# Patient Record
Sex: FEMALE | Race: WHITE | NOT HISPANIC OR LATINO
[De-identification: names, ages, dates, MRNs, and addresses within clinical notes are randomized per-mention and may not be internally consistent; named-entity substitution may affect disease eponyms.]

---

## 2023-05-22 ENCOUNTER — APPOINTMENT (OUTPATIENT)
Dept: ORTHOPEDIC SURGERY | Facility: CLINIC | Age: 88
End: 2023-05-22
Payer: MEDICARE

## 2023-05-22 DIAGNOSIS — I10 ESSENTIAL (PRIMARY) HYPERTENSION: ICD-10-CM

## 2023-05-22 PROBLEM — Z00.00 ENCOUNTER FOR PREVENTIVE HEALTH EXAMINATION: Status: ACTIVE | Noted: 2023-05-22

## 2023-05-22 PROCEDURE — 24500 CLTX HUMRL SHFT FX W/O MNPJ: CPT | Mod: LT

## 2023-05-22 PROCEDURE — 99204 OFFICE O/P NEW MOD 45 MIN: CPT | Mod: 25

## 2023-05-22 PROCEDURE — 73080 X-RAY EXAM OF ELBOW: CPT | Mod: LT

## 2023-05-22 PROCEDURE — A4565: CPT

## 2023-05-22 PROCEDURE — 73030 X-RAY EXAM OF SHOULDER: CPT | Mod: RT

## 2023-05-22 NOTE — IMAGING
[de-identified] : left elbow exam is limited due to guarding\par hand swelling\par ttp over distal humerus\par rom not assessed\par nvid [Left] : left elbow [FreeTextEntry1] : displaced distal humerus fracture

## 2023-05-22 NOTE — HISTORY OF PRESENT ILLNESS
[de-identified] : 5/22/23:  This is Ms. CARLEY ALLEN  a 89 year old female who comes in today complaining of left elbow pain since her knees buckled on friday night while she was walking up stairs ad she may have banged her elbow on her walker.  She went to [facility] ER and was diagnosed with humerus fx.   [] : no [de-identified] : SN HANNAH

## 2023-05-22 NOTE — ASSESSMENT
[FreeTextEntry1] : The patient was advised of the diagnosis. The natural history of the pathology was explained in full to the patient in layman's terms. All questions were answered. The risks and benefits of surgical and non-surgical treatment alternatives were explained in full to the patient.\par \par A splint was applied.  The importance of ice and elevation were discussed with the patient.  The risks were also discussed such as compartment syndrome and skin breakdown.  They were instructed to never put foreign objects down the splint.  Patients should call for increasing pain, worsening swelling, numbness, extremity discoloration, or any other concerns.\par \par Discussed with the pt and her family that surgery is not a good option due to her age.\par Discussed that it is very likely the elbow will be stiff permanently.\par F/u in 2 weeks for xrays

## 2023-05-30 RX ORDER — IBUPROFEN 600 MG/1
600 TABLET, FILM COATED ORAL EVERY 6 HOURS
Qty: 40 | Refills: 3 | Status: ACTIVE | COMMUNITY
Start: 2023-05-30 | End: 1900-01-01

## 2023-07-11 ENCOUNTER — APPOINTMENT (OUTPATIENT)
Dept: ORTHOPEDIC SURGERY | Facility: CLINIC | Age: 88
End: 2023-07-11
Payer: MEDICARE

## 2023-07-11 DIAGNOSIS — S42.492A OTHER DISPLACED FRACTURE OF LOWER END OF LEFT HUMERUS, INITIAL ENCOUNTER FOR CLOSED FRACTURE: ICD-10-CM

## 2023-07-11 PROCEDURE — 99024 POSTOP FOLLOW-UP VISIT: CPT

## 2023-07-11 PROCEDURE — 73080 X-RAY EXAM OF ELBOW: CPT | Mod: LT

## 2023-07-11 NOTE — ASSESSMENT
[FreeTextEntry1] : The patient was advised of the diagnosis. The natural history of the pathology was explained in full to the patient in layman's terms. All questions were answered. 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

## 2023-07-11 NOTE — IMAGING
[Left] : left elbow [de-identified] : left elbow exam is limited due to guarding\par hand swelling\par no ttp over distal humerus\par rom not assessed\par nvid [FreeTextEntry1] : displaced distal humerus fracture with possible callus formation

## 2023-07-11 NOTE — HISTORY OF PRESENT ILLNESS
[8] : 8 [7] : 7 [Constant] : constant [Nothing helps with pain getting better] : Nothing helps with pain getting better [Retired] : Work status: retired [de-identified] : 7/11/23:  Pt has been wearing long arm splint and is improving.  Pt reports that she has stiffness but pain is tolerable.\par \par 5/22/23:  This is Ms. CARLEY ALLEN  a 89 year old female who comes in today complaining of left elbow pain since her knees buckled on friday night while she was walking up stairs ad she may have banged her elbow on her walker.  She went to [facility] ER and was diagnosed with humerus fx.  \par \par 7/11/23: 7 weeks s/p distal humerus fracture. In splint. [] : no [de-identified] : SN HANNAH

## 2023-07-11 NOTE — DISCUSSION/SUMMARY
[Home] : at home, [unfilled] , at the time of the visit. [Medical Office: ([facility])___] : at the medical office located in  [FreeTextEntry1] : Family provided reassurance after stopping Percocet due to patient confusion.\par Provided rx for Ibuprofen 600 mg q6h prn pain.\par Family encouraged to call with any questions/concerns.

## 2024-02-14 ENCOUNTER — RESULT REVIEW (OUTPATIENT)
Age: 89
End: 2024-02-14

## 2024-02-14 ENCOUNTER — INPATIENT (INPATIENT)
Facility: HOSPITAL | Age: 89
LOS: 21 days | Discharge: NURSING, REHAB OR LONG-TERM CARE | DRG: 64 | End: 2024-03-07
Attending: INTERNAL MEDICINE | Admitting: STUDENT IN AN ORGANIZED HEALTH CARE EDUCATION/TRAINING PROGRAM
Payer: MEDICARE

## 2024-02-14 VITALS
RESPIRATION RATE: 16 BRPM | HEART RATE: 71 BPM | TEMPERATURE: 98 F | SYSTOLIC BLOOD PRESSURE: 128 MMHG | OXYGEN SATURATION: 92 % | DIASTOLIC BLOOD PRESSURE: 56 MMHG

## 2024-02-14 DIAGNOSIS — I63.9 CEREBRAL INFARCTION, UNSPECIFIED: ICD-10-CM

## 2024-02-14 LAB
ALBUMIN SERPL ELPH-MCNC: 3.9 G/DL — SIGNIFICANT CHANGE UP (ref 3.3–5)
ALP SERPL-CCNC: 115 U/L — SIGNIFICANT CHANGE UP (ref 40–120)
ALT FLD-CCNC: 9 U/L — LOW (ref 10–45)
ANION GAP SERPL CALC-SCNC: 12 MMOL/L — SIGNIFICANT CHANGE UP (ref 5–17)
APPEARANCE UR: CLEAR — SIGNIFICANT CHANGE UP
APTT BLD: 26.5 SEC — SIGNIFICANT CHANGE UP (ref 24.5–35.6)
AST SERPL-CCNC: 13 U/L — SIGNIFICANT CHANGE UP (ref 10–40)
BASOPHILS # BLD AUTO: 0.03 K/UL — SIGNIFICANT CHANGE UP (ref 0–0.2)
BASOPHILS NFR BLD AUTO: 0.4 % — SIGNIFICANT CHANGE UP (ref 0–2)
BILIRUB SERPL-MCNC: 0.4 MG/DL — SIGNIFICANT CHANGE UP (ref 0.2–1.2)
BILIRUB UR-MCNC: NEGATIVE — SIGNIFICANT CHANGE UP
BUN SERPL-MCNC: 59 MG/DL — HIGH (ref 7–23)
CALCIUM SERPL-MCNC: 8.7 MG/DL — SIGNIFICANT CHANGE UP (ref 8.4–10.5)
CHLORIDE SERPL-SCNC: 104 MMOL/L — SIGNIFICANT CHANGE UP (ref 96–108)
CO2 SERPL-SCNC: 24 MMOL/L — SIGNIFICANT CHANGE UP (ref 22–31)
COLOR SPEC: YELLOW — SIGNIFICANT CHANGE UP
CREAT SERPL-MCNC: 1.59 MG/DL — HIGH (ref 0.5–1.3)
DIFF PNL FLD: NEGATIVE — SIGNIFICANT CHANGE UP
EGFR: 30 ML/MIN/1.73M2 — LOW
EOSINOPHIL # BLD AUTO: 0.04 K/UL — SIGNIFICANT CHANGE UP (ref 0–0.5)
EOSINOPHIL NFR BLD AUTO: 0.5 % — SIGNIFICANT CHANGE UP (ref 0–6)
GLUCOSE SERPL-MCNC: 145 MG/DL — HIGH (ref 70–99)
GLUCOSE UR QL: NEGATIVE MG/DL — SIGNIFICANT CHANGE UP
HCT VFR BLD CALC: 28.9 % — LOW (ref 34.5–45)
HGB BLD-MCNC: 9.3 G/DL — LOW (ref 11.5–15.5)
IMM GRANULOCYTES NFR BLD AUTO: 0.4 % — SIGNIFICANT CHANGE UP (ref 0–0.9)
INR BLD: 1.32 RATIO — HIGH (ref 0.85–1.18)
KETONES UR-MCNC: NEGATIVE MG/DL — SIGNIFICANT CHANGE UP
LEUKOCYTE ESTERASE UR-ACNC: NEGATIVE — SIGNIFICANT CHANGE UP
LYMPHOCYTES # BLD AUTO: 1.26 K/UL — SIGNIFICANT CHANGE UP (ref 1–3.3)
LYMPHOCYTES # BLD AUTO: 15.9 % — SIGNIFICANT CHANGE UP (ref 13–44)
MCHC RBC-ENTMCNC: 28 PG — SIGNIFICANT CHANGE UP (ref 27–34)
MCHC RBC-ENTMCNC: 32.2 GM/DL — SIGNIFICANT CHANGE UP (ref 32–36)
MCV RBC AUTO: 87 FL — SIGNIFICANT CHANGE UP (ref 80–100)
MONOCYTES # BLD AUTO: 0.71 K/UL — SIGNIFICANT CHANGE UP (ref 0–0.9)
MONOCYTES NFR BLD AUTO: 9 % — SIGNIFICANT CHANGE UP (ref 2–14)
NEUTROPHILS # BLD AUTO: 5.85 K/UL — SIGNIFICANT CHANGE UP (ref 1.8–7.4)
NEUTROPHILS NFR BLD AUTO: 73.8 % — SIGNIFICANT CHANGE UP (ref 43–77)
NITRITE UR-MCNC: NEGATIVE — SIGNIFICANT CHANGE UP
NRBC # BLD: 0 /100 WBCS — SIGNIFICANT CHANGE UP (ref 0–0)
PH UR: 7 — SIGNIFICANT CHANGE UP (ref 5–8)
PLATELET # BLD AUTO: 213 K/UL — SIGNIFICANT CHANGE UP (ref 150–400)
POTASSIUM SERPL-MCNC: 4.3 MMOL/L — SIGNIFICANT CHANGE UP (ref 3.5–5.3)
POTASSIUM SERPL-SCNC: 4.3 MMOL/L — SIGNIFICANT CHANGE UP (ref 3.5–5.3)
PROT SERPL-MCNC: 6.5 G/DL — SIGNIFICANT CHANGE UP (ref 6–8.3)
PROT UR-MCNC: NEGATIVE MG/DL — SIGNIFICANT CHANGE UP
PROTHROM AB SERPL-ACNC: 13.7 SEC — HIGH (ref 9.5–13)
RBC # BLD: 3.32 M/UL — LOW (ref 3.8–5.2)
RBC # FLD: 17.4 % — HIGH (ref 10.3–14.5)
SODIUM SERPL-SCNC: 140 MMOL/L — SIGNIFICANT CHANGE UP (ref 135–145)
SP GR SPEC: >1.03 — HIGH (ref 1–1.03)
TROPONIN T, HIGH SENSITIVITY RESULT: 83 NG/L — HIGH (ref 0–51)
UROBILINOGEN FLD QL: 0.2 MG/DL — SIGNIFICANT CHANGE UP (ref 0.2–1)
WBC # BLD: 7.92 K/UL — SIGNIFICANT CHANGE UP (ref 3.8–10.5)
WBC # FLD AUTO: 7.92 K/UL — SIGNIFICANT CHANGE UP (ref 3.8–10.5)

## 2024-02-14 PROCEDURE — 70498 CT ANGIOGRAPHY NECK: CPT | Mod: 26,MA

## 2024-02-14 PROCEDURE — 0042T: CPT | Mod: MA

## 2024-02-14 PROCEDURE — 93306 TTE W/DOPPLER COMPLETE: CPT | Mod: 26

## 2024-02-14 PROCEDURE — 70450 CT HEAD/BRAIN W/O DYE: CPT | Mod: 26,MA,59

## 2024-02-14 PROCEDURE — 71045 X-RAY EXAM CHEST 1 VIEW: CPT | Mod: 26

## 2024-02-14 PROCEDURE — 99291 CRITICAL CARE FIRST HOUR: CPT

## 2024-02-14 PROCEDURE — 70496 CT ANGIOGRAPHY HEAD: CPT | Mod: 26,MA

## 2024-02-14 RX ORDER — ACETAMINOPHEN 500 MG
2 TABLET ORAL
Refills: 0 | DISCHARGE

## 2024-02-14 RX ORDER — FUROSEMIDE 40 MG
1 TABLET ORAL
Refills: 0 | DISCHARGE

## 2024-02-14 RX ORDER — SODIUM CHLORIDE 9 MG/ML
1000 INJECTION INTRAMUSCULAR; INTRAVENOUS; SUBCUTANEOUS
Refills: 0 | Status: DISCONTINUED | OUTPATIENT
Start: 2024-02-14 | End: 2024-02-18

## 2024-02-14 RX ORDER — HEPARIN SODIUM 5000 [USP'U]/ML
5000 INJECTION INTRAVENOUS; SUBCUTANEOUS EVERY 12 HOURS
Refills: 0 | Status: DISCONTINUED | OUTPATIENT
Start: 2024-02-14 | End: 2024-02-16

## 2024-02-14 RX ORDER — ASPIRIN/CALCIUM CARB/MAGNESIUM 324 MG
300 TABLET ORAL DAILY
Refills: 0 | Status: DISCONTINUED | OUTPATIENT
Start: 2024-02-14 | End: 2024-02-16

## 2024-02-14 RX ORDER — SODIUM CHLORIDE 9 MG/ML
1000 INJECTION INTRAMUSCULAR; INTRAVENOUS; SUBCUTANEOUS
Refills: 0 | Status: DISCONTINUED | OUTPATIENT
Start: 2024-02-14 | End: 2024-02-14

## 2024-02-14 RX ORDER — ATORVASTATIN CALCIUM 80 MG/1
80 TABLET, FILM COATED ORAL AT BEDTIME
Refills: 0 | Status: DISCONTINUED | OUTPATIENT
Start: 2024-02-14 | End: 2024-02-16

## 2024-02-14 RX ORDER — ACETAMINOPHEN 500 MG
650 TABLET ORAL ONCE
Refills: 0 | Status: COMPLETED | OUTPATIENT
Start: 2024-02-14 | End: 2024-02-14

## 2024-02-14 RX ADMIN — Medication 650 MILLIGRAM(S): at 17:04

## 2024-02-14 RX ADMIN — HEPARIN SODIUM 5000 UNIT(S): 5000 INJECTION INTRAVENOUS; SUBCUTANEOUS at 17:06

## 2024-02-14 RX ADMIN — Medication 300 MILLIGRAM(S): at 12:48

## 2024-02-14 RX ADMIN — SODIUM CHLORIDE 30 MILLILITER(S): 9 INJECTION INTRAMUSCULAR; INTRAVENOUS; SUBCUTANEOUS at 17:03

## 2024-02-14 RX ADMIN — Medication 260 MILLIGRAM(S): at 16:37

## 2024-02-14 RX ADMIN — SODIUM CHLORIDE 50 MILLILITER(S): 9 INJECTION INTRAMUSCULAR; INTRAVENOUS; SUBCUTANEOUS at 16:37

## 2024-02-14 NOTE — ED PROVIDER NOTE - PHYSICAL EXAMINATION
Gen: looks ill  Head: NC, AT   Eyes: right eyelid droop   ENT: normal hearing, patent oropharynx without erythema/exudate, uvula midline  Neck: supple, no tenderness, Trachea midline  Pulm: Bilateral BS, normal resp effort, no wheeze/stridor/retractions  CV: RRR, no M/R/G, 2+ radial and dp pulses bl, no edema  Abd: soft, NT/ND, +BS, no hepatosplenomegaly  Mskel: extremities x4 with normal ROM and no joint effusions. no ctl spine ttp.   Skin: no rash, no bruising   Neuro: aphasic, dysarthric. right upper ext not moving. left upper ext can resist gravity. bl lower ext can resist gravity. right facial droop. Gen: looks ill and generally frail  Head: NC, AT   Eyes: right eyelid droop   ENT: normal hearing, patent oropharynx without erythema/exudate, uvula midline  Neck: supple, no tenderness, Trachea midline  Pulm: Bilateral BS, normal resp effort, no wheeze/stridor/retractions  CV: RRR, signif systolic murmur. 2+ radial pulses. cannot palpate dp pulse bl. has pacemaker  Abd: soft, NT/ND, +BS, no hepatosplenomegaly  Mskel: left 2nd toe crosses over big toe. extremities x4 with normal ROM and no joint effusions. no ctl spine ttp.   Skin: no rash, no bruising   Neuro: aphasic, dysarthric. right upper ext not moving. left upper ext can resist gravity. bl lower ext can resist gravity. right facial droop.

## 2024-02-14 NOTE — H&P ADULT - NSHPPHYSICALEXAM_GEN_ALL_CORE
Physical Examination: INCOMPLETE  General - non-toxic appearing female in no acute distress  Cardiovascular - peripheral pulses palpable, no edema  Respiratory - breathing comfortably with no increased work of breathing    Neurologic Exam:  Mental status - Awake, Alert, Oriented to person, place, and time. Speech fluent, repetition and naming intact. Follows simple and complex commands. Attention/concentration, recent and remote memory (including registration 3/3 and recall 3/3), and fund of knowledge intact    Cranial nerves - PERRLA (4mm -> 3mm b/l), VFF, EOMI, face sensation (V1-V3) intact b/l, facial strength intact without asymmetry b/l, hearing intact b/l, palate with symmetric elevation, trapezius OR sternocleidomastiod 5/5 strength b/l, tongue midline on protrusion with full lateral movement    Motor - Normal bulk and tone throughout. No pronator drift.    Strength testing            Deltoid      Biceps      Triceps     Wrist Extension    Wrist Flexion     Interossei         R            5                 5               5                     5                              5                        5                 5  L             5                 5               5                     5                              5                        5                 5              Hip Flexion    Hip Extension    Knee Flexion    Knee Extension    Dorsiflexion    Plantar Flexion  R              5                         5                       5                           5                            5                          5  L              5                         5                        5                           5                            5                          5    Sensation - Light touch/temperature OR pain/vibration intact throughout    DTR's -             Biceps      Triceps     Brachioradialis      Patellar    Ankle    Toes/plantar response  R             2+             2+                  2+                       2+            2+                 Down  L              2+             2+                 2+                        2+           2+                 Down    Coordination - Finger to Nose intact b/l. No tremors appreciated    Gait and station - Normal casual gait. Romberg (-) Physical Examination:   General - lying in bed, intermittently moaning. Head turned to right side (aide reports baseline 2/2 arthritic issues)  Cardiovascular - no overt b/l LE edema appreciated on evaluation    Neurologic Exam:  Mental status - eyes closed, opens to repeated verbal stimuli. Follows intermittent simple commands to squeeze L hand/give thumbs up in L hand. Does not respond to orientation questions.   Cranial nerves - R pupil appears ?sluggishly reactive. No BTT in R eye. Unable to open left eye. ?R facial droop but may be 2/2 positioning of patient's head to R.  Motor - Normal bulk. Increased tone in RUE. Does not maintain antigravity when asked throughout all extremities initially, however does squeeze hand on LUE, withdraws slightly to noxious stimuli in R hand.   Upon re-evaluation able to raise both legs antigravity.  Sensation - Withdraws to noxious stimuli throughout.  DTR's - deferred  Coordination -deferred  Gait and station - deferred

## 2024-02-14 NOTE — STROKE CODE NOTE - IV ALTEPLASE EXCL REL HIDDEN
Sarah called from [facility] with concerns re: pt.    She states that pt has an open area on her buttocks that they've been treating with Venalax, but it isn't helping. They would like to apply duoderm and have their wound nurse look at it. Ok to do?    Sarah also states that when pt returned from hospital last week, they had discontinued her Paroxetine and Sarah is wondering if they could restart it due to behaviors?    Sarah also states that when pt returned from hospital last week, her orders for Vicodin were only PRN when previously they had been scheduled. Sarah states that pt is in a lot of pain and pt would like to have the Vicodin scheduled again along with PRN Vicodin?    (phones are down at [facility] so Sarah was using her cell phone, number noted above)   show

## 2024-02-14 NOTE — ED PROVIDER NOTE - OBJECTIVE STATEMENT
Kori Alfonso III, MD [facility], PA-C    Lower Extremity Surgery  Discharge Instructions      Please take the time to review the following instructions before you leave the hospital and use them as guidelines during your recovery from surgery. If you have any questions you may contact my office at [phone]. In the event of an emergency please call 911 or have someone take you to the nearest emergency room. Wound Care/Dressing Changes:    [x]   You may change your dressing as needed. Beginning the 2 days after you are discharged from the hospital you can change your dressing daily. A big, bulky dressing isn't necessary as long as there isn't any drainage from the incisions. There will be a piece of mesh tape over your incision that resembles gauze. This tape should stay on and you should not attempt to remove it. Showering/Bathing:    [x]     You may shower 2 days after surgery. Your dressing may be removed for showering. Do not soak your incision underwater. Weight Bearing Status/Braces/Activity:    You are weight bearing as tolerated on the operative extremity. Ice/Elevation:    Continue ice and elevation consistently for 48 hours after surgery. Medication:    You will be given a prescription for pain medications. This should have been given at your preop appointment. Refills of pain medication are authorized during office hours only (8AM - 5PM Mon thru Fri). You can take 1000 mg of Tylenol every 8 hours. Do not exceed 3000 mg of Tylenol per day. If you have been given Norco for post operative pain, do not take the Tylenol. You should use Aspirin 81 mg twice daily for 21 days from the date of your surgery. This will help to prevent blood clots from forming in your legs. This should be started at dinner the day of your surgery. If you are taking another medication such as Xarelto this should also be started the day after the surgery.   You 89F unknown med hx pw weakness. pt last seen yesterday night at 9:30pm by aide. this morning found by aide with right facial droop 89F unknown med hx pw weakness. pt last seen yesterday night at 9:30pm by aide. this morning found by aide with right facial droop and right upper ext weakness. baseline is not presently known as no family presented to the ed. all history was given by ems 89F unknown med hx pw weakness. pt last seen yesterday night at 9:30pm by aide. this morning found by aide with right facial droop and right upper ext weakness. baseline is not presently known as no family presented to the ed. all history was given by ems    attending ethan spoke with cardiologist dr leon her attending outside , he adds that she has mitral stenosis, tavr, and clean coronaries. pacemaker for heart block.

## 2024-02-14 NOTE — H&P ADULT - ASSESSMENT
ASSESSMENT       IMPRESSION     PLAN  [] admit to stroke unit  [] ASA 81 daily -> unless patient able to tolerate PO, in which case  ASA   [] Start Atorvastatin 80mg QHS, (goal LDL<70)  [] DVT prophylaxis  [] MRI brain w/o contrast to look at the extent and distribution of the potential stroke   [] TTE with telemetry to look for a cardiac source of embolism.  [] Check HbA1C and lipid panel  [] Telemonitoring; Neurochecks and vital signs Q2H  [] Permissive HTN up to 180/120 mmHg for first 24 hours after the symptom onset followed by gradual normotension.   [] BGM goals 140-180  [] PT/OT evaluation  [] NPO until clears dysphagia screen, otherwise swallow evaluation  [] Stroke education provided    Discussed with stroke fellow.  Patient to be seen by team and attending. Note finalized upon attending attestation.  ASSESSMENT   89-year-old left-handed woman with past medical history of CHF, HTN, pacemaker placement, valve replacement presenting as a code stroke for AMS, ?R-facial droop, R sided weakness. Was found by home aide at 830 2/14 less responsive, not moving extremities as usually does. No prior known hx strokes per family.At baseline patient alert, able to recognize/comprehend familiar faces, per daughters at bedside patient sometimes has difficulty with getting words out. Not candidate for tenecteplase as area of hypodensity already appreciated on CT head imaging/age, not candidate for thrombectomy given higher MRS.  LKW: 2130 2/13/24  NIHSS 18  MRS 4 (aide at bedside reports patient requires assistance with all ADLs, including getting out of bed, showering, changing clothes, walking, and since breaking L arm last year, requires also assistance with feeding)  BP per /70, glucose per .     IMPRESSION   R-sided weakness, encephalopathy c/f L brain dysfunction found to have acute L MCA stroke, mechanism ESUS.    PLAN  [] admit to stroke unit  [] ASA 81 daily -> unless patient able to tolerate PO, in which case  ASA   [] Start Atorvastatin 80mg QHS, (goal LDL<70)  [] DVT prophylaxis  [] MRI brain w/o contrast to look at the extent and distribution of the potential stroke   [] TTE with telemetry to look for a cardiac source of embolism.  [] Check HbA1C and lipid panel  [] Telemonitoring; Neurochecks and vital signs Q2H  [] Permissive HTN up to 180/120 mmHg for first 24 hours after the symptom onset followed by gradual normotension.   [] BGM goals 140-180  [] PT/OT evaluation  [] NPO until clears dysphagia screen, otherwise swallow evaluation  [] Stroke education provided  [] hold home anti-hypertensives for now    Decision to defer tenecteplase + thrombectomy given patient overall case also discussed with stroke fellow under supervision of stroke attending, and conveyed to patient family.  Patient to be seen by team and attending in AM. Note finalized upon attending attestation.  ASSESSMENT   89-year-old left-handed woman with past medical history of CHF, HTN, pacemaker placement, valve replacement presenting as a code stroke for AMS, ?R-facial droop, R sided weakness. Was found by home aide at 830 2/14 less responsive, not moving extremities as usually does. No prior known hx strokes per family.At baseline patient alert, able to recognize/comprehend familiar faces, per daughters at bedside patient sometimes has difficulty with getting words out. Not candidate for tenecteplase as area of hypodensity already appreciated on CT head imaging/age, not candidate for thrombectomy given higher MRS.  LKW: 2130 2/13/24  NIHSS 18  MRS 4 (aide at bedside reports patient requires assistance with all ADLs, including getting out of bed, showering, changing clothes, walking, and since breaking L arm last year, requires also assistance with feeding)  BP per /70, glucose per .     IMPRESSION   R-sided weakness, encephalopathy c/f L brain dysfunction found to have acute L MCA stroke, mechanism ESUS.    PLAN  [] admit to stroke unit  [] ASA 81 daily -> unless patient able to tolerate PO, in which case  ASA   [] Start Atorvastatin 80mg QHS, (goal LDL<70)  [] DVT prophylaxis  [] MRI brain w/o contrast to look at the extent and distribution of the potential stroke   [] TTE with telemetry to look for a cardiac source of embolism.  [] Check HbA1C and lipid panel  [] Telemonitoring; Neurochecks and vital signs Q2H  [] Permissive HTN up to 180/120 mmHg for first 24 hours after the symptom onset followed by gradual normotension.   [] BGM goals 140-180  [] PT/OT evaluation  [] NPO until clears dysphagia screen, otherwise swallow evaluation  [] Stroke education provided  [] hold home anti-hypertensives for now    Decision to defer tenecteplase + thrombectomy given patient overall case also discussed with stroke fellow under supervision of stroke attending, and discussed with patient family.  Patient to be seen by team and attending in AM. Note finalized upon attending attestation.  ASSESSMENT   89-year-old left-handed woman with past medical history of CHF, HTN, pacemaker placement, valve replacement presenting as a code stroke for AMS, ?R-facial droop, R sided weakness. Was found by home aide at 830 2/14 less responsive, not moving extremities as usually does. No prior known hx strokes per family.At baseline patient alert, able to recognize/comprehend familiar faces, per daughters at bedside patient sometimes has difficulty with getting words out. Not candidate for tenecteplase as area of hypodensity already appreciated on CT head imaging/age, not candidate for thrombectomy given higher MRS.  LKW: 2130 2/13/24  NIHSS 18  MRS 4 (aide at bedside reports patient requires assistance with all ADLs, including getting out of bed, showering, changing clothes, walking, and since breaking L arm last year, requires also assistance with feeding)  BP per /70, glucose per .     IMPRESSION   R-sided weakness, encephalopathy c/f L brain dysfunction found to have acute L frontoparietal infarct, mechanism likely embolic    PLAN  [] admit to stroke unit  [] ASA 81 daily -> unless patient able to tolerate PO, in which case  ASA   [] Start Atorvastatin 80mg QHS, (goal LDL<70)  [] DVT prophylaxis  [] MRI brain w/o contrast to look at the extent and distribution of the potential stroke   [] TTE with telemetry to look for a cardiac source of embolism.  [] Check HbA1C and lipid panel  [] Telemonitoring; Neurochecks and vital signs Q2H  [] Permissive HTN up to 180/120 mmHg for first 24 hours after the symptom onset followed by gradual normotension.   [] BGM goals 140-180  [] PT/OT evaluation  [] NPO until clears dysphagia screen, otherwise swallow evaluation  [] Stroke education provided  [] hold home anti-hypertensives for now    Decision to defer tenecteplase + thrombectomy given patient overall case also discussed with stroke fellow under supervision of stroke attending, and discussed with patient family.  Patient to be seen by team and attending in AM. Note finalized upon attending attestation.  ASSESSMENT   89-year-old left-handed woman with past medical history of CHF, HTN, pacemaker placement, valve replacement presenting as a code stroke for AMS, ?R-facial droop, R sided weakness. Was found by home aide at 830 2/14 less responsive, not moving extremities as usually does. No prior known hx strokes per family.At baseline patient alert, able to recognize/comprehend familiar faces, per daughters at bedside patient sometimes has difficulty with getting words out. Not candidate for tenecteplase as area of hypodensity already appreciated on CT head imaging/age, not candidate for thrombectomy given higher MRS.  LKW: 2130 2/13/24  NIHSS 18  MRS 4 (aide at bedside reports patient requires assistance with all ADLs, including getting out of bed, showering, changing clothes, walking, and since breaking L arm last year, requires also assistance with feeding)  BP per /70, glucose per .     IMPRESSION   R-sided weakness, encephalopathy c/f L brain dysfunction found to have acute L frontoparietal infarct, mechanism likely embolic vs ICAD    PLAN  [] admit to stroke unit  [] ASA 81 daily -> unless patient able to tolerate PO, in which case  ASA   [] Start Atorvastatin 80mg QHS, (goal LDL<70)  [] DVT prophylaxis  [] MRI brain w/o contrast to look at the extent and distribution of the potential stroke   [] TTE with telemetry to look for a cardiac source of embolism.  [] Check HbA1C and lipid panel  [] Telemonitoring; Neurochecks and vital signs Q2H  [] Permissive HTN up to 180/120 mmHg for first 24 hours after the symptom onset followed by gradual normotension.   [] BGM goals 140-180  [] PT/OT evaluation  [] NPO until clears dysphagia screen, otherwise swallow evaluation  [] Stroke education provided  [] hold home anti-hypertensives for now    Decision to defer tenecteplase + thrombectomy given patient overall case also discussed with stroke fellow under supervision of stroke attending, and discussed with patient family.  Patient to be seen by team and attending in AM. Note finalized upon attending attestation.

## 2024-02-14 NOTE — H&P ADULT - NSHPADDITIONALINFOADULT_GEN_ALL_CORE
89 yo LH female w/ PMH CHF, HTN, pacemaker placement, valve replacement, p/w AMS, ?R-facial droop, R sided weakness, found by home aide at 830 2/14 less responsive, not moving extremities as usually does. LKN 2130 on 2/13/24. NIHSS 18 (unknown basline NIHSS)  MRS 4 (aide at bedside reports patient requires assistance with all ADLs, including getting out of bed, showering, changing clothes, walking, and since breaking L arm last year, requires also assistance with feeding)  BP per /70, glucose per .   A/P   #L-MCA region hypodensity on CTH  - will not consider TNK, wakeup protocol, due to well-established stroke territory, the risk of rebleed in the setting of 89yo is high   -Will not consider MT d/t no complete occlusion. and mRS4    Plan:  rectal 300mg ASA if cannot PO overnight  SBP<180  code and goal of care discussion  [facility] when exam changes  Pt was staffed with me and discussed with Dr Yovany Ruffin MD PhD  Vascular neurology fellow

## 2024-02-14 NOTE — ED ADULT NURSE NOTE - NSFALLHARMRISKINTERV_ED_ALL_ED

## 2024-02-14 NOTE — CHART NOTE - NSCHARTNOTEFT_GEN_A_CORE
*  HPI: 89-year-old left-handed woman with past medical history of CHF, HTN, pacemaker placement, valve replacement presenting as a code stroke for AMS, ?R-facial droop, R sided weakness.  Was found by home aide at 830 2/14 less responsive, not moving extremities as usually does. No prior known hx strokes per family.  At baseline patient alert, able to recognize/comprehend familiar faces, per daughters at bedside patient sometimes has difficulty with getting words out.    SH: No smoking ETOH use recreational drug use.  LKW: 2130 2/13/24  NIHSS 18  MRS 4 (aide at bedside reports patient requires assistance with all ADLs, including getting out of bed, showering, changing clothes, walking, and since breaking L arm last year, requires also assistance with feeding)  BP per /70, glucose per .     Information obtained from aide at bedside, then after CT scanner family also at bedside. (14 Feb 2024 12:33)    ROS: negative unless otherwise noted.       EVENT: Notified by RN that report from ED nurse had noted NIHSS 12, however on the admission to stroke unit assessment by stroke RN, NIHSS was 17. This assessment is consistent with the physical exam performed by the neurology resident during the initial code stroke (NIH 18) and the H&P admission. Patient does not appear in acute distress. Per family, patient has appeared the same since they first came to the emergency department. No further intervention required at this time.         PHYSICAL EXAM:   Vital Signs Last 24 Hrs  T(C): 36.7 (21 Feb 2024 08:00), Max: 36.7 (21 Feb 2024 00:00)  T(F): 98 (21 Feb 2024 08:00), Max: 98 (21 Feb 2024 00:00)  HR: 90 (21 Feb 2024 14:00) (81 - 110)  BP: 138/54 (21 Feb 2024 14:00) (107/45 - 138/61)  BP(mean): 78 (21 Feb 2024 14:00) (62 - 88)  RR: 25 (21 Feb 2024 14:00) (16 - 29)  SpO2: 96% (21 Feb 2024 14:00) (92% - 99%)      NEUROLOGICAL EXAM:  Mental status: Eyes closed, open intermittently to repetitive verbal stimuli. Does not respond to orientation questions, incomprehensible groans intermittently. Follows some simple commands.   Cranial Nerves: R facial asymmetry. Decreased BTT on R.  Motor exam: RUE no effort/ RLE drift. LUE some effort/ LLE drift.   Sensation: Withdraws to noxious stimuli throughout  Coordination/ Gait: deferred    LABS:                        8.2    5.91  )-----------( 179      ( 21 Feb 2024 06:32 )             27.1    02-21    152<H>  |  122<H>  |  24<H>  ----------------------------<  118<H>  5.1   |  19<L>  |  1.14    Ca    8.4      21 Feb 2024 06:32  Phos  1.8     02-20  Mg     2.5     02-20    TPro  6.3  /  Alb  3.7  /  TBili  0.6  /  DBili  x   /  AST  15  /  ALT  13  /  AlkPhos  97  02-20  PT/INR - ( 20 Feb 2024 14:40 )   PT: 13.5 sec;   INR: 1.30 ratio         PTT - ( 20 Feb 2024 14:40 )  PTT:24.6 sec      Impression: Global aphasia, R hemiparesis due to acute L frontoparietal infarct, mechanism likely embolic vs ICAD      Plan:  - No additional intervention, continue with stroke work up  - Nursing Interventions; Continue Telemetry Monitor, Continuous Pulse Oximeter, Neurochecks q2h, Oxygen prn      Time spent with patient:  20 minutes

## 2024-02-14 NOTE — ED PROVIDER NOTE - CLINICAL SUMMARY MEDICAL DECISION MAKING FREE TEXT BOX
right side weakness and right facial droop. likely cva. lkw is 930pm on 2/13/24, so not a good tpa candidate. awaiting remainder of hx from fam right side weakness and right facial droop. likely cva. lkw is 930pm on 2/13/24, so not a good tnk candidate. awaiting remainder of hx from fam  I read ekg as sinus rhythm with first degree av block with pvcs, lad, lbbb, qtc 516  case dw neuro resident dr del castillo. we agree no need for thrombectomy or tnk. admit to stroke unit.

## 2024-02-14 NOTE — H&P ADULT - HISTORY OF PRESENT ILLNESS
89-year-old left-handed woman with past medical history of CHF, HTN, pacemaker placement, valve replacement presenting as a code stroke for AMS, ?R-facial droop, R sided weakness.  Was found by home aide at 830 2/14 less responsive, not moving extremities as usually does. No prior known hx strokes per family.    At baseline patient alert, able to recognize/comprehend familiar faces, per daughters at bedside patient sometimes has difficulty with getting words out.  SH: No smoking ETOH use recreational drug use.  LKW: 2130 2/13/24  NIHSS 18  MRS 4 (aide at bedside reports patient requires assistance with all ADLs, including getting out of bed, showering, changing clothes, walking, and since breaking L arm last year, requires also assistance with feeding)  BP per /70, glucose per .     Information obtained from aide at bedside, then after CT scanner family also at bedside. 89-year-old left-handed woman with past medical history of CHF, HTN, pacemaker placement, valve replacement presenting as a code stroke for AMS, ?R-facial droop, R sided weakness.  Was found by home aide at 830 2/14 less responsive, not moving extremities as usually does. No prior known hx strokes per family.  At baseline patient alert, able to recognize/comprehend familiar faces, per daughters at bedside patient sometimes has difficulty with getting words out.    SH: No smoking ETOH use recreational drug use.  LKW: 2130 2/13/24  NIHSS 18  MRS 4 (aide at bedside reports patient requires assistance with all ADLs, including getting out of bed, showering, changing clothes, walking, and since breaking L arm last year, requires also assistance with feeding)  BP per /70, glucose per .     Information obtained from aide at bedside, then after CT scanner family also at bedside.

## 2024-02-14 NOTE — ED PROVIDER NOTE - CRITICAL CARE ATTENDING CONTRIBUTION TO CARE
pt with stroke. considered for tnk and thrombectomy  not a good candidate for either  plan for stroke admit

## 2024-02-14 NOTE — STROKE CODE NOTE - NSSTROKETPAEXCLABS_HEADCT
Head CT suggesting an established acute cerebral infarction as evidenced by selene hypodensity, regardless of size

## 2024-02-14 NOTE — ED ADULT NURSE NOTE - HIV OFFER
Post-Care Instructions: I reviewed with the patient in detail post-care instructions. Patient is to wear sunprotection, and avoid picking at any of the treated lesions. Pt may apply Vaseline to crusted or scabbing areas. Render Post-Care Instructions In Note?: no Duration Of Freeze Thaw-Cycle (Seconds): 3 Detail Level: Detailed Number Of Freeze-Thaw Cycles: 3 freeze-thaw cycles Consent: The patient's consent was obtained including but not limited to risks of crusting, scabbing, blistering, scarring, darker or lighter pigmentary change, recurrence, incomplete removal and infection. Opt out

## 2024-02-14 NOTE — H&P ADULT - NSHPLABSRESULTS_GEN_ALL_CORE
LABS:                        9.3    7.92  )-----------( 213      ( 14 Feb 2024 11:56 )             28.9     02-14    140  |  104  |  59<H>  ----------------------------<  145<H>  4.3   |  24  |  1.59<H>    Ca    8.7      14 Feb 2024 11:56    TPro  6.5  /  Alb  3.9  /  TBili  0.4  /  DBili  x   /  AST  13  /  ALT  9<L>  /  AlkPhos  115  02-14    PT/INR - ( 14 Feb 2024 11:56 )   PT: 13.7 sec;   INR: 1.32 ratio         PTT - ( 14 Feb 2024 11:56 )  PTT:26.5 sec LABS:                        9.3    7.92  )-----------( 213      ( 14 Feb 2024 11:56 )             28.9     02-14    140  |  104  |  59<H>  ----------------------------<  145<H>  4.3   |  24  |  1.59<H>    Ca    8.7      14 Feb 2024 11:56    TPro  6.5  /  Alb  3.9  /  TBili  0.4  /  DBili  x   /  AST  13  /  ALT  9<L>  /  AlkPhos  115  02-14    PT/INR - ( 14 Feb 2024 11:56 )   PT: 13.7 sec;   INR: 1.32 ratio         PTT - ( 14 Feb 2024 11:56 )  PTT:26.5 sec    CT brain:  Acute left frontoparietalinfarct. No CT evidence for selene hemorrhagic   transformation.    CT brain perfusion:  Significantly limited by motion.  Cerebral blood flow less than 30% = 0 mL. No core infarct predicted.  Tmax greater than 6 seconds = 46 mL and involves the bilateral mesial   cerebellar hemispheres, the right temporal lobe, bilateral frontal lobes,   and left posterior temporal. This represents brain parenchyma predicted   to be at continued ischemic risk in the presence of neurologic symptoms.   This finding is likely spurious in nature.  Mismatch volume 46 mL  Mismatch ratio infinite    CTA brain:  Intraluminal filling defect involving the left ICA terminus and extending   into the proximal right A1 segment.  Normal flow-related enhancement of the remaining left LEBRON. Normal   flow-related enhancement of the left MCA.  Right MCA enhances to a lesser degree than the left MCA.  No vascular aneurysm or AVM.    CTA neck:  Approximately 50% short segment stenosis of the origin and proximal   segment of the left ICA due to calcified plaque. Normal flow-related   enhancement of the more distal vessel.  Approximately 75-80% short segment stenosis of the proximal segment of   the right internal carotid artery due to partially calcified plaque.   Normal flow-related enhancement of the more distal vessel.    No evidence for arterial dissection. LABS:                        9.3    7.92  )-----------( 213      ( 14 Feb 2024 11:56 )             28.9     02-14    140  |  104  |  59<H>  ----------------------------<  145<H>  4.3   |  24  |  1.59<H>    Ca    8.7      14 Feb 2024 11:56    TPro  6.5  /  Alb  3.9  /  TBili  0.4  /  DBili  x   /  AST  13  /  ALT  9<L>  /  AlkPhos  115  02-14    PT/INR - ( 14 Feb 2024 11:56 )   PT: 13.7 sec;   INR: 1.32 ratio    PTT - ( 14 Feb 2024 11:56 )  PTT:26.5 sec    CT brain:  Acute left frontoparietalinfarct. No CT evidence for selene hemorrhagic   transformation.    CT brain perfusion:  Significantly limited by motion.  Cerebral blood flow less than 30% = 0 mL. No core infarct predicted.  Tmax greater than 6 seconds = 46 mL and involves the bilateral mesial   cerebellar hemispheres, the right temporal lobe, bilateral frontal lobes,   and left posterior temporal. This represents brain parenchyma predicted   to be at continued ischemic risk in the presence of neurologic symptoms.   This finding is likely spurious in nature.  Mismatch volume 46 mL  Mismatch ratio infinite    CTA brain:  Intraluminal filling defect involving the left ICA terminus and extending   into the proximal right A1 segment.  Normal flow-related enhancement of the remaining left LEBRON. Normal   flow-related enhancement of the left MCA.  Right MCA enhances to a lesser degree than the left MCA.  No vascular aneurysm or AVM.    CTA neck:  Approximately 50% short segment stenosis of the origin and proximal   segment of the left ICA due to calcified plaque. Normal flow-related   enhancement of the more distal vessel.  Approximately 75-80% short segment stenosis of the proximal segment of   the right internal carotid artery due to partially calcified plaque.   Normal flow-related enhancement of the more distal vessel.    No evidence for arterial dissection.

## 2024-02-14 NOTE — H&P ADULT - CRITICAL CARE ATTENDING COMMENT
89 Left handed female with CHF, HTN, PPM, severe arthritis R > L, wheelchair bound and minimally verbal at baseline here with R hemiparesis and ?aphasia. Out of tnk window and not candidate for wakeup stroke due to large hypodensity on CTH. CTA with no clear LVO but not thrombectomy candidate due to MRS of 4.   Initial NIHSS reported to be 18  CTH with L M2 superior division hypodensity  CTA with mild R ICA stenosis in the supraclinoid region, 50% L ICA stenosis proximally, 75-80% stenosis R ICA  CTP nondiagnostic   Has baseline R hemiparesis due to severe arthritis per daughter, LUE limited due to broken humerus that was never repaired  Left handed but still appears to be left brain dominant as she is mute and not following commands, may be related to mental status   Failed dysphagia eval, will give gentle hydration for now but cautious for volume overload given elev proBNP and CHF  PPM interrogated without events  L M2 superior division infarct - etiology TUCKER vs ESUS   MRI brain unlikely to change mgmt - will d/c  Will need to continue to address GOC with family 89 Left handed female with CHF, HTN, PPM, severe arthritis R > L, wheelchair bound and minimally verbal at baseline here with R hemiparesis and ?aphasia. Out of tnk window and not candidate for wakeup stroke due to large hypodensity on CTH. CTA with no clear LVO but not thrombectomy candidate due to MRS of 4.   Initial NIHSS reported to be 18  CTH with L M2 superior division hypodensity  CTA with mild R ICA stenosis in the supraclinoid region, 50% L ICA stenosis proximally and intraluminal filling defect starting at the L ICA, 75-80% stenosis R ICA  CTP nondiagnostic   Has baseline R hemiparesis due to severe arthritis per daughter, LUE limited due to broken humerus that was never repaired  Left handed but still appears to be left brain dominant as she is mute and not following commands, may be related to mental status   Failed dysphagia eval, will give gentle hydration for now but cautious for volume overload given elev proBNP and CHF  PPM interrogated without events  L M2 superior division infarct - etiology likely cardioembolic   Will start hep gtt goal ptt 40-60, will check CTH to eval for hemorrhagic conversion  MRI brain unlikely to change mgmt - will d/c  Will need to continue to address GOC with family, likely PEG 89 Left handed female with CHF, HTN, PPM, severe arthritis R > L, wheelchair bound and minimally verbal at baseline here with R hemiparesis and ?aphasia. Out of tnk window and not candidate for wakeup stroke due to large hypodensity on CTH. CTA with no clear LVO but not thrombectomy candidate due to MRS of 4.   Initial NIHSS reported to be 18  CTH with L M2 superior division hypodensity  CTA with mild R ICA stenosis in the supraclinoid region, 50% L ICA stenosis proximally and intraluminal filling defect starting at the L ICA, 75-80% stenosis R ICA  CTP nondiagnostic   Has baseline R hemiparesis due to severe arthritis per daughter, LUE limited due to broken humerus that was never repaired  Left handed but still appears to be left brain dominant as she is mute and not following commands, may be related to mental status   Failed dysphagia eval, will give gentle hydration for now but cautious for volume overload given elev proBNP and CHF  PPM interrogated without events  L M2 superior division infarct - etiology likely cardioembolic   Will need to continue to address GOC with family, likely PEG

## 2024-02-14 NOTE — ED ADULT NURSE NOTE - OBJECTIVE STATEMENT
Pt is 89y F with PMH HLD, CHF on furosemide, pacemaker, arthritis, valve replacement, complaining of facial droop. Code Stroke called 1120. . Pt's aide reports pt going to sleep 2130 yesterday, waking up to onset of AMS, slurred speech, and flaccid RUE this morning 0830. Reports baseline R lean preference and denies any changes in facial droop. Reports baseline ambulatory with walker and 1 person assist, this morning unable to move extremities. Pt unable to speak coherently, but follows commands. Withdraws from pain bilateral lower extremities, but unable to raise all extremities. L swollen eyelid, unable to open, baseline as per aide. Reports previous L arm fracture. Transported to CT scanner. Aide at baseline. Pt is 89y F with PMH HLD, CHF on furosemide, pacemaker, arthritis, valve replacement, complaining of facial droop. Code Stroke called 1120. . Pt's aide reports pt going to sleep 2130 yesterday, waking up to onset of AMS, slurred speech, and weaker RUE this morning 0830. Reports baseline R lean preference and denies any changes in facial symmetry. Reports baseline ambulatory with walker and 1 person assist, this morning unable to move all extremities. Pt unable to speak coherently, severe aphasia and dysarthria, almost no intelligible words. Able to follow commands with LUE. Withdraws from pain bilateral lower extremities. Aide reports previous L arm fracture. L arm some effort against gravity but drifts down to bed. Unable to raise all other extremities. L swollen eyelid, unable to open to assess pupils, baseline as per aide. Transported to CT scanner. Aide at baseline.

## 2024-02-15 DIAGNOSIS — I05.0 RHEUMATIC MITRAL STENOSIS: ICD-10-CM

## 2024-02-15 DIAGNOSIS — I63.9 CEREBRAL INFARCTION, UNSPECIFIED: ICD-10-CM

## 2024-02-15 DIAGNOSIS — Z95.0 PRESENCE OF CARDIAC PACEMAKER: ICD-10-CM

## 2024-02-15 LAB
A1C WITH ESTIMATED AVERAGE GLUCOSE RESULT: 6.3 % — HIGH (ref 4–5.6)
ANION GAP SERPL CALC-SCNC: 14 MMOL/L — SIGNIFICANT CHANGE UP (ref 5–17)
BUN SERPL-MCNC: 55 MG/DL — HIGH (ref 7–23)
CALCIUM SERPL-MCNC: 8.9 MG/DL — SIGNIFICANT CHANGE UP (ref 8.4–10.5)
CHLORIDE SERPL-SCNC: 108 MMOL/L — SIGNIFICANT CHANGE UP (ref 96–108)
CHOLEST SERPL-MCNC: 136 MG/DL — SIGNIFICANT CHANGE UP
CO2 SERPL-SCNC: 23 MMOL/L — SIGNIFICANT CHANGE UP (ref 22–31)
CREAT SERPL-MCNC: 1.61 MG/DL — HIGH (ref 0.5–1.3)
EGFR: 29 ML/MIN/1.73M2 — LOW
ESTIMATED AVERAGE GLUCOSE: 134 MG/DL — HIGH (ref 68–114)
GLUCOSE SERPL-MCNC: 124 MG/DL — HIGH (ref 70–99)
HCT VFR BLD CALC: 28 % — LOW (ref 34.5–45)
HDLC SERPL-MCNC: 45 MG/DL — LOW
HGB BLD-MCNC: 8.8 G/DL — LOW (ref 11.5–15.5)
LIPID PNL WITH DIRECT LDL SERPL: 76 MG/DL — SIGNIFICANT CHANGE UP
MCHC RBC-ENTMCNC: 27.8 PG — SIGNIFICANT CHANGE UP (ref 27–34)
MCHC RBC-ENTMCNC: 31.4 GM/DL — LOW (ref 32–36)
MCV RBC AUTO: 88.3 FL — SIGNIFICANT CHANGE UP (ref 80–100)
NON HDL CHOLESTEROL: 91 MG/DL — SIGNIFICANT CHANGE UP
NRBC # BLD: 0 /100 WBCS — SIGNIFICANT CHANGE UP (ref 0–0)
NT-PROBNP SERPL-SCNC: 8756 PG/ML — HIGH (ref 0–300)
PLATELET # BLD AUTO: 196 K/UL — SIGNIFICANT CHANGE UP (ref 150–400)
POTASSIUM SERPL-MCNC: 3.8 MMOL/L — SIGNIFICANT CHANGE UP (ref 3.5–5.3)
POTASSIUM SERPL-SCNC: 3.8 MMOL/L — SIGNIFICANT CHANGE UP (ref 3.5–5.3)
RBC # BLD: 3.17 M/UL — LOW (ref 3.8–5.2)
RBC # FLD: 17.5 % — HIGH (ref 10.3–14.5)
SODIUM SERPL-SCNC: 145 MMOL/L — SIGNIFICANT CHANGE UP (ref 135–145)
TRIGL SERPL-MCNC: 79 MG/DL — SIGNIFICANT CHANGE UP
WBC # BLD: 6.47 K/UL — SIGNIFICANT CHANGE UP (ref 3.8–10.5)
WBC # FLD AUTO: 6.47 K/UL — SIGNIFICANT CHANGE UP (ref 3.8–10.5)

## 2024-02-15 PROCEDURE — 93279 PRGRMG DEV EVAL PM/LDLS PM: CPT | Mod: 26

## 2024-02-15 RX ORDER — ACETAMINOPHEN 500 MG
650 TABLET ORAL ONCE
Refills: 0 | Status: COMPLETED | OUTPATIENT
Start: 2024-02-15 | End: 2024-02-15

## 2024-02-15 RX ORDER — ALBUTEROL 90 UG/1
2.5 AEROSOL, METERED ORAL ONCE
Refills: 0 | Status: COMPLETED | OUTPATIENT
Start: 2024-02-15 | End: 2024-02-15

## 2024-02-15 RX ORDER — ALBUTEROL 90 UG/1
2.5 AEROSOL, METERED ORAL
Refills: 0 | Status: DISCONTINUED | OUTPATIENT
Start: 2024-02-15 | End: 2024-02-15

## 2024-02-15 RX ORDER — ALBUTEROL 90 UG/1
2.5 AEROSOL, METERED ORAL
Refills: 0 | Status: DISCONTINUED | OUTPATIENT
Start: 2024-02-15 | End: 2024-02-16

## 2024-02-15 RX ADMIN — HEPARIN SODIUM 5000 UNIT(S): 5000 INJECTION INTRAVENOUS; SUBCUTANEOUS at 06:01

## 2024-02-15 RX ADMIN — Medication 650 MILLIGRAM(S): at 13:44

## 2024-02-15 RX ADMIN — Medication 260 MILLIGRAM(S): at 13:14

## 2024-02-15 RX ADMIN — ALBUTEROL 2.5 MILLIGRAM(S): 90 AEROSOL, METERED ORAL at 14:13

## 2024-02-15 RX ADMIN — HEPARIN SODIUM 5000 UNIT(S): 5000 INJECTION INTRAVENOUS; SUBCUTANEOUS at 17:23

## 2024-02-15 RX ADMIN — Medication 300 MILLIGRAM(S): at 13:14

## 2024-02-15 RX ADMIN — Medication 260 MILLIGRAM(S): at 06:02

## 2024-02-15 RX ADMIN — Medication 650 MILLIGRAM(S): at 07:25

## 2024-02-15 RX ADMIN — Medication 260 MILLIGRAM(S): at 18:04

## 2024-02-15 RX ADMIN — ALBUTEROL 2.5 MILLIGRAM(S): 90 AEROSOL, METERED ORAL at 21:07

## 2024-02-15 RX ADMIN — Medication 650 MILLIGRAM(S): at 19:00

## 2024-02-15 NOTE — CHART NOTE - NSCHARTNOTEFT_GEN_A_CORE
EVENT:   Called to patients bedside by nurse for increase in NIHSS  As per RN, patient NIHSS went from 18 to 22 for no effort antigravity in all extremity. She is still able to move all extremities and withdraw from pain. She is unable to answer neither LOC question and need sternal rub to participate. My assessment validates RNs assessment. Vital signs remain stable, home aide at bedside states this is what prompted her to call 911 for hospital admission. no sign of distress, SOB, or Pain. Discussed with fellow. no need for CTH at this time.       aspirin Suppository 300 milliGRAM(s) Rectal daily  atorvastatin 80 milliGRAM(s) Oral at bedtime  heparin   Injectable 5000 Unit(s) SubCutaneous every 12 hours  sodium chloride 0.9%. 1000 milliLiter(s) IV Continuous <Continuous>      CONSTITUTIONAL: No weight loss, weakness, fevers or chills  EYES/ENT: No visual changes;  No vertigo or throat pain   NECK: No pain or stiffness, swollen neck  RESPIRATORY: No shortness of breath, cough, wheezing, hemoptysis or orthopnea  CARDIOVASCULAR: No chest pain or palpitations, dyspnea on exertion, LE swelling  GASTROINTESTINAL: No abdominal or epigastric pain; No nausea, vomiting, or hematemesis; No diarrhea or constipation; No melena or hematochezia.  GENITOURINARY: No dysuria, frequency incontinence or hematuria.  NEUROLOGICAL: No loss of sensation,  numbness, tingling, or weakness, seizure or blackouts.   SKIN: No itching, burning, rashes, or lesions   All other review of systems is negative unless indicated above.    Family History  Surgical History    PHYSICAL EXAM:   Vital Signs Last 24 Hrs  T(C): 37.1 (15 Feb 2024 04:08), Max: 37.1 (15 Feb 2024 04:08)  T(F): 98.8 (15 Feb 2024 04:08), Max: 98.8 (15 Feb 2024 04:08)  HR: 69 (15 Feb 2024 04:08) (60 - 73)  BP: 130/77 (15 Feb 2024 04:08) (100/70 - 147/67)  BP(mean): 96 (15 Feb 2024 04:08) (76 - 97)  RR: 25 (15 Feb 2024 04:08) (15 - 26)  SpO2: 100% (15 Feb 2024 04:08) (92% - 100%)    Parameters below as of 15 Feb 2024 04:08  Patient On (Oxygen Delivery Method): nasal cannula  O2 Flow (L/min): 2      General: Alert and Oriented x 0-1. No acute Distress. Well Nourished, Well Developed  Cardiac: Regular Heart Rate and Rhythm. No Murmur. No Jugular Venous Distension. No Peripheral Edema.  Pulmonary: Clear Breath Sounds to Auscultation Bilateraly. No Accessory Muscle use.       NEUROLOGICAL EXAM:  Mental status: alert with sternal rub, oriented x 0-1, does not follows commands,   Cranial Nerves: No facial asymmetry, no nystagmus, no dysarthria,  tongue midline  Sensation: Intact to light touch   Coordination/ Gait: No dysmetria, gait not tested    LABS:                        8.8    6.47  )-----------( 196      ( 15 Feb 2024 05:25 )             28.0    02-15    145  |  108  |  55<H>  ----------------------------<  124<H>  3.8   |  23  |  1.61<H>    Ca    8.9      15 Feb 2024 05:25    TPro  6.5  /  Alb  3.9  /  TBili  0.4  /  DBili  x   /  AST  13  /  ALT  9<L>  /  AlkPhos  115  02-14  PT/INR - ( 14 Feb 2024 11:56 )   PT: 13.7 sec;   INR: 1.32 ratio         PTT - ( 14 Feb 2024 11:56 )  PTT:26.5 sec      IMAGING: Reviewed by me.   CT Head:  CT Angio Head/Neck    Impression:      Plan:  Medication Changes  IV Fluids  Labs  Consultant  Radiographic Studies  Nursing Interventions; Continue Telemetry Monitor, Continuous Pulse Oximeter, Neurochecks q2 , Oxygen prn ,  Will reassess in, 1 hour    Time spent with patient:  _15 minutes  Event discussed with: Dr rae

## 2024-02-15 NOTE — CONSULT NOTE ADULT - ASSESSMENT
89-year-old left-handed woman with past medical history of CHF, HTN, pacemaker placement, TAVR presenting as a code stroke for AMS, ?R-facial droop, R sided weakness.  Was found by home aide at 830 2/14 less responsive, not moving extremities as usually does. No prior known hx strokes per family.  At baseline patient alert, able to recognize/comprehend familiar faces, per daughters at bedside patient sometimes has difficulty with getting words out.    SH: No smoking ETOH use recreational drug use.  LKW: 2130 2/13/24  NIHSS 18  MRS 4 (aide at bedside reports patient requires assistance with all ADLs, including getting out of bed, showering, changing clothes, walking, and since breaking L arm last year, requires also assistance with feeding)  BP per /70, glucose per .     Information obtained from daughter  at bedside

## 2024-02-15 NOTE — SPEECH LANGUAGE PATHOLOGY EVALUATION - SLP REHAB POTENTIAL
Future Appointments   Date Time Provider Aries Joseph   9/27/2023 10:00 AM Karli Goode DO [id number]     LOV   4/21/2023    Last refill  unknown bilateral upper extremities/normal fair, will monitor progress closely

## 2024-02-15 NOTE — PROCEDURE NOTE - ADDITIONAL PROCEDURE DETAILS
1. Battery longevity 9 yrs 9 months  2. Lead impedance WNL  3. Sensing and pacing thresholds WNL  4. No recent arrhythmia episodes stored   5. Normal pacemaker function    #[id number]

## 2024-02-15 NOTE — SPEECH LANGUAGE PATHOLOGY EVALUATION - SLP DIAGNOSIS
Pt is a 89yoF with pmhx of CHF, valve replacement, arthritis, presenting as code stroke w/ R facial droops and weakness, found w/ acute L frontoparietal infarct. Pt p/w severe receptive-expressive aphasia (receptive slightly better than expressive). Pt generally with impaired alertness, with brief periods of opening eyes, but quickly closing them. Within periods of alertness, pt does attempt to raise fingers and squeeze hand on L hand, however unable to follow any other directive. Pt attempts to grunt/groan in response to question, but unable to distinguish yes/no and is non-verbal. Will continue to follow for speech therapy.

## 2024-02-15 NOTE — OCCUPATIONAL THERAPY INITIAL EVALUATION ADULT - PERTINENT HX OF CURRENT PROBLEM, REHAB EVAL
89-year-old left-handed woman with past medical history of CHF, HTN, pacemaker placement, valve replacement presenting as a code stroke for AMS, ?R-facial droop, R sided weakness.  Was found by home aide at 830 2/14 less responsive, not moving extremities as usually does. No prior known hx strokes per family. At baseline patient alert, able to recognize/comprehend familiar faces, per daughters at bedside patient sometimes has difficulty with getting words out.    CT brain: Acute left frontoparietal infarct. No CT evidence for selene hemorrhagic  transformation.  CT brain perfusion: Significantly limited by motion. Cerebral blood flow less than 30% = 0 mL. No core infarct predicted. Tmax greater than 6 seconds = 46 mL and involves the bilateral mesial cerebellar hemispheres, the right temporal lobe, bilateral frontal lobes, and left posterior temporal. This represents brain parenchyma predicted to be at continued ischemic risk in the presence of neurologic symptoms. This finding is likely spurious in nature. Mismatch volume 46 mL Mismatch ratio infinite  CTA brain:Intraluminal filling defect involving the left ICA terminus and extending  into the proximal right A1 segment. Normal flow-related enhancement of the remaining left LEBRON. Normal flow-related enhancement of the left MCA. Right MCA enhances to a lesser degree than the left MCA. No vascular aneurysm or AVM.  CTA neck: Approximately 50% short segment stenosis of the origin and proximal segment of the left ICA due to calcified plaque. Normal flow-related enhancement of the more distal vessel. Approximately 75-80% short segment stenosis of the proximal segment of the right internal carotid artery due to partially calcified plaque. Normal flow-related enhancement of the more distal vessel. No evidence for arterial dissection.

## 2024-02-15 NOTE — SPEECH LANGUAGE PATHOLOGY EVALUATION - H & P REVIEW
89-year-old left-handed woman with past medical history of CHF, HTN, pacemaker placement, valve replacement presenting as a code stroke for AMS, ?R-facial droop, R sided weakness. Was found by home aide at 830 2/14 less responsive, not moving extremities as usually does. No prior known hx strokes per family.At baseline patient alert, able to recognize/comprehend familiar faces, per daughters at bedside patient sometimes has difficulty with getting words out. Not candidate for tenecteplase as area of hypodensity already appreciated on CT head imaging/age, not candidate for thrombectomy given higher MRS. LKW: 2130 2/13/24. NIHSS 18. MRS 4 (aide at bedside reports patient requires assistance with all ADLs, including getting out of bed, showering, changing clothes, walking, and since breaking L arm last year, requires also assistance with feeding). BP per /70, glucose per .  IMPRESSION  R-sided weakness, encephalopathy c/f L brain dysfunction found to have acute L frontoparietal infarct, mechanism likely embolic vs ICAD. dysphagia screen failed on 2/14 due to "unable to position upright"./yes

## 2024-02-15 NOTE — OCCUPATIONAL THERAPY INITIAL EVALUATION ADULT - RANGE OF MOTION EXAMINATION, UPPER EXTREMITY
RUE PROM limited by severe OA, L wrist/digits/.shoulder PROM WFL. L elbow NT 2* old fx (HHA states pt has a lot of pain)

## 2024-02-15 NOTE — SPEECH LANGUAGE PATHOLOGY EVALUATION - COMMENTS
hx con't  2/14 NIH 17; pt lethargic  2/15 NIH went from 18 to 22    CXR 2/14 IMPRESSION: Pulmonary vascular congestive changes  brain imaging 2/14 IMPRESSION:  CT brain:Acute left frontoparietal infarct. No CT evidence for selene hemorrhagic transformation. Rightward midline shift of 4 mm  CT brain perfusion:Significantly limited by motion. Cerebral blood flow less than 30% = 0 mL. No core infarct predicted. Tmax greater than 6 seconds = 46 mL and involves the bilateral mesial cerebellar hemispheres, the right temporal lobe, bilateral frontal lobes, and left posterior temporal. This represents brain parenchyma predicted to be at continued ischemic risk in the presence of neurologic symptoms. This finding is likely spurious in nature. Mismatch volume 46 mL Mismatch ratio infinite    CTA brain: Intraluminal filling defect involving the left ICA terminus and extending into the proximal right A1 segment. Normal flow-related enhancement of the remaining left LEBRON. Normal flow-related enhancement of the left MCA. Right MCA enhances to a lesser degree than the left MCA. No vascular aneurysm or AVM.    CTA neck: Approximately 50% short segment stenosis of the origin and proximal segment of the left ICA due to calcified plaque. Normal flow-related enhancement of the more distal vessel. Approximately 75-80% short segment stenosis of the proximal segment of the right internal carotid artery due to partially calcified plaque.  Normal flow-related enhancement of the more distal vessel. No evidence for arterial dissection.    No prior SLP exams in [facility]. 1 MBS in PACS from 10/15/2015 from [facility], which revealed no penetration or aspiration with regular solids, thin liquids, puree, or barium pill. Noted degenerative changes of cervical spine, mild dysmotility of esophagus, and small hiatal hernia. Language: 1. Pt will improve expressive language skills for functional communication.  2. Pt will improve receptive language skills for functional communication.  3. Family/caregiver will demonstrate understanding/carryover of strategies to improve patient’s communication of wants/needs N/A given reduced sustained alertness N/A given nonverbal Pt attempting to grunt/groan. Non-verbal. Pt able to attempt raising fingers on L hand and squeeze L hand, however, otherwise unable to follow other directives or yes/no questions Language: 1. Pt will improve expressive language skills for functional communication.  2. Pt will improve receptive language skills for functional communication.  3. Family/caregiver will demonstrate understanding/carryover of strategies to improve patient’s communication of wants/needs    results d/w pt; JAN Wan Umu, daughter, [facility]

## 2024-02-15 NOTE — PHYSICAL THERAPY INITIAL EVALUATION ADULT - PASSIVE RANGE OF MOTION EXAMINATION, REHAB EVAL
RUE PROM limited by severe OA, L wrist/digits/.shoulder PROM WFL. L elbow NT 2* old fx (HHA states pt has a lot of pain)./bilateral lower extremity Passive ROM was WFL (within functional limits)

## 2024-02-15 NOTE — PHYSICAL THERAPY INITIAL EVALUATION ADULT - IMPAIRMENTS CONTRIBUTING IMPAIRED BED MOBILITY, REHAB EVAL
impaired balance/cognition/decreased flexibility/impaired motor control/impaired postural control/decreased strength

## 2024-02-15 NOTE — OCCUPATIONAL THERAPY INITIAL EVALUATION ADULT - LIVES WITH, PROFILE
As per HHA at bedside, pt lives in apartment with 4/5 steps to enter, tub with shower chair. Pt has 24/7 HHA, ambulates short distances with RW and assist, requires assist with all ADLs

## 2024-02-15 NOTE — SPEECH LANGUAGE PATHOLOGY EVALUATION - SLP GENERAL OBSERVATIONS
Pt received upright at bedside, on room air, Aox0 (potentially may groan with name, but unable to distinguish yes/no). Able to follow directives to squeeze clinician hand with pt L hand. Pt with sub-optimal body habitus/positioning given significant arthritis on R shoulder and neck at baseline, resulting in a sustained head posture toward R side facing downward. Pt primarily groaning/grunting in response, eyes briefly opening and then closing to verbal stimuli/questions, attempting to raise fingers on L hand when cued. Otherwise, nonverbal and did not follow alternate commands. Daughter and HHA present who report pt with broken humerus (which was never fixed) on L arm and given severe arthritis on R side, unable to move either arm. Per daughter and HHA report: at baseline, pt with unclear speech (clearer in AM, progressively slurred toward PM); eats very soft small foods with thin liquids at baseline with intermittent coughing on thin liquids. Also reporting "strangle hiatal hernia" and esophgeal issues.

## 2024-02-15 NOTE — CONSULT NOTE ADULT - SUBJECTIVE AND OBJECTIVE BOX
CHIEF COMPLAINT:    HISTORY OF PRESENT ILLNESS:  89-year-old left-handed woman with past medical history of CHF, HTN, pacemaker placement, TAVR presenting as a code stroke for AMS, ?R-facial droop, R sided weakness.  Was found by home aide at 830 2/14 less responsive, not moving extremities as usually does. No prior known hx strokes per family.  At baseline patient alert, able to recognize/comprehend familiar faces, per daughters at bedside patient sometimes has difficulty with getting words out.    SH: No smoking ETOH use recreational drug use.  LKW: 2130 2/13/24  NIHSS 18  MRS 4 (aide at bedside reports patient requires assistance with all ADLs, including getting out of bed, showering, changing clothes, walking, and since breaking L arm last year, requires also assistance with feeding)  BP per /70, glucose per .     Information obtained from daughter  at bedside    PAST MEDICAL & SURGICAL HISTORY:          MEDICATIONS:  heparin   Injectable 5000 Unit(s) SubCutaneous every 12 hours      albuterol    0.083% 2.5 milliGRAM(s) Nebulizer <User Schedule>    aspirin Suppository 300 milliGRAM(s) Rectal daily      atorvastatin 80 milliGRAM(s) Oral at bedtime    sodium chloride 0.9%. 1000 milliLiter(s) IV Continuous <Continuous>      FAMILY HISTORY:      SOCIAL HISTORY:    [ ] Non-smoker  [ ] Smoker  [ ] Alcohol    Allergies    No Known Allergies    Intolerances    	    REVIEW OF SYSTEMS:	    [ ] All others negative	  [XX ] Unable to obtain    PHYSICAL EXAM:  T(C): 37.1 (02-15-24 @ 16:00), Max: 37.1 (02-15-24 @ 04:08)  HR: 66 (02-15-24 @ 18:00) (60 - 76)  BP: 119/57 (02-15-24 @ 18:00) (104/62 - 141/60)  RR: 17 (02-15-24 @ 18:00) (16 - 26)  SpO2: 100% (02-15-24 @ 18:00) (99% - 100%)  Wt(kg): --  I&O's Summary    14 Feb 2024 07:01  -  15 Feb 2024 07:00  --------------------------------------------------------  IN: 0 mL / OUT: 150 mL / NET: -150 mL    15 Feb 2024 07:01  -  15 Feb 2024 19:26  --------------------------------------------------------  IN: 390 mL / OUT: 300 mL / NET: 90 mL        Appearance: NAD  HEENT:   Dry oral mucosa, PERRL, EOMI	  Lymphatic: No lymphadenopathy  Cardiovascular: Normal S1 S2, No JVD, No murmurs, No edema  Respiratory: Decreased bs  Psychiatry: A & O x 0 sleepy  Gastrointestinal:  Soft, Non-tender, + BS	  Skin: No rashes, No ecchymoses, No cyanosis	  Neurologic Exam:  Mental status - eyes closed, opens to repeated verbal stimuli. Follows intermittent simple commands to squeeze L hand/give thumbs up in L hand. Does not respond to orientation questions.   Cranial nerves - R pupil appears ?sluggishly reactive. No BTT in R eye. Unable to open left eye. ?R facial droop but may be 2/2 positioning of patient's head to R.  Motor - Normal bulk. Increased tone in RUE. Does not maintain antigravity when asked throughout all extremities initially, however does squeeze hand on LUE, withdraws slightly to noxious stimuli in R hand.   Upon re-evaluation able to raise both legs antigravity.  Sensation - Withdraws to noxious stimuli throughout.  DTR's - deferred  Coordination -deferred  Gait and station - deferred  Extremities: Normal range of motion, No clubbing, cyanosis or edema  Vascular: Peripheral pulses palpable 2+ bilaterally    TELEMETRY: 	  V  paced   ECG:  	V paced    Procedure Note-PPM Interrogation Note [Charted Location: [place] [address]] [Authored: 15-Feb-2024 10:17]- for Visit: [id number], Complete, Entered, Signed in Full, Available to Patient    PROCEDURE:   · Procedure Name	PPM Interrogation Note  · Procedure Date/Time	15-Feb-2024 10:18  · Procedure Performed By	Myself  · Procedural Sedation Used	No  · 	Biotronik  · Model	Eluna 8 SR-T  · Mode	VVI  · Rate	40 ppm  · Right Ventricular Lead	Yes  · R-wave amplitude (mV)	8.4  · RV lead Impedance (ohms)	741  · Threshold (V)	0.6  · Threshold at (ms)	0.4  · Battery	Good  · Underlying Rhythm	NSR  · Comments	PPM Interrogation  · Additional Procedure Details	1. Battery longevity 9 yrs 9 months  2. Lead impedance WNL  3. Sensing and pacing thresholds WNL  4. No recent arrhythmia episodes stored   5. Normal pacemaker function    #[id number]      Electronic Signatures:  Roberta Rivera (RITESH)  (Signed 15-Feb-2024 10:28)  	Authored: PROCEDURE      Last Updated: 15-Feb-2024 10:28 by Roberta Rivera (RITESH)      RADIOLOGY: < from: CT Brain Perfusion Maps Stroke (02.14.24 @ 11:56) >    ACC: [id number] EXAM:  CT ANGIO BRAIN STROKE PROTC IC   ORDERED BY:    RENETTA SUNSHINE     ACC: [id number] EXAM:  CT ANGIO NECK STROKE PROTCL IC   ORDERED BY:    RENETTA SUNSHINE     ACC: [id number] EXAM:  CT BRAIN STROKE PROTOCOL   ORDERED BY:  RENETTA SUNSHINE     ACC: [id number] EXAM:  CT BRAIN PERFUSION MAPS STROKE   ORDERED BY:    RENETTA SUNSHINE     PROCEDURE DATE:  02/14/2024          INTERPRETATION:  CT angiography of the brain and neck. CT brain perfusion.    CLINICAL INDICATION: Code stroke    TECHNIQUE: Direct axial CT scanning of the brain and neck was obtained   from the vertex to the level of the clavicular heads after the dynamic   intravenous injection of Omnipaque 300. Sagittal and coronal maximum   intensity projection reformats were provided.  Three-dimensional   reconstructions were performed by the radiologist using the [employer]   workstation.    CT perfusion was obtained, and post processed utilizing RAPID software.    Additionally, noncontrast axial CT scanning of the brain was obtained   from the skull base to the vertex. Sagittal and coronal reformats were   provided.    Total of 120 cc of Omnipaque 300 was intravenously administered for this   examination. 10 cc discarded.    COMPARISON: None available    FINDINGS:    CT BRAIN:    Acute left frontoparietal infarct. No CT evidence for selene hemorrhagic   transformation.    Rightward midline shift of 4 mm. No effacement of the basal cisterns.   Ventricles are mildly enlarged.    Mild white matter microvascular ischemic disease.    No acute intracranial hemorrhage.    Mild right maxillary sinus mucosal thickening. Remaining visualized   paranasal sinuses and mastoid air cells are clear.    CT BRAIN PERFUSION:    Significantly limited by motion.    Cerebral blood flow less than 30% = 0 mL.    Tmax greater than 6 seconds = 46 mL and involves the bilateral mesial   cerebellar hemispheres, the right temporal lobe, bilateral frontal lobes,   and left posterior temporal. This finding is likely spurious in nature.    Mismatch volume 46 mL  Mismatch ratio infinite    CTA BRAIN:    Mild stenosis of the proximal right supraclinoid ICA due to calcified   plaque. Otherwise normal flow-related enhancement of the skull   base/intracranial right ICA.    Intraluminal filling defect involving the left ICA terminus and extending   into the proximal right A1 segment. Otherwise normal flow-related   enhancement of the left skull base and supraclinoid ICA.    Normal flow-related enhancement of the remaining left LEBRON and the entire   right LEBRON.    Normal flow-related enhancement of the left middle cerebral artery.    The right middle cerebral artery enhances to a lesser degree than the   left, but there is no flow-limiting stenosis.    Normal flow-related enhancement of the bilateral intradural vertebral   arteries and basilar artery.    No vascular aneurysm or AVM.    CTA NECK:    Stenoses described in this report are on the basis of NASCET criteria.    Normal flow-related enhancement of the bilateral common carotid arteries.    Approximately 50% short segment stenosis of the origin and proximal   segment of the left ICA due to calcified plaque. Normal flow-related   enhancement of the more distal vessel.    Approximately 75-80% short segment stenosis of the proximal segment of   the right internal carotid artery due to partially calcified plaque.   Normal flow-related enhancement of the more distal vessel.    Normal flow-related enhancement of the bilateral vertebral arteries.    Nonspecific groundglass opacities at the lung apices.    Thyroid gland mildly enlarged, heterogeneous, containing multiple   calcific foci.    No evidence for arterial dissection or vascular aneurysm.    IMPRESSION:    CT brain:  Acute left frontoparietalinfarct. No CT evidence for selene hemorrhagic   transformation.    CT brain perfusion:  Significantly limited by motion.    Cerebral blood flow less than 30% = 0 mL. No core infarct predicted.    Tmax greater than 6 seconds = 46 mL and involves the bilateral mesial   cerebellar hemispheres, the right temporal lobe, bilateral frontal lobes,   and left posterior temporal. This represents brain parenchyma predicted   to be at continued ischemic risk in the presence of neurologic symptoms.   This finding is likely spurious in nature.    Mismatch volume 46 mL  Mismatch ratio infinite    CTA brain:  Intraluminal filling defect involving the left ICA terminus and extending   into the proximal right A1 segment.    Normal flow-related enhancement of the remaining left LEBRON. Normal   flow-related enhancement of the left MCA.    Right MCA enhances to a lesser degree than the left MCA.    No vascular aneurysm or AVM.    CTA neck:  Approximately 50% short segment stenosis of the origin and proximal   segment of the left ICA due to calcified plaque. Normal flow-related   enhancement of the more distal vessel.    Approximately 75-80% short segment stenosis of the proximal segment of   the right internal carotid artery due to partially calcified plaque.   Normal flow-related enhancement of the more distal vessel.    No evidence for arterial dissection.    Dr. Ontiveros discussed these findings with Dr. Marshall on 2/14/2024 11:57 AM   with read back.    --- End of Report ---            RADHA ONTIVEROS MD; Attending Radiologist  This document has been electronically signed. Feb 14 2024 12:49PM    < end of copied text >    OTHER: 	  	  LABS:	 	    CARDIAC MARKERS:      < from: TTE W or WO Ultrasound Enhancing Agent (02.14.24 @ 15:41) >    TRANSTHORACIC ECHOCARDIOGRAM REPORT  ________________________________________________________________________________                                      _______       Pt. Name:       CARLEY ALLEN Study Date:    2/14/2024  MRN:            [id number]   YOB: 1927  Accession #:    [id number]    Age:           89 years  Account#:       [id number] Gender:        F  Heart Rate:     73 bpm       Height:        59.00 in (149.86 cm)  Rhythm:         sinus rhythm Weight:        130.00 lb (58.97 kg)  Blood Pressure: 140/58 mmHg  BSA/BMI:       1.54 m² / 26.26 kg/m²  ________________________________________________________________________________________  Referring Physician:    [id number] No Pedroza  Interpreting Physician: Cody Troy MD  Primary Sonographer:    Michell Espino [facility]    CPT:               ECHO TTE WO CON COMP W DOPP - 65057.m  Indication(s):     Other cerebrovascular disease - I67.89  Procedure:         Transthoracic echocardiogram with 2-D, M-mode and complete                     spectral and color flow Doppler.  Ordering Location: [facility]  Admission Status:  Inpatient  Study Information: Image quality for this study is technically difficult.    _______________________________________________________________________________________     CONCLUSIONS:      1. Technically difficult image quality.   2. 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 No aortic regurgitation.   3. Severe mitral valve stenosis, secondary to dystrophic mitral annular calcification.   4. No prior echocardiogram is available for comparison.      < end of copied text >                              8.8    6.47  )-----------( 196      ( 15 Feb 2024 05:25 )             28.0     02-15    145  |  108  |  55<H>  ----------------------------<  124<H>  3.8   |  23  |  1.61<H>    Ca    8.9      15 Feb 2024 05:25    TPro  6.5  /  Alb  3.9  /  TBili  0.4  /  DBili  x   /  AST  13  /  ALT  9<L>  /  AlkPhos  115  02-14    proBNP:   Lipid Profile:   HgA1c:   TSH:

## 2024-02-15 NOTE — PHYSICAL THERAPY INITIAL EVALUATION ADULT - STRENGTHENING, PT EVAL
GOAL: Patient will improve bilateral UE/LE strength by 1 grade, for increased limb stability, and to improve gait in 4 weeks.

## 2024-02-15 NOTE — PHYSICAL THERAPY INITIAL EVALUATION ADULT - GAIT TRAINING, PT EVAL
GOAL: Patient will ambulate 10 feet with appropriate assistive device as needed and mod Ax1, in 4 weeks.

## 2024-02-15 NOTE — SWALLOW BEDSIDE ASSESSMENT ADULT - SWALLOW EVAL: DIAGNOSIS
Pt is a 89yoF with pmhx of CHF, valve replacement, arthritis, presenting as code stroke w/ R facial droops and weakness, found w/ acute L frontoparietal infarct. Pt p/w a mentation and secretion management that does not support oral feeding at this time. Pt voice gurgly at baseline with intermittent cough on secretions alone, and with brief periods of eye opening, but eyes quickly closing again. Will continue to follow pending improvement in sustained mentation and secretion management.

## 2024-02-15 NOTE — CONSULT NOTE ADULT - CRITICAL CARE ATTENDING COMMENT
Advanced care planning was discussed with  family.  Advanced care planning forms were reviewed and discussed as appropriate.  Differential diagnosis and plan of care discussed with family after the evaluation.   Pain assessed and judicious use of narcotics when appropriate was discussed.  Importance of Fall prevention discussed.  Counseling on Smoking and Alcohol cessation was offered when appropriate.  Counseling on Diet, exercise, and medication compliance was done.

## 2024-02-15 NOTE — PHYSICAL THERAPY INITIAL EVALUATION ADULT - GENERAL OBSERVATIONS, REHAB EVAL
Pt received semisupine in bed with +IV, +pulse ox, +BP cuff, +cardiac monitor, and +external female catheter. NAD noted.

## 2024-02-15 NOTE — SWALLOW BEDSIDE ASSESSMENT ADULT - SLP GENERAL OBSERVATIONS
Pt received upright at bedside, on room air, Aox0 (potentially may groan with name, but unable to distinguish yes/no). Able to follow directives to squeeze clinician hand with pt L hand. Pt with sub-optimal body habitus/positioning given significant arthritis on R shoulder and neck at baseline, resulting in a sustained head posture toward R side facing downward. Daughter and HHA present who report pt with broken humerus (which was never fixed) on L arm and given severe arthritis on R side, unable to move either arm. Per daughter and HHA report: at baseline, pt with unclear speech (clearer in AM, progressively slurred toward PM); eats very soft small foods with thin liquids at baseline with intermittent coughing on thin liquids. Also reporting "strangle hiatal hernia" and esophgeal issues. Pt received upright at bedside, on room air, Aox0 (potentially may groan with name, but unable to distinguish yes/no). Able to follow directives to squeeze clinician hand with pt L hand. Pt with sub-optimal body habitus/positioning given significant arthritis on R shoulder and neck at baseline, resulting in a sustained head posture toward R side facing downward. Pt primarily groaning/grunting in response, eyes briefly opening and then closing to verbal stimuli/questions, attempting to raise fingers on L hand when cued. Otherwise, nonverbal and did not follow alternate commands. Daughter and HHA present who report pt with broken humerus (which was never fixed) on L arm and given severe arthritis on R side, unable to move either arm. Per daughter and HHA report: at baseline, pt with unclear speech (clearer in AM, progressively slurred toward PM); eats very soft small foods with thin liquids at baseline with intermittent coughing on thin liquids. Also reporting "strangle hiatal hernia" and esophgeal issues.

## 2024-02-15 NOTE — PHYSICAL THERAPY INITIAL EVALUATION ADULT - PERTINENT HX OF CURRENT PROBLEM, REHAB EVAL
Pt is a 89-year-old left-handed woman with past medical history of CHF, HTN, pacemaker placement, valve replacement presenting as a code stroke for AMS, ?R-facial droop, R sided weakness.  Was found by home aide at 830 2/14 less responsive, not moving extremities as usually does. No prior known hx strokes per family. At baseline patient alert, able to recognize/comprehend familiar faces, per daughters at bedside patient sometimes has difficulty with getting words out.    CT brain 2/14/24: Acute left frontoparietal infarct. No CT evidence for selene hemorrhagic  transformation.  CT brain perfusion 2/14/24: Significantly limited by motion. Cerebral blood flow less than 30% = 0 mL. No core infarct predicted. Tmax greater than 6 seconds = 46 mL and involves the bilateral mesial cerebellar hemispheres, the right temporal lobe, bilateral frontal lobes, and left posterior temporal. This represents brain parenchyma predicted to be at continued ischemic risk in the presence of neurologic symptoms. This finding is likely spurious in nature. Mismatch volume 46 mL Mismatch ratio infinite  CTA brain 2/14/24: Intraluminal filling defect involving the left ICA terminus and extending  into the proximal right A1 segment. Normal flow-related enhancement of the remaining left LEBRON. Normal flow-related enhancement of the left MCA. Right MCA enhances to a lesser degree than the left MCA. No vascular aneurysm or AVM.  CTA neck 2/14/24: Approximately 50% short segment stenosis of the origin and proximal segment of the left ICA due to calcified plaque. Normal flow-related enhancement of the more distal vessel. Approximately 75-80% short segment stenosis of the proximal segment of the right internal carotid artery due to partially calcified plaque. Normal flow-related enhancement of the more distal vessel. No evidence for arterial dissection.

## 2024-02-16 DIAGNOSIS — J90 PLEURAL EFFUSION, NOT ELSEWHERE CLASSIFIED: ICD-10-CM

## 2024-02-16 LAB
ANION GAP SERPL CALC-SCNC: 13 MMOL/L — SIGNIFICANT CHANGE UP (ref 5–17)
APTT BLD: 27 SEC — SIGNIFICANT CHANGE UP (ref 24.5–35.6)
BUN SERPL-MCNC: 53 MG/DL — HIGH (ref 7–23)
CALCIUM SERPL-MCNC: 8.6 MG/DL — SIGNIFICANT CHANGE UP (ref 8.4–10.5)
CHLORIDE SERPL-SCNC: 113 MMOL/L — HIGH (ref 96–108)
CO2 SERPL-SCNC: 21 MMOL/L — LOW (ref 22–31)
CREAT SERPL-MCNC: 1.69 MG/DL — HIGH (ref 0.5–1.3)
EGFR: 27 ML/MIN/1.73M2 — LOW
GLUCOSE SERPL-MCNC: 128 MG/DL — HIGH (ref 70–99)
HCT VFR BLD CALC: 24.4 % — LOW (ref 34.5–45)
HCT VFR BLD CALC: 26.4 % — LOW (ref 34.5–45)
HGB BLD-MCNC: 7.8 G/DL — LOW (ref 11.5–15.5)
HGB BLD-MCNC: 8.2 G/DL — LOW (ref 11.5–15.5)
INR BLD: 1.3 RATIO — HIGH (ref 0.85–1.18)
MCHC RBC-ENTMCNC: 27.9 PG — SIGNIFICANT CHANGE UP (ref 27–34)
MCHC RBC-ENTMCNC: 28.7 PG — SIGNIFICANT CHANGE UP (ref 27–34)
MCHC RBC-ENTMCNC: 31.1 GM/DL — LOW (ref 32–36)
MCHC RBC-ENTMCNC: 32 GM/DL — SIGNIFICANT CHANGE UP (ref 32–36)
MCV RBC AUTO: 89.7 FL — SIGNIFICANT CHANGE UP (ref 80–100)
MCV RBC AUTO: 89.8 FL — SIGNIFICANT CHANGE UP (ref 80–100)
NRBC # BLD: 0 /100 WBCS — SIGNIFICANT CHANGE UP (ref 0–0)
NRBC # BLD: 0 /100 WBCS — SIGNIFICANT CHANGE UP (ref 0–0)
PLATELET # BLD AUTO: 176 K/UL — SIGNIFICANT CHANGE UP (ref 150–400)
PLATELET # BLD AUTO: 191 K/UL — SIGNIFICANT CHANGE UP (ref 150–400)
POTASSIUM SERPL-MCNC: 3.6 MMOL/L — SIGNIFICANT CHANGE UP (ref 3.5–5.3)
POTASSIUM SERPL-SCNC: 3.6 MMOL/L — SIGNIFICANT CHANGE UP (ref 3.5–5.3)
PROCALCITONIN SERPL-MCNC: 0.14 NG/ML — HIGH (ref 0.02–0.1)
PROTHROM AB SERPL-ACNC: 13.5 SEC — HIGH (ref 9.5–13)
RBC # BLD: 2.72 M/UL — LOW (ref 3.8–5.2)
RBC # BLD: 2.94 M/UL — LOW (ref 3.8–5.2)
RBC # FLD: 17.8 % — HIGH (ref 10.3–14.5)
RBC # FLD: 17.9 % — HIGH (ref 10.3–14.5)
SODIUM SERPL-SCNC: 147 MMOL/L — HIGH (ref 135–145)
WBC # BLD: 6.43 K/UL — SIGNIFICANT CHANGE UP (ref 3.8–10.5)
WBC # BLD: 7.29 K/UL — SIGNIFICANT CHANGE UP (ref 3.8–10.5)
WBC # FLD AUTO: 6.43 K/UL — SIGNIFICANT CHANGE UP (ref 3.8–10.5)
WBC # FLD AUTO: 7.29 K/UL — SIGNIFICANT CHANGE UP (ref 3.8–10.5)

## 2024-02-16 PROCEDURE — 99291 CRITICAL CARE FIRST HOUR: CPT

## 2024-02-16 PROCEDURE — 71250 CT THORAX DX C-: CPT | Mod: 26

## 2024-02-16 PROCEDURE — 70450 CT HEAD/BRAIN W/O DYE: CPT | Mod: 26

## 2024-02-16 RX ORDER — ACETAMINOPHEN 500 MG
650 TABLET ORAL ONCE
Refills: 0 | Status: COMPLETED | OUTPATIENT
Start: 2024-02-16 | End: 2024-02-16

## 2024-02-16 RX ORDER — ACETAMINOPHEN 500 MG
650 TABLET ORAL ONCE
Refills: 0 | Status: COMPLETED | OUTPATIENT
Start: 2024-02-16 | End: 2024-02-17

## 2024-02-16 RX ORDER — IPRATROPIUM/ALBUTEROL SULFATE 18-103MCG
3 AEROSOL WITH ADAPTER (GRAM) INHALATION EVERY 6 HOURS
Refills: 0 | Status: DISCONTINUED | OUTPATIENT
Start: 2024-02-16 | End: 2024-02-27

## 2024-02-16 RX ADMIN — SODIUM CHLORIDE 30 MILLILITER(S): 9 INJECTION INTRAMUSCULAR; INTRAVENOUS; SUBCUTANEOUS at 16:30

## 2024-02-16 RX ADMIN — Medication 650 MILLIGRAM(S): at 11:40

## 2024-02-16 RX ADMIN — Medication 260 MILLIGRAM(S): at 11:04

## 2024-02-16 RX ADMIN — Medication 3 MILLILITER(S): at 23:24

## 2024-02-16 RX ADMIN — Medication 300 MILLIGRAM(S): at 11:27

## 2024-02-16 RX ADMIN — HEPARIN SODIUM 5000 UNIT(S): 5000 INJECTION INTRAVENOUS; SUBCUTANEOUS at 05:17

## 2024-02-16 RX ADMIN — ALBUTEROL 2.5 MILLIGRAM(S): 90 AEROSOL, METERED ORAL at 09:39

## 2024-02-16 RX ADMIN — Medication 3 MILLILITER(S): at 17:25

## 2024-02-16 NOTE — PROGRESS NOTE ADULT - SUBJECTIVE AND OBJECTIVE BOX
Subjective: Patient seen and examined. No new events except as noted.   remains iN Stroke Unit   family at bedside   i called and updated her cardiologist Dr. Reese Siegel     REVIEW OF SYSTEMS:  Unable to obtain         MEDICATIONS:  MEDICATIONS  (STANDING):  albuterol    0.083% 2.5 milliGRAM(s) Nebulizer <User Schedule>  aspirin Suppository 300 milliGRAM(s) Rectal daily  atorvastatin 80 milliGRAM(s) Oral at bedtime  heparin   Injectable 5000 Unit(s) SubCutaneous every 12 hours  sodium chloride 0.9%. 1000 milliLiter(s) (30 mL/Hr) IV Continuous <Continuous>      PHYSICAL EXAM:  T(C): 37.6 (02-16-24 @ 09:00), Max: 37.6 (02-16-24 @ 09:00)  HR: 97 (02-16-24 @ 09:00) (64 - 97)  BP: 121/62 (02-16-24 @ 08:00) (104/62 - 138/58)  RR: 24 (02-16-24 @ 09:00) (16 - 24)  SpO2: 94% (02-16-24 @ 09:00) (94% - 100%)  Wt(kg): --  I&O's Summary    15 Feb 2024 07:01  -  16 Feb 2024 07:00  --------------------------------------------------------  IN: 720 mL / OUT: 550 mL / NET: 170 mL          Appearance: NAD  HEENT:   Dry oral mucosa, PERRL, EOMI	  Lymphatic: No lymphadenopathy  Cardiovascular: Normal S1 S2, No JVD, No murmurs, No edema  Respiratory: Decreased bs  Psychiatry: A & O x 0 sleepy  Gastrointestinal:  Soft, Non-tender, + BS	  Skin: No rashes, No ecchymoses, No cyanosis	  Neurologic Exam:  Mental status - eyes closed, opens to repeated verbal stimuli. Follows intermittent simple commands to squeeze L hand/give thumbs up in L hand. Does not respond to orientation questions.   Cranial nerves - R pupil appears ?sluggishly reactive. No BTT in R eye. Unable to open left eye. ?R facial droop but may be 2/2 positioning of patient's head to R.  Motor - Normal bulk. Increased tone in RUE. Does not maintain antigravity when asked throughout all extremities initially, however does squeeze hand on LUE, withdraws slightly to noxious stimuli in R hand.   Upon re-evaluation able to raise both legs antigravity.  Sensation - Withdraws to noxious stimuli throughout.  DTR's - deferred  Coordination -deferred  Gait and station - deferred  Extremities: Normal range of motion, No clubbing, cyanosis or edema  Vascular: Peripheral pulses palpable 2+ bilaterally        LABS:    CARDIAC MARKERS:                                7.8    6.43  )-----------( 176      ( 16 Feb 2024 05:44 )             24.4     02-16    147<H>  |  113<H>  |  53<H>  ----------------------------<  128<H>  3.6   |  21<L>  |  1.69<H>    Ca    8.6      16 Feb 2024 05:45    TPro  6.5  /  Alb  3.9  /  TBili  0.4  /  DBili  x   /  AST  13  /  ALT  9<L>  /  AlkPhos  115  02-14      TELEMETRY: 	 V paced    ECG:  	  RADIOLOGY:   DIAGNOSTIC TESTING:  [ ] Echocardiogram:  [ ]  Catheterization:  [ ] Stress Test:    OTHER:

## 2024-02-16 NOTE — CONSULT NOTE ADULT - ASSESSMENT
89F presented with a stroke    1. Stroke     2. Dysphagia       I had a prolonged conversation with the patient regarding the hospital course, differential diagnosis, results of diagnostic tests this far, and therapeutic modalities available. Plan of care discussed with the patient after the evaluation. Patient expresses a clear understanding of the plan of care. Fifty minutes spent on the total encounter, of which more than fifty percent of the encounter was spent on counseling and/or coordinating care by the attending physician. Advanced care planning forms were discussed. Code status including forceful chest compressions, defibrillation and intubation were discussed. The risks benefits and alternatives to pertinent gastrointestinal procedures and interventions were discussed in detail and all questions were answered. Duration: 15 Minutes.      Jovon Ugalde D.O.  Gastroenterology and Hepatology  [address] [place] [address], [address]  [place], [place]  Office: [phone]   89F presented with a stroke    1. Stroke   per neurology     2. Dysphagia   I had a lengthy discussion with her family regarding risks vs. benefits of PEG placement. Overall she is frail appearing and is considered high risk but would consider proceeding once medically optimized. Can tentatively plan for Tuesday 1/20. Will need cardiac clearance and PPM interrogation report.      3. Hypoxia   pending CT chest   abx started for possible aspiration       I had a prolonged conversation with the patient regarding the hospital course, differential diagnosis, results of diagnostic tests this far, and therapeutic modalities available. Plan of care discussed with the patient after the evaluation. Patient expresses a clear understanding of the plan of care. Fifty minutes spent on the total encounter, of which more than fifty percent of the encounter was spent on counseling and/or coordinating care by the attending physician. Advanced care planning forms were discussed. Code status including forceful chest compressions, defibrillation and intubation were discussed. The risks benefits and alternatives to pertinent gastrointestinal procedures and interventions were discussed in detail and all questions were answered. Duration: 15 Minutes.      Jovon Ugalde D.O.  Gastroenterology and Hepatology  [address] [place] [address], [address]  [place], [place]  Office: [phone]

## 2024-02-16 NOTE — CONSULT NOTE ADULT - SUBJECTIVE AND OBJECTIVE BOX
HOSPITAL COURSE:  89-year-old left-handed woman with past medical history of CHF, HTN, pacemaker placement, valve replacement presenting as a code stroke for AMS, ?R-facial droop, R sided weakness. Was found by home aide at 830 2/14 less responsive, not moving extremities as usually does. No prior known hx strokes per family. At baseline patient alert, able to recognize/comprehend familiar faces, per daughters at bedside patient sometimes has difficulty with getting words out.    Patient was found to have a left MCA stroke with partially occlusive LICA thrombus, etiology likely cardioembolic in the setting of AF. Not candidate for tenecteplase as area of hypodensity already appreciated on CT head imaging/age, not candidate for thrombectomy given higher MRS.      Patient has been in the stroke unit, however, patient has been experiencing decline of her mental status, described as less responsive. CTH was done which demonstrated evolving R mCA stroke with hemorrhagic transformation. NSCU was consulted for evaluation.     24 hour Events:       Allergies    No Known Allergies    Intolerances        REVIEW OF SYSTEMS: [x ] Unable to Assess due to neurologic exam   [ ] All ROS addressed below are non-contributory, except:  Neuro: [ ] Headache [ ] Back pain [ ] Numbness [ ] Weakness [ ] Ataxia [ ] Dizziness [ ] Aphasia [ ] Dysarthria [ ] Visual disturbance  Resp: [ ] Shortness of breath/dyspnea, [ ] Orthopnea [ ] Cough  CV: [ ] Chest pain [ ] Palpitation [ ] Lightheadedness [ ] Syncope  Renal: [ ] Thirst [ ] Edema  GI: [ ] Nausea [ ] Emesis [ ] Abdominal pain [ ] Constipation [ ] Diarrhea  Hem: [ ] Hematemesis [ ] bright red blood per rectum  ID: [ ] Fever [ ] Chills [ ] Dysuria  ENT: [ ] Rhinorrhea      DEVICES:   [ ] Restraints [ ] ET tube [ ] central line [ ] arterial line [ ] madrigal [ ] NGT/OGT [ ] EVD [ ] LD [ ] SAMIA/HMV [ ] Trach [ ] PEG [ ] Chest Tube     VITALS:   Vital Signs Last 24 Hrs  T(C): 37.6 (16 Feb 2024 12:00), Max: 37.6 (16 Feb 2024 09:00)  T(F): 99.7 (16 Feb 2024 12:00), Max: 99.7 (16 Feb 2024 12:00)  HR: 81 (16 Feb 2024 16:00) (65 - 105)  BP: 106/59 (16 Feb 2024 16:00) (106/52 - 135/58)  BP(mean): 74 (16 Feb 2024 16:00) (73 - 87)  RR: 18 (16 Feb 2024 16:00) (16 - 24)  SpO2: 100% (16 Feb 2024 16:00) (92% - 100%)    Parameters below as of 16 Feb 2024 16:00  Patient On (Oxygen Delivery Method): nasal cannula  O2 Flow (L/min): 2    CAPILLARY BLOOD GLUCOSE        I&O's Summary    15 Feb 2024 07:01  -  16 Feb 2024 07:00  --------------------------------------------------------  IN: 720 mL / OUT: 550 mL / NET: 170 mL    16 Feb 2024 07:01  -  16 Feb 2024 16:44  --------------------------------------------------------  IN: 335 mL / OUT: 0 mL / NET: 335 mL        Respiratory:        LABS:                        8.2    7.29  )-----------( 191      ( 16 Feb 2024 09:28 )             26.4     02-16    147<H>  |  113<H>  |  53<H>  ----------------------------<  128<H>  3.6   |  21<L>  |  1.69<H>             MEDICATION LEVELS:     IVF FLUIDS/MEDICATIONS:   MEDICATIONS  (STANDING):  albuterol/ipratropium for Nebulization 3 milliLiter(s) Nebulizer every 6 hours  sodium chloride 0.9%. 1000 milliLiter(s) (30 mL/Hr) IV Continuous <Continuous>    MEDICATIONS  (PRN):        IMAGING:      EXAMINATION:  PHYSICAL EXAM:    Constitutional: No Acute Distress, saturating 99% on 2 L NC. Emaciated, elderly woman.    Neurological: Awake, attends to the examiner, pupils 2 mm bilaterally reactive. Midline gaze. Couldn't appreciate facial asymmetry. Aphasic, globally. RUE withdraws to pain. RLE 3/5. LUE antigravity, LLE antigravity.    Pulmonary: Clear to Auscultation, No rales, No rhonchi, No wheezes     Cardiovascular: S1, S2, Regular rate and rhythm     Gastrointestinal: Soft, Non-tender, Non-distended     Extremities: No calf tenderness     ASSESSMENT & PLAN:    89 y/o female with RMCA stroke, cardioembolic likely etiology cardioembolic in setting of a fib. Not a thrombectomy candidate due to high MRS, not tPA candidate due to already visible stroke on the CTH. Now with evolution of stroke on the repeat CTH with some hemorrhagic transformation.      Recommend repeating the CTH in 6 hours from previous scan for stability. Recommend holding any antiplatelet/anti-thrombotics at the moment, in the setting of hemorrhagic transformation. Patient currently protecting her airway and saturating 99% on 2L nasal cannula. Patient is comfortable and able to protect her own secretions. Currently the patient does not need NSCU. Recommend speaking with the family regarding goals of care discussion as the patient may not be a neurosurgical candidate due to age and MRS score as well as involvement of the dominant hemisphere. If the patient's respiratory status declines and she is unable to protect her secretions and her airway, and should she require intubation, please call NSCU and will reconsider the admission.     Thank you for your consultation.     Plan discussed with the attending and the primary team.  HOSPITAL COURSE:  89-year-old left-handed woman with past medical history of CHF, HTN, pacemaker placement, valve replacement presenting as a code stroke for AMS, ?R-facial droop, R sided weakness. Was found by home aide at 830 2/14 less responsive, not moving extremities as usually does. No prior known hx strokes per family. At baseline patient alert, able to recognize/comprehend familiar faces, per daughters at bedside patient sometimes has difficulty with getting words out.    Patient was found to have a left MCA stroke with partially occlusive LICA thrombus, etiology likely cardioembolic in the setting of AF. Not candidate for tenecteplase as area of hypodensity already appreciated on CT head imaging/age, not candidate for thrombectomy given higher MRS.      Patient has been in the stroke unit, however, patient has been experiencing decline of her mental status, described as less responsive. CTH was done which demonstrated evolving R mCA stroke with hemorrhagic transformation. NSCU was consulted for evaluation.     24 hour Events:       Allergies    No Known Allergies    Intolerances        REVIEW OF SYSTEMS: [x ] Unable to Assess due to neurologic exam   [ ] All ROS addressed below are non-contributory, except:  Neuro: [ ] Headache [ ] Back pain [ ] Numbness [ ] Weakness [ ] Ataxia [ ] Dizziness [ ] Aphasia [ ] Dysarthria [ ] Visual disturbance  Resp: [ ] Shortness of breath/dyspnea, [ ] Orthopnea [ ] Cough  CV: [ ] Chest pain [ ] Palpitation [ ] Lightheadedness [ ] Syncope  Renal: [ ] Thirst [ ] Edema  GI: [ ] Nausea [ ] Emesis [ ] Abdominal pain [ ] Constipation [ ] Diarrhea  Hem: [ ] Hematemesis [ ] bright red blood per rectum  ID: [ ] Fever [ ] Chills [ ] Dysuria  ENT: [ ] Rhinorrhea      DEVICES:   [ ] Restraints [ ] ET tube [ ] central line [ ] arterial line [ ] madrigal [ ] NGT/OGT [ ] EVD [ ] LD [ ] SAMIA/HMV [ ] Trach [ ] PEG [ ] Chest Tube     VITALS:   Vital Signs Last 24 Hrs  T(C): 37.6 (16 Feb 2024 12:00), Max: 37.6 (16 Feb 2024 09:00)  T(F): 99.7 (16 Feb 2024 12:00), Max: 99.7 (16 Feb 2024 12:00)  HR: 81 (16 Feb 2024 16:00) (65 - 105)  BP: 106/59 (16 Feb 2024 16:00) (106/52 - 135/58)  BP(mean): 74 (16 Feb 2024 16:00) (73 - 87)  RR: 18 (16 Feb 2024 16:00) (16 - 24)  SpO2: 100% (16 Feb 2024 16:00) (92% - 100%)    Parameters below as of 16 Feb 2024 16:00  Patient On (Oxygen Delivery Method): nasal cannula  O2 Flow (L/min): 2    CAPILLARY BLOOD GLUCOSE        I&O's Summary    15 Feb 2024 07:01  -  16 Feb 2024 07:00  --------------------------------------------------------  IN: 720 mL / OUT: 550 mL / NET: 170 mL    16 Feb 2024 07:01  -  16 Feb 2024 16:44  --------------------------------------------------------  IN: 335 mL / OUT: 0 mL / NET: 335 mL        Respiratory:        LABS:                        8.2    7.29  )-----------( 191      ( 16 Feb 2024 09:28 )             26.4     02-16    147<H>  |  113<H>  |  53<H>  ----------------------------<  128<H>  3.6   |  21<L>  |  1.69<H>             MEDICATION LEVELS:     IVF FLUIDS/MEDICATIONS:   MEDICATIONS  (STANDING):  albuterol/ipratropium for Nebulization 3 milliLiter(s) Nebulizer every 6 hours  sodium chloride 0.9%. 1000 milliLiter(s) (30 mL/Hr) IV Continuous <Continuous>    MEDICATIONS  (PRN):        IMAGING:      EXAMINATION:  PHYSICAL EXAM:    Constitutional: No Acute Distress, saturating 99% on 2 L NC. Emaciated, elderly woman.    Neurological: Awake, attends to the examiner, pupils 2 mm bilaterally reactive. Midline gaze. Couldn't appreciate facial asymmetry. Aphasic, globally. RUE withdraws to pain. RLE 3/5. LUE antigravity, LLE antigravity.    Pulmonary: Clear to Auscultation, No rales, No rhonchi, No wheezes     Cardiovascular: S1, S2, Regular rate and rhythm     Gastrointestinal: Soft, Non-tender, Non-distended     Extremities: No calf tenderness     ASSESSMENT & PLAN:    89 y/o female with RMCA stroke, cardioembolic likely etiology cardioembolic in setting of a fib. Not a thrombectomy candidate due to high MRS, not tPA candidate due to already visible stroke on the CTH. Now with evolution of stroke on the repeat CTH with some hemorrhagic transformation.      Recommend repeating the CTH in 6 hours from previous scan for stability. Recommend holding any antiplatelet/anti-thrombotics at the moment, in the setting of hemorrhagic transformation. Patient currently protecting her airway and saturating 99% on 2L nasal cannula. Patient is comfortable and able to protect her own secretions. Currently the patient does not need NSCU. Recommend speaking with the family regarding goals of care discussion as the patient may not be a neurosurgical candidate due to age and MRS score as well as involvement of the dominant hemisphere. Recommend Na goal 145-155 for cerebral edema. If the patient's respiratory status declines and she is unable to protect her secretions and her airway, and should she require intubation, please call NSCU and will reconsider the admission.     Thank you for your consultation.     Plan discussed with the attending and the primary team.

## 2024-02-16 NOTE — CONSULT NOTE ADULT - SUBJECTIVE AND OBJECTIVE BOX
Chief Complaint:  Patient is a 89y old  Female who presents with a chief complaint of     Date of service: 02-16-24 @ 09:48    HPI:    The patient is a 89-year-old woman with past medical history of CHF, HTN, pacemaker placement, valve replacement presenting as a code stroke. She was found to have an acute L frontoparietal infarct. GI consulted for dysphagia.       Allergies:  No Known Allergies      Home Medications:    Hospital Medications:  albuterol    0.083% 2.5 milliGRAM(s) Nebulizer <User Schedule>  aspirin Suppository 300 milliGRAM(s) Rectal daily  atorvastatin 80 milliGRAM(s) Oral at bedtime  heparin   Injectable 5000 Unit(s) SubCutaneous every 12 hours  sodium chloride 0.9%. 1000 milliLiter(s) IV Continuous <Continuous>      PMHX/PSHX:      Family history:      Social History:   Denies ethanol use.  Denies illicit drug use.    ROS:     unable to obtain       PHYSICAL EXAM:     GENERAL:  Appears stated age, well-groomed, well-nourished, no distress  HEENT:  NC/AT,  conjunctivae anicteric, clear and pink,   NECK: supple, trachea midline  CHEST:  Full & symmetric excursion, no increased effort, breath sounds clear  HEART:  Regular rhythm, no JVD  ABDOMEN:  Soft, non-tender, non-distended, normoactive bowel sounds,  no masses , no hepatosplenomegaly  EXTREMITIES:  no cyanosis,clubbing or edema  SKIN:  No rash, erythema, or, ecchymoses, no jaundice  NEURO:  Alert, non-focal, no asterixis  PSYCH: Appropriate affect, oriented to place and time  RECTAL: Deferred      Vital Signs:  Vital Signs Last 24 Hrs  T(C): 37.6 (16 Feb 2024 09:00), Max: 37.6 (16 Feb 2024 09:00)  T(F): 99.6 (16 Feb 2024 09:00), Max: 99.6 (16 Feb 2024 09:00)  HR: 97 (16 Feb 2024 09:00) (64 - 97)  BP: 121/62 (16 Feb 2024 08:00) (104/62 - 138/58)  BP(mean): 79 (16 Feb 2024 08:00) (73 - 87)  RR: 24 (16 Feb 2024 09:00) (16 - 24)  SpO2: 94% (16 Feb 2024 09:00) (94% - 100%)    Parameters below as of 16 Feb 2024 08:00  Patient On (Oxygen Delivery Method): room air      Daily     Daily     LABS: Labs personally reviewed by me:                        7.8    6.43  )-----------( 176      ( 16 Feb 2024 05:44 )             24.4     02-16    147<H>  |  113<H>  |  53<H>  ----------------------------<  128<H>  3.6   |  21<L>  |  1.69<H>    Ca    8.6      16 Feb 2024 05:45    TPro  6.5  /  Alb  3.9  /  TBili  0.4  /  DBili  x   /  AST  13  /  ALT  9<L>  /  AlkPhos  115  02-14    LIVER FUNCTIONS - ( 14 Feb 2024 11:56 )  Alb: 3.9 g/dL / Pro: 6.5 g/dL / ALK PHOS: 115 U/L / ALT: 9 U/L / AST: 13 U/L / GGT: x           PT/INR - ( 14 Feb 2024 11:56 )   PT: 13.7 sec;   INR: 1.32 ratio         PTT - ( 14 Feb 2024 11:56 )  PTT:26.5 sec  Urinalysis Basic - ( 16 Feb 2024 05:45 )    Color: x / Appearance: x / SG: x / pH: x  Gluc: 128 mg/dL / Ketone: x  / Bili: x / Urobili: x   Blood: x / Protein: x / Nitrite: x   Leuk Esterase: x / RBC: x / WBC x   Sq Epi: x / Non Sq Epi: x / Bacteria: x          Imaging personally reviewed by me:           Chief Complaint:  Patient is a 89y old  Female who presents with a chief complaint of     Date of service: 02-16-24 @ 09:48    HPI:    The patient is nonverbal, history obtained from chart review and family at bedside. The patient is a 89-year-old woman with past medical history of CHF, HTN, pacemaker placement, valve replacement presenting as a code stroke. She was found to have an acute L frontoparietal infarct. GI consulted for dysphagia.       Allergies:  No Known Allergies      Home Medications:    Hospital Medications:  albuterol    0.083% 2.5 milliGRAM(s) Nebulizer <User Schedule>  aspirin Suppository 300 milliGRAM(s) Rectal daily  atorvastatin 80 milliGRAM(s) Oral at bedtime  heparin   Injectable 5000 Unit(s) SubCutaneous every 12 hours  sodium chloride 0.9%. 1000 milliLiter(s) IV Continuous <Continuous>      PMHX/PSHX:      Family history:      Social History:   Denies ethanol use.  Denies illicit drug use.    ROS:     unable to obtain       PHYSICAL EXAM:     GENERAL:  Appears stated age, well-groomed, well-nourished, no distress  HEENT:  NC/AT,  conjunctivae anicteric, clear and pink,   NECK: supple, trachea midline  CHEST:  Full & symmetric excursion, no increased effort, breath sounds clear  HEART:  Regular rhythm, no JVD  ABDOMEN:  Soft, non-tender, non-distended, normoactive bowel sounds,  no masses , no hepatosplenomegaly  EXTREMITIES:  no cyanosis,clubbing or edema  SKIN:  No rash, erythema, or, ecchymoses, no jaundice  NEURO:  Alert, non-focal, no asterixis  PSYCH: Appropriate affect, oriented to place and time  RECTAL: Deferred      Vital Signs:  Vital Signs Last 24 Hrs  T(C): 37.6 (16 Feb 2024 09:00), Max: 37.6 (16 Feb 2024 09:00)  T(F): 99.6 (16 Feb 2024 09:00), Max: 99.6 (16 Feb 2024 09:00)  HR: 97 (16 Feb 2024 09:00) (64 - 97)  BP: 121/62 (16 Feb 2024 08:00) (104/62 - 138/58)  BP(mean): 79 (16 Feb 2024 08:00) (73 - 87)  RR: 24 (16 Feb 2024 09:00) (16 - 24)  SpO2: 94% (16 Feb 2024 09:00) (94% - 100%)    Parameters below as of 16 Feb 2024 08:00  Patient On (Oxygen Delivery Method): room air      Daily     Daily     LABS: Labs personally reviewed by me:                        7.8    6.43  )-----------( 176      ( 16 Feb 2024 05:44 )             24.4     02-16    147<H>  |  113<H>  |  53<H>  ----------------------------<  128<H>  3.6   |  21<L>  |  1.69<H>    Ca    8.6      16 Feb 2024 05:45    TPro  6.5  /  Alb  3.9  /  TBili  0.4  /  DBili  x   /  AST  13  /  ALT  9<L>  /  AlkPhos  115  02-14    LIVER FUNCTIONS - ( 14 Feb 2024 11:56 )  Alb: 3.9 g/dL / Pro: 6.5 g/dL / ALK PHOS: 115 U/L / ALT: 9 U/L / AST: 13 U/L / GGT: x           PT/INR - ( 14 Feb 2024 11:56 )   PT: 13.7 sec;   INR: 1.32 ratio         PTT - ( 14 Feb 2024 11:56 )  PTT:26.5 sec  Urinalysis Basic - ( 16 Feb 2024 05:45 )    Color: x / Appearance: x / SG: x / pH: x  Gluc: 128 mg/dL / Ketone: x  / Bili: x / Urobili: x   Blood: x / Protein: x / Nitrite: x   Leuk Esterase: x / RBC: x / WBC x   Sq Epi: x / Non Sq Epi: x / Bacteria: x          Imaging personally reviewed by me:

## 2024-02-16 NOTE — PROGRESS NOTE ADULT - ASSESSMENT
89-year-old left-handed woman with past medical history of CHF, HTN, pacemaker placement, valve replacement presenting as a code stroke for AMS, ?R-facial droop, R sided weakness. Was found by home aide at 830 2/14 less responsive, not moving extremities as usually does. No prior known hx strokes per family.At baseline patient alert, able to recognize/comprehend familiar faces, per daughters at bedside patient sometimes has difficulty with getting words out. Not candidate for tenecteplase as area of hypodensity already appreciated on CT head imaging/age, not candidate for thrombectomy given higher MRS.    NEURO: Neurologically unchanged since admission. Continue close monitoring for neurologic deterioration. A1c 6.3, LDL 76 - high intensity statin when oral access obtained, titrate statin to LDL goal less than 70. CT Head, CTA Head/Neck as above. Physical therapy/OT/Speech eval - NOHEMI    ANTITHROMBOTIC THERAPY: ASA 300mg rectal    PULMONARY: protecting airway, saturating well. Continue home albuterol nebs.     CARDIOVASCULAR: TTE severe mitral stenosis , cardiac monitoring                              SBP goal:     GASTROINTESTINAL:  dysphagia screen       Diet:     RENAL: BUN/Cr stable, good urine output      Na Goal: Greater than 135     Sousa:    HEMATOLOGY: H/H stable, Platelets normal      DVT ppx: Heparin s.c [] LMWH []     ID: afebrile, no leukocytosis     OTHER:     DISPOSITION: Rehab or home depending on PT eval once stable and workup is complete      CORE MEASURES:        Admission NIHSS:      TPA: [] YES [] NO      LDL/HDL:     Depression Screen:      Statin Therapy:     Dysphagia Screen: [] PASS [] FAIL     Smoking [] YES [] NO      Afib [] YES [] NO     Stroke Education [] YES [] NO    Obtain screening lower extremity venous ultrasound in patients who meet 1 or more of the following criteria as patient is high risk for DVT/PE on admission:   [] History of DVT/PE  []Hypercoagulable states (Factor V Leiden, Cancer, OCP, etc. )  []Prolonged immobility (hemiplegia/hemiparesis/post operative or any other extended immobilization)  [] Transferred from outside facility (Rehab or Long term care)  [] Age </= to 50 89-year-old left-handed woman with past medical history of CHF, HTN, pacemaker placement, valve replacement presenting as a code stroke for AMS, ?R-facial droop, R sided weakness. Was found by home aide at 830 2/14 less responsive, not moving extremities as usually does. No prior known hx strokes per family.At baseline patient alert, able to recognize/comprehend familiar faces, per daughters at bedside patient sometimes has difficulty with getting words out. Not candidate for tenecteplase as area of hypodensity already appreciated on CT head imaging/age, not candidate for thrombectomy given higher MRS.    NEURO: Neurologically unchanged since admission. Continue close monitoring for neurologic deterioration. A1c 6.3, LDL 76 - high intensity statin when oral access obtained, titrate statin to LDL goal less than 70. CT Head, CTA Head/Neck as above. Physical therapy/OT/Speech eval - NOHEMI    ANTITHROMBOTIC THERAPY: ASA 300mg rectal    PULMONARY: protecting airway, saturating well. Continue home albuterol nebs.     CARDIOVASCULAR: TTE severe mitral stenosis, continue cardiac monitoring                              SBP goal: <180    GASTROINTESTINAL:  dysphagia screen failed. Failed bedside S/S eval yesterday. Will re-eval today.      Diet: NPO    RENAL: BUN/Cr stable, good urine output      Na Goal: Greater than 135     Sousa: No    HEMATOLOGY: H/H downtrending, will repeat. Platelets 176.      DVT ppx: Heparin s.c    ID: Afebrile, no leukocytosis     OTHER: Plan discussed with daughter at bedside    DISPOSITION: Rehab per PT eval once stable and workup is complete      CORE MEASURES:        Admission NIHSS: 18     TPA: NO      LDL/HDL: 76/45     Depression Screen: p     Statin Therapy: yes, when oral access obtained     Dysphagia Screen: FAIL     Smoking NO      Afib NO     Stroke Education YES    Obtain screening lower extremity venous ultrasound in patients who meet 1 or more of the following criteria as patient is high risk for DVT/PE on admission:   [] History of DVT/PE  [] Hypercoagulable states (Factor V Leiden, Cancer, OCP, etc. )  [] Prolonged immobility (hemiplegia/hemiparesis/post operative or any other extended immobilization)  [] Transferred from outside facility (Rehab or Long term care)  [] Age </= to 50 89yo left-handed woman with past medical history of CHF, HTN, pacemaker placement, valve replacement presenting as a code stroke for AMS, ?R-facial droop, R sided weakness. Was found by home aide at 830 2/14 less responsive, not moving extremities as usually does. No prior known hx strokes per family.At baseline patient alert, able to recognize/comprehend familiar faces, per daughters at bedside patient sometimes has difficulty with getting words out. Not candidate for tenecteplase as area of hypodensity already appreciated on CT head imaging/age, not candidate for thrombectomy given higher MRS.    NEURO: Neurologically unchanged since admission. Continue close monitoring for neurologic deterioration. A1c 6.3, LDL 76 - high intensity statin when oral access obtained, titrate statin to LDL goal less than 70. CT Head, CTA Head/Neck as above. Physical therapy/OT/Speech eval - NOHEMI    ANTITHROMBOTIC THERAPY: ASA 300mg rectal    PULMONARY: protecting airway, saturating well. Continue home albuterol nebs.     CARDIOVASCULAR: TTE severe mitral stenosis, continue cardiac monitoring                              SBP goal: <180    GASTROINTESTINAL:  dysphagia screen failed. Failed bedside S/S eval yesterday. Will re-eval today. GI consulted for possible PEG.      Diet: NPO    RENAL: BUN/Cr stable, good urine output      Na Goal: Greater than 135     Sousa: No    HEMATOLOGY: H/H downtrending, will repeat. Platelets 176.      DVT ppx: Heparin s.c    ID: Afebrile, no leukocytosis     OTHER: Plan discussed with daughter at bedside. After family discussion GI consulted for PEG placement.     DISPOSITION: Rehab per PT eval once stable and workup is complete      CORE MEASURES:        Admission NIHSS: 18     TPA: NO      LDL/HDL: 76/45     Depression Screen: p     Statin Therapy: yes, when oral access obtained     Dysphagia Screen: FAIL     Smoking NO      Afib NO     Stroke Education YES    Obtain screening lower extremity venous ultrasound in patients who meet 1 or more of the following criteria as patient is high risk for DVT/PE on admission:   [] History of DVT/PE  [] Hypercoagulable states (Factor V Leiden, Cancer, OCP, etc. )  [] Prolonged immobility (hemiplegia/hemiparesis/post operative or any other extended immobilization)  [] Transferred from outside facility (Rehab or Long term care)  [] Age </= to 50 89yo left-handed woman with past medical history of CHF, HTN, pacemaker placement, valve replacement presenting as a code stroke for AMS, ?R-facial droop, R sided weakness. Was found by home aide at 830 2/14 less responsive, not moving extremities as usually does. No prior known hx strokes per family.At baseline patient alert, able to recognize/comprehend familiar faces, per daughters at bedside patient sometimes has difficulty with getting words out. Not candidate for tenecteplase as area of hypodensity already appreciated on CT head imaging/age, not candidate for thrombectomy given higher MRS.    IMPRESSION: Global aphasia, R hemiparesis due to L MCA infarct 2/2 L ICA partially occlusive thrombus, etiology likely cardioembolic in the setting of AF    NEURO: Neurologically slightly worse since admission. Continue close monitoring for neurologic deterioration. A1c 6.3, LDL 76 - high intensity statin when oral access obtained, titrate statin to LDL goal less than 70. CT Head, CTA Head/Neck as above. Maintain SBP < 140. Physical therapy/OT/Speech eval - NOHEMI    ANTITHROMBOTIC THERAPY: On hold given hemorrhagic transformation on CTH     PULMONARY: protecting airway, saturating well. Continue home albuterol nebs. Now on supplemental O2. Close monitoring for pulmonary edema    CARDIOVASCULAR: TTE severe mitral stenosis, continue cardiac monitoring                              SBP goal: <180    GASTROINTESTINAL:  dysphagia screen failed. Failed bedside S/S eval yesterday. Will re-eval today. GI consulted for possible PEG.      Diet: NPO. Caution with IVF given mitral stenosis and concern for volume overload as seen on CT Chest     RENAL: BUN/Cr stable, good urine output      Na Goal: Greater than 135     Sousa: No    HEMATOLOGY: H/H downtrending, will repeat. Platelets 176.      DVT ppx: Heparin s.c    ID: Low grade temps with leukocytosis, started Abx for PNA    OTHER: Plan discussed with daughter at bedside. After family discussion GI consulted for PEG placement.     DISPOSITION: Rehab per PT eval once stable and workup is complete      CORE MEASURES:        Admission NIHSS: 18     TPA: NO      LDL/HDL: 76/45     Depression Screen: p     Statin Therapy: yes, when oral access obtained     Dysphagia Screen: FAIL     Smoking NO      Afib NO     Stroke Education YES    Obtain screening lower extremity venous ultrasound in patients who meet 1 or more of the following criteria as patient is high risk for DVT/PE on admission:   [] History of DVT/PE  [] Hypercoagulable states (Factor V Leiden, Cancer, OCP, etc. )  [] Prolonged immobility (hemiplegia/hemiparesis/post operative or any other extended immobilization)  [] Transferred from outside facility (Rehab or Long term care)  [] Age </= to 50

## 2024-02-16 NOTE — PROGRESS NOTE ADULT - PROBLEM SELECTOR PLAN 3
Called and spoke with mother, informed her of lab result message below, mother voice understanding, no further questions asked.    Rx sent to pharmacy as instructed by Dr. Goldberg.    Mother will return call to schedule appt. Due to work schedule change.      Interrogated   VVI mode.

## 2024-02-16 NOTE — PROGRESS NOTE ADULT - SUBJECTIVE AND OBJECTIVE BOX
THE PATIENT WAS SEEN AND EXAMINED BY ME WITH THE HOUSESTAFF AND STROKE TEAM DURING MORNING ROUNDS.     HPI:  89-year-old left-handed woman with past medical history of CHF, HTN, pacemaker placement, valve replacement presenting as a code stroke for AMS, ?R-facial droop, R sided weakness. Was found by home aide at 830 2/14 less responsive, not moving extremities as usually does. No prior known hx strokes per family. At baseline patient alert, able to recognize/comprehend familiar faces, per daughters at bedside patient sometimes has difficulty with getting words out. Information obtained from aide at bedside, then after CT scanner family also at bedside. (14 Feb 2024 12:33)    SUBJECTIVE: No events overnight.    albuterol    0.083% 2.5 milliGRAM(s) Nebulizer <User Schedule>  aspirin Suppository 300 milliGRAM(s) Rectal daily  atorvastatin 80 milliGRAM(s) Oral at bedtime  heparin   Injectable 5000 Unit(s) SubCutaneous every 12 hours  sodium chloride 0.9%. 1000 milliLiter(s) IV Continuous <Continuous>    PHYSICAL EXAM:   Vital Signs Last 24 Hrs  T(C): 36.7 (15 Feb 2024 20:00), Max: 37.1 (15 Feb 2024 12:00)  T(F): 98 (15 Feb 2024 20:00), Max: 98.7 (15 Feb 2024 12:00)  HR: 85 (16 Feb 2024 06:00) (60 - 86)  BP: 130/60 (16 Feb 2024 06:00) (104/62 - 138/58)  BP(mean): 87 (16 Feb 2024 06:00) (73 - 91)  RR: 20 (16 Feb 2024 06:00) (16 - 22)  SpO2: 99% (16 Feb 2024 06:00) (97% - 100%)    Parameters below as of 16 Feb 2024 06:00  Patient On (Oxygen Delivery Method): room air    General: No acute distress  Abdomen: Soft, nontender, nondistended   Extremities: No edema    NEUROLOGICAL EXAM:  Mental status: Opens eyes briefly to voice, does not respond to questions. Not following commands.  Cranial Nerves: No facial asymmetry, unclear BTT  Motor exam: Normal tone, some effort against gravity throughout  Sensation: Intact to light touch   Coordination/ Gait: Deferred    LABS:                        7.8    6.43  )-----------( 176      ( 16 Feb 2024 05:44 )             24.4    02-16    147<H>  |  113<H>  |  53<H>  ----------------------------<  128<H>  3.6   |  21<L>  |  1.69<H>    Ca    8.6      16 Feb 2024 05:45    TPro  6.5  /  Alb  3.9  /  TBili  0.4  /  DBili  x   /  AST  13  /  ALT  9<L>  /  AlkPhos  115  02-14  PT/INR - ( 14 Feb 2024 11:56 )   PT: 13.7 sec;   INR: 1.32 ratio      PTT - ( 14 Feb 2024 11:56 )  PTT:26.5 sec    IMAGING: Reviewed by me.     CT Head, CTA Head/Neck 2/14/24:    CT brain:  Acute left frontoparietalinfarct. No CT evidence for selene hemorrhagic   transformation.    CT brain perfusion:  Significantly limited by motion.    Cerebral blood flow less than 30% = 0 mL. No core infarct predicted.    Tmax greater than 6 seconds = 46 mL and involves the bilateral mesial   cerebellar hemispheres, the right temporal lobe, bilateral frontal lobes,   and left posterior temporal. This represents brain parenchyma predicted   to be at continued ischemic risk in the presence of neurologic symptoms.   This finding is likely spurious in nature.    Mismatch volume 46 mL  Mismatch ratio infinite    CTA brain:  Intraluminal filling defect involving the left ICA terminus and extending   into the proximal right A1 segment.    Normal flow-related enhancement of the remaining left LEBRON. Normal   flow-related enhancement of the left MCA.    Right MCA enhances to a lesser degree than the left MCA.    No vascular aneurysm or AVM.    CTA neck:  Approximately 50% short segment stenosis of the origin and proximal   segment of the left ICA due to calcified plaque. Normal flow-related   enhancement of the more distal vessel.    Approximately 75-80% short segment stenosis of the proximal segment of   the right internal carotid artery due to partially calcified plaque.   Normal flow-related enhancement of the more distal vessel. THE PATIENT WAS SEEN AND EXAMINED BY ME WITH THE HOUSESTAFF AND STROKE TEAM DURING MORNING ROUNDS.     HPI:  89-year-old left-handed woman with past medical history of CHF, HTN, pacemaker placement, valve replacement presenting as a code stroke for AMS, ?R-facial droop, R sided weakness. Was found by home aide at 830 2/14 less responsive, not moving extremities as usually does. No prior known hx strokes per family. At baseline patient alert, able to recognize/comprehend familiar faces, per daughters at bedside patient sometimes has difficulty with getting words out. Information obtained from aide at bedside, then after CT scanner family also at bedside. (14 Feb 2024 12:33)    SUBJECTIVE: No events overnight.    albuterol    0.083% 2.5 milliGRAM(s) Nebulizer <User Schedule>  aspirin Suppository 300 milliGRAM(s) Rectal daily  atorvastatin 80 milliGRAM(s) Oral at bedtime  heparin   Injectable 5000 Unit(s) SubCutaneous every 12 hours  sodium chloride 0.9%. 1000 milliLiter(s) IV Continuous <Continuous>    PHYSICAL EXAM:   Vital Signs Last 24 Hrs  T(C): 36.7 (15 Feb 2024 20:00), Max: 37.1 (15 Feb 2024 12:00)  T(F): 98 (15 Feb 2024 20:00), Max: 98.7 (15 Feb 2024 12:00)  HR: 85 (16 Feb 2024 06:00) (60 - 86)  BP: 130/60 (16 Feb 2024 06:00) (104/62 - 138/58)  BP(mean): 87 (16 Feb 2024 06:00) (73 - 91)  RR: 20 (16 Feb 2024 06:00) (16 - 22)  SpO2: 99% (16 Feb 2024 06:00) (97% - 100%)    Parameters below as of 16 Feb 2024 06:00  Patient On (Oxygen Delivery Method): room air    General: No acute distress  Abdomen: Soft, nontender, nondistended   Extremities: No edema    NEUROLOGICAL EXAM:  Mental status: Opens eyes briefly to voice, does not respond to questions. Not following commands.  Cranial Nerves: No facial asymmetry  Motor exam: Normal tone, some effort against gravity throughout all extremities  Sensation: Grimaces to painful stimuli  Coordination/ Gait: Deferred    LABS:                        7.8    6.43  )-----------( 176      ( 16 Feb 2024 05:44 )             24.4    02-16    147<H>  |  113<H>  |  53<H>  ----------------------------<  128<H>  3.6   |  21<L>  |  1.69<H>    Ca    8.6      16 Feb 2024 05:45    TPro  6.5  /  Alb  3.9  /  TBili  0.4  /  DBili  x   /  AST  13  /  ALT  9<L>  /  AlkPhos  115  02-14  PT/INR - ( 14 Feb 2024 11:56 )   PT: 13.7 sec;   INR: 1.32 ratio      PTT - ( 14 Feb 2024 11:56 )  PTT:26.5 sec    IMAGING: Reviewed by me.     CT Head, CTA Head/Neck 2/14/24:    CT brain:  Acute left frontoparietalinfarct. No CT evidence for selene hemorrhagic   transformation.    CT brain perfusion:  Significantly limited by motion.    Cerebral blood flow less than 30% = 0 mL. No core infarct predicted.    Tmax greater than 6 seconds = 46 mL and involves the bilateral mesial   cerebellar hemispheres, the right temporal lobe, bilateral frontal lobes,   and left posterior temporal. This represents brain parenchyma predicted   to be at continued ischemic risk in the presence of neurologic symptoms.   This finding is likely spurious in nature.    Mismatch volume 46 mL  Mismatch ratio infinite    CTA brain:  Intraluminal filling defect involving the left ICA terminus and extending   into the proximal right A1 segment.    Normal flow-related enhancement of the remaining left LEBRON. Normal   flow-related enhancement of the left MCA.    Right MCA enhances to a lesser degree than the left MCA.    No vascular aneurysm or AVM.    CTA neck:  Approximately 50% short segment stenosis of the origin and proximal   segment of the left ICA due to calcified plaque. Normal flow-related   enhancement of the more distal vessel.    Approximately 75-80% short segment stenosis of the proximal segment of   the right internal carotid artery due to partially calcified plaque.   Normal flow-related enhancement of the more distal vessel. THE PATIENT WAS SEEN AND EXAMINED BY ME WITH THE HOUSESTAFF AND STROKE TEAM DURING MORNING ROUNDS.     HPI:  89-year-old left-handed woman with past medical history of CHF, HTN, pacemaker placement, valve replacement presenting as a code stroke for AMS, ?R-facial droop, R sided weakness. Was found by home aide at 830 2/14 less responsive, not moving extremities as usually does. No prior known hx strokes per family. At baseline patient alert, able to recognize/comprehend familiar faces, per daughters at bedside patient sometimes has difficulty with getting words out. Information obtained from aide at bedside, then after CT scanner family also at bedside. (14 Feb 2024 12:33)    SUBJECTIVE: No events overnight. Inc work of breathing, O2 sats decreased, more lethargic     albuterol    0.083% 2.5 milliGRAM(s) Nebulizer <User Schedule>  aspirin Suppository 300 milliGRAM(s) Rectal daily  atorvastatin 80 milliGRAM(s) Oral at bedtime  heparin   Injectable 5000 Unit(s) SubCutaneous every 12 hours  sodium chloride 0.9%. 1000 milliLiter(s) IV Continuous <Continuous>    PHYSICAL EXAM:   Vital Signs Last 24 Hrs  T(C): 36.7 (15 Feb 2024 20:00), Max: 37.1 (15 Feb 2024 12:00)  T(F): 98 (15 Feb 2024 20:00), Max: 98.7 (15 Feb 2024 12:00)  HR: 85 (16 Feb 2024 06:00) (60 - 86)  BP: 130/60 (16 Feb 2024 06:00) (104/62 - 138/58)  BP(mean): 87 (16 Feb 2024 06:00) (73 - 91)  RR: 20 (16 Feb 2024 06:00) (16 - 22)  SpO2: 99% (16 Feb 2024 06:00) (97% - 100%)    Parameters below as of 16 Feb 2024 06:00  Patient On (Oxygen Delivery Method): room air    General: No acute distress  Abdomen: Soft, nontender, nondistended   Extremities: No edema    NEUROLOGICAL EXAM:  Mental status: Opens eyes briefly to voice, does not respond to questions. Not following commands.  Cranial Nerves: No facial asymmetry  Motor exam: Normal tone, some effort against gravity throughout all extremities, L > R. Arthritic changes throughout extremities   Sensation: Grimaces to painful stimuli x4, less so on the right  Coordination/ Gait: Deferred    LABS:                        7.8    6.43  )-----------( 176      ( 16 Feb 2024 05:44 )             24.4    02-16    147<H>  |  113<H>  |  53<H>  ----------------------------<  128<H>  3.6   |  21<L>  |  1.69<H>    Ca    8.6      16 Feb 2024 05:45    TPro  6.5  /  Alb  3.9  /  TBili  0.4  /  DBili  x   /  AST  13  /  ALT  9<L>  /  AlkPhos  115  02-14  PT/INR - ( 14 Feb 2024 11:56 )   PT: 13.7 sec;   INR: 1.32 ratio      PTT - ( 14 Feb 2024 11:56 )  PTT:26.5 sec    IMAGING: Reviewed by me.     CT Head, CTA Head/Neck 2/14/24:    CT brain:  Acute left frontoparietalinfarct. No CT evidence for selene hemorrhagic   transformation.    CT brain perfusion:  Significantly limited by motion.    Cerebral blood flow less than 30% = 0 mL. No core infarct predicted.    Tmax greater than 6 seconds = 46 mL and involves the bilateral mesial   cerebellar hemispheres, the right temporal lobe, bilateral frontal lobes,   and left posterior temporal. This represents brain parenchyma predicted   to be at continued ischemic risk in the presence of neurologic symptoms.   This finding is likely spurious in nature.    Mismatch volume 46 mL  Mismatch ratio infinite    CTA brain:  Intraluminal filling defect involving the left ICA terminus and extending   into the proximal right A1 segment.    Normal flow-related enhancement of the remaining left LEBRON. Normal   flow-related enhancement of the left MCA.    Right MCA enhances to a lesser degree than the left MCA.    No vascular aneurysm or AVM.    CTA neck:  Approximately 50% short segment stenosis of the origin and proximal   segment of the left ICA due to calcified plaque. Normal flow-related   enhancement of the more distal vessel.    Approximately 75-80% short segment stenosis of the proximal segment of   the right internal carotid artery due to partially calcified plaque.   Normal flow-related enhancement of the more distal vessel.

## 2024-02-16 NOTE — CONSULT NOTE ADULT - SUBJECTIVE AND OBJECTIVE BOX
PULMONARY CONSULT    HPI: 89 y/o F with PMH of CHF, HTN, PPM, valve replacement. Presents as a code stroke for AMS, ?R-facial droop, R sided weakness. At baseline patient reportedly alert, able to recognize/comprehend familiar faces, per daughters at bedside patient sometimes has difficulty with getting words out. Found to have acute left frontoparietal infarct. Called to consult for concern of aspiration PNA.     PAST MEDICAL & SURGICAL HISTORY:  CHF  HTN  PPM      Allergies  No Known Allergies        FAMILY HISTORY:    Social history:     Review of Systems:  CONSTITUTIONAL: No fever, chills, or fatigue  EYES: No eye pain, visual disturbances, or discharge  ENMT:  No difficulty hearing, tinnitus, vertigo; No sinus or throat pain  NECK: No pain or stiffness  RESPIRATORY: Per above  CARDIOVASCULAR: No chest pain, palpitations, dizziness, or leg swelling  GASTROINTESTINAL: No abdominal or epigastric pain. No nausea, vomiting, or hematemesis; No diarrhea or constipation. No melena or hematochezia.  GENITOURINARY: No dysuria, frequency, hematuria, or incontinence  NEUROLOGICAL: Per above   SKIN: No itching, burning, rashes, or lesions   MUSCULOSKELETAL: No joint pain or swelling; No muscle, back, or extremity pain  PSYCHIATRIC: No depression, anxiety, mood swings, or difficulty sleeping      Medications:  MEDICATIONS  (STANDING):  albuterol    0.083% 2.5 milliGRAM(s) Nebulizer <User Schedule>  aspirin Suppository 300 milliGRAM(s) Rectal daily  atorvastatin 80 milliGRAM(s) Oral at bedtime  heparin   Injectable 5000 Unit(s) SubCutaneous every 12 hours  sodium chloride 0.9%. 1000 milliLiter(s) (30 mL/Hr) IV Continuous <Continuous>            Vital Signs Last 24 Hrs  T(C): 37.6 (16 Feb 2024 09:00), Max: 37.6 (16 Feb 2024 09:00)  T(F): 99.6 (16 Feb 2024 09:00), Max: 99.6 (16 Feb 2024 09:00)  HR: 98 (16 Feb 2024 11:20) (65 - 105)  BP: 116/55 (16 Feb 2024 10:00) (104/62 - 130/60)  BP(mean): 78 (16 Feb 2024 10:00) (73 - 87)  RR: 20 (16 Feb 2024 11:20) (16 - 24)  SpO2: 92% (16 Feb 2024 11:20) (92% - 100%)    Parameters below as of 16 Feb 2024 10:00  Patient On (Oxygen Delivery Method): room air                02-15 @ 07:01  -  02-16 @ 07:00  --------------------------------------------------------  IN: 720 mL / OUT: 550 mL / NET: 170 mL          LABS:                        8.2    7.29  )-----------( 191      ( 16 Feb 2024 09:28 )             26.4     02-16    147<H>  |  113<H>  |  53<H>  ----------------------------<  128<H>  3.6   |  21<L>  |  1.69<H>    Ca    8.6      16 Feb 2024 05:45              Urinalysis Basic - ( 16 Feb 2024 05:45 )    Color: x / Appearance: x / SG: x / pH: x  Gluc: 128 mg/dL / Ketone: x  / Bili: x / Urobili: x   Blood: x / Protein: x / Nitrite: x   Leuk Esterase: x / RBC: x / WBC x   Sq Epi: x / Non Sq Epi: x / Bacteria: x              Physical Examination:    General: No acute distress.      HEENT: Pupils equal, reactive to light.  Symmetric.    PULM: Clear to auscultation bilaterally, no significant sputum production    CVS: S1, S2    ABD: Soft, nondistended, nontender, normoactive bowel sounds, no masses    EXT: No edema, nontender    SKIN: Warm and well perfused, no rashes noted.    NEURO: Alert, oriented, interactive, nonfocal    RADIOLOGY REVIEWED  CXR 2/14: congestion     CT chest: b/l pl effusions   f/u official report      TTE: < from: TTE W or WO Ultrasound Enhancing Agent (02.14.24 @ 15:41) >  CONCLUSIONS:      1. Technically difficult image quality.   2. 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 No aortic regurgitation.   3. Severe mitral valve stenosis, secondary to dystrophic mitral annular calcification.   4. No prior echocardiogram is available for comparison.    ________________________________________________________________________________________  FINDINGS:     Left Ventricle:  There is moderate (grade 2) left ventricular diastolic dysfunction, with elevated filling pressure. Left ventricular endocardium is not well visualized.     Right Ventricle:  The right ventricle is not well visualized. A device lead is visualized.     Left Atrium:  The left atrium is severely dilated with an indexed volume of 64.47 ml/m².     Aortic Valve:  Transcatheter aortic valve replacement with normal gradient. No aortic regurgitation.     Mitral Valve:  There is severe calcification of the mitral valve annulus. There is severe leaflet calcification. There is severe mitral valve stenosis, secondary to dystrophic mitral annular calcification. The transmitral mean gradient is 15.76 mmHg at a heart rate of 73 bpm. There is mild mitral regurgitation.     Tricuspid Valve:  There is mild tricuspid regurgitation.     Pulmonic Valve:  The pulmonic valve was not well visualized.     Pericardium:  No pericardial effusion seen.  ____________________________________________________________________  QUANTITATIVE DATA:  Left Ventricle Measurements: (Indexed to BSA)     MV E Vmax:    1.88 m/s  MV A Vmax:    2.65 m/s  MV E/A:       0.71  e' lateral:   4.90 cm/s  e' medial:    3.37 cm/s  E/e' lateral: 38.37  E/e' medial:  55.79  E/e' Average: 45.47       Right Ventricle Measurements:     TV Aaliyah. S': 12.00 cm/s       LVOT / RVOT/ Qp/Qs Data: (Indexed to BSA)  LVOT Diameter: 1.90 cm  LVOT Vmax:     1.38 m/s  LVOT VTI:      25.59 cm  LVOT SV:       72.5 ml    47.25 ml/m²  LVOT CO:       7.69 l/min 5.01 l/min/m²    Aortic Valve Measurements:  AV Vmax:                1.5 m/s  AV Peak Gradient:       8.9 mmHg  AV Mean Gradient:       3.8 mmHg  AV VTI:                 28.2 cm  AV VTI Ratio:           0.91  AoV EOA, Contin:        2.57 cm²  AoV EOA, Contin i:      1.68 cm²/m²  AoV DimensionlessIndex 0.91    Mitral Valve Measurements:     MV VTI:         81.37 cm  MV VTI (tips):  74.60 cm  MV Mean Grad:   15.22 mmHg  MV E Vmax:      1.9 m/s  MV A Vmax:      2.6 m/s  MV E/A:         0.7  MV Gradient HR: 73 bpm       Tricuspid Valve Measurements:     TR Vmax:          3.1 m/s  TR Peak Gradient: 37.5 mmHg    ________________________________________________________________________________________  Electronically signed on 2/14/2024 at 7:32:22 PM by Cody Troy MD         < end of copied text >     PULMONARY CONSULT    HPI: 89 y/o F with PMH of CHF, HTN, PPM, valve replacement. Presents as a code stroke for AMS, ?R-facial droop, R sided weakness. At baseline patient reportedly alert, able to recognize/comprehend familiar faces, per daughters at bedside patient sometimes has difficulty with getting words out. Found to have acute left frontoparietal infarct. Called to consult for concern of aspiration PNA. Reportedly with oral secretions. ROS unable to be obtained 2nd to mental status.     PAST MEDICAL & SURGICAL HISTORY:  CHF  HTN  PPM      Allergies  No Known Allergies        FAMILY HISTORY: non contributory     Social history: never a smoker     Review of Systems: ROS unable to be obtained     Medications:  MEDICATIONS  (STANDING):  albuterol    0.083% 2.5 milliGRAM(s) Nebulizer <User Schedule>  aspirin Suppository 300 milliGRAM(s) Rectal daily  atorvastatin 80 milliGRAM(s) Oral at bedtime  heparin   Injectable 5000 Unit(s) SubCutaneous every 12 hours  sodium chloride 0.9%. 1000 milliLiter(s) (30 mL/Hr) IV Continuous <Continuous>            Vital Signs Last 24 Hrs  T(C): 37.6 (16 Feb 2024 09:00), Max: 37.6 (16 Feb 2024 09:00)  T(F): 99.6 (16 Feb 2024 09:00), Max: 99.6 (16 Feb 2024 09:00)  HR: 98 (16 Feb 2024 11:20) (65 - 105)  BP: 116/55 (16 Feb 2024 10:00) (104/62 - 130/60)  BP(mean): 78 (16 Feb 2024 10:00) (73 - 87)  RR: 20 (16 Feb 2024 11:20) (16 - 24)  SpO2: 92% (16 Feb 2024 11:20) (92% - 100%)    Parameters below as of 16 Feb 2024 10:00  Patient On (Oxygen Delivery Method): room air                02-15 @ 07:01  -  02-16 @ 07:00  --------------------------------------------------------  IN: 720 mL / OUT: 550 mL / NET: 170 mL          LABS:                        8.2    7.29  )-----------( 191      ( 16 Feb 2024 09:28 )             26.4     02-16    147<H>  |  113<H>  |  53<H>  ----------------------------<  128<H>  3.6   |  21<L>  |  1.69<H>    Ca    8.6      16 Feb 2024 05:45              Urinalysis Basic - ( 16 Feb 2024 05:45 )    Color: x / Appearance: x / SG: x / pH: x  Gluc: 128 mg/dL / Ketone: x  / Bili: x / Urobili: x   Blood: x / Protein: x / Nitrite: x   Leuk Esterase: x / RBC: x / WBC x   Sq Epi: x / Non Sq Epi: x / Bacteria: x              Physical Examination:    General: No acute distress.      HEENT: Pupils equal, reactive to light.  Symmetric.    PULM: decreased BS     CVS: S1, S2    ABD: Soft, nondistended, nontender, normoactive bowel sounds, no masses    EXT: No edema, nontender    SKIN: Warm and well perfused, no rashes noted.    NEURO: lethargic, oriented x0    RADIOLOGY REVIEWED  CXR 2/14: congestion     CT chest: b/l pl effusions   f/u official report      TTE: < from: TTE W or WO Ultrasound Enhancing Agent (02.14.24 @ 15:41) >  CONCLUSIONS:      1. Technically difficult image quality.   2. 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 No aortic regurgitation.   3. Severe mitral valve stenosis, secondary to dystrophic mitral annular calcification.   4. No prior echocardiogram is available for comparison.    ________________________________________________________________________________________  FINDINGS:     Left Ventricle:  There is moderate (grade 2) left ventricular diastolic dysfunction, with elevated filling pressure. Left ventricular endocardium is not well visualized.     Right Ventricle:  The right ventricle is not well visualized. A device lead is visualized.     Left Atrium:  The left atrium is severely dilated with an indexed volume of 64.47 ml/m².     Aortic Valve:  Transcatheter aortic valve replacement with normal gradient. No aortic regurgitation.     Mitral Valve:  There is severe calcification of the mitral valve annulus. There is severe leaflet calcification. There is severe mitral valve stenosis, secondary to dystrophic mitral annular calcification. The transmitral mean gradient is 15.76 mmHg at a heart rate of 73 bpm. There is mild mitral regurgitation.     Tricuspid Valve:  There is mild tricuspid regurgitation.     Pulmonic Valve:  The pulmonic valve was not well visualized.     Pericardium:  No pericardial effusion seen.  ____________________________________________________________________  QUANTITATIVE DATA:  Left Ventricle Measurements: (Indexed to BSA)     MV E Vmax:    1.88 m/s  MV A Vmax:    2.65 m/s  MV E/A:       0.71  e' lateral:   4.90 cm/s  e' medial:    3.37 cm/s  E/e' lateral: 38.37  E/e' medial:  55.79  E/e' Average: 45.47       Right Ventricle Measurements:     TV Aaliyah. S': 12.00 cm/s       LVOT / RVOT/ Qp/Qs Data: (Indexed to BSA)  LVOT Diameter: 1.90 cm  LVOT Vmax:     1.38 m/s  LVOT VTI:      25.59 cm  LVOT SV:       72.5 ml    47.25 ml/m²  LVOT CO:       7.69 l/min 5.01 l/min/m²    Aortic Valve Measurements:  AV Vmax:                1.5 m/s  AV Peak Gradient:       8.9 mmHg  AV Mean Gradient:       3.8 mmHg  AV VTI:                 28.2 cm  AV VTI Ratio:           0.91  AoV EOA, Contin:        2.57 cm²  AoV EOA, Contin i:      1.68 cm²/m²  AoV DimensionlessIndex 0.91    Mitral Valve Measurements:     MV VTI:         81.37 cm  MV VTI (tips):  74.60 cm  MV Mean Grad:   15.22 mmHg  MV E Vmax:      1.9 m/s  MV A Vmax:      2.6 m/s  MV E/A:         0.7  MV Gradient HR: 73 bpm       Tricuspid Valve Measurements:     TR Vmax:          3.1 m/s  TR Peak Gradient: 37.5 mmHg    ________________________________________________________________________________________  Electronically signed on 2/14/2024 at 7:32:22 PM by Cody Troy MD         < end of copied text >

## 2024-02-16 NOTE — CONSULT NOTE ADULT - ASSESSMENT
89 y/o F with PMH of CHF, HTN, PPM, valve replacement. Presents as a code stroke for AMS, ?R-facial droop, R sided weakness. At baseline patient reportedly alert, able to recognize/comprehend familiar faces, per daughters at bedside patient sometimes has difficulty with getting words out. Found to have acute left frontoparietal infarct. Called to consult for concern of aspiration PNA.

## 2024-02-16 NOTE — CONSULT NOTE ADULT - NS ATTEND AMEND GEN_ALL_CORE FT
CT chest with ?early PNA vs. mucous plugging LLL, F/u official report  Check procalcitonin  Start Levaquin 500mg/d  keep sat>90%  prognosis guarded

## 2024-02-17 LAB
ANION GAP SERPL CALC-SCNC: 16 MMOL/L — SIGNIFICANT CHANGE UP (ref 5–17)
ANION GAP SERPL CALC-SCNC: 16 MMOL/L — SIGNIFICANT CHANGE UP (ref 5–17)
BUN SERPL-MCNC: 45 MG/DL — HIGH (ref 7–23)
BUN SERPL-MCNC: 47 MG/DL — HIGH (ref 7–23)
CALCIUM SERPL-MCNC: 9 MG/DL — SIGNIFICANT CHANGE UP (ref 8.4–10.5)
CALCIUM SERPL-MCNC: 9.1 MG/DL — SIGNIFICANT CHANGE UP (ref 8.4–10.5)
CHLORIDE SERPL-SCNC: 116 MMOL/L — HIGH (ref 96–108)
CHLORIDE SERPL-SCNC: 119 MMOL/L — HIGH (ref 96–108)
CO2 SERPL-SCNC: 19 MMOL/L — LOW (ref 22–31)
CO2 SERPL-SCNC: 20 MMOL/L — LOW (ref 22–31)
CREAT SERPL-MCNC: 1.73 MG/DL — HIGH (ref 0.5–1.3)
CREAT SERPL-MCNC: 1.79 MG/DL — HIGH (ref 0.5–1.3)
EGFR: 26 ML/MIN/1.73M2 — LOW
EGFR: 27 ML/MIN/1.73M2 — LOW
GLUCOSE SERPL-MCNC: 103 MG/DL — HIGH (ref 70–99)
GLUCOSE SERPL-MCNC: 106 MG/DL — HIGH (ref 70–99)
HCT VFR BLD CALC: 25.3 % — LOW (ref 34.5–45)
HCT VFR BLD CALC: 25.7 % — LOW (ref 34.5–45)
HGB BLD-MCNC: 7.8 G/DL — LOW (ref 11.5–15.5)
HGB BLD-MCNC: 7.8 G/DL — LOW (ref 11.5–15.5)
MCHC RBC-ENTMCNC: 27.6 PG — SIGNIFICANT CHANGE UP (ref 27–34)
MCHC RBC-ENTMCNC: 27.9 PG — SIGNIFICANT CHANGE UP (ref 27–34)
MCHC RBC-ENTMCNC: 30.4 GM/DL — LOW (ref 32–36)
MCHC RBC-ENTMCNC: 30.8 GM/DL — LOW (ref 32–36)
MCV RBC AUTO: 90.4 FL — SIGNIFICANT CHANGE UP (ref 80–100)
MCV RBC AUTO: 90.8 FL — SIGNIFICANT CHANGE UP (ref 80–100)
NRBC # BLD: 0 /100 WBCS — SIGNIFICANT CHANGE UP (ref 0–0)
NRBC # BLD: 0 /100 WBCS — SIGNIFICANT CHANGE UP (ref 0–0)
PLATELET # BLD AUTO: 174 K/UL — SIGNIFICANT CHANGE UP (ref 150–400)
PLATELET # BLD AUTO: 185 K/UL — SIGNIFICANT CHANGE UP (ref 150–400)
POTASSIUM SERPL-MCNC: 3.5 MMOL/L — SIGNIFICANT CHANGE UP (ref 3.5–5.3)
POTASSIUM SERPL-MCNC: 3.5 MMOL/L — SIGNIFICANT CHANGE UP (ref 3.5–5.3)
POTASSIUM SERPL-SCNC: 3.5 MMOL/L — SIGNIFICANT CHANGE UP (ref 3.5–5.3)
POTASSIUM SERPL-SCNC: 3.5 MMOL/L — SIGNIFICANT CHANGE UP (ref 3.5–5.3)
PROCALCITONIN SERPL-MCNC: 0.14 NG/ML — HIGH (ref 0.02–0.1)
RBC # BLD: 2.8 M/UL — LOW (ref 3.8–5.2)
RBC # BLD: 2.83 M/UL — LOW (ref 3.8–5.2)
RBC # FLD: 18 % — HIGH (ref 10.3–14.5)
RBC # FLD: 18.1 % — HIGH (ref 10.3–14.5)
SODIUM SERPL-SCNC: 152 MMOL/L — HIGH (ref 135–145)
SODIUM SERPL-SCNC: 154 MMOL/L — HIGH (ref 135–145)
WBC # BLD: 6.5 K/UL — SIGNIFICANT CHANGE UP (ref 3.8–10.5)
WBC # BLD: 6.56 K/UL — SIGNIFICANT CHANGE UP (ref 3.8–10.5)
WBC # FLD AUTO: 6.5 K/UL — SIGNIFICANT CHANGE UP (ref 3.8–10.5)
WBC # FLD AUTO: 6.56 K/UL — SIGNIFICANT CHANGE UP (ref 3.8–10.5)

## 2024-02-17 PROCEDURE — 99233 SBSQ HOSP IP/OBS HIGH 50: CPT

## 2024-02-17 PROCEDURE — 70450 CT HEAD/BRAIN W/O DYE: CPT | Mod: 26

## 2024-02-17 RX ORDER — ACETAMINOPHEN 500 MG
650 TABLET ORAL ONCE
Refills: 0 | Status: COMPLETED | OUTPATIENT
Start: 2024-02-17 | End: 2024-02-17

## 2024-02-17 RX ADMIN — Medication 650 MILLIGRAM(S): at 23:59

## 2024-02-17 RX ADMIN — Medication 260 MILLIGRAM(S): at 07:23

## 2024-02-17 RX ADMIN — Medication 650 MILLIGRAM(S): at 01:14

## 2024-02-17 RX ADMIN — Medication 260 MILLIGRAM(S): at 00:44

## 2024-02-17 RX ADMIN — Medication 3 MILLILITER(S): at 11:46

## 2024-02-17 RX ADMIN — Medication 3 MILLILITER(S): at 18:00

## 2024-02-17 RX ADMIN — Medication 3 MILLILITER(S): at 05:24

## 2024-02-17 RX ADMIN — Medication 260 MILLIGRAM(S): at 22:39

## 2024-02-17 RX ADMIN — Medication 650 MILLIGRAM(S): at 08:00

## 2024-02-17 NOTE — PROGRESS NOTE ADULT - SUBJECTIVE AND OBJECTIVE BOX
Subjective: Patient seen and examined. No new events except as noted.   remains in Stroke Unit       REVIEW OF SYSTEMS:    Unable to obtain      MEDICATIONS:  MEDICATIONS  (STANDING):  acetaminophen   IVPB .. 650 milliGRAM(s) IV Intermittent once  albuterol/ipratropium for Nebulization 3 milliLiter(s) Nebulizer every 6 hours  sodium chloride 0.9%. 1000 milliLiter(s) (30 mL/Hr) IV Continuous <Continuous>      PHYSICAL EXAM:  T(C): 37.1 (02-17-24 @ 14:00), Max: 37.5 (02-17-24 @ 00:00)  HR: 104 (02-17-24 @ 18:00) (83 - 106)  BP: 122/74 (02-17-24 @ 18:00) (102/46 - 129/57)  RR: 24 (02-17-24 @ 18:00) (14 - 26)  SpO2: 100% (02-17-24 @ 18:00) (92% - 100%)  Wt(kg): --  I&O's Summary    16 Feb 2024 07:01  -  17 Feb 2024 07:00  --------------------------------------------------------  IN: 515 mL / OUT: 581 mL / NET: -66 mL    17 Feb 2024 07:01  -  17 Feb 2024 19:54  --------------------------------------------------------  IN: 360 mL / OUT: 250 mL / NET: 110 mL        Appearance: NAD  HEENT:   Dry oral mucosa, PERRL, EOMI	  Lymphatic: No lymphadenopathy  Cardiovascular: Normal S1 S2, No JVD, No murmurs, No edema  Respiratory: Decreased bs  Psychiatry: A & O x 0 sleepy  Gastrointestinal:  Soft, Non-tender, + BS	  Skin: No rashes, No ecchymoses, No cyanosis	  Neurologic Exam:  Mental status - eyes closed, opens to repeated verbal stimuli. Follows intermittent simple commands to squeeze L hand/give thumbs up in L hand. Does not respond to orientation questions.   Cranial nerves - R pupil appears ?sluggishly reactive. No BTT in R eye. Unable to open left eye. ?R facial droop but may be 2/2 positioning of patient's head to R.  Motor - Normal bulk. Increased tone in RUE. Does not maintain antigravity when asked throughout all extremities initially, however does squeeze hand on LUE, withdraws slightly to noxious stimuli in R hand.   Upon re-evaluation able to raise both legs antigravity.  Sensation - Withdraws to noxious stimuli throughout.  DTR's - deferred  Coordination -deferred  Gait and station - deferred  Extremities: Normal range of motion, No clubbing, cyanosis or edema  Vascular: Peripheral pulses palpable 2+ bilaterally        LABS:    CARDIAC MARKERS:                                7.8    6.50  )-----------( 185      ( 17 Feb 2024 14:24 )             25.3     02-17    154<H>  |  119<H>  |  45<H>  ----------------------------<  103<H>  3.5   |  19<L>  |  1.73<H>    Ca    9.0      17 Feb 2024 14:24        TELEMETRY: 	 V paced   ECG:  	  RADIOLOGY:   DIAGNOSTIC TESTING:  [ ] Echocardiogram:  [ ]  Catheterization:  [ ] Stress Test:    OTHER:

## 2024-02-17 NOTE — PROGRESS NOTE ADULT - SUBJECTIVE AND OBJECTIVE BOX
pt seen and examined, no complaints, ROS - .        albuterol/ipratropium for Nebulization 3 milliLiter(s) Nebulizer every 6 hours  sodium chloride 0.9%. 1000 milliLiter(s) IV Continuous <Continuous>                            7.8    6.56  )-----------( 174      ( 17 Feb 2024 07:39 )             25.7       Hemoglobin: 7.8 g/dL (02-17 @ 07:39)  Hemoglobin: 8.2 g/dL (02-16 @ 09:28)  Hemoglobin: 7.8 g/dL (02-16 @ 05:44)  Hemoglobin: 8.8 g/dL (02-15 @ 05:25)  Hemoglobin: 9.3 g/dL (02-14 @ 11:56)      02-17    152<H>  |  116<H>  |  47<H>  ----------------------------<  106<H>  3.5   |  20<L>  |  1.79<H>    Ca    9.1      17 Feb 2024 07:36      Creatinine Trend: 1.79<--, 1.69<--, 1.61<--, 1.59<--    COAGS:           T(C): 37 (02-17-24 @ 08:00), Max: 37.6 (02-16-24 @ 12:00)  HR: 83 (02-17-24 @ 10:00) (81 - 109)  BP: 102/46 (02-17-24 @ 10:00) (102/46 - 135/58)  RR: 18 (02-17-24 @ 10:00) (14 - 26)  SpO2: 100% (02-17-24 @ 10:00) (92% - 100%)  Wt(kg): --    I&O's Summary    16 Feb 2024 07:01  -  17 Feb 2024 07:00  --------------------------------------------------------  IN: 515 mL / OUT: 581 mL / NET: -66 mL      ROS:     unable to obtain       PHYSICAL EXAM:     GENERAL:  Appears stated age, well-groomed, well-nourished, no distress  HEENT:  NC/AT,  conjunctivae anicteric, clear and pink,   NECK: supple, trachea midline  CHEST:  Full & symmetric excursion, no increased effort, breath sounds clear  HEART:  Regular rhythm, no JVD  ABDOMEN:  Soft, non-tender, non-distended, normoactive bowel sounds,  no masses , no hepatosplenomegaly  EXTREMITIES:  no cyanosis,clubbing or edema  SKIN:  No rash, erythema, or, ecchymoses, no jaundice  NEURO:  Alert, non-focal, no asterixis  PSYCH: Appropriate affect, oriented to place and time  RECTAL: Deferred

## 2024-02-17 NOTE — PROGRESS NOTE ADULT - PROBLEM SELECTOR PLAN 3
CT chest with ?early PNA vs. mucous plugging LLL  -Procalcitonin mildly elevated   -Continue Levaquin (pt with PCN allergy) for now. Will re-evaluate this afternoon   -Keep sats >90%  -Duoneb q6h  -Suction PRN. CT chest with ?early PNA vs. mucous plugging LLL  -Procalcitonin mildly elevated   -No leukocytosis, afebrile  -Suggest monitor off ABX.   -Keep sats >90%  -Duoneb q6h  -Suction PRN.

## 2024-02-17 NOTE — PROGRESS NOTE ADULT - SUBJECTIVE AND OBJECTIVE BOX
Follow-up Pulm Progress Note    Unable to participate in ROS, breathing nonlabored  O2 sats 100% on 2LNC, placed on room air     Medications:  MEDICATIONS  (STANDING):  albuterol/ipratropium for Nebulization 3 milliLiter(s) Nebulizer every 6 hours  sodium chloride 0.9%. 1000 milliLiter(s) (30 mL/Hr) IV Continuous <Continuous>    Vital Signs Last 24 Hrs  T(C): 37 (17 Feb 2024 08:00), Max: 37.6 (16 Feb 2024 12:00)  T(F): 98.6 (17 Feb 2024 08:00), Max: 99.7 (16 Feb 2024 12:00)  HR: 83 (17 Feb 2024 10:00) (81 - 109)  BP: 102/46 (17 Feb 2024 10:00) (102/46 - 135/58)  BP(mean): 66 (17 Feb 2024 10:00) (65 - 83)  RR: 18 (17 Feb 2024 10:00) (14 - 26)  SpO2: 100% (17 Feb 2024 10:00) (92% - 100%)    Parameters below as of 17 Feb 2024 10:00  Patient On (Oxygen Delivery Method): nasal cannula  O2 Flow (L/min): 1            02-16 @ 07:01  -  02-17 @ 07:00  --------------------------------------------------------  IN: 515 mL / OUT: 581 mL / NET: -66 mL          LABS:                        7.8    6.56  )-----------( 174      ( 17 Feb 2024 07:39 )             25.7     02-17    152<H>  |  116<H>  |  47<H>  ----------------------------<  106<H>  3.5   |  20<L>  |  1.79<H>    Ca    9.1      17 Feb 2024 07:36      PT/INR - ( 16 Feb 2024 14:28 )   PT: 13.5 sec;   INR: 1.30 ratio         PTT - ( 16 Feb 2024 14:28 )  PTT:27.0 sec  Urinalysis Basic - ( 17 Feb 2024 07:36 )    Color: x / Appearance: x / SG: x / pH: x  Gluc: 106 mg/dL / Ketone: x  / Bili: x / Urobili: x   Blood: x / Protein: x / Nitrite: x   Leuk Esterase: x / RBC: x / WBC x   Sq Epi: x / Non Sq Epi: x / Bacteria: x      Procalcitonin, Serum: 0.14 ng/mL (02-17-24 @ 07:36)  Procalcitonin, Serum: 0.14 ng/mL (02-16-24 @ 14:28)    Physical Examination:  PULM: Decreased BS   CVS: S1, S2 heard    RADIOLOGY REVIEWED    CT chest:  < from: CT Chest No Cont (02.16.24 @ 12:30) >    FINDINGS:    AIRWAYS AND LUNGS: Tracheal bronchial secretions.  Patchy bilateral lung   opacities superimposed on mosaic attenuation.    MEDIASTINUM AND PLEURA: There are no enlarged mediastinal, hilar or   axillary lymph nodes. The visualized portion of the thyroid gland is   heterogeneous. There are small bilateral pleural effusions.  There is no   pneumothorax.    HEART AND VESSELS: There is mild cardiomegaly.  There are atherosclerotic   calcifications of the aorta and coronary arteries. There is a small   pericardial effusion.  TAVR. Left-sided pacemaker    UPPER ABDOMEN: Images of the upper abdomen demonstrate cholecystectomy.   Residual contrast within the kidneys..    BONES AND SOFT TISSUES: There are mild degenerative changes of the spine.    The soft tissues are unremarkable.      IMPRESSION:  CHF and/or pneumonia with small bilateral pleural effusions.    --- End of Report ---    < end of copied text >    < from: TTE W or WO Ultrasound Enhancing Agent (02.14.24 @ 15:41) >  _______________________________________________________________________________________     CONCLUSIONS:      1. Technically difficult image quality.   2. 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 No aortic regurgitation.   3. Severe mitral valve stenosis, secondary to dystrophic mitral annular calcification.   4. No prior echocardiogram is available for comparison.    ________________________________________________________________________________________  FINDINGS:     Left Ventricle:  There is moderate (grade 2) left ventricular diastolic dysfunction, with elevated filling pressure. Left ventricular endocardium is not well visualized.     Right Ventricle:  The right ventricle is not well visualized. A device lead is visualized.     Left Atrium:  The left atrium is severely dilated with an indexed volume of 64.47 ml/m².     Aortic Valve:  Transcatheter aortic valve replacement with normal gradient. No aortic regurgitation.     Mitral Valve:  There is severe calcification of the mitral valve annulus. There is severe leaflet calcification. There is severe mitral valve stenosis, secondary to dystrophic mitral annular calcification. The transmitral mean gradient is 15.76 mmHg at a heart rate of 73 bpm. There is mild mitral regurgitation.     Tricuspid Valve:  There is mild tricuspid regurgitation.     Pulmonic Valve:  The pulmonic valve was not well visualized.     Pericardium:  No pericardial effusion seen.  ____________________________________________________________________  QUANTITATIVE DATA:  Left Ventricle Measurements: (Indexed to BSA)     MV E Vmax:    1.88 m/s  MV A Vmax:    2.65 m/s  MV E/A:       0.71  e' lateral:   4.90 cm/s  e' medial:    3.37 cm/s  E/e' lateral: 38.37  E/e' medial:  55.79  E/e' Average: 45.47       Right Ventricle Measurements:     TV Aaliyah. S': 12.00 cm/s       LVOT / RVOT/ Qp/Qs Data: (Indexed to BSA)  LVOT Diameter: 1.90 cm  LVOT Vmax:     1.38 m/s  LVOT VTI:      25.59 cm  LVOT SV:       72.5 ml    47.25 ml/m²  LVOT CO:       7.69 l/min 5.01 l/min/m²    Aortic Valve Measurements:  AV Vmax:                1.5 m/s  AV Peak Gradient:       8.9 mmHg  AV Mean Gradient:       3.8 mmHg  AV VTI:                 28.2 cm  AV VTI Ratio:           0.91  AoV EOA, Contin:        2.57 cm²  AoV EOA, Contin i:      1.68 cm²/m²  AoV DimensionlessIndex 0.91    Mitral Valve Measurements:     MV VTI:         81.37 cm  MV VTI (tips):  74.60 cm  MV Mean Grad:   15.22 mmHg  MV E Vmax:      1.9 m/s  MV A Vmax:      2.6 m/s  MV E/A:         0.7  MV Gradient HR: 73 bpm       Tricuspid Valve Measurements:     TR Vmax:          3.1 m/s  TR Peak Gradient: 37.5 mmHg    ________________________________________________________________________________________  Electronically signed on 2/14/2024 at 7:32:22 PM by Cody Troy MD       < end of copied text >

## 2024-02-17 NOTE — PROGRESS NOTE ADULT - ASSESSMENT
89yo left-handed woman with past medical history of CHF, HTN, pacemaker placement, valve replacement presenting as a code stroke for AMS, ?R-facial droop, R sided weakness. Was found by home aide at 830 2/14 less responsive, not moving extremities as usually does. No prior known hx strokes per family.At baseline patient alert, able to recognize/comprehend familiar faces, per daughters at bedside patient sometimes has difficulty with getting words out. Not candidate for tenecteplase as area of hypodensity already appreciated on CT head imaging/age, not candidate for thrombectomy given higher MRS.    IMPRESSION: Global aphasia, R hemiparesis due to L MCA infarct 2/2 L ICA partially occlusive thrombus, etiology likely cardioembolic in the setting of AF    NEURO: Neurologically slightly worse since admission. Continue close monitoring for neurologic deterioration. A1c 6.3, LDL 76 - high intensity statin when oral access obtained, titrate statin to LDL goal less than 70. CT Head, CTA Head/Neck as above. Maintain SBP < 140. Physical therapy/OT/Speech eval - NOHEMI    ANTITHROMBOTIC THERAPY: On hold given hemorrhagic transformation on CTH     PULMONARY: protecting airway, saturating well. Continue home albuterol nebs. Now on supplemental O2. Close monitoring for pulmonary edema    CARDIOVASCULAR: TTE severe mitral stenosis, continue cardiac monitoring                              SBP goal: <180    GASTROINTESTINAL:  dysphagia screen failed. Failed bedside S/S eval yesterday. Will re-eval today. GI consulted for possible PEG.      Diet: NPO. Caution with IVF given mitral stenosis and concern for volume overload as seen on CT Chest     RENAL: BUN/Cr stable, good urine output      Na Goal: Greater than 135     Sousa: No    HEMATOLOGY: H/H downtrending, will repeat. Platelets 176.      DVT ppx: Heparin s.c    ID: Low grade temps with leukocytosis, started Abx for PNA    OTHER: Plan discussed with daughter at bedside. After family discussion GI consulted for PEG placement.     DISPOSITION: Rehab per PT eval once stable and workup is complete      CORE MEASURES:        Admission NIHSS: 18     TPA: NO      LDL/HDL: 76/45     Depression Screen: p     Statin Therapy: yes, when oral access obtained     Dysphagia Screen: FAIL     Smoking NO      Afib NO     Stroke Education YES    Obtain screening lower extremity venous ultrasound in patients who meet 1 or more of the following criteria as patient is high risk for DVT/PE on admission:   [] History of DVT/PE  [] Hypercoagulable states (Factor V Leiden, Cancer, OCP, etc. )  [] Prolonged immobility (hemiplegia/hemiparesis/post operative or any other extended immobilization)  [] Transferred from outside facility (Rehab or Long term care)  [] Age </= to 50     89 YO left-handed woman with past medical history of CHF, HTN, pacemaker placement, valve replacement presenting as a code stroke for AMS, ?R-facial droop, R sided weakness. Was found by home aide at 830 2/14 less responsive, not moving extremities as usually does. No prior known hx strokes per family.At baseline patient alert, able to recognize/comprehend familiar faces, per daughters at bedside patient sometimes has difficulty with getting words out. Not candidate for tenecteplase as area of hypodensity already appreciated on CT head imaging/age, not candidate for thrombectomy given higher MRS.    IMPRESSION: Global aphasia, R hemiparesis due to L MCA infarct 2/2 L ICA partially occlusive thrombus, etiology likely cardioembolic in the setting of AF    NEURO: Neurologically slightly improved since admission. Continue close monitoring for neurologic deterioration. A1c 6.3, LDL 76 - high intensity statin when oral access obtained, titrate statin to LDL goal less than 70. CT Head, CTA Head/Neck as above. Maintain SBP < 140. Physical therapy/OT/Speech eval - NOHEMI    ANTITHROMBOTIC THERAPY: On hold given hemorrhagic transformation on CTH     PULMONARY: protecting airway, saturating well. Continue home albuterol nebs. Now on supplemental O2. Close monitoring for pulmonary edema    CARDIOVASCULAR: TTE severe mitral stenosis, continue cardiac monitoring                              SBP goal: <180    GASTROINTESTINAL:  dysphagia screen failed. Failed bedside S/S eval yesterday. Will re-eval today. GI following for possible PEG placement on 2/20     Diet: NPO. Caution with IVF given mitral stenosis and concern for volume overload as seen on CT Chest     RENAL: BUN/Cr stable, good urine output      Na Goal: Greater than 135     Sousa: No    HEMATOLOGY: H/H downtrending, will repeat. Platelets 176.      DVT ppx: on HOLD, will discuss with GI     ID: Low grade temps with leukocytosis, started Abx for PNA, Levaquin     OTHER: Plan discussed with daughter at bedside. Family wants FULL code. Palliative care consulted for goals of care discussion and support.     DISPOSITION: Rehab per PT eval once stable and workup is complete    CORE MEASURES:        Admission NIHSS: 18     TPA: NO      LDL/HDL: 76/45     Depression Screen: p     Statin Therapy: yes, when oral access obtained     Dysphagia Screen: FAIL     Smoking NO      Afib NO     Stroke Education YES    Obtain screening lower extremity venous ultrasound in patients who meet 1 or more of the following criteria as patient is high risk for DVT/PE on admission:   [] History of DVT/PE  [] Hypercoagulable states (Factor V Leiden, Cancer, OCP, etc. )  [] Prolonged immobility (hemiplegia/hemiparesis/post operative or any other extended immobilization)  [] Transferred from outside facility (Rehab or Long term care)  [] Age </= to 50

## 2024-02-17 NOTE — PROGRESS NOTE ADULT - ASSESSMENT
89 y/o F with PMH of CHF, HTN, PPM, valve replacement. Presents as a code stroke for AMS, ?R-facial droop, R sided weakness. At baseline patient reportedly alert, able to recognize/comprehend familiar faces, per daughters at bedside patient sometimes has difficulty with getting words out. Found to have acute left frontoparietal infarct. Called to consult for concern of aspiration PNA.

## 2024-02-17 NOTE — PROGRESS NOTE ADULT - ASSESSMENT
89F presented with a stroke    1. Stroke   per neurology     2. Dysphagia   Dr Ugalde has a lengthy discussion with her family regarding risks vs. benefits of PEG placement. Overall she is frail appearing and is considered high risk but would consider proceeding once medically optimized. Can tentatively plan for Tuesday 1/20. Will need cardiac clearance and PPM interrogation report.      3. Hypoxia      abx started for possible aspiration

## 2024-02-18 LAB
ANION GAP SERPL CALC-SCNC: 15 MMOL/L — SIGNIFICANT CHANGE UP (ref 5–17)
ANION GAP SERPL CALC-SCNC: 17 MMOL/L — SIGNIFICANT CHANGE UP (ref 5–17)
BUN SERPL-MCNC: 41 MG/DL — HIGH (ref 7–23)
BUN SERPL-MCNC: 43 MG/DL — HIGH (ref 7–23)
CALCIUM SERPL-MCNC: 9 MG/DL — SIGNIFICANT CHANGE UP (ref 8.4–10.5)
CALCIUM SERPL-MCNC: 9.1 MG/DL — SIGNIFICANT CHANGE UP (ref 8.4–10.5)
CHLORIDE SERPL-SCNC: 122 MMOL/L — HIGH (ref 96–108)
CHLORIDE SERPL-SCNC: 124 MMOL/L — HIGH (ref 96–108)
CO2 SERPL-SCNC: 18 MMOL/L — LOW (ref 22–31)
CO2 SERPL-SCNC: 19 MMOL/L — LOW (ref 22–31)
CREAT SERPL-MCNC: 1.55 MG/DL — HIGH (ref 0.5–1.3)
CREAT SERPL-MCNC: 1.72 MG/DL — HIGH (ref 0.5–1.3)
EGFR: 27 ML/MIN/1.73M2 — LOW
EGFR: 30 ML/MIN/1.73M2 — LOW
GLUCOSE SERPL-MCNC: 103 MG/DL — HIGH (ref 70–99)
GLUCOSE SERPL-MCNC: 110 MG/DL — HIGH (ref 70–99)
HCT VFR BLD CALC: 27.3 % — LOW (ref 34.5–45)
HCT VFR BLD CALC: 27.8 % — LOW (ref 34.5–45)
HGB BLD-MCNC: 8.4 G/DL — LOW (ref 11.5–15.5)
HGB BLD-MCNC: 8.5 G/DL — LOW (ref 11.5–15.5)
MCHC RBC-ENTMCNC: 28.2 PG — SIGNIFICANT CHANGE UP (ref 27–34)
MCHC RBC-ENTMCNC: 28.8 PG — SIGNIFICANT CHANGE UP (ref 27–34)
MCHC RBC-ENTMCNC: 30.6 GM/DL — LOW (ref 32–36)
MCHC RBC-ENTMCNC: 30.8 GM/DL — LOW (ref 32–36)
MCV RBC AUTO: 91.6 FL — SIGNIFICANT CHANGE UP (ref 80–100)
MCV RBC AUTO: 94.2 FL — SIGNIFICANT CHANGE UP (ref 80–100)
NRBC # BLD: 0 /100 WBCS — SIGNIFICANT CHANGE UP (ref 0–0)
NRBC # BLD: 1 /100 WBCS — HIGH (ref 0–0)
PLATELET # BLD AUTO: 175 K/UL — SIGNIFICANT CHANGE UP (ref 150–400)
PLATELET # BLD AUTO: 178 K/UL — SIGNIFICANT CHANGE UP (ref 150–400)
POTASSIUM SERPL-MCNC: 3.5 MMOL/L — SIGNIFICANT CHANGE UP (ref 3.5–5.3)
POTASSIUM SERPL-MCNC: 5.3 MMOL/L — SIGNIFICANT CHANGE UP (ref 3.5–5.3)
POTASSIUM SERPL-SCNC: 3.5 MMOL/L — SIGNIFICANT CHANGE UP (ref 3.5–5.3)
POTASSIUM SERPL-SCNC: 5.3 MMOL/L — SIGNIFICANT CHANGE UP (ref 3.5–5.3)
RBC # BLD: 2.95 M/UL — LOW (ref 3.8–5.2)
RBC # BLD: 2.98 M/UL — LOW (ref 3.8–5.2)
RBC # FLD: 18.2 % — HIGH (ref 10.3–14.5)
RBC # FLD: 18.6 % — HIGH (ref 10.3–14.5)
SODIUM SERPL-SCNC: 157 MMOL/L — HIGH (ref 135–145)
SODIUM SERPL-SCNC: 158 MMOL/L — HIGH (ref 135–145)
WBC # BLD: 6.99 K/UL — SIGNIFICANT CHANGE UP (ref 3.8–10.5)
WBC # BLD: 7.73 K/UL — SIGNIFICANT CHANGE UP (ref 3.8–10.5)
WBC # FLD AUTO: 6.99 K/UL — SIGNIFICANT CHANGE UP (ref 3.8–10.5)
WBC # FLD AUTO: 7.73 K/UL — SIGNIFICANT CHANGE UP (ref 3.8–10.5)

## 2024-02-18 RX ORDER — ENOXAPARIN SODIUM 100 MG/ML
40 INJECTION SUBCUTANEOUS EVERY 24 HOURS
Refills: 0 | Status: DISCONTINUED | OUTPATIENT
Start: 2024-02-18 | End: 2024-02-18

## 2024-02-18 RX ORDER — ENOXAPARIN SODIUM 100 MG/ML
30 INJECTION SUBCUTANEOUS EVERY 24 HOURS
Refills: 0 | Status: DISCONTINUED | OUTPATIENT
Start: 2024-02-18 | End: 2024-02-26

## 2024-02-18 RX ORDER — SODIUM CHLORIDE 9 MG/ML
1000 INJECTION, SOLUTION INTRAVENOUS
Refills: 0 | Status: DISCONTINUED | OUTPATIENT
Start: 2024-02-18 | End: 2024-02-19

## 2024-02-18 RX ORDER — SODIUM CHLORIDE 9 MG/ML
1000 INJECTION, SOLUTION INTRAVENOUS
Refills: 0 | Status: DISCONTINUED | OUTPATIENT
Start: 2024-02-18 | End: 2024-02-18

## 2024-02-18 RX ORDER — ACETAMINOPHEN 500 MG
650 TABLET ORAL ONCE
Refills: 0 | Status: COMPLETED | OUTPATIENT
Start: 2024-02-18 | End: 2024-02-18

## 2024-02-18 RX ADMIN — Medication 3 MILLILITER(S): at 05:31

## 2024-02-18 RX ADMIN — SODIUM CHLORIDE 30 MILLILITER(S): 9 INJECTION, SOLUTION INTRAVENOUS at 11:35

## 2024-02-18 RX ADMIN — Medication 3 MILLILITER(S): at 11:35

## 2024-02-18 RX ADMIN — Medication 3 MILLILITER(S): at 17:28

## 2024-02-18 RX ADMIN — Medication 3 MILLILITER(S): at 00:00

## 2024-02-18 RX ADMIN — ENOXAPARIN SODIUM 30 MILLIGRAM(S): 100 INJECTION SUBCUTANEOUS at 17:28

## 2024-02-18 NOTE — PROGRESS NOTE ADULT - ASSESSMENT
89 YO left-handed woman with past medical history of CHF, HTN, pacemaker placement, valve replacement presenting as a code stroke for AMS, ?R-facial droop, R sided weakness. Was found by home aide at 830 2/14 less responsive, not moving extremities as usually does. No prior known hx strokes per family.At baseline patient alert, able to recognize/comprehend familiar faces, per daughters at bedside patient sometimes has difficulty with getting words out. Not candidate for tenecteplase as area of hypodensity already appreciated on CT head imaging/age, not candidate for thrombectomy given higher MRS.    IMPRESSION: Global aphasia, R hemiparesis due to L MCA infarct 2/2 L ICA partially occlusive thrombus, etiology likely cardioembolic in the setting of AF    NEURO: Neurologically slightly improved since admission. Continue close monitoring for neurologic deterioration. A1c 6.3, LDL 76 - high intensity statin when oral access obtained, titrate statin to LDL goal less than 70. CT Head, CTA Head/Neck as above. Maintain SBP < 140. Physical therapy/OT/Speech eval - NOHEMI    ANTITHROMBOTIC THERAPY: On hold given hemorrhagic transformation on CTH     PULMONARY: protecting airway, saturating well. Continue home albuterol nebs. Now on supplemental O2. Close monitoring for pulmonary edema    CARDIOVASCULAR: TTE severe mitral stenosis, continue cardiac monitoring                              SBP goal: <180    GASTROINTESTINAL: Dysphagia screen failed. Failed bedside S/S eval yesterday. Will re-eval today. GI following for possible PEG placement on 2/20. Repeat Swallow evaluation ordered.      Diet: NPO. Caution with IVF given mitral stenosis and concern for volume overload as seen on CT Chest     RENAL: BUN/Cr stable, good urine output     Na Goal: Greater than 135. Na: 158, on IVF, continue to trend      Sousa: No    HEMATOLOGY: H/H downtrending, will repeat. Platelets 176.      DVT ppx: Lovenox 40 subq     ID: Low grade temps with leukocytosis, started Abx for PNA, Levaquin     OTHER: Plan discussed with daughter at bedside. Family wants FULL code. Palliative care consulted for goals of care discussion and support.     DISPOSITION: Rehab per PT eval once stable and workup is complete    CORE MEASURES:        Admission NIHSS: 18     TPA: NO      LDL/HDL: 76/45     Depression Screen: p     Statin Therapy: yes, when oral access obtained     Dysphagia Screen: FAIL     Smoking NO      Afib NO     Stroke Education YES    Obtain screening lower extremity venous ultrasound in patients who meet 1 or more of the following criteria as patient is high risk for DVT/PE on admission:   [] History of DVT/PE  [] Hypercoagulable states (Factor V Leiden, Cancer, OCP, etc. )  [] Prolonged immobility (hemiplegia/hemiparesis/post operative or any other extended immobilization)  [] Transferred from outside facility (Rehab or Long term care)  [] Age </= to 50

## 2024-02-18 NOTE — PROGRESS NOTE ADULT - SUBJECTIVE AND OBJECTIVE BOX
THE PATIENT WAS SEEN AND EXAMINED BY ME WITH THE HOUSESTAFF AND STROKE TEAM DURING MORNING ROUNDS.     HPI:  89-year-old left-handed woman with past medical history of CHF, HTN, pacemaker placement, valve replacement presenting as a code stroke for AMS, ?R-facial droop, R sided weakness. Was found by home aide at 830 2/14 less responsive, not moving extremities as usually does. No prior known hx strokes per family. At baseline patient alert, able to recognize/comprehend familiar faces, per daughters at bedside patient sometimes has difficulty with getting words out. Information obtained from aide at bedside, then after CT scanner family also at bedside.    SUBJECTIVE: No events overnight.  No new neurologic complaints.      albuterol/ipratropium for Nebulization 3 milliLiter(s) Nebulizer every 6 hours  dextrose 5% + sodium chloride 0.9%. 1000 milliLiter(s) IV Continuous <Continuous>  enoxaparin Injectable 40 milliGRAM(s) SubCutaneous every 24 hours    PHYSICAL EXAM:   Vital Signs Last 24 Hrs  T(C): 36.8 (18 Feb 2024 08:00), Max: 37.1 (17 Feb 2024 14:00)  T(F): 98.3 (18 Feb 2024 08:00), Max: 98.8 (18 Feb 2024 02:00)  HR: 91 (18 Feb 2024 10:00) (89 - 109)  BP: 122/53 (18 Feb 2024 10:00) (112/49 - 133/63)  BP(mean): 77 (18 Feb 2024 10:00) (71 - 91)  RR: 17 (18 Feb 2024 10:00) (15 - 24)  SpO2: 100% (18 Feb 2024 10:00) (91% - 100%)    Parameters below as of 18 Feb 2024 10:00  Patient On (Oxygen Delivery Method): nasal cannula  O2 Flow (L/min): 1    General: No acute distress  Abdomen: Soft, nontender, nondistended   Extremities: No edema    NEUROLOGICAL EXAM:  Mental status: Opens eyes briefly to voice, does not respond to questions. Inconsistently following one step commands. Generates a few unintelligible sounds  Cranial Nerves: No facial asymmetry. Decreased blink to threat on Right.   Motor exam: Normal tone, some effort against gravity throughout all extremities, L > R. Arthritic changes throughout extremities. RLE: some effort against gravity  Sensation: Grimaces to painful stimuli x4, less so on the right  Coordination/ Gait: Deferred      LABS:                        8.4    6.99  )-----------( 178      ( 18 Feb 2024 04:27 )             27.3    02-18    158<H>  |  122<H>  |  43<H>  ----------------------------<  103<H>  3.5   |  19<L>  |  1.72<H>    Ca    9.0      18 Feb 2024 04:27    PT/INR - ( 16 Feb 2024 14:28 )   PT: 13.5 sec;   INR: 1.30 ratio      PTT - ( 16 Feb 2024 14:28 )  PTT:27.0 sec      IMAGING: Reviewed by me.     2/17/24 CT Head:   Acute left MCA territory infarct, as on the prior, with associated   hemorrhagic transformation centered in the ipsilateral basal ganglia, not   significantly changed. Associated 8-9 mm rightward midline shift is not   significantly changed. Slightly more pronounced gyriform hyperdensity may   reflect spared cortex versus cortical petechial hemorrhage. Additional   findings described in detail above.    2/16/24 CT Head:   IMPRESSION: Redemonstration of an evolving left-sided MCA distribution   acute/subacute infarct with a new small amount of hemorrhagic   transformation within the infarct bed in comparison with 2/14/2024.    Surrounding mass effect and shift of the midline structures from left to   right appears unchanged when compared to the most recent prior CT exam.    CT Head, CTA Head/Neck 2/14/24:    CT brain:  Acute left frontoparietalinfarct. No CT evidence for selene hemorrhagic   transformation.    CT brain perfusion:  Significantly limited by motion.    Cerebral blood flow less than 30% = 0 mL. No core infarct predicted.    Tmax greater than 6 seconds = 46 mL and involves the bilateral mesial   cerebellar hemispheres, the right temporal lobe, bilateral frontal lobes,   and left posterior temporal. This represents brain parenchyma predicted   to be at continued ischemic risk in the presence of neurologic symptoms.   This finding is likely spurious in nature.    Mismatch volume 46 mL  Mismatch ratio infinite    CTA brain:  Intraluminal filling defect involving the left ICA terminus and extending   into the proximal right A1 segment.    Normal flow-related enhancement of the remaining left LEBRON. Normal   flow-related enhancement of the left MCA.    Right MCA enhances to a lesser degree than the left MCA.    No vascular aneurysm or AVM.    CTA neck:  Approximately 50% short segment stenosis of the origin and proximal   segment of the left ICA due to calcified plaque. Normal flow-related   enhancement of the more distal vessel.    Approximately 75-80% short segment stenosis of the proximal segment of   the right internal carotid artery due to partially calcified plaque.   Normal flow-related enhancement of the more distal vessel.    2/14/24 TTE:    1. Technically difficult image quality.   2. 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 No aortic regurgitation.   3. Severe mitral valve stenosis, secondary to dystrophic mitral annular calcification.   4. No prior echocardiogram is available for comparison.

## 2024-02-18 NOTE — PROGRESS NOTE ADULT - ASSESSMENT
89F presented with a stroke    1. Stroke   per neurology     2. Dysphagia   possible PEG tuesday    3. Hypoxia      abx started for possible aspiration     4. Hypernatremia

## 2024-02-18 NOTE — PROGRESS NOTE ADULT - SUBJECTIVE AND OBJECTIVE BOX
Subjective: Patient seen and examined. No new events except as noted.   remains in Stroke unit       REVIEW OF SYSTEMS:    Unable to obtain       MEDICATIONS:  MEDICATIONS  (STANDING):  albuterol/ipratropium for Nebulization 3 milliLiter(s) Nebulizer every 6 hours  sodium chloride 0.9%. 1000 milliLiter(s) (30 mL/Hr) IV Continuous <Continuous>      PHYSICAL EXAM:  T(C): 36.7 (02-18-24 @ 04:00), Max: 37.1 (02-17-24 @ 14:00)  HR: 104 (02-18-24 @ 08:00) (89 - 109)  BP: 118/58 (02-18-24 @ 08:00) (112/49 - 133/63)  RR: 20 (02-18-24 @ 08:00) (15 - 24)  SpO2: 99% (02-18-24 @ 08:00) (93% - 100%)  Wt(kg): --  I&O's Summary    17 Feb 2024 07:01  -  18 Feb 2024 07:00  --------------------------------------------------------  IN: 720 mL / OUT: 500 mL / NET: 220 mL          Appearance: NAD  HEENT:   Dry oral mucosa, PERRL, EOMI	  Lymphatic: No lymphadenopathy  Cardiovascular: Normal S1 S2, No JVD, No murmurs, No edema  Respiratory: Decreased bs  Psychiatry: A & O x 0 sleepy  Gastrointestinal:  Soft, Non-tender, + BS	  Skin: No rashes, No ecchymoses, No cyanosis	  Neurologic Exam:  Mental status - eyes closed, opens to repeated verbal stimuli. Follows intermittent simple commands to squeeze L hand/give thumbs up in L hand. Does not respond to orientation questions.   Cranial nerves - R pupil appears ?sluggishly reactive. No BTT in R eye. Unable to open left eye. ?R facial droop but may be 2/2 positioning of patient's head to R.  Motor - Normal bulk. Increased tone in RUE. Does not maintain antigravity when asked throughout all extremities initially, however does squeeze hand on LUE, withdraws slightly to noxious stimuli in R hand.   Upon re-evaluation able to raise both legs antigravity.  Sensation - Withdraws to noxious stimuli throughout.  DTR's - deferred  Coordination -deferred  Gait and station - deferred  Extremities: Normal range of motion, No clubbing, cyanosis or edema  Vascular: Peripheral pulses palpable 2+ bilaterally            LABS:    CARDIAC MARKERS:                                8.4    6.99  )-----------( 178      ( 18 Feb 2024 04:27 )             27.3     02-18    158<H>  |  122<H>  |  43<H>  ----------------------------<  103<H>  3.5   |  19<L>  |  1.72<H>    Ca    9.0      18 Feb 2024 04:27      proBNP:   Lipid Profile:   HgA1c:   TSH:             TELEMETRY: 	  V paced  ECG:  	  RADIOLOGY:   DIAGNOSTIC TESTING:  [ ] Echocardiogram:  [ ]  Catheterization:  [ ] Stress Test:    OTHER:

## 2024-02-19 DIAGNOSIS — R13.10 DYSPHAGIA, UNSPECIFIED: ICD-10-CM

## 2024-02-19 LAB
ANION GAP SERPL CALC-SCNC: 16 MMOL/L — SIGNIFICANT CHANGE UP (ref 5–17)
ANION GAP SERPL CALC-SCNC: 16 MMOL/L — SIGNIFICANT CHANGE UP (ref 5–17)
BUN SERPL-MCNC: 36 MG/DL — HIGH (ref 7–23)
BUN SERPL-MCNC: 39 MG/DL — HIGH (ref 7–23)
CALCIUM SERPL-MCNC: 9.1 MG/DL — SIGNIFICANT CHANGE UP (ref 8.4–10.5)
CALCIUM SERPL-MCNC: 9.1 MG/DL — SIGNIFICANT CHANGE UP (ref 8.4–10.5)
CHLORIDE SERPL-SCNC: 126 MMOL/L — HIGH (ref 96–108)
CHLORIDE SERPL-SCNC: 127 MMOL/L — HIGH (ref 96–108)
CO2 SERPL-SCNC: 17 MMOL/L — LOW (ref 22–31)
CO2 SERPL-SCNC: 18 MMOL/L — LOW (ref 22–31)
CREAT SERPL-MCNC: 1.46 MG/DL — HIGH (ref 0.5–1.3)
CREAT SERPL-MCNC: 1.51 MG/DL — HIGH (ref 0.5–1.3)
EGFR: 31 ML/MIN/1.73M2 — LOW
EGFR: 33 ML/MIN/1.73M2 — LOW
GLUCOSE SERPL-MCNC: 117 MG/DL — HIGH (ref 70–99)
GLUCOSE SERPL-MCNC: 121 MG/DL — HIGH (ref 70–99)
HCT VFR BLD CALC: 26.5 % — LOW (ref 34.5–45)
HCT VFR BLD CALC: 28 % — LOW (ref 34.5–45)
HGB BLD-MCNC: 8.1 G/DL — LOW (ref 11.5–15.5)
HGB BLD-MCNC: 8.5 G/DL — LOW (ref 11.5–15.5)
MCHC RBC-ENTMCNC: 28.2 PG — SIGNIFICANT CHANGE UP (ref 27–34)
MCHC RBC-ENTMCNC: 28.2 PG — SIGNIFICANT CHANGE UP (ref 27–34)
MCHC RBC-ENTMCNC: 30.4 GM/DL — LOW (ref 32–36)
MCHC RBC-ENTMCNC: 30.6 GM/DL — LOW (ref 32–36)
MCV RBC AUTO: 92.3 FL — SIGNIFICANT CHANGE UP (ref 80–100)
MCV RBC AUTO: 93 FL — SIGNIFICANT CHANGE UP (ref 80–100)
NRBC # BLD: 0 /100 WBCS — SIGNIFICANT CHANGE UP (ref 0–0)
NRBC # BLD: 0 /100 WBCS — SIGNIFICANT CHANGE UP (ref 0–0)
PLATELET # BLD AUTO: 183 K/UL — SIGNIFICANT CHANGE UP (ref 150–400)
PLATELET # BLD AUTO: 198 K/UL — SIGNIFICANT CHANGE UP (ref 150–400)
POTASSIUM SERPL-MCNC: 3.3 MMOL/L — LOW (ref 3.5–5.3)
POTASSIUM SERPL-MCNC: 3.4 MMOL/L — LOW (ref 3.5–5.3)
POTASSIUM SERPL-SCNC: 3.3 MMOL/L — LOW (ref 3.5–5.3)
POTASSIUM SERPL-SCNC: 3.4 MMOL/L — LOW (ref 3.5–5.3)
RBC # BLD: 2.87 M/UL — LOW (ref 3.8–5.2)
RBC # BLD: 3.01 M/UL — LOW (ref 3.8–5.2)
RBC # FLD: 18.6 % — HIGH (ref 10.3–14.5)
RBC # FLD: 18.6 % — HIGH (ref 10.3–14.5)
SODIUM SERPL-SCNC: 160 MMOL/L — CRITICAL HIGH (ref 135–145)
SODIUM SERPL-SCNC: 160 MMOL/L — CRITICAL HIGH (ref 135–145)
WBC # BLD: 6.86 K/UL — SIGNIFICANT CHANGE UP (ref 3.8–10.5)
WBC # BLD: 6.94 K/UL — SIGNIFICANT CHANGE UP (ref 3.8–10.5)
WBC # FLD AUTO: 6.86 K/UL — SIGNIFICANT CHANGE UP (ref 3.8–10.5)
WBC # FLD AUTO: 6.94 K/UL — SIGNIFICANT CHANGE UP (ref 3.8–10.5)

## 2024-02-19 RX ORDER — AMLODIPINE BESYLATE 2.5 MG/1
5 TABLET ORAL DAILY
Refills: 0 | Status: DISCONTINUED | OUTPATIENT
Start: 2024-02-19 | End: 2024-02-20

## 2024-02-19 RX ORDER — POTASSIUM CHLORIDE 20 MEQ
10 PACKET (EA) ORAL
Refills: 0 | Status: COMPLETED | OUTPATIENT
Start: 2024-02-19 | End: 2024-02-19

## 2024-02-19 RX ORDER — SODIUM CHLORIDE 9 MG/ML
1000 INJECTION, SOLUTION INTRAVENOUS
Refills: 0 | Status: DISCONTINUED | OUTPATIENT
Start: 2024-02-19 | End: 2024-02-23

## 2024-02-19 RX ORDER — SODIUM CHLORIDE 9 MG/ML
1000 INJECTION, SOLUTION INTRAVENOUS
Refills: 0 | Status: DISCONTINUED | OUTPATIENT
Start: 2024-02-19 | End: 2024-02-19

## 2024-02-19 RX ADMIN — Medication 3 MILLILITER(S): at 00:41

## 2024-02-19 RX ADMIN — ENOXAPARIN SODIUM 30 MILLIGRAM(S): 100 INJECTION SUBCUTANEOUS at 17:10

## 2024-02-19 RX ADMIN — Medication 3 MILLILITER(S): at 17:10

## 2024-02-19 RX ADMIN — Medication 100 MILLIEQUIVALENT(S): at 20:43

## 2024-02-19 RX ADMIN — Medication 3 MILLILITER(S): at 12:01

## 2024-02-19 RX ADMIN — Medication 3 MILLILITER(S): at 06:26

## 2024-02-19 RX ADMIN — Medication 100 MILLIEQUIVALENT(S): at 23:15

## 2024-02-19 RX ADMIN — SODIUM CHLORIDE 75 MILLILITER(S): 9 INJECTION, SOLUTION INTRAVENOUS at 17:11

## 2024-02-19 RX ADMIN — SODIUM CHLORIDE 30 MILLILITER(S): 9 INJECTION, SOLUTION INTRAVENOUS at 07:49

## 2024-02-19 RX ADMIN — Medication 100 MILLIEQUIVALENT(S): at 16:35

## 2024-02-19 NOTE — PROGRESS NOTE ADULT - ASSESSMENT
89-year-old left-handed woman with past medical history of CHF, HTN, pacemaker placement, valve replacement presenting as a code stroke for AMS, ?R-facial droop, R sided weakness. Was found by home aide at 830 2/14 less responsive, not moving extremities as usually does. No prior known hx strokes per family. At baseline patient alert, able to recognize/comprehend familiar faces, per daughters at bedside patient sometimes has difficulty with getting words out. Not candidate for tenecteplase as area of hypodensity already appreciated on CT head imaging/age, not candidate for thrombectomy given higher MRS.    IMPRESSION: Global aphasia, R hemiparesis due to L MCA infarct 2/2 L ICA partially occlusive thrombus, etiology likely cardioembolic in the setting of AF    NEURO: Neurologically slightly improved since admission. Continue close monitoring for neurologic deterioration. A1c 6.3, LDL 76 - high intensity statin when oral access obtained, titrate statin to LDL goal less than 70. CT Head, CTA Head/Neck as above. Maintain SBP < 140. Physical therapy/OT/Speech eval - NOHEMI    ANTITHROMBOTIC THERAPY: On hold given hemorrhagic transformation on CTH     PULMONARY: protecting airway, saturating well. Continue home albuterol nebs. Now on supplemental O2. CT chest w/ small b/l pleural effusions and congestion. CT chest w/ mucous plugging LLL. Pulmonology following. Procalcitonin not significantly elevated. Continue monitoring off antibiotics.    CARDIOVASCULAR: TTE severe mitral stenosis, s/p previous TAVR. Cardiac pacemaker interrogated. Continue cardiac monitoring. Monitor for PAF.     SBP goal: <180    GASTROINTESTINAL: Dysphagia screen failed. Failed bedside S/S eval. GI following for possible PEG placement on 2/20. Per GI, PEG on hold until hypernatremia is corrected / patient is medically optimized. Repeat Swallow evaluation 2/19 - patient not a candidate for oral feeding at this time.       Diet: NPO. Caution with IVF given mitral stenosis and concern for volume overload as seen on CT Chest     RENAL: BUN/Cr 39/1.51, good urine output     Na Goal: Greater than 135. Na: 160, on IVF, continue to trend      Sousa: No    HEMATOLOGY: H/H fluctuating, hemoglobin 8.1, will c/w trending. Platelets 198.      DVT ppx: Lovenox 30 subq     ID: Afebrile without leukocytosis, monitoring off antibiotics.     OTHER: Plan discussed with daughter at bedside. Family wants FULL code. Palliative care consulted for goals of care discussion and support.     DISPOSITION: Rehab per PT eval once stable and workup is complete    CORE MEASURES:        Admission NIHSS: 18     TPA: NO      LDL/HDL: 76/45     Depression Screen: p     Statin Therapy: yes, when oral access obtained     Dysphagia Screen: FAIL     Smoking NO      Afib NO     Stroke Education YES    Obtain screening lower extremity venous ultrasound in patients who meet 1 or more of the following criteria as patient is high risk for DVT/PE on admission:   [] History of DVT/PE  [] Hypercoagulable states (Factor V Leiden, Cancer, OCP, etc. )  [] Prolonged immobility (hemiplegia/hemiparesis/post operative or any other extended immobilization)  [] Transferred from outside facility (Rehab or Long term care)  [] Age </= to 50 89-year-old left-handed woman with past medical history of CHF, HTN, pacemaker placement, valve replacement presenting as a code stroke for AMS, ?R-facial droop, R sided weakness. Was found by home aide at 830 2/14 less responsive, not moving extremities as usually does. No prior known hx strokes per family. At baseline patient alert, able to recognize/comprehend familiar faces, per daughters at bedside patient sometimes has difficulty with getting words out. Not candidate for tenecteplase as area of hypodensity already appreciated on CT head imaging/age, not candidate for thrombectomy given higher MRS.    IMPRESSION: Global aphasia, R hemiparesis due to L MCA infarct 2/2 L ICA partially occlusive thrombus, etiology likely cardioembolic in the setting of AF    NEURO: Neurologically slightly improved since admission. Continue close monitoring for neurologic deterioration. A1c 6.3, LDL 76 - high intensity statin when oral access obtained, titrate statin to LDL goal less than 70. CT Head, CTA Head/Neck as above. Maintain SBP < 140. Physical therapy/OT/Speech eval - NOHEMI    ANTITHROMBOTIC THERAPY: On hold given hemorrhagic transformation on CTH     PULMONARY: Protecting airway, saturating well. Continue home albuterol nebs. Now on supplemental O2. CT chest w/ small b/l pleural effusions and congestion. CT chest w/ mucous plugging LLL. Pulmonology following. Procalcitonin not significantly elevated. Continue monitoring off antibiotics.    CARDIOVASCULAR: TTE severe mitral stenosis, s/p previous TAVR. Cardiac pacemaker interrogated. Continue cardiac monitoring. Monitor for PAF. BP in the 170s, Norvasc 5mg daily added as per cardio.      SBP goal: <180    GASTROINTESTINAL: Dysphagia screen failed. Failed bedside S/S eval. GI following for possible PEG placement on 2/20. Per GI, PEG on hold until hypernatremia is corrected / patient is medically optimized. Repeat Swallow evaluation 2/19 - patient not a candidate for oral feeding at this time. Repeat Swallow evaluation on 2/19, patient did not pass. as per GI: PEG on hold until hypernatremia is corrected / pt is medically optimized.      Diet: NPO. Caution with IVF given mitral stenosis and concern for volume overload as seen on CT Chest     RENAL: BUN/Cr, good urine output     Na Goal: Greater than 135. Na: 160, on D5W, continue to trend      Sousa: No    HEMATOLOGY: H/H fluctuating, hemoglobin 8.5, will c/w trending. Platelets 183      DVT ppx: Lovenox 30 subq     ID: Afebrile without leukocytosis, monitoring off antibiotics.     OTHER: Plan discussed with daughter at bedside. Family wants FULL code. Palliative care consulted for goals of care discussion and support.     DISPOSITION: Rehab per PT eval once stable and workup is complete    CORE MEASURES:        Admission NIHSS: 18     TPA: NO      LDL/HDL: 76/45     Depression Screen: p     Statin Therapy: yes, when oral access obtained     Dysphagia Screen: FAIL     Smoking NO      Afib NO     Stroke Education YES    Obtain screening lower extremity venous ultrasound in patients who meet 1 or more of the following criteria as patient is high risk for DVT/PE on admission:   [] History of DVT/PE  [] Hypercoagulable states (Factor V Leiden, Cancer, OCP, etc. )  [] Prolonged immobility (hemiplegia/hemiparesis/post operative or any other extended immobilization)  [] Transferred from outside facility (Rehab or Long term care)  [] Age </= to 50 89-year-old left-handed woman with past medical history of CHF, HTN, pacemaker placement, valve replacement presenting as a code stroke for AMS, ?R-facial droop, R sided weakness. Was found by home aide at 830 2/14 less responsive, not moving extremities as usually does. No prior known hx strokes per family. At baseline patient alert, able to recognize/comprehend familiar faces, per daughters at bedside patient sometimes has difficulty with getting words out. Not candidate for tenecteplase as area of hypodensity already appreciated on CT head imaging/age, not candidate for thrombectomy given higher MRS.    IMPRESSION: Global aphasia, R hemiparesis due to L MCA infarct 2/2 L ICA partially occlusive thrombus, etiology likely cardioembolic in the setting of AF    NEURO: Neurologically slightly improved since admission. Continue close monitoring for neurologic deterioration. A1c 6.3, LDL 76 - high intensity statin when oral access obtained, titrate statin to LDL goal less than 70. CT Head, CTA Head/Neck as above. Maintain SBP < 140. Physical therapy/OT/Speech eval - NOHEMI    ANTITHROMBOTIC THERAPY: On hold given hemorrhagic transformation on CTH and PEG pending    PULMONARY: Protecting airway, saturating well. Continue home albuterol nebs. Now on supplemental O2. CT chest w/ small b/l pleural effusions and congestion. CT chest w/ mucous plugging LLL. Pulmonology following. Procalcitonin not significantly elevated. Continue monitoring off antibiotics.    CARDIOVASCULAR: TTE severe mitral stenosis, s/p previous TAVR. Cardiac pacemaker interrogated. Continue cardiac monitoring. Monitor for PAF. BP in the 170s, Norvasc 5mg daily added as per cardio.      SBP goal: <180    GASTROINTESTINAL: Dysphagia screen failed. Failed bedside S/S eval. GI following for possible PEG placement on 2/20. Per GI, PEG on hold until hypernatremia is corrected / patient is medically optimized. Repeat Swallow evaluation 2/19 - patient not a candidate for oral feeding at this time. Repeat Swallow evaluation on 2/19, patient did not pass. as per GI: PEG on hold until hypernatremia is corrected / pt is medically optimized.      Diet: NPO. Caution with IVF given mitral stenosis and concern for volume overload as seen on CT Chest     RENAL: BUN/Cr, good urine output     Na Goal: Greater than 135. Na: 160, on D5W, continue to trend      Sousa: No    HEMATOLOGY: H/H fluctuating, hemoglobin 8.5, will c/w trending. Platelets 183      DVT ppx: Lovenox 30 subq     ID: Afebrile without leukocytosis, monitoring off antibiotics.     OTHER: Plan discussed with daughter at bedside. Family wants FULL code. Palliative care consulted for goals of care discussion and support.     DISPOSITION: Rehab per PT eval once stable and workup is complete    CORE MEASURES:        Admission NIHSS: 18     TPA: NO      LDL/HDL: 76/45     Depression Screen: p     Statin Therapy: yes, when oral access obtained     Dysphagia Screen: FAIL     Smoking NO      Afib NO     Stroke Education YES    Obtain screening lower extremity venous ultrasound in patients who meet 1 or more of the following criteria as patient is high risk for DVT/PE on admission:   [] History of DVT/PE  [] Hypercoagulable states (Factor V Leiden, Cancer, OCP, etc. )  [] Prolonged immobility (hemiplegia/hemiparesis/post operative or any other extended immobilization)  [] Transferred from outside facility (Rehab or Long term care)  [] Age </= to 50

## 2024-02-19 NOTE — PROGRESS NOTE ADULT - ASSESSMENT
89F presented with a stroke    1. Stroke w. midline shift  per neurology     2. Dysphagia   PEG on hold until hypernatremia is corrected / pt is medically optimized   PPM interrogated  would need cardiac clearance      3. Hypoxia   CT chest appreciated; bilateral effusions     4. Hypernatremia      89F presented with a stroke    1. Stroke w. midline shift  per neurology     2. Dysphagia   PEG on hold until hypernatremia is corrected / pt is medically optimized. she is also following some commands today, consider repeat swallow eval     3. Hypoxia   CT chest appreciated; bilateral effusions     4. Hypernatremia

## 2024-02-19 NOTE — SWALLOW BEDSIDE ASSESSMENT ADULT - SLP GENERAL OBSERVATIONS
Pt found in bed, family and HHA present, pt on room air, minimal verbal responses unless cued, following ~30% of simple commands. Left gaze preference

## 2024-02-19 NOTE — PROGRESS NOTE ADULT - SUBJECTIVE AND OBJECTIVE BOX
THE PATIENT WAS SEEN AND EXAMINED BY STROKE TEAM DURING MORNING ROUNDS.     HPI:  89-year-old left-handed woman with past medical history of CHF, HTN, pacemaker placement, valve replacement presenting as a code stroke for AMS, ?R-facial droop, R sided weakness.  Was found by home aide at 830 2/14 less responsive, not moving extremities as usually does. No prior known hx strokes per family.  At baseline patient alert, able to recognize/comprehend familiar faces, per daughters at bedside patient sometimes has difficulty with getting words out.    SH: No smoking ETOH use recreational drug use.  LKW: 2130 2/13/24  NIHSS 18  MRS 4 (aide at bedside reports patient requires assistance with all ADLs, including getting out of bed, showering, changing clothes, walking, and since breaking L arm last year, requires also assistance with feeding)  BP per /70, glucose per .     Information obtained from aide at bedside, then after CT scanner family also at bedside. (14 Feb 2024 12:33)      SUBJECTIVE: No events overnight.  No new neurologic complaints.  ROS reported negative unless otherwise noted.    acetaminophen   IVPB .. 650 milliGRAM(s) IV Intermittent once  albuterol/ipratropium for Nebulization 3 milliLiter(s) Nebulizer every 6 hours  enoxaparin Injectable 30 milliGRAM(s) SubCutaneous every 24 hours  lactated ringers. 1000 milliLiter(s) IV Continuous <Continuous>      PHYSICAL EXAM:   Vital Signs Last 24 Hrs  T(C): 36.8 (19 Feb 2024 12:00), Max: 36.8 (18 Feb 2024 18:09)  T(F): 98.3 (19 Feb 2024 12:00), Max: 98.3 (18 Feb 2024 18:09)  HR: 94 (19 Feb 2024 14:00) (93 - 108)  BP: 167/63 (19 Feb 2024 14:00) (104/49 - 172/59)  BP(mean): 90 (19 Feb 2024 14:00) (69 - 96)  RR: 21 (19 Feb 2024 14:00) (19 - 26)  SpO2: 96% (19 Feb 2024 14:00) (92% - 100%)    Parameters below as of 19 Feb 2024 14:00  Patient On (Oxygen Delivery Method): room air      Physical Examination:    General: No acute distress  Abdomen: Soft, nontender, nondistended   Extremities: No edema    NEUROLOGICAL EXAM:  Mental status: Opens eyes briefly to voice, does not respond to questions. Inconsistently following one step commands. Generates a few unintelligible sounds  Cranial Nerves: No facial asymmetry. Decreased blink to threat on Right.   Motor exam: Normal tone, some effort against gravity throughout all extremities, L > R. Arthritic changes throughout extremities. RLE: some effort against gravity  Sensation: Grimaces to painful stimuli x4, less so on the right  Coordination/ Gait: Deferred      LABS:                        8.1    6.94  )-----------( 198      ( 19 Feb 2024 06:18 )             26.5    02-19    160<HH>  |  126<H>  |  39<H>  ----------------------------<  121<H>  3.4<L>   |  18<L>  |  1.51<H>    Ca    9.1      19 Feb 2024 06:18      IMAGING: Reviewed by me.     2/17/24 CT Head:   Acute left MCA territory infarct, as on the prior, with associated   hemorrhagic transformation centered in the ipsilateral basal ganglia, not   significantly changed. Associated 8-9 mm rightward midline shift is not   significantly changed. Slightly more pronounced gyriform hyperdensity may   reflect spared cortex versus cortical petechial hemorrhage. Additional   findings described in detail above.    2/16/24 CT Head:   IMPRESSION: Redemonstration of an evolving left-sided MCA distribution   acute/subacute infarct with a new small amount of hemorrhagic   transformation within the infarct bed in comparison with 2/14/2024.    Surrounding mass effect and shift of the midline structures from left to   right appears unchanged when compared to the most recent prior CT exam.    CT Head, CTA Head/Neck 2/14/24:    CT brain:  Acute left frontoparietalinfarct. No CT evidence for selene hemorrhagic   transformation.    CT brain perfusion:  Significantly limited by motion.    Cerebral blood flow less than 30% = 0 mL. No core infarct predicted.    Tmax greater than 6 seconds = 46 mL and involves the bilateral mesial   cerebellar hemispheres, the right temporal lobe, bilateral frontal lobes,   and left posterior temporal. This represents brain parenchyma predicted   to be at continued ischemic risk in the presence of neurologic symptoms.   This finding is likely spurious in nature.    Mismatch volume 46 mL  Mismatch ratio infinite    CTA brain:  Intraluminal filling defect involving the left ICA terminus and extending   into the proximal right A1 segment.    Normal flow-related enhancement of the remaining left LEBRON. Normal   flow-related enhancement of the left MCA.    Right MCA enhances to a lesser degree than the left MCA.    No vascular aneurysm or AVM.    CTA neck:  Approximately 50% short segment stenosis of the origin and proximal   segment of the left ICA due to calcified plaque. Normal flow-related   enhancement of the more distal vessel.    Approximately 75-80% short segment stenosis of the proximal segment of   the right internal carotid artery due to partially calcified plaque.   Normal flow-related enhancement of the more distal vessel.    2/14/24 TTE:    1. Technically difficult image quality.   2. 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 No aortic regurgitation.   3. Severe mitral valve stenosis, secondary to dystrophic mitral annular calcification.   4. No prior echocardiogram is available for comparison.     THE PATIENT WAS SEEN AND EXAMINED BY STROKE TEAM DURING MORNING ROUNDS.     HPI:  89-year-old left-handed woman with past medical history of CHF, HTN, pacemaker placement, valve replacement presenting as a code stroke for AMS, ?R-facial droop, R sided weakness. Was found by home aide at 830 2/14 less responsive, not moving extremities as usually does. No prior known hx strokes per family. At baseline patient alert, able to recognize/comprehend familiar faces, per daughters at bedside patient sometimes has difficulty with getting words out. Information obtained from aide at bedside, then after CT scanner family also at bedside.    SUBJECTIVE: No events overnight.  No new neurologic complaints.  ROS reported negative unless otherwise noted.    acetaminophen   IVPB .. 650 milliGRAM(s) IV Intermittent once  albuterol/ipratropium for Nebulization 3 milliLiter(s) Nebulizer every 6 hours  enoxaparin Injectable 30 milliGRAM(s) SubCutaneous every 24 hours  lactated ringers. 1000 milliLiter(s) IV Continuous <Continuous>      PHYSICAL EXAM:   Vital Signs Last 24 Hrs  T(C): 36.8 (19 Feb 2024 12:00), Max: 36.8 (18 Feb 2024 18:09)  T(F): 98.3 (19 Feb 2024 12:00), Max: 98.3 (18 Feb 2024 18:09)  HR: 94 (19 Feb 2024 14:00) (93 - 108)  BP: 167/63 (19 Feb 2024 14:00) (104/49 - 172/59)  BP(mean): 90 (19 Feb 2024 14:00) (69 - 96)  RR: 21 (19 Feb 2024 14:00) (19 - 26)  SpO2: 96% (19 Feb 2024 14:00) (92% - 100%)    Parameters below as of 19 Feb 2024 14:00  Patient On (Oxygen Delivery Method): room air    Physical Examination:    General: No acute distress  Abdomen: Soft, nontender, nondistended   Extremities: No edema    NEUROLOGICAL EXAM:  Mental status: Opens eyes briefly to voice, does not respond to questions. Inconsistently following one step commands. Generates a few unintelligible sounds  Cranial Nerves: No facial asymmetry. Decreased blink to threat on Right.   Motor exam: Normal tone, some effort against gravity throughout all extremities, L > R. Arthritic changes throughout extremities. RLE: some effort against gravity  Sensation: Grimaces to painful stimuli x4, less so on the right  Coordination/ Gait: Deferred      LABS:                        8.1    6.94  )-----------( 198      ( 19 Feb 2024 06:18 )             26.5    02-19    160<HH>  |  126<H>  |  39<H>  ----------------------------<  121<H>  3.4<L>   |  18<L>  |  1.51<H>    Ca    9.1      19 Feb 2024 06:18      IMAGING: Reviewed by me.     2/17/24 CT Head:   Acute left MCA territory infarct, as on the prior, with associated   hemorrhagic transformation centered in the ipsilateral basal ganglia, not   significantly changed. Associated 8-9 mm rightward midline shift is not   significantly changed. Slightly more pronounced gyriform hyperdensity may   reflect spared cortex versus cortical petechial hemorrhage. Additional   findings described in detail above.    2/16/24 CT Head:   IMPRESSION: Redemonstration of an evolving left-sided MCA distribution   acute/subacute infarct with a new small amount of hemorrhagic   transformation within the infarct bed in comparison with 2/14/2024.    Surrounding mass effect and shift of the midline structures from left to   right appears unchanged when compared to the most recent prior CT exam.    CT Head, CTA Head/Neck 2/14/24:    CT brain:  Acute left frontoparietalinfarct. No CT evidence for selene hemorrhagic   transformation.    CT brain perfusion:  Significantly limited by motion.    Cerebral blood flow less than 30% = 0 mL. No core infarct predicted.    Tmax greater than 6 seconds = 46 mL and involves the bilateral mesial   cerebellar hemispheres, the right temporal lobe, bilateral frontal lobes,   and left posterior temporal. This represents brain parenchyma predicted   to be at continued ischemic risk in the presence of neurologic symptoms.   This finding is likely spurious in nature.    Mismatch volume 46 mL  Mismatch ratio infinite    CTA brain:  Intraluminal filling defect involving the left ICA terminus and extending   into the proximal right A1 segment.    Normal flow-related enhancement of the remaining left LEBRON. Normal   flow-related enhancement of the left MCA.    Right MCA enhances to a lesser degree than the left MCA.    No vascular aneurysm or AVM.    CTA neck:  Approximately 50% short segment stenosis of the origin and proximal   segment of the left ICA due to calcified plaque. Normal flow-related   enhancement of the more distal vessel.    Approximately 75-80% short segment stenosis of the proximal segment of   the right internal carotid artery due to partially calcified plaque.   Normal flow-related enhancement of the more distal vessel.    2/14/24 TTE:    1. Technically difficult image quality.   2. 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 No aortic regurgitation.   3. Severe mitral valve stenosis, secondary to dystrophic mitral annular calcification.   4. No prior echocardiogram is available for comparison.

## 2024-02-19 NOTE — PROGRESS NOTE ADULT - SUBJECTIVE AND OBJECTIVE BOX
Follow-up Pulm Progress Note    pt reportedly more responsive  currently lethargic  sats 99% on 1LNC  minimal/no secretions         Medications:  MEDICATIONS  (STANDING):  acetaminophen   IVPB .. 650 milliGRAM(s) IV Intermittent once  albuterol/ipratropium for Nebulization 3 milliLiter(s) Nebulizer every 6 hours  enoxaparin Injectable 30 milliGRAM(s) SubCutaneous every 24 hours  lactated ringers. 1000 milliLiter(s) (30 mL/Hr) IV Continuous <Continuous>        Vital Signs Last 24 Hrs  T(C): 36.8 (19 Feb 2024 08:00), Max: 37.1 (18 Feb 2024 12:00)  T(F): 98.2 (19 Feb 2024 08:00), Max: 98.7 (18 Feb 2024 12:00)  HR: 93 (19 Feb 2024 10:00) (93 - 109)  BP: 158/60 (19 Feb 2024 10:00) (104/49 - 158/60)  BP(mean): 86 (19 Feb 2024 10:00) (69 - 89)  RR: 20 (19 Feb 2024 10:00) (10 - 26)  SpO2: 99% (19 Feb 2024 10:00) (92% - 100%)    Parameters below as of 19 Feb 2024 10:00  Patient On (Oxygen Delivery Method): nasal cannula  O2 Flow (L/min): 1 02-18 @ 07:01  -  02-19 @ 07:00  --------------------------------------------------------  IN: 720 mL / OUT: 420 mL / NET: 300 mL          LABS:                        8.1    6.94  )-----------( 198      ( 19 Feb 2024 06:18 )             26.5     02-19    160<HH>  |  126<H>  |  39<H>  ----------------------------<  121<H>  3.4<L>   |  18<L>  |  1.51<H>    Ca    9.1      19 Feb 2024 06:18            Urinalysis Basic - ( 19 Feb 2024 06:18 )    Color: x / Appearance: x / SG: x / pH: x  Gluc: 121 mg/dL / Ketone: x  / Bili: x / Urobili: x   Blood: x / Protein: x / Nitrite: x   Leuk Esterase: x / RBC: x / WBC x   Sq Epi: x / Non Sq Epi: x / Bacteria: x      Procalcitonin, Serum: 0.14 ng/mL (02-17-24 @ 07:36)  Procalcitonin, Serum: 0.14 ng/mL (02-16-24 @ 14:28)              Physical Examination:  PULM: Decreased BS   CVS: S1, S2 heard    RADIOLOGY REVIEWED    CT chest:  < from: CT Chest No Cont (02.16.24 @ 12:30) >    FINDINGS:    AIRWAYS AND LUNGS: Tracheal bronchial secretions.  Patchy bilateral lung   opacities superimposed on mosaic attenuation.    MEDIASTINUM AND PLEURA: There are no enlarged mediastinal, hilar or   axillary lymph nodes. The visualized portion of the thyroid gland is   heterogeneous. There are small bilateral pleural effusions.  There is no   pneumothorax.    HEART AND VESSELS: There is mild cardiomegaly.  There are atherosclerotic   calcifications of the aorta and coronary arteries. There is a small   pericardial effusion.  TAVR. Left-sided pacemaker    UPPER ABDOMEN: Images of the upper abdomen demonstrate cholecystectomy.   Residual contrast within the kidneys..    BONES AND SOFT TISSUES: There are mild degenerative changes of the spine.    The soft tissues are unremarkable.      IMPRESSION:  CHF and/or pneumonia with small bilateral pleural effusions.    --- End of Report ---    < end of copied text >    < from: TTE W or WO Ultrasound Enhancing Agent (02.14.24 @ 15:41) >  _______________________________________________________________________________________     CONCLUSIONS:      1. Technically difficult image quality.   2. 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 No aortic regurgitation.   3. Severe mitral valve stenosis, secondary to dystrophic mitral annular calcification.   4. No prior echocardiogram is available for comparison.    ________________________________________________________________________________________  FINDINGS:     Left Ventricle:  There is moderate (grade 2) left ventricular diastolic dysfunction, with elevated filling pressure. Left ventricular endocardium is not well visualized.     Right Ventricle:  The right ventricle is not well visualized. A device lead is visualized.     Left Atrium:  The left atrium is severely dilated with an indexed volume of 64.47 ml/m².     Aortic Valve:  Transcatheter aortic valve replacement with normal gradient. No aortic regurgitation.     Mitral Valve:  There is severe calcification of the mitral valve annulus. There is severe leaflet calcification. There is severe mitral valve stenosis, secondary to dystrophic mitral annular calcification. The transmitral mean gradient is 15.76 mmHg at a heart rate of 73 bpm. There is mild mitral regurgitation.     Tricuspid Valve:  There is mild tricuspid regurgitation.     Pulmonic Valve:  The pulmonic valve was not well visualized.     Pericardium:  No pericardial effusion seen.  ____________________________________________________________________  QUANTITATIVE DATA:  Left Ventricle Measurements: (Indexed to BSA)     MV E Vmax:    1.88 m/s  MV A Vmax:    2.65 m/s  MV E/A:       0.71  e' lateral:   4.90 cm/s  e' medial:    3.37 cm/s  E/e' lateral: 38.37  E/e' medial:  55.79  E/e' Average: 45.47       Right Ventricle Measurements:     TV Aaliyah. S': 12.00 cm/s       LVOT / RVOT/ Qp/Qs Data: (Indexed to BSA)  LVOT Diameter: 1.90 cm  LVOT Vmax:     1.38 m/s  LVOT VTI:      25.59 cm  LVOT SV:       72.5 ml    47.25 ml/m²  LVOT CO:       7.69 l/min 5.01 l/min/m²    Aortic Valve Measurements:  AV Vmax:                1.5 m/s  AV Peak Gradient:       8.9 mmHg  AV Mean Gradient:       3.8 mmHg  AV VTI:                 28.2 cm  AV VTI Ratio:           0.91  AoV EOA, Contin:        2.57 cm²  AoV EOA, Contin i:      1.68 cm²/m²  AoV DimensionlessIndex 0.91    Mitral Valve Measurements:     MV VTI:         81.37 cm  MV VTI (tips):  74.60 cm  MV Mean Grad:   15.22 mmHg  MV E Vmax:      1.9 m/s  MV A Vmax:      2.6 m/s  MV E/A:         0.7  MV Gradient HR: 73 bpm       Tricuspid Valve Measurements:     TR Vmax:          3.1 m/s  TR Peak Gradient: 37.5 mmHg    ________________________________________________________________________________________  Electronically signed on 2/14/2024 at 7:32:22 PM by Cody Troy MD       < end of copied text >

## 2024-02-19 NOTE — SWALLOW BEDSIDE ASSESSMENT ADULT - SWALLOW EVAL: DIAGNOSIS
89 yo female s/p L MCA stroke. Pt now seen for reassessment of swallow as mentation reportedly improved. Pt presents with evidence of an oropharyngeal dysphagia marked by significantly impaired oral management skills and an absent trigger of the pharyngeal swallow. Pt is not a candidate for oral feeding at this time.

## 2024-02-19 NOTE — PROGRESS NOTE ADULT - SUBJECTIVE AND OBJECTIVE BOX
Subjective: Patient seen and examined. No new events except as noted.   remains in Stroke Unit   Daughter at bedside   more awake and alert     REVIEW OF SYSTEMS:    CONSTITUTIONAL: + weakness, fevers or chills  EYES/ENT: No visual changes;  No vertigo or throat pain   NECK: No pain or stiffness  RESPIRATORY: No cough, wheezing, hemoptysis; No shortness of breath  CARDIOVASCULAR: No chest pain or palpitations  GASTROINTESTINAL: No abdominal or epigastric pain. No nausea, vomiting, or hematemesis; No diarrhea or constipation. No melena or hematochezia.  GENITOURINARY: No dysuria, frequency or hematuria  NEUROLOGICAL: No numbness or weakness  SKIN: No itching, burning, rashes, or lesions   All other review of systems is negative unless indicated above.    MEDICATIONS:  MEDICATIONS  (STANDING):  acetaminophen   IVPB .. 650 milliGRAM(s) IV Intermittent once  albuterol/ipratropium for Nebulization 3 milliLiter(s) Nebulizer every 6 hours  enoxaparin Injectable 30 milliGRAM(s) SubCutaneous every 24 hours  lactated ringers. 1000 milliLiter(s) (30 mL/Hr) IV Continuous <Continuous>      PHYSICAL EXAM:  T(C): 36.8 (02-19-24 @ 08:00), Max: 37.1 (02-18-24 @ 12:00)  HR: 100 (02-19-24 @ 08:00) (93 - 109)  BP: 140/62 (02-19-24 @ 08:00) (104/49 - 140/62)  RR: 21 (02-19-24 @ 08:00) (10 - 26)  SpO2: 97% (02-19-24 @ 08:00) (92% - 100%)  Wt(kg): --  I&O's Summary    18 Feb 2024 07:01  -  19 Feb 2024 07:00  --------------------------------------------------------  IN: 720 mL / OUT: 420 mL / NET: 300 mL          Appearance: NAD  HEENT:   Dry oral mucosa, PERRL, EOMI	  Lymphatic: No lymphadenopathy  Cardiovascular: Normal S1 S2, No JVD, No murmurs, No edema  Respiratory: Decreased bs  Psychiatry: A & O x 0 sleepy  Gastrointestinal:  Soft, Non-tender, + BS	  Skin: No rashes, No ecchymoses, No cyanosis	  Neurologic Exam:  Mental status - eyes closed, opens to repeated verbal stimuli. Follows intermittent simple commands to squeeze L hand/give thumbs up in L hand. Does not respond to orientation questions.   Cranial nerves - R pupil appears ?sluggishly reactive. No BTT in R eye. Unable to open left eye. ?R facial droop but may be 2/2 positioning of patient's head to R.  Motor - Normal bulk. Increased tone in RUE. Does not maintain antigravity when asked throughout all extremities initially, however does squeeze hand on LUE, withdraws slightly to noxious stimuli in R hand.   Upon re-evaluation able to raise both legs antigravity.  Sensation - Withdraws to noxious stimuli throughout.  DTR's - deferred  Coordination -deferred  Gait and station - deferred  Extremities: Normal range of motion, No clubbing, cyanosis or edema  Vascular: Peripheral pulses palpable 2+ bilaterally        LABS:    CARDIAC MARKERS:                                8.1    6.94  )-----------( 198      ( 19 Feb 2024 06:18 )             26.5     02-19    160<HH>  |  126<H>  |  39<H>  ----------------------------<  121<H>  3.4<L>   |  18<L>  |  1.51<H>    Ca    9.1      19 Feb 2024 06:18            TELEMETRY: 	  SR ST   ECG:  	  RADIOLOGY:   DIAGNOSTIC TESTING:  [ ] Echocardiogram:  [ ]  Catheterization:  [ ] Stress Test:    OTHER:

## 2024-02-19 NOTE — PROGRESS NOTE ADULT - SUBJECTIVE AND OBJECTIVE BOX
Chief Complaint:  Patient is a 89y old  Female who presents with a chief complaint of     Date of service 02-19-24 @ 09:54      Interval Events:       Hospital Medications:  acetaminophen   IVPB .. 650 milliGRAM(s) IV Intermittent once  albuterol/ipratropium for Nebulization 3 milliLiter(s) Nebulizer every 6 hours  enoxaparin Injectable 30 milliGRAM(s) SubCutaneous every 24 hours  lactated ringers. 1000 milliLiter(s) IV Continuous <Continuous>        Review of Systems:  General:  No wt loss, fevers, chills, night sweats, fatigue,   Eyes:  Good vision, no reported pain  ENT:  No sore throat, pain, runny nose, dysphagia  CV:  No pain, palpitations, hypo/hypertension  Resp:  No dyspnea, cough, tachypnea, wheezing  GI:  See HPI  :  No pain, bleeding, incontinence, nocturia  Muscle:  No pain, weakness  Neuro:  No weakness, tingling, memory problems  Psych:  No fatigue, insomnia, mood problems, depression  Endocrine:  No polyuria, polydipsia, cold/heat intolerance  Heme:  No petechiae, ecchymosis, easy bruisability  Integumentary:  No rash, edema    PHYSICAL EXAM:   Vital Signs:  Vital Signs Last 24 Hrs  T(C): 36.8 (19 Feb 2024 08:00), Max: 37.1 (18 Feb 2024 12:00)  T(F): 98.2 (19 Feb 2024 08:00), Max: 98.7 (18 Feb 2024 12:00)  HR: 100 (19 Feb 2024 08:00) (91 - 109)  BP: 140/62 (19 Feb 2024 08:00) (104/49 - 140/62)  BP(mean): 89 (19 Feb 2024 08:00) (69 - 89)  RR: 21 (19 Feb 2024 08:00) (10 - 26)  SpO2: 97% (19 Feb 2024 08:00) (92% - 100%)    Parameters below as of 19 Feb 2024 08:00  Patient On (Oxygen Delivery Method): nasal cannula  O2 Flow (L/min): 1    Daily     Daily       PHYSICAL EXAM:     GENERAL:  Appears stated age, well-groomed, well-nourished, no distress  HEENT:  NC/AT,  conjunctivae anicteric, clear and pink,   NECK: supple, trachea midline  CHEST:  Full & symmetric excursion, no increased effort, breath sounds clear  HEART:  Regular rhythm, no JVD  ABDOMEN:  Soft, non-tender, non-distended, normoactive bowel sounds,  no masses , no hepatosplenomegaly  EXTREMITIES:  no cyanosis,clubbing or edema  SKIN:  No rash, erythema, or, ecchymoses, no jaundice  NEURO:  Alert, non-focal, no asterixis  PSYCH: Appropriate affect, oriented to place and time  RECTAL: Deferred      LABS Personally reviewed by me:                        8.1    6.94  )-----------( 198      ( 19 Feb 2024 06:18 )             26.5     Mean Cell Volume: 92.3 fl (02-19-24 @ 06:18)    02-19    160<HH>  |  126<H>  |  39<H>  ----------------------------<  121<H>  3.4<L>   |  18<L>  |  1.51<H>    Ca    9.1      19 Feb 2024 06:18          Urinalysis Basic - ( 19 Feb 2024 06:18 )    Color: x / Appearance: x / SG: x / pH: x  Gluc: 121 mg/dL / Ketone: x  / Bili: x / Urobili: x   Blood: x / Protein: x / Nitrite: x   Leuk Esterase: x / RBC: x / WBC x   Sq Epi: x / Non Sq Epi: x / Bacteria: x                              8.1    6.94  )-----------( 198      ( 19 Feb 2024 06:18 )             26.5                         8.5    7.73  )-----------( 175      ( 18 Feb 2024 18:04 )             27.8                         8.4    6.99  )-----------( 178      ( 18 Feb 2024 04:27 )             27.3                         7.8    6.50  )-----------( 185      ( 17 Feb 2024 14:24 )             25.3                         7.8    6.56  )-----------( 174      ( 17 Feb 2024 07:39 )             25.7       Imaging personally reviewed by me:             Chief Complaint:  Patient is a 89y old  Female who presents with a chief complaint of     Date of service 02-19-24 @ 09:54      Interval Events:   more awake     Hospital Medications:  acetaminophen   IVPB .. 650 milliGRAM(s) IV Intermittent once  albuterol/ipratropium for Nebulization 3 milliLiter(s) Nebulizer every 6 hours  enoxaparin Injectable 30 milliGRAM(s) SubCutaneous every 24 hours  lactated ringers. 1000 milliLiter(s) IV Continuous <Continuous>        Review of Systems:  General:  No wt loss, fevers, chills, night sweats, fatigue,   Eyes:  Good vision, no reported pain  ENT:  No sore throat, pain, runny nose, dysphagia  CV:  No pain, palpitations, hypo/hypertension  Resp:  No dyspnea, cough, tachypnea, wheezing  GI:  See HPI  :  No pain, bleeding, incontinence, nocturia  Muscle:  No pain, weakness  Neuro:  No weakness, tingling, memory problems  Psych:  No fatigue, insomnia, mood problems, depression  Endocrine:  No polyuria, polydipsia, cold/heat intolerance  Heme:  No petechiae, ecchymosis, easy bruisability  Integumentary:  No rash, edema    PHYSICAL EXAM:   Vital Signs:  Vital Signs Last 24 Hrs  T(C): 36.8 (19 Feb 2024 08:00), Max: 37.1 (18 Feb 2024 12:00)  T(F): 98.2 (19 Feb 2024 08:00), Max: 98.7 (18 Feb 2024 12:00)  HR: 100 (19 Feb 2024 08:00) (91 - 109)  BP: 140/62 (19 Feb 2024 08:00) (104/49 - 140/62)  BP(mean): 89 (19 Feb 2024 08:00) (69 - 89)  RR: 21 (19 Feb 2024 08:00) (10 - 26)  SpO2: 97% (19 Feb 2024 08:00) (92% - 100%)    Parameters below as of 19 Feb 2024 08:00  Patient On (Oxygen Delivery Method): nasal cannula  O2 Flow (L/min): 1    Daily     Daily       PHYSICAL EXAM:     GENERAL:  Appears stated age, well-groomed, well-nourished, no distress  HEENT:  NC/AT,  conjunctivae anicteric, clear and pink,   NECK: supple, trachea midline  CHEST:  Full & symmetric excursion, no increased effort, breath sounds clear  HEART:  Regular rhythm, no JVD  ABDOMEN:  Soft, non-tender, non-distended, normoactive bowel sounds,  no masses , no hepatosplenomegaly  EXTREMITIES:  no cyanosis,clubbing or edema  SKIN:  No rash, erythema, or, ecchymoses, no jaundice  NEURO:  Alert, non-focal, no asterixis  PSYCH: Appropriate affect, oriented to place and time  RECTAL: Deferred      LABS Personally reviewed by me:                        8.1    6.94  )-----------( 198      ( 19 Feb 2024 06:18 )             26.5     Mean Cell Volume: 92.3 fl (02-19-24 @ 06:18)    02-19    160<HH>  |  126<H>  |  39<H>  ----------------------------<  121<H>  3.4<L>   |  18<L>  |  1.51<H>    Ca    9.1      19 Feb 2024 06:18          Urinalysis Basic - ( 19 Feb 2024 06:18 )    Color: x / Appearance: x / SG: x / pH: x  Gluc: 121 mg/dL / Ketone: x  / Bili: x / Urobili: x   Blood: x / Protein: x / Nitrite: x   Leuk Esterase: x / RBC: x / WBC x   Sq Epi: x / Non Sq Epi: x / Bacteria: x                              8.1    6.94  )-----------( 198      ( 19 Feb 2024 06:18 )             26.5                         8.5    7.73  )-----------( 175      ( 18 Feb 2024 18:04 )             27.8                         8.4    6.99  )-----------( 178      ( 18 Feb 2024 04:27 )             27.3                         7.8    6.50  )-----------( 185      ( 17 Feb 2024 14:24 )             25.3                         7.8    6.56  )-----------( 174      ( 17 Feb 2024 07:39 )             25.7       Imaging personally reviewed by me:

## 2024-02-20 LAB
ALBUMIN SERPL ELPH-MCNC: 3.7 G/DL — SIGNIFICANT CHANGE UP (ref 3.3–5)
ALP SERPL-CCNC: 97 U/L — SIGNIFICANT CHANGE UP (ref 40–120)
ALT FLD-CCNC: 13 U/L — SIGNIFICANT CHANGE UP (ref 10–45)
ANION GAP SERPL CALC-SCNC: 11 MMOL/L — SIGNIFICANT CHANGE UP (ref 5–17)
ANION GAP SERPL CALC-SCNC: 11 MMOL/L — SIGNIFICANT CHANGE UP (ref 5–17)
APPEARANCE UR: CLEAR — SIGNIFICANT CHANGE UP
APTT BLD: 24.6 SEC — SIGNIFICANT CHANGE UP (ref 24.5–35.6)
AST SERPL-CCNC: 15 U/L — SIGNIFICANT CHANGE UP (ref 10–40)
BACTERIA # UR AUTO: NEGATIVE /HPF — SIGNIFICANT CHANGE UP
BILIRUB SERPL-MCNC: 0.6 MG/DL — SIGNIFICANT CHANGE UP (ref 0.2–1.2)
BILIRUB UR-MCNC: NEGATIVE — SIGNIFICANT CHANGE UP
BUN SERPL-MCNC: 29 MG/DL — HIGH (ref 7–23)
BUN SERPL-MCNC: 34 MG/DL — HIGH (ref 7–23)
CALCIUM SERPL-MCNC: 8.5 MG/DL — SIGNIFICANT CHANGE UP (ref 8.4–10.5)
CALCIUM SERPL-MCNC: 8.8 MG/DL — SIGNIFICANT CHANGE UP (ref 8.4–10.5)
CAST: 5 /LPF — HIGH (ref 0–4)
CHLORIDE SERPL-SCNC: 122 MMOL/L — HIGH (ref 96–108)
CHLORIDE SERPL-SCNC: 127 MMOL/L — HIGH (ref 96–108)
CO2 SERPL-SCNC: 19 MMOL/L — LOW (ref 22–31)
CO2 SERPL-SCNC: 21 MMOL/L — LOW (ref 22–31)
COLOR SPEC: YELLOW — SIGNIFICANT CHANGE UP
CREAT SERPL-MCNC: 1.21 MG/DL — SIGNIFICANT CHANGE UP (ref 0.5–1.3)
CREAT SERPL-MCNC: 1.32 MG/DL — HIGH (ref 0.5–1.3)
DIFF PNL FLD: NEGATIVE — SIGNIFICANT CHANGE UP
EGFR: 37 ML/MIN/1.73M2 — LOW
EGFR: 41 ML/MIN/1.73M2 — LOW
GLUCOSE SERPL-MCNC: 142 MG/DL — HIGH (ref 70–99)
GLUCOSE SERPL-MCNC: 147 MG/DL — HIGH (ref 70–99)
GLUCOSE UR QL: NEGATIVE MG/DL — SIGNIFICANT CHANGE UP
HCT VFR BLD CALC: 26.7 % — LOW (ref 34.5–45)
HGB BLD-MCNC: 8 G/DL — LOW (ref 11.5–15.5)
INR BLD: 1.3 RATIO — HIGH (ref 0.85–1.18)
KETONES UR-MCNC: ABNORMAL MG/DL
LEUKOCYTE ESTERASE UR-ACNC: NEGATIVE — SIGNIFICANT CHANGE UP
MAGNESIUM SERPL-MCNC: 2.5 MG/DL — SIGNIFICANT CHANGE UP (ref 1.6–2.6)
MCHC RBC-ENTMCNC: 28.9 PG — SIGNIFICANT CHANGE UP (ref 27–34)
MCHC RBC-ENTMCNC: 30 GM/DL — LOW (ref 32–36)
MCV RBC AUTO: 96.4 FL — SIGNIFICANT CHANGE UP (ref 80–100)
NITRITE UR-MCNC: NEGATIVE — SIGNIFICANT CHANGE UP
NRBC # BLD: 0 /100 WBCS — SIGNIFICANT CHANGE UP (ref 0–0)
PH UR: 5.5 — SIGNIFICANT CHANGE UP (ref 5–8)
PHOSPHATE SERPL-MCNC: 1.8 MG/DL — LOW (ref 2.5–4.5)
PLATELET # BLD AUTO: 168 K/UL — SIGNIFICANT CHANGE UP (ref 150–400)
POTASSIUM SERPL-MCNC: 3.5 MMOL/L — SIGNIFICANT CHANGE UP (ref 3.5–5.3)
POTASSIUM SERPL-MCNC: 3.5 MMOL/L — SIGNIFICANT CHANGE UP (ref 3.5–5.3)
POTASSIUM SERPL-SCNC: 3.5 MMOL/L — SIGNIFICANT CHANGE UP (ref 3.5–5.3)
POTASSIUM SERPL-SCNC: 3.5 MMOL/L — SIGNIFICANT CHANGE UP (ref 3.5–5.3)
PROT SERPL-MCNC: 6.3 G/DL — SIGNIFICANT CHANGE UP (ref 6–8.3)
PROT UR-MCNC: SIGNIFICANT CHANGE UP MG/DL
PROTHROM AB SERPL-ACNC: 13.5 SEC — HIGH (ref 9.5–13)
RBC # BLD: 2.77 M/UL — LOW (ref 3.8–5.2)
RBC # FLD: 18.8 % — HIGH (ref 10.3–14.5)
RBC CASTS # UR COMP ASSIST: 1 /HPF — SIGNIFICANT CHANGE UP (ref 0–4)
SODIUM SERPL-SCNC: 154 MMOL/L — HIGH (ref 135–145)
SODIUM SERPL-SCNC: 157 MMOL/L — HIGH (ref 135–145)
SP GR SPEC: 1.02 — SIGNIFICANT CHANGE UP (ref 1–1.03)
SQUAMOUS # UR AUTO: 2 /HPF — SIGNIFICANT CHANGE UP (ref 0–5)
UROBILINOGEN FLD QL: 0.2 MG/DL — SIGNIFICANT CHANGE UP (ref 0.2–1)
WBC # BLD: 5.38 K/UL — SIGNIFICANT CHANGE UP (ref 3.8–10.5)
WBC # FLD AUTO: 5.38 K/UL — SIGNIFICANT CHANGE UP (ref 3.8–10.5)
WBC UR QL: 0 /HPF — SIGNIFICANT CHANGE UP (ref 0–5)

## 2024-02-20 PROCEDURE — 99223 1ST HOSP IP/OBS HIGH 75: CPT

## 2024-02-20 PROCEDURE — 99291 CRITICAL CARE FIRST HOUR: CPT | Mod: FS

## 2024-02-20 RX ORDER — POTASSIUM PHOSPHATE, MONOBASIC POTASSIUM PHOSPHATE, DIBASIC 236; 224 MG/ML; MG/ML
30 INJECTION, SOLUTION INTRAVENOUS ONCE
Refills: 0 | Status: COMPLETED | OUTPATIENT
Start: 2024-02-20 | End: 2024-02-20

## 2024-02-20 RX ORDER — POTASSIUM CHLORIDE 20 MEQ
10 PACKET (EA) ORAL
Refills: 0 | Status: COMPLETED | OUTPATIENT
Start: 2024-02-20 | End: 2024-02-21

## 2024-02-20 RX ADMIN — Medication 3 MILLILITER(S): at 23:00

## 2024-02-20 RX ADMIN — SODIUM CHLORIDE 75 MILLILITER(S): 9 INJECTION, SOLUTION INTRAVENOUS at 11:58

## 2024-02-20 RX ADMIN — Medication 100 MILLIEQUIVALENT(S): at 22:59

## 2024-02-20 RX ADMIN — Medication 3 MILLILITER(S): at 17:43

## 2024-02-20 RX ADMIN — Medication 3 MILLILITER(S): at 00:07

## 2024-02-20 RX ADMIN — POTASSIUM PHOSPHATE, MONOBASIC POTASSIUM PHOSPHATE, DIBASIC 83.33 MILLIMOLE(S): 236; 224 INJECTION, SOLUTION INTRAVENOUS at 16:50

## 2024-02-20 RX ADMIN — Medication 3 MILLILITER(S): at 06:08

## 2024-02-20 RX ADMIN — SODIUM CHLORIDE 50 MILLILITER(S): 9 INJECTION, SOLUTION INTRAVENOUS at 22:59

## 2024-02-20 RX ADMIN — ENOXAPARIN SODIUM 30 MILLIGRAM(S): 100 INJECTION SUBCUTANEOUS at 17:43

## 2024-02-20 RX ADMIN — Medication 3 MILLILITER(S): at 13:32

## 2024-02-20 RX ADMIN — Medication 100 MILLIEQUIVALENT(S): at 16:44

## 2024-02-20 NOTE — PROGRESS NOTE ADULT - SUBJECTIVE AND OBJECTIVE BOX
THE PATIENT WAS SEEN AND EXAMINED BY STROKE TEAM DURING MORNING ROUNDS.     HPI:  89-year-old left-handed woman with past medical history of CHF, HTN, pacemaker placement, valve replacement presenting as a code stroke for AMS, ?R-facial droop, R sided weakness. Was found by home aide at 830 2/14 less responsive, not moving extremities as usually does. No prior known hx strokes per family. At baseline patient alert, able to recognize/comprehend familiar faces, per daughters at bedside patient sometimes has difficulty with getting words out. Information obtained from aide at bedside, then after CT scanner family also at bedside.    SUBJECTIVE: No events overnight.  No new neurologic complaints.  ROS reported negative unless otherwise noted.    acetaminophen   IVPB .. 650 milliGRAM(s) IV Intermittent once  albuterol/ipratropium for Nebulization 3 milliLiter(s) Nebulizer every 6 hours  enoxaparin Injectable 30 milliGRAM(s) SubCutaneous every 24 hours  lactated ringers. 1000 milliLiter(s) IV Continuous <Continuous>      Vital Signs Last 24 Hrs  T(C): 36.8 (19 Feb 2024 20:00), Max: 36.8 (19 Feb 2024 08:00)  T(F): 98.2 (19 Feb 2024 20:00), Max: 98.3 (19 Feb 2024 12:00)  HR: 84 (20 Feb 2024 06:00) (84 - 102)  BP: 121/57 (20 Feb 2024 06:00) (108/45 - 172/59)  BP(mean): 82 (20 Feb 2024 06:00) (65 - 96)  RR: 22 (20 Feb 2024 06:00) (20 - 26)  SpO2: 95% (20 Feb 2024 06:00) (94% - 100%)    Parameters below as of 20 Feb 2024 06:00  Patient On (Oxygen Delivery Method): room air        Physical Examination:    General: No acute distress  Abdomen: Soft, nontender, nondistended   Extremities: No edema    NEUROLOGICAL EXAM:  Mental status: Opens eyes briefly to voice, does not respond to questions. Inconsistently following one step commands. Generates a few unintelligible sounds (today reexam pendng)  Cranial Nerves: No facial asymmetry. Decreased blink to threat on Right.   Motor exam: Normal tone, some effort against gravity throughout all extremities, L > R. Arthritic changes throughout extremities. RLE: some effort against gravity  Sensation: Grimaces to painful stimuli x4, less so on the right  Coordination/ Gait: Deferred      02-20    157<H>  |  127<H>  |  34<H>  ----------------------------<  142<H>  3.5   |  19<L>  |  1.32<H>    Ca    8.5      20 Feb 2024 03:53                          8.0    5.38  )-----------( 168      ( 20 Feb 2024 03:53 )             26.7       IMAGING: Reviewed by me.     2/17/24 CT Head:   Acute left MCA territory infarct, as on the prior, with associated   hemorrhagic transformation centered in the ipsilateral basal ganglia, not   significantly changed. Associated 8-9 mm rightward midline shift is not   significantly changed. Slightly more pronounced gyriform hyperdensity may   reflect spared cortex versus cortical petechial hemorrhage. Additional   findings described in detail above.    2/16/24 CT Head:   IMPRESSION: Redemonstration of an evolving left-sided MCA distribution   acute/subacute infarct with a new small amount of hemorrhagic   transformation within the infarct bed in comparison with 2/14/2024.    Surrounding mass effect and shift of the midline structures from left to   right appears unchanged when compared to the most recent prior CT exam.    CT Head, CTA Head/Neck 2/14/24:    CT brain:  Acute left frontoparietalinfarct. No CT evidence for selene hemorrhagic   transformation.    CT brain perfusion:  Significantly limited by motion.    Cerebral blood flow less than 30% = 0 mL. No core infarct predicted.    Tmax greater than 6 seconds = 46 mL and involves the bilateral mesial   cerebellar hemispheres, the right temporal lobe, bilateral frontal lobes,   and left posterior temporal. This represents brain parenchyma predicted   to be at continued ischemic risk in the presence of neurologic symptoms.   This finding is likely spurious in nature.    Mismatch volume 46 mL  Mismatch ratio infinite    CTA brain:  Intraluminal filling defect involving the left ICA terminus and extending   into the proximal right A1 segment.    Normal flow-related enhancement of the remaining left LEBRON. Normal   flow-related enhancement of the left MCA.    Right MCA enhances to a lesser degree than the left MCA.    No vascular aneurysm or AVM.    CTA neck:  Approximately 50% short segment stenosis of the origin and proximal   segment of the left ICA due to calcified plaque. Normal flow-related   enhancement of the more distal vessel.    Approximately 75-80% short segment stenosis of the proximal segment of   the right internal carotid artery due to partially calcified plaque.   Normal flow-related enhancement of the more distal vessel.    2/14/24 TTE:    1. Technically difficult image quality.   2. 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 No aortic regurgitation.   3. Severe mitral valve stenosis, secondary to dystrophic mitral annular calcification.   4. No prior echocardiogram is available for comparison.     THE PATIENT WAS SEEN AND EXAMINED BY STROKE TEAM DURING MORNING ROUNDS.     HPI:  89-year-old left-handed woman with past medical history of CHF, HTN, pacemaker placement, valve replacement presenting as a code stroke for AMS, ?R-facial droop, R sided weakness. Was found by home aide at 830 2/14 less responsive, not moving extremities as usually does. No prior known hx strokes per family. At baseline patient alert, able to recognize/comprehend familiar faces, per daughters at bedside patient sometimes has difficulty with getting words out. Information obtained from aide at bedside, then after CT scanner family also at bedside.    SUBJECTIVE: Na uptrending to 160, fluids switched to free water, and potassium supplemented.  No new neurologic complaints.  ROS unable to be assessed    acetaminophen   IVPB .. 650 milliGRAM(s) IV Intermittent once  albuterol/ipratropium for Nebulization 3 milliLiter(s) Nebulizer every 6 hours  enoxaparin Injectable 30 milliGRAM(s) SubCutaneous every 24 hours  lactated ringers. 1000 milliLiter(s) IV Continuous <Continuous>      Vital Signs Last 24 Hrs  T(C): 36.8 (19 Feb 2024 20:00), Max: 36.8 (19 Feb 2024 08:00)  T(F): 98.2 (19 Feb 2024 20:00), Max: 98.3 (19 Feb 2024 12:00)  HR: 84 (20 Feb 2024 06:00) (84 - 102)  BP: 121/57 (20 Feb 2024 06:00) (108/45 - 172/59)  BP(mean): 82 (20 Feb 2024 06:00) (65 - 96)  RR: 22 (20 Feb 2024 06:00) (20 - 26)  SpO2: 95% (20 Feb 2024 06:00) (94% - 100%)    Parameters below as of 20 Feb 2024 06:00  Patient On (Oxygen Delivery Method): room air        Physical Examination:    General: No acute distress  Abdomen: Soft, nondistended   Extremities: No edema    NEUROLOGICAL EXAM:  Mental status: Opens eyes briefly to voice, does not respond to questions. Inconsistently following one step commands. Generates a few unintelligible sounds (today reexam pendng)  Cranial Nerves: No facial asymmetry. Decreased blink to threat on Right.   Motor exam: Normal tone, some effort against gravity throughout all extremities, L > R. Arthritic changes throughout extremities. RLE: some effort against gravity  Sensation: Grimaces to painful stimuli x4, less so on the right  Coordination/ Gait: Deferred      02-20    157<H>  |  127<H>  |  34<H>  ----------------------------<  142<H>  3.5   |  19<L>  |  1.32<H>    Ca    8.5      20 Feb 2024 03:53                          8.0    5.38  )-----------( 168      ( 20 Feb 2024 03:53 )             26.7       IMAGING: Reviewed by me.     2/17/24 CT Head:   Acute left MCA territory infarct, as on the prior, with associated   hemorrhagic transformation centered in the ipsilateral basal ganglia, not   significantly changed. Associated 8-9 mm rightward midline shift is not   significantly changed. Slightly more pronounced gyriform hyperdensity may   reflect spared cortex versus cortical petechial hemorrhage. Additional   findings described in detail above.    2/16/24 CT Head:   IMPRESSION: Redemonstration of an evolving left-sided MCA distribution   acute/subacute infarct with a new small amount of hemorrhagic   transformation within the infarct bed in comparison with 2/14/2024.    Surrounding mass effect and shift of the midline structures from left to   right appears unchanged when compared to the most recent prior CT exam.    CT Head, CTA Head/Neck 2/14/24:    CT brain:  Acute left frontoparietalinfarct. No CT evidence for selene hemorrhagic   transformation.    CT brain perfusion:  Significantly limited by motion.    Cerebral blood flow less than 30% = 0 mL. No core infarct predicted.    Tmax greater than 6 seconds = 46 mL and involves the bilateral mesial   cerebellar hemispheres, the right temporal lobe, bilateral frontal lobes,   and left posterior temporal. This represents brain parenchyma predicted   to be at continued ischemic risk in the presence of neurologic symptoms.   This finding is likely spurious in nature.    Mismatch volume 46 mL  Mismatch ratio infinite    CTA brain:  Intraluminal filling defect involving the left ICA terminus and extending   into the proximal right A1 segment.    Normal flow-related enhancement of the remaining left LEBRON. Normal   flow-related enhancement of the left MCA.    Right MCA enhances to a lesser degree than the left MCA.    No vascular aneurysm or AVM.    CTA neck:  Approximately 50% short segment stenosis of the origin and proximal   segment of the left ICA due to calcified plaque. Normal flow-related   enhancement of the more distal vessel.    Approximately 75-80% short segment stenosis of the proximal segment of   the right internal carotid artery due to partially calcified plaque.   Normal flow-related enhancement of the more distal vessel.    2/14/24 TTE:    1. Technically difficult image quality.   2. 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 No aortic regurgitation.   3. Severe mitral valve stenosis, secondary to dystrophic mitral annular calcification.   4. No prior echocardiogram is available for comparison.     THE PATIENT WAS SEEN AND EXAMINED BY STROKE TEAM DURING MORNING ROUNDS.     HPI:  89-year-old left-handed woman with past medical history of CHF, HTN, pacemaker placement, valve replacement presenting as a code stroke for AMS, ?R-facial droop, R sided weakness. Was found by home aide at 830 2/14 less responsive, not moving extremities as usually does. No prior known hx strokes per family. At baseline patient alert, able to recognize/comprehend familiar faces, per daughters at bedside patient sometimes has difficulty with getting words out. Information obtained from aide at bedside, then after CT scanner family also at bedside.    SUBJECTIVE: Na uptrending to 160, fluids switched to free water, and potassium supplemented.  No new neurologic complaints.  ROS unable to be assessed    acetaminophen   IVPB .. 650 milliGRAM(s) IV Intermittent once  albuterol/ipratropium for Nebulization 3 milliLiter(s) Nebulizer every 6 hours  enoxaparin Injectable 30 milliGRAM(s) SubCutaneous every 24 hours  lactated ringers. 1000 milliLiter(s) IV Continuous <Continuous>      Vital Signs Last 24 Hrs  T(C): 36.8 (19 Feb 2024 20:00), Max: 36.8 (19 Feb 2024 08:00)  T(F): 98.2 (19 Feb 2024 20:00), Max: 98.3 (19 Feb 2024 12:00)  HR: 84 (20 Feb 2024 06:00) (84 - 102)  BP: 121/57 (20 Feb 2024 06:00) (108/45 - 172/59)  BP(mean): 82 (20 Feb 2024 06:00) (65 - 96)  RR: 22 (20 Feb 2024 06:00) (20 - 26)  SpO2: 95% (20 Feb 2024 06:00) (94% - 100%)    Parameters below as of 20 Feb 2024 06:00  Patient On (Oxygen Delivery Method): room air        Physical Examination:    General: No acute distress  Abdomen: Soft, nondistended   Extremities: No edema    NEUROLOGICAL EXAM:  Mental status: Opens eyes briefly to voice, does not respond to questions. No FC  Cranial Nerves: No facial asymmetry. Decreased blink to threat on Right.   Motor exam: Normal tone, some effort spontaneous against gravity throughout all extremities, L > R. Arthritic changes throughout extremities. RLE: some effort against gravity  Sensation: Grimaces to painful stimuli x4, less so on the right  Coordination/ Gait: Deferred      02-20    157<H>  |  127<H>  |  34<H>  ----------------------------<  142<H>  3.5   |  19<L>  |  1.32<H>    Ca    8.5      20 Feb 2024 03:53                          8.0    5.38  )-----------( 168      ( 20 Feb 2024 03:53 )             26.7       IMAGING: Reviewed by me.     2/17/24 CT Head:   Acute left MCA territory infarct, as on the prior, with associated   hemorrhagic transformation centered in the ipsilateral basal ganglia, not   significantly changed. Associated 8-9 mm rightward midline shift is not   significantly changed. Slightly more pronounced gyriform hyperdensity may   reflect spared cortex versus cortical petechial hemorrhage. Additional   findings described in detail above.    2/16/24 CT Head:   IMPRESSION: Redemonstration of an evolving left-sided MCA distribution   acute/subacute infarct with a new small amount of hemorrhagic   transformation within the infarct bed in comparison with 2/14/2024.    Surrounding mass effect and shift of the midline structures from left to   right appears unchanged when compared to the most recent prior CT exam.    CT Head, CTA Head/Neck 2/14/24:    CT brain:  Acute left frontoparietalinfarct. No CT evidence for selene hemorrhagic   transformation.    CT brain perfusion:  Significantly limited by motion.    Cerebral blood flow less than 30% = 0 mL. No core infarct predicted.    Tmax greater than 6 seconds = 46 mL and involves the bilateral mesial   cerebellar hemispheres, the right temporal lobe, bilateral frontal lobes,   and left posterior temporal. This represents brain parenchyma predicted   to be at continued ischemic risk in the presence of neurologic symptoms.   This finding is likely spurious in nature.    Mismatch volume 46 mL  Mismatch ratio infinite    CTA brain:  Intraluminal filling defect involving the left ICA terminus and extending   into the proximal right A1 segment.    Normal flow-related enhancement of the remaining left LEBRON. Normal   flow-related enhancement of the left MCA.    Right MCA enhances to a lesser degree than the left MCA.    No vascular aneurysm or AVM.    CTA neck:  Approximately 50% short segment stenosis of the origin and proximal   segment of the left ICA due to calcified plaque. Normal flow-related   enhancement of the more distal vessel.    Approximately 75-80% short segment stenosis of the proximal segment of   the right internal carotid artery due to partially calcified plaque.   Normal flow-related enhancement of the more distal vessel.    2/14/24 TTE:    1. Technically difficult image quality.   2. 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 No aortic regurgitation.   3. Severe mitral valve stenosis, secondary to dystrophic mitral annular calcification.   4. No prior echocardiogram is available for comparison.

## 2024-02-20 NOTE — PROGRESS NOTE ADULT - SUBJECTIVE AND OBJECTIVE BOX
Subjective: Patient seen and examined. No new events except as noted.   remains in Stroke Unit    Hypernatremia     REVIEW OF SYSTEMS:    CONSTITUTIONAL:+ weakness, fevers or chills  EYES/ENT: No visual changes;  No vertigo or throat pain   NECK: No pain or stiffness  RESPIRATORY: No cough, wheezing, hemoptysis; No shortness of breath  CARDIOVASCULAR: No chest pain or palpitations  GASTROINTESTINAL: No abdominal or epigastric pain. No nausea, vomiting, or hematemesis; No diarrhea or constipation. No melena or hematochezia.  GENITOURINARY: No dysuria, frequency or hematuria  NEUROLOGICAL: No numbness or weakness  SKIN: No itching, burning, rashes, or lesions   All other review of systems is negative unless indicated above.    MEDICATIONS:  MEDICATIONS  (STANDING):  acetaminophen   IVPB .. 650 milliGRAM(s) IV Intermittent once  albuterol/ipratropium for Nebulization 3 milliLiter(s) Nebulizer every 6 hours  amLODIPine   Tablet 5 milliGRAM(s) Oral daily  dextrose 5%. 1000 milliLiter(s) (75 mL/Hr) IV Continuous <Continuous>  enoxaparin Injectable 30 milliGRAM(s) SubCutaneous every 24 hours      PHYSICAL EXAM:  T(C): 37.1 (02-20-24 @ 08:00), Max: 37.1 (02-20-24 @ 08:00)  HR: 95 (02-20-24 @ 08:00) (84 - 102)  BP: 121/50 (02-20-24 @ 08:00) (108/45 - 172/59)  RR: 22 (02-20-24 @ 08:00) (20 - 26)  SpO2: 97% (02-20-24 @ 08:00) (94% - 100%)  Wt(kg): --  I&O's Summary    19 Feb 2024 07:01  -  20 Feb 2024 07:00  --------------------------------------------------------  IN: 1320 mL / OUT: 550 mL / NET: 770 mL          Appearance: NAD  HEENT:   Dry oral mucosa, PERRL, EOMI	  Lymphatic: No lymphadenopathy  Cardiovascular: Normal S1 S2, No JVD, No murmurs, No edema  Respiratory: Decreased bs  Psychiatry: A & O x 0 sleepy  Gastrointestinal:  Soft, Non-tender, + BS	  Skin: No rashes, No ecchymoses, No cyanosis	  Neurologic Exam:  Mental status - eyes closed, opens to repeated verbal stimuli. Follows intermittent simple commands to squeeze L hand/give thumbs up in L hand. Does not respond to orientation questions.   Cranial nerves - R pupil appears ?sluggishly reactive. No BTT in R eye. Unable to open left eye. ?R facial droop but may be 2/2 positioning of patient's head to R.  Motor - Normal bulk. Increased tone in RUE. Does not maintain antigravity when asked throughout all extremities initially, however does squeeze hand on LUE, withdraws slightly to noxious stimuli in R hand.   Upon re-evaluation able to raise both legs antigravity.  Sensation - Withdraws to noxious stimuli throughout.  DTR's - deferred  Coordination -deferred  Gait and station - deferred  Extremities: Normal range of motion, No clubbing, cyanosis or edema  Vascular: Peripheral pulses palpable 2+ bilaterally          LABS:    CARDIAC MARKERS:                                8.0    5.38  )-----------( 168      ( 20 Feb 2024 03:53 )             26.7     02-20    157<H>  |  127<H>  |  34<H>  ----------------------------<  142<H>  3.5   |  19<L>  |  1.32<H>    Ca    8.5      20 Feb 2024 03:53            TELEMETRY: 	  SR   ECG:  	  RADIOLOGY:   DIAGNOSTIC TESTING:  [ ] Echocardiogram:  [ ]  Catheterization:  [ ] Stress Test:    OTHER:

## 2024-02-20 NOTE — CONSULT NOTE ADULT - CONVERSATION DETAILS
Met with daughters Florencia and Maria Luisa who are very vested in the belief that their mother is a fighter with a resilient spirit who is willing to accept limited mobility as a way of life.  As far as nutrition, they feel that eating is important and their marc in [employer] is guiding their decision making.  They do seek guidance from their Rabbi.  At this point they are leaning towards oral feeds as tolerated despite the speech and swallow conclusion and will let us know re: peg tube placement.  MOLST was discussed - they desire all medical interventions and resuscitation in the event of cardiac arrest.  Time spent in face to face discussion 50 minutes.

## 2024-02-20 NOTE — PROGRESS NOTE ADULT - NS ATTEND AMEND GEN_ALL_CORE FT
I saw and examined the patient, reviewed diagnostic studies, and reviewed images personally. I agree with PA’s history, exam, orders placed, and plan of care. Thirty minutes of critical care time was spent in caring for this patient who has concern of sudden and clinically significant deterioration requiring a high level of physician preparedness, management of brain supporting interventions, and frequent physician assessments.  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 Now failed swallow w/ dysphagia, plan is for NG. Family debating between peg vs no peg - palliative consult. Hold off on AC until peg decision, then reevaluate. Hypernatremia, Monitor Na. Repeat K+ for hypokalemia. Stable exam otherwise. Discussed case with two daughters at bedside at length. All questions answered, they express understanding of current situation and management plan.

## 2024-02-20 NOTE — CONSULT NOTE ADULT - TIME BILLING
minutes spent on total encounter. The necessity of the time spent during the encounter on this date of service was due to:     Total time spent including the following  [x ] Physical chart review and documentation   An extensive review of the physical chart, electronic health record, and documentation was conducted to obtain collateral information including but not limited to:   - Current inpatient records (ED, H&P, primary team, and consultants [IR])   - Inpatient values/results (CBC, CMP, CT)   - Prior inpatient records (prior GaP notes when he was admitted to [facility])   - Current or proposed treatment plans   - Pharmacotherapy review   [x ]discussion with the interdisciplinary palliative care team -RN, , clinicians, trainees,   [x ]discussion with the patient/family/decision maker + 50 minutes in addition to regular e and m service  [ x]Physical Exam and/or review of systems   [x ]Formulation of assessment and plan   [ ]Evaluating for response to treatment and side effects of opioids or benzodiazepines.  [ x]Review of care coordination documentation.

## 2024-02-20 NOTE — PROGRESS NOTE ADULT - ASSESSMENT
89F presented with a stroke    1. Stroke w. midline shift  per family, pt was awake and following commands all day yesterday     2. Dysphagia   needs S&S follow up   If she remains NPO, family will decide on pleasure feeds vs. PEG     3. Hypernatremia        89F presented with a stroke    1. Stroke w. midline shift  per family, pt was awake and following commands all day yesterday     2. Dysphagia   discussed with S&S, plan for reevaluation tomorrow. If remains NPO, will plan for PEG on thursday 2/20  attempt to place NGT unsuccessful due to deviated septum     3. Hypernatremia

## 2024-02-20 NOTE — PROGRESS NOTE ADULT - ASSESSMENT
89-year-old left-handed woman with past medical history of CHF, HTN, pacemaker placement, valve replacement presenting as a code stroke for AMS, ?R-facial droop, R sided weakness. Was found by home aide at 830 2/14 less responsive, not moving extremities as usually does. No prior known hx strokes per family. At baseline patient alert, able to recognize/comprehend familiar faces, per daughters at bedside patient sometimes has difficulty with getting words out. Not candidate for tenecteplase as area of hypodensity already appreciated on CT head imaging/age, not candidate for thrombectomy given higher MRS.    IMPRESSION: Global aphasia, R hemiparesis due to L MCA infarct 2/2 L ICA partially occlusive thrombus, etiology likely cardioembolic in the setting of AF    NEURO: Neurologically slightly improved since admission. Continue close monitoring for neurologic deterioration. A1c 6.3, LDL 76 - high intensity statin when oral access obtained, titrate statin to LDL goal less than 70. CT Head, CTA Head/Neck as above. Maintain SBP < 140. Physical therapy/OT/Speech eval - NOHEMI    ANTITHROMBOTIC THERAPY: On hold given hemorrhagic transformation on CTH and PEG pending    PULMONARY: Protecting airway, saturating well. Continue home albuterol nebs. Now on supplemental O2. CT chest w/ small b/l pleural effusions and congestion. CT chest w/ mucous plugging LLL. Pulmonology following. Procalcitonin not significantly elevated. Continue monitoring off antibiotics.    CARDIOVASCULAR: TTE severe mitral stenosis, s/p previous TAVR. Cardiac pacemaker interrogated. Continue cardiac monitoring. Monitor for PAF. BP in the 170s, Norvasc 5mg daily added as per cardio.      SBP goal: <180    GASTROINTESTINAL: Dysphagia screen failed. Failed rpt bedside S/S eval. GI following for possible PEG placement on 2/20. Per GI, PEG on hold until hypernatremia is corrected / patient is medically optimized. Repeat Swallow evaluation 2/19 - patient not a candidate for oral feeding at this time. Repeat Swallow evaluation on 2/19, patient did not pass. as per GI: PEG on hold until hypernatremia is corrected / pt is medically optimized.      Diet: NPO. Caution with IVF given mitral stenosis and concern for volume overload as seen on CT Chest     RENAL: BUN/Cr, good urine output     Na Goal: Greater than 135. Na: 157, on D5W @ 75cc/hr, continue to trend      Sousa: No    HEMATOLOGY: H/H fluctuating, hemoglobin 8.0, will c/w trending. Platelets 183      DVT ppx: Lovenox 30 subq     ID: Afebrile without leukocytosis, monitoring off antibiotics.     OTHER: Plan discussed with daughter at bedside. Family wants FULL code. Palliative care consulted for goals of care discussion and support.     DISPOSITION: Rehab per PT eval once stable and workup is complete    CORE MEASURES:        Admission NIHSS: 18     TPA: NO      LDL/HDL: 76/45     Depression Screen: unable to assess     Statin Therapy: yes, when oral access obtained     Dysphagia Screen: FAIL     Smoking NO      Afib NO     Stroke Education YES    Obtain screening lower extremity venous ultrasound in patients who meet 1 or more of the following criteria as patient is high risk for DVT/PE on admission:   [] History of DVT/PE  [] Hypercoagulable states (Factor V Leiden, Cancer, OCP, etc. )  [x] Prolonged immobility (hemiplegia/hemiparesis/post operative or any other extended immobilization)  [] Transferred from outside facility (Rehab or Long term care)  [] Age </= to 50    -on lovenox 30 dvt ppx, Lower [facility] duplex pending 89-year-old left-handed woman with past medical history of CHF, HTN, pacemaker placement, valve replacement presenting as a code stroke for AMS, ?R-facial droop, R sided weakness. Was found by home aide at 830 2/14 less responsive, not moving extremities as usually does. No prior known hx strokes per family. At baseline patient alert, able to recognize/comprehend familiar faces, per daughters at bedside patient sometimes has difficulty with getting words out. Not candidate for tenecteplase as area of hypodensity already appreciated on CT head imaging/age, not candidate for thrombectomy given higher MRS.    IMPRESSION: Global aphasia, R hemiparesis due to L MCA infarct 2/2 L ICA partially occlusive thrombus, etiology likely cardioembolic in the setting of AF    NEURO: Neurologically slightly improved since admission. Continue close monitoring for neurologic deterioration. A1c 6.3, LDL 76 - high intensity statin when oral access obtained, titrate statin to LDL goal less than 70. CT Head, CTA Head/Neck as above. Maintain SBP < 140. Physical therapy/OT/Speech eval - NOHEMI    ANTITHROMBOTIC THERAPY: On hold given hemorrhagic transformation on CTH and PEG pending    PULMONARY: Protecting airway, saturating well. Continue home albuterol nebs. Now on supplemental O2. CT chest w/ small b/l pleural effusions and congestion. CT chest w/ mucous plugging LLL. Pulmonology following. Procalcitonin not significantly elevated. Continue monitoring off antibiotics.    CARDIOVASCULAR: TTE severe mitral stenosis, s/p previous TAVR. Cardiac pacemaker interrogated. Continue cardiac monitoring. Monitor for PAF. BP in the 170s, Norvasc 5mg daily added as per cardio.      SBP goal: <180    GASTROINTESTINAL: Dysphagia screen failed. Failed rpt bedside S/S eval. GI following for possible PEG placement on 2/20. Per GI, PEG on hold until hypernatremia is corrected / patient is medically optimized. Repeat Swallow evaluation 2/19 - patient not a candidate for oral feeding at this time. Repeat Swallow evaluation on 2/19, patient did not pass. as per GI: PEG on hold until hypernatremia is corrected / pt is medically optimized.      Diet: NPO. Caution with IVF given mitral stenosis and concern for volume overload as seen on CT Chest     RENAL: mild Oliguria     Na Goal: Greater than 135. Na: 157, on D5W @ 75cc/hr, continue to trend      Sousa: No  Resolving JOLLY w stage 3 CKD? in setting of contrast load, Cr improving 1.32    HEMATOLOGY: H/H fluctuating, hemoglobin 8.0, will c/w trending. Platelets 183      DVT ppx: Lovenox 30 subq     ID: Afebrile without leukocytosis, monitoring off antibiotics.     OTHER: Plan discussed with daughter at bedside. Family wants FULL code. Palliative care consulted for goals of care discussion and support.     DISPOSITION: Rehab per PT eval once stable and workup is complete    CORE MEASURES:        Admission NIHSS: 18     TPA: NO      LDL/HDL: 76/45     Depression Screen: unable to assess     Statin Therapy: yes, when oral access obtained     Dysphagia Screen: FAIL     Smoking NO      Afib NO     Stroke Education YES    Obtain screening lower extremity venous ultrasound in patients who meet 1 or more of the following criteria as patient is high risk for DVT/PE on admission:   [] History of DVT/PE  [] Hypercoagulable states (Factor V Leiden, Cancer, OCP, etc. )  [x] Prolonged immobility (hemiplegia/hemiparesis/post operative or any other extended immobilization)  [] Transferred from outside facility (Rehab or Long term care)  [] Age </= to 50    -on lovenox 30 dvt ppx, Lower [facility] duplex pending

## 2024-02-20 NOTE — PROGRESS NOTE ADULT - SUBJECTIVE AND OBJECTIVE BOX
Chief Complaint:  Patient is a 89y old  Female who presents with a chief complaint of     Date of service 02-20-24 @ 10:29      Interval Events:   sleepy    Hospital Medications:  acetaminophen   IVPB .. 650 milliGRAM(s) IV Intermittent once  albuterol/ipratropium for Nebulization 3 milliLiter(s) Nebulizer every 6 hours  amLODIPine   Tablet 5 milliGRAM(s) Oral daily  dextrose 5%. 1000 milliLiter(s) IV Continuous <Continuous>  enoxaparin Injectable 30 milliGRAM(s) SubCutaneous every 24 hours        Review of Systems:  unable to obtain     PHYSICAL EXAM:   Vital Signs:  Vital Signs Last 24 Hrs  T(C): 37.1 (20 Feb 2024 08:00), Max: 37.1 (20 Feb 2024 08:00)  T(F): 98.7 (20 Feb 2024 08:00), Max: 98.7 (20 Feb 2024 08:00)  HR: 95 (20 Feb 2024 08:00) (84 - 102)  BP: 121/50 (20 Feb 2024 08:00) (108/45 - 172/59)  BP(mean): 70 (20 Feb 2024 08:00) (65 - 96)  RR: 22 (20 Feb 2024 08:00) (20 - 26)  SpO2: 97% (20 Feb 2024 08:00) (94% - 100%)    Parameters below as of 20 Feb 2024 08:00  Patient On (Oxygen Delivery Method): room air      Daily     Daily       PHYSICAL EXAM:     GENERAL:  Appears stated age, well-groomed, well-nourished, no distress  HEENT:  NC/AT,  conjunctivae anicteric, clear and pink,   NECK: supple, trachea midline  CHEST:  Full & symmetric excursion, no increased effort, breath sounds clear  HEART:  Regular rhythm, no JVD  ABDOMEN:  Soft, non-tender, non-distended, normoactive bowel sounds,  no masses , no hepatosplenomegaly  EXTREMITIES:  no cyanosis,clubbing or edema  SKIN:  No rash, erythema, or, ecchymoses, no jaundice  NEURO:  Alert, non-focal, no asterixis  PSYCH: Appropriate affect, oriented to place and time  RECTAL: Deferred      LABS Personally reviewed by me:                        8.0    5.38  )-----------( 168      ( 20 Feb 2024 03:53 )             26.7     Mean Cell Volume: 96.4 fl (02-20-24 @ 03:53)    02-20    157<H>  |  127<H>  |  34<H>  ----------------------------<  142<H>  3.5   |  19<L>  |  1.32<H>    Ca    8.5      20 Feb 2024 03:53          Urinalysis Basic - ( 20 Feb 2024 03:53 )    Color: x / Appearance: x / SG: x / pH: x  Gluc: 142 mg/dL / Ketone: x  / Bili: x / Urobili: x   Blood: x / Protein: x / Nitrite: x   Leuk Esterase: x / RBC: x / WBC x   Sq Epi: x / Non Sq Epi: x / Bacteria: x                              8.0    5.38  )-----------( 168      ( 20 Feb 2024 03:53 )             26.7                         8.5    6.86  )-----------( 183      ( 19 Feb 2024 15:41 )             28.0                         8.1    6.94  )-----------( 198      ( 19 Feb 2024 06:18 )             26.5                         8.5    7.73  )-----------( 175      ( 18 Feb 2024 18:04 )             27.8                         8.4    6.99  )-----------( 178      ( 18 Feb 2024 04:27 )             27.3       Imaging personally reviewed by me:

## 2024-02-20 NOTE — CONSULT NOTE ADULT - PROBLEM SELECTOR RECOMMENDATION 9
Not candidate for tenecteplase as area of hypodensity already appreciated on CT head imaging/age, not candidate for thrombectomy given higher MRS.  LKW: 2130 2/13/24  NIHSS 18  MRS 4 (aide at bedside reports patient requires assistance with all ADLs, including getting out of bed, showering, changing clothes, walking, and since breaking L arm last year, requires also assistance with feeding)  Awaiting MRI   Monitor on Tele rule out PAF , but di suspect this to be the etiology given advanced age and severe MS  Aspiration precautions
CT chest with small b/l pl effusions and congestion  -Suggest diuresis as able, keep O>I as tolerated
acute on chronic, family will follow rabinical guidance and place peg tube with pleasure feeds as tolerated.

## 2024-02-20 NOTE — CONSULT NOTE ADULT - SUBJECTIVE AND OBJECTIVE BOX
89-year-old left-handed woman with past medical history of CHF, HTN, pacemaker placement, valve replacement presenting as a code stroke for AMS, ?R-facial droop, R sided weakness.  Was found by home aide at 830 2/14 less responsive, not moving extremities as usually does. No prior known hx strokes per family.  At baseline patient alert, able to recognize/comprehend familiar faces, per daughters at bedside patient sometimes has difficulty with getting words out.    SH: No smoking ETOH use recreational drug use.  LKW: 2130 2/13/24  NIHSS 18  MRS 4 (aide at bedside reports patient requires assistance with all ADLs, including getting out of bed, showering, changing clothes, walking, and since breaking L arm last year, requires also assistance with feeding)  BP per /70, glucose per .     Information obtained from aide at bedside, then after CT scanner family also at bedside. (14 Feb 2024 12:33)    02-20-24 @ 11:33  PAST MEDICAL & SURGICAL HISTORY:      Review of Systems:   UTO    Allergies    No Known Allergies    Intolerances        Social History: pta pt required assistance with ADL. has 24 hr HHA    FAMILY HISTORY:      MEDICATIONS  (STANDING):  acetaminophen   IVPB .. 650 milliGRAM(s) IV Intermittent once  albuterol/ipratropium for Nebulization 3 milliLiter(s) Nebulizer every 6 hours  amLODIPine   Tablet 5 milliGRAM(s) Oral daily  dextrose 5%. 1000 milliLiter(s) (75 mL/Hr) IV Continuous <Continuous>  enoxaparin Injectable 30 milliGRAM(s) SubCutaneous every 24 hours    MEDICATIONS  (PRN):      Vital Signs Last 24 Hrs  T(C): 37.1 (20 Feb 2024 08:00), Max: 37.1 (20 Feb 2024 08:00)  T(F): 98.7 (20 Feb 2024 08:00), Max: 98.7 (20 Feb 2024 08:00)  HR: 80 (20 Feb 2024 10:00) (80 - 102)  BP: 105/53 (20 Feb 2024 10:00) (105/53 - 172/59)  BP(mean): 76 (20 Feb 2024 10:00) (65 - 96)  RR: 17 (20 Feb 2024 10:00) (17 - 26)  SpO2: 97% (20 Feb 2024 10:00) (94% - 100%)    Parameters below as of 20 Feb 2024 10:00  Patient On (Oxygen Delivery Method): room air      CAPILLARY BLOOD GLUCOSE        I&O's Summary    19 Feb 2024 07:01  -  20 Feb 2024 07:00  --------------------------------------------------------  IN: 1320 mL / OUT: 550 mL / NET: 770 mL        PHYSICAL EXAM:  GENERAL: NAD, well-developed  HEAD:  Atraumatic, Normocephalic  EYES: EOMI, PERRLA, conjunctiva and sclera clear  NECK: Supple, No JVD  CHEST/LUNG: Clear to auscultation bilaterally; No wheeze  HEART: Regular rate and rhythm; No murmurs, rubs, or gallops  ABDOMEN: Soft, Nontender, Nondistended; Bowel sounds present  EXTREMITIES:  2+ Peripheral Pulses, No clubbing, cyanosis, or edema  NEUROLOGICAL EXAM:  Mental status: Opens eyes briefly to voice, does not respond to questions. No FC  Cranial Nerves: No facial asymmetry. Decreased blink to threat on Right.   Motor exam: Normal tone, some effort spontaneous against gravity throughout all extremities, L > R. Arthritic changes throughout extremities. RLE: some effort against gravity  Sensation: Grimaces to painful stimuli x4, less so on the right  Coordination/ Gait: Deferred      LABS:                        8.0    5.38  )-----------( 168      ( 20 Feb 2024 03:53 )             26.7     02-20    157<H>  |  127<H>  |  34<H>  ----------------------------<  142<H>  3.5   |  19<L>  |  1.32<H>    Ca    8.5      20 Feb 2024 03:53            Urinalysis Basic - ( 20 Feb 2024 03:53 )    Color: x / Appearance: x / SG: x / pH: x  Gluc: 142 mg/dL / Ketone: x  / Bili: x / Urobili: x   Blood: x / Protein: x / Nitrite: x   Leuk Esterase: x / RBC: x / WBC x   Sq Epi: x / Non Sq Epi: x / Bacteria: x    < from: CT Head No Cont (02.17.24 @ 09:14) >  FINDINGS:    VENTRICLES AND SULCI: Sulcal effacement left cerebral hemisphere. Mild   effacement of the left lateral ventricle with ventricular configuration   not significantly changed from prior head CT.  INTRA-AXIAL: Redemonstrated acute left MCA territory infarct. Again seen   is acute hemorrhage centered in the left basal ganglia which measures up   to 2.3 cm in maximal diameter, not significantly changed when accounting   for differences in measurement and technique. Again seen is 8-9 mm of   rightward midline shift which is not significantly changed from prior.   Slightly more pronounced gyriform hyperdensity in the region of the   aforementioned infarct. Moderate chronic white matter microvascular   changes.  EXTRA-AXIAL: No mass or fluid collection. Basal cisterns are normal in   appearance.    VISUALIZED SINUSES:  Mild mucosal thickening of the right maxillary   sinus. Small left sphenoid sinus air-fluid level.  TYMPANOMASTOID CAVITIES:  No middle ear effusion. Minimal partial   opacification right posterior-inferior mastoid air cells.  VISUALIZED ORBITS: Bilateral lens replacement.  CALVARIUM: No aggressive lesion.    MISCELLANEOUS: None.    IMPRESSION:    Acute left MCA territory infarct, as on the prior, with associated   hemorrhagic transformation centered in the ipsilateral basal ganglia, not   significantly changed. Associated 8-9 mm rightward midline shift is not   significantly changed. Slightly more pronounced gyriform hyperdensity may   reflect spared cortex versus cortical petechialhemorrhage. Additional   findings described in detail above.    < end of copied text >  < from: TTE W or WO Ultrasound Enhancing Agent (02.14.24 @ 15:41) >  CONCLUSIONS:      1. Technically difficult image quality.   2. 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 No aortic regurgitation.   3. Severe mitral valve stenosis, secondary to dystrophic mitral annular calcification.   4. No prior echocardiogram is available for comparison.    ________________________________________________________________________________________  FINDINGS:     Left Ventricle:  There is moderate (grade 2) left ventricular diastolic dysfunction, with elevated filling pressure. Left ventricular endocardium is not well visualized.     Right Ventricle:  The right ventricle is not well visualized. A device lead is visualized.     Left Atrium:  The left atrium is severely dilated with an indexed volume of 64.47 ml/m².     Aortic Valve:  Transcatheter aortic valve replacement with normal gradient. No aortic regurgitation.     Mitral Valve:  There is severe calcification of the mitral valve annulus. There is severe leaflet calcification. There is severe mitral valve stenosis, secondary to dystrophic mitral annular calcification. The transmitral mean gradient is 15.76 mmHg at a heart rate of 73 bpm. There is mild mitral regurgitation.     Tricuspid Valve:  There is mild tricuspid regurgitation.     Pulmonic Valve:  The pulmonic valve was not well visualized.     Pericardium:  No pericardial effusion seen.    < end of copied text >      RADIOLOGY & ADDITIONAL TESTS:    Imaging Personally Reviewed:    Consultant(s) Notes Reviewed:      Care Discussed with Consultants/Other Providers:

## 2024-02-20 NOTE — CONSULT NOTE ADULT - SUBJECTIVE AND OBJECTIVE BOX
HPI:  89-year-old left-handed woman with past medical history of CHF, HTN, pacemaker placement, valve replacement presenting as a code stroke for AMS, ?R-facial droop, R sided weakness.  Was found by home aide at 830 2/14 less responsive, not moving extremities as usually does. No prior known hx strokes per family.  At baseline patient alert, able to recognize/comprehend familiar faces, per daughters at bedside patient sometimes has difficulty with getting words out.    SH: No smoking ETOH use recreational drug use.  LKW: 2130 2/13/24  NIHSS 18  MRS 4 (aide at bedside reports patient requires assistance with all ADLs, including getting out of bed, showering, changing clothes, walking, and since breaking L arm last year, requires also assistance with feeding)  BP per /70, glucose per .     Information obtained from aide at bedside, then after CT scanner family also at bedside. (14 Feb 2024 12:33)    PERTINENT PM/SXH:       FAMILY HISTORY:    Family Hx substance abuse [ ]yes [ ]no  ITEMS NOT CHECKED ARE NOT PRESENT    SOCIAL HISTORY:   Significant other/partner[ ]  Children[ ]  Congregational/Spirituality:  Substance hx:  [ ]   Tobacco hx:  [ ]   Alcohol hx: [ ]   Home Opioid hx:  [ ] I-Stop Reference No:  Living Situation: [ ]Home  [ ]Long term care  [ ]Rehab [ ]Other    ADVANCE DIRECTIVES:    DNR/MOLST  [ ]  Living Will  [ ]   DECISION MAKER(s):  [ ] Health Care Proxy(s)  [ ] Surrogate(s)  [ ] Guardian           Name(s): Phone Number(s):    BASELINE (I)ADL(s) (prior to admission):  [place]: [ ]Total  [ ] Moderate [ ]Dependent    Allergies    No Known Allergies    Intolerances    MEDICATIONS  (STANDING):  acetaminophen   IVPB .. 650 milliGRAM(s) IV Intermittent once  albuterol/ipratropium for Nebulization 3 milliLiter(s) Nebulizer every 6 hours  amLODIPine   Tablet 5 milliGRAM(s) Oral daily  dextrose 5%. 1000 milliLiter(s) (75 mL/Hr) IV Continuous <Continuous>  enoxaparin Injectable 30 milliGRAM(s) SubCutaneous every 24 hours    MEDICATIONS  (PRN):    PRESENT SYMPTOMS: [ ]Unable to self-report see CPOT, PAINADs, RDOS  Source if other than patient:  [ ]Family   [ ]Team     Pain: [ ]yes [ ]no  QOL impact -   Location -                    Aggravating factors -  Quality -  Radiation -  Timing-  Severity (0-10 scale):  Minimal acceptable level (0-10 scale):       Dyspnea:                           [ ]Mild [ ]Moderate [ ]Severe  Anxiety:                             [ ]Mild [ ]Moderate [ ]Severe  Fatigue:                             [ ]Mild [ ]Moderate [ ]Severe  Nausea:                             [ ]Mild [ ]Moderate [ ]Severe  Loss of appetite:              [ ]Mild [ ]Moderate [ ]Severe  Constipation:                    [ ]Mild [ ]Moderate [ ]Severe    PCSSQ [Palliative Care Spiritual Screening Question]   Severity (0-10):  Score of 4 or > indicate consideration of Chaplaincy referral.  Chaplaincy Referral: [ ] yes [ ] refused [ ] following    Caregiver [place]? : [ ] yes [ ] no [ ] deferred:  Social work referral [ ] Patient & Family Centered Care Referral [ ]     Anticipatory Grief Present?: [ ] yes [ ] no  [ ] deferred: Social work referral [ ]  Patient & Family Centered Care Referral [ ]       Other Symptoms:  [ ]All other review of systems negative     PHYSICAL EXAM:  Vital Signs Last 24 Hrs  T(C): 37.1 (20 Feb 2024 08:00), Max: 37.1 (20 Feb 2024 08:00)  T(F): 98.7 (20 Feb 2024 08:00), Max: 98.7 (20 Feb 2024 08:00)  HR: 95 (20 Feb 2024 08:00) (84 - 102)  BP: 121/50 (20 Feb 2024 08:00) (108/45 - 172/59)  BP(mean): 70 (20 Feb 2024 08:00) (65 - 96)  RR: 22 (20 Feb 2024 08:00) (20 - 26)  SpO2: 97% (20 Feb 2024 08:00) (94% - 100%)    Parameters below as of 20 Feb 2024 08:00  Patient On (Oxygen Delivery Method): room air     I&O's Summary    19 Feb 2024 07:01  -  20 Feb 2024 07:00  --------------------------------------------------------  IN: 1320 mL / OUT: 550 mL / NET: 770 mL      GENERAL: [ ]Cachexia    [ ]Alert  [ ]Oriented x   [ ]Lethargic  [ ]Unarousable  [ ]Verbal  [ ]Non-Verbal  Behavioral:   [ ] Anxiety  [ ] Delirium [ ] Agitation [ ] Other  HEENT:  [ ]Normal   [ ]Dry mouth   [ ]ET Tube/Trach  [ ]Oral lesions  PULMONARY:   [ ]Clear [ ]Tachypnea  [ ]Audible excessive secretions   [ ]Rhonchi        [ ]Right [ ]Left [ ]Bilateral  [ ]Crackles        [ ]Right [ ]Left [ ]Bilateral  [ ]Wheezing     [ ]Right [ ]Left [ ]Bilateral  [ ]Diminished breath sounds [ ]right [ ]left [ ]bilateral  CARDIOVASCULAR:    [ ]Regular [ ]Irregular [ ]Tachy  [ ]Mihcael [ ]Murmur [ ]Other  GASTROINTESTINAL:  [ ]Soft  [ ]Distended   [ ]+BS  [ ]Non tender [ ]Tender  [ ]Other [ ]PEG [ ]OGT/ NGT  Last BM:  GENITOURINARY:  [ ]Normal [ ] Incontinent   [ ]Oliguria/Anuria   [ ]Sousa  MUSCULOSKELETAL:   [ ]Normal   [ ]Weakness  [ ]Bed/Wheelchair bound [ ]Edema  NEUROLOGIC:   [ ]No focal deficits  [ ]Cognitive impairment  [ ]Dysphagia [ ]Dysarthria [ ]Paresis [ ]Other   SKIN:   [ ]Normal  [ ]Rash  [ ]Other  [ ]Pressure ulcer(s)       Present on admission [ ]y [ ]n    CRITICAL CARE:  [ ] Shock Present  [ ]Septic [ ]Cardiogenic [ ]Neurologic [ ]Hypovolemic  [ ]  Vasopressors [ ]  Inotropes   [ ]Respiratory failure present [ ]Mechanical ventilation [ ]Non-invasive ventilatory support [ ]High flow    [ ]Acute  [ ]Chronic [ ]Hypoxic  [ ]Hypercarbic [ ]Other  [ ]Other organ failure     LABS:                        8.0    5.38  )-----------( 168      ( 20 Feb 2024 03:53 )             26.7   02-20    157<H>  |  127<H>  |  34<H>  ----------------------------<  142<H>  3.5   |  19<L>  |  1.32<H>    Ca    8.5      20 Feb 2024 03:53        Urinalysis Basic - ( 20 Feb 2024 03:53 )    Color: x / Appearance: x / SG: x / pH: x  Gluc: 142 mg/dL / Ketone: x  / Bili: x / Urobili: x   Blood: x / Protein: x / Nitrite: x   Leuk Esterase: x / RBC: x / WBC x   Sq Epi: x / Non Sq Epi: x / Bacteria: x      RADIOLOGY & ADDITIONAL STUDIES:  < from: CT Brain Stroke Protocol (02.14.24 @ 11:56) >  CT brain:  Acute left frontoparietalinfarct. No CT evidence for selene hemorrhagic   transformation.    CT brain perfusion:  Significantly limited by motion.    Cerebral blood flow less than 30% = 0 mL. No core infarct predicted.    Tmax greater than 6 seconds = 46 mL and involves the bilateral mesial   cerebellar hemispheres, the right temporal lobe, bilateral frontal lobes,   and left posterior temporal. This represents brain parenchyma predicted   to be at continued ischemic risk in the presence of neurologic symptoms.   This finding is likely spurious in nature.    Mismatch volume 46 mL  Mismatch ratio infinite    CTA brain:  Intraluminal filling defect involving the left ICA terminus and extending   into the proximal right A1 segment.    Normal flow-related enhancement of the remaining left LEBRON. Normal   flow-related enhancement of the left MCA.    Right MCA enhances to a lesser degree than the left MCA.    No vascular aneurysm or AVM.    CTA neck:  Approximately 50% short segment stenosis of the origin and proximal   segment of the left ICA due to calcified plaque. Normal flow-related   enhancement of the more distal vessel.    Approximately 75-80% short segment stenosis of the proximal segment of   the right internal carotid artery due to partially calcified plaque.   Normal flow-related enhancement of the more distal vessel.    No evidence for arterial dissection.    Dr. Ontiveros discussed these findings with Dr. Marshall on 2/14/2024 11:57 AM   with read back.    --- End of Report ---            RADHA ONTIVEROS MD; Attending Radiologist  This document has been electronically signed. Feb 14 2024 12:49PM    < end of copied text >  < from: CT Head No Cont (02.17.24 @ 09:14) >  IMPRESSION:    Acute left MCA territory infarct, as on the prior, with associated   hemorrhagic transformation centered in the ipsilateral basal ganglia, not   significantly changed. Associated 8-9 mm rightward midline shift is not   significantly changed. Slightly more pronounced gyriform hyperdensity may   reflect spared cortex versus cortical petechialhemorrhage. Additional   findings described in detail above.        --- End of Report ---           MONET ALDANA MD; Resident Radiologist  This document has been electronically signed.  DUKE PAYTON M.D., ATTENDING RADIOLOGIST  This document has been electronically signed. Feb 17 2024 11:20AM    < end of copied text >  < from: CT Chest No Cont (02.16.24 @ 12:30) >    IMPRESSION:  CHF and/or pneumonia with small bilateral pleural effusions.    --- End of Report ---            KYRA CHAVARRIA MD; Attending Radiologist  This document has been electronically signed.Feb 16 2024  1:32PM    < end of copied text >  < from: Xray Chest 1 View- PORTABLE-Urgent (02.14.24 @ 12:27) >  IMPRESSION:  Pulmonary vascular congestive changes    --- End of Report ---          PATRICK ESQUEDA DO; Resident Radiologist  This document has been electronically signed.  JULEE HERNDON MD; Attending Radiologist  This document has been electronically signed. Feb 14 2024 12:35PM    < end of copied text >  < from: TTE W or WO Ultrasound Enhancing Agent (02.14.24 @ 15:41) >  CONCLUSIONS:      1. Technically difficult image quality.   2. 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 No aortic regurgitation.   3. Severe mitral valve stenosis, secondary to dystrophic mitral annular calcification.   4. No prior echocardiogram is available for comparison.    < end of copied text >  < from: TTE W or WO Ultrasound Enhancing Agent (02.14.24 @ 15:41) >  ________________________________________________________________________________________  Electronically signed on 2/14/2024 at 7:32:22 PM by Cody Troy MD         *** Final ***    < end of copied text >      PROTEIN CALORIE MALNUTRITION PRESENT: [ ]mild [ ]moderate [ ]severe [ ]underweight [ ]morbid obesity  https://www.andeal.org/vault/2440/web/files/ONC/Table_Clinical%20Characteristics%20to%20Document%20Malnutrition-White%20JV%20et%20al%202012.pdf      Weight (kg): 43.091 (02-14-24 @ 16:30)    [ ]PPSV2 < or = to 30% [ ]significant weight loss  [ ]poor nutritional intake  [ ]anasarca[ ]Artificial Nutrition      Other REFERRALS:  [ ]Hospice  [ ]Child Life  [ ]Social Work  [ ]Case management [ ]Holistic Therapy                                 Care Coordination Assessment 201    COGNITIVE/LEARNING 201  Mental Status    Answers: Unable to assess    ADMISSION HISTORY 201  Admitted From    Answers: Home    Functional Status Prior to Admission    Answers: Assistive equipment,  Answers: Assistive person,  Answers: Completely dependent    Services Present on Admission    Answers: DME; specify Rolling Walker and Wheelchair    FINANCIAL 201  Does patient have financial concerns for discharge?    Answers: None    LIVING ARRANGEMENTS/SUPPORT 201  Housing Environment    Answers: Apartment,  Answers: Stairs, number of steps 4-5 [address]    Living Arrangements    Answers: Lives alone,  Answers: Other    Sources of support/caregivers    Answers: Children,  Answers: Hired Care    CAREGIVER CONTACT 201  Does the patient wish to identify a Caregiver?    Answers: Unable to assess    EMERGENCY CONTACTS OUTSIDE HOME 201  Emergency Contact    Answers: Emergency Contact Name Florencia Bustillo,  Answers: Emergency Contact Phone # [phone],  Answers: Emergency Contact Relationship daughter    DISCHARGE PLANNING 201  Potential Discharge Plan and Services    Answers: Anticipated Needs Unclear at Present    SCREENING 201  Social Work Screen and Referral    Answers: Adjustment to Illness/Difficulty Coping    SUMMARY 201  Initial Clinical Summary    Notes: Social work reviewed chart of patient on Stroke Unit for psychosocial  assessment and support. As per H&P, patient is a 89-year-old left-handed woman  with past medical history of CHF, HTN, pacemaker placement, valve replacement  presenting as a code stroke for AMS, ?R-facial droop, R sided weakness.     Patient unable to participate in assessment secondary to acuity of illness.  LMSW met with patients daughter, Florencia Bustillo(p:[phone]), at bedside to  introduce self and explain role in patients care. Daughter verbalized  understanding. LMSW provided contact card and assured availability. Daughter  confirmed patients demographics.      Patient resides alone in an apartment ([address]) in [place], [place]. As per  daughter, patient with 24/7 private hire aides. Daughter reports her and her  siblings (patients children) will remain as patients emergency contacts: Maria Luisa Rosariop:[phone] and  Alee Jorge (p:[phone]). Children to act as  patients surrogate decision maker. Prior to admission, patient required  assistance with all ADLs and utilized a RW for short distances. Patient has a  wheelchair at home.        As per PT, patient recommended for NOHEMI. As per daughter, she would like for  patient to return home with Home PT. Patient with Medicare insurance. Spiritual  support remains available. Social work will continue to collaborate with  interdisciplinary team to assess needs.       Electronically signed by:  Deborah Curtis  Electronically signed on:  2024-02-15  14:54        Palliative Performance Scale:  http://npcrc.org/files/news/palliative_performance_scale_ppsv2.pdf  (Ctrl +  left click to view)  Respiratory Distress Observation Tool:  https://homecareinformation.net/handouts/hen/Respiratory_Distress_Observation_Scale.pdf (Ctrl +  left click to view)  PAINAD Score:  http://geriatrictoolkit.[place].[facility]/cog/painad.pdf (Ctrl +  left click to view)   HPI:  89-year-old left-handed woman with past medical history of CHF, HTN, pacemaker placement, valve replacement presenting as a code stroke for AMS, ?R-facial droop, R sided weakness.  Was found by home aide at 830 2/14 less responsive, not moving extremities as usually does. No prior known hx strokes per family.  At baseline patient alert, able to recognize/comprehend familiar faces, per daughters at bedside patient sometimes has difficulty with getting words out.      Patient with acute LMCA stroke with hemorrhagic conversion.  We are called to address goals of care.    SH: No smoking ETOH use recreational drug use.  LKW: 2130 2/13/24  NIHSS 18  MRS 4 (aide at bedside reports patient requires assistance with all ADLs, including getting out of bed, showering, changing clothes, walking, and since breaking L arm last year, requires also assistance with feeding)  BP per /70, glucose per .     Information obtained from aide at bedside, then after CT scanner family also at bedside. (14 Feb 2024 12:33)    PERTINENT PM/SXH:  as above HPI      FAMILY HISTORY: No family hx of pacemaker implant    Family Hx substance abuse [ ]yes [x ]no  ITEMS NOT CHECKED ARE NOT PRESENT    SOCIAL HISTORY:   Significant other/partner[ ]  Children[x ]  Protestant/Spirituality: Rastafari  Substance hx:  [ ]   Tobacco hx:  [ ]   Alcohol hx: [ ]   Home Opioid hx:  [ ] I-Stop Reference No:  Living Situation: [x ]Home with 24 hr HHA [ ]Long term care  [ ]Rehab [ ]Other    ADVANCE DIRECTIVES:    DNR/MOLST  [ ]  Living Will  [ ]   DECISION MAKER(s):  [ ] Health Care Proxy(s)  [ ] Surrogate(s)  [ ] Guardian           Name(s):  All adult children (three)  Phone Number(s):  Maria Luisa [phone]  Florencia     BASELINE (I)ADL(s) (prior to admission):  [place]: [ ]Total  [ ] Moderate x[ ]Dependent    Allergies    No Known Allergies    Intolerances    MEDICATIONS  (STANDING):  acetaminophen   IVPB .. 650 milliGRAM(s) IV Intermittent once  albuterol/ipratropium for Nebulization 3 milliLiter(s) Nebulizer every 6 hours  amLODIPine   Tablet 5 milliGRAM(s) Oral daily  dextrose 5%. 1000 milliLiter(s) (75 mL/Hr) IV Continuous <Continuous>  enoxaparin Injectable 30 milliGRAM(s) SubCutaneous every 24 hours    MEDICATIONS  (PRN):    PRESENT SYMPTOMS: [x ]Unable to self-report see CPOT, PAINADs, RDOS  Source if other than patient:  [ ]Family   [ ]Team     Pain: [ ]yes [ ]no  QOL impact -   Location -                    Aggravating factors -  Quality -  Radiation -  Timing-  Severity (0-10 scale):  Minimal acceptable level (0-10 scale):       Dyspnea:                           [ ]Mild [ ]Moderate [ ]Severe  Anxiety:                             [ ]Mild [ ]Moderate [ ]Severe  Fatigue:                             [ ]Mild [ ]Moderate [ ]Severe  Nausea:                             [ ]Mild [ ]Moderate [ ]Severe  Loss of appetite:              [ ]Mild [ ]Moderate [ ]Severe  Constipation:                    [ ]Mild [ ]Moderate [ ]Severe    PCSSQ [Palliative Care Spiritual Screening Question]   Severity (0-10):  Score of 4 or > indicate consideration of Chaplaincy referral.  Chaplaincy Referral: [ ] yes [ ] refused [ ] following [x ] deferred    Caregiver [place]? : [ ] yes [x ] no [ ] deferred:  Social work referral [ ] Patient & Family Centered Care Referral [ ]     Anticipatory Grief Present?: [x ] yes [ ] no  [ ] deferred: Social work referral [ ]  Patient & Family Centered Care Referral [ ]       Other Symptoms:  [ ]All other review of systems negative The patient is unable to self-report.     PHYSICAL EXAM:  Vital Signs Last 24 Hrs  T(C): 37.1 (20 Feb 2024 08:00), Max: 37.1 (20 Feb 2024 08:00)  T(F): 98.7 (20 Feb 2024 08:00), Max: 98.7 (20 Feb 2024 08:00)  HR: 95 (20 Feb 2024 08:00) (84 - 102)  BP: 121/50 (20 Feb 2024 08:00) (108/45 - 172/59)  BP(mean): 70 (20 Feb 2024 08:00) (65 - 96)  RR: 22 (20 Feb 2024 08:00) (20 - 26)  SpO2: 97% (20 Feb 2024 08:00) (94% - 100%)    Parameters below as of 20 Feb 2024 08:00  Patient On (Oxygen Delivery Method): room air     I&O's Summary    19 Feb 2024 07:01  -  20 Feb 2024 07:00  --------------------------------------------------------  IN: 1320 mL / OUT: 550 mL / NET: 770 mL      GENERAL: [ ]Cachexia    [x ]Alert  [ ]Oriented x   [ ]Lethargic  [ ]Unarousable  [ ]Verbal  [x ]Non-Verbal  Behavioral:   [ ] Anxiety  [ ] Delirium [ ] Agitation [ ] Other  HEENT:  [ x]Normal   [ ]Dry mouth   [ ]ET Tube/Trach  [ ]Oral lesions  PULMONARY:   [x ]Clear [ ]Tachypnea  [ ]Audible excessive secretions   [ ]Rhonchi        [ ]Right [ ]Left [ ]Bilateral  [ ]Crackles        [ ]Right [ ]Left [ ]Bilateral  [ ]Wheezing     [ ]Right [ ]Left [ ]Bilateral  [ ]Diminished breath sounds [ ]right [ ]left [ ]bilateral  CARDIOVASCULAR:    [x ]Regular [ ]Irregular [ ]Tachy  [ ]Michael [ ]Murmur [ ]Other  GASTROINTESTINAL:  [x ]Soft  [ ]Distended   [x ]+BS  [x ]Non tender [ ]Tender  [ ]Other [ ]PEG [ ]OGT/ NGT  Last BM:  GENITOURINARY:  [ ]Normal [x ] Incontinent   [ ]Oliguria/Anuria   [ ]Sousa  MUSCULOSKELETAL:   [ ]Normal   [ ]Weakness  [x ]Bed/Wheelchair bound [ ]Edema  NEUROLOGIC:   [ ]No focal deficits  [x ]Cognitive impairment  [x ]Dysphagia [ ]Dysarthria [ ]Paresis [ ]Other   SKIN: scattered ecchimosi  [x ]Normal  [ ]Rash  [ ]Other  [ ]Pressure ulcer(s)       Present on admission [ ]y [ ]n    CRITICAL CARE:  [ ] Shock Present  [ ]Septic [ ]Cardiogenic [ ]Neurologic [ ]Hypovolemic  [ ]  Vasopressors [ ]  Inotropes   [ ]Respiratory failure present [ ]Mechanical ventilation [ ]Non-invasive ventilatory support [ ]High flow    [ ]Acute  [ ]Chronic [ ]Hypoxic  [ ]Hypercarbic [ ]Other  [x ]Other organ failure  brain    LABS:                        8.0    5.38  )-----------( 168      ( 20 Feb 2024 03:53 )             26.7   02-20    157<H>  |  127<H>  |  34<H>  ----------------------------<  142<H>  3.5   |  19<L>  |  1.32<H>    Ca    8.5      20 Feb 2024 03:53        Urinalysis Basic - ( 20 Feb 2024 03:53 )    Color: x / Appearance: x / SG: x / pH: x  Gluc: 142 mg/dL / Ketone: x  / Bili: x / Urobili: x   Blood: x / Protein: x / Nitrite: x   Leuk Esterase: x / RBC: x / WBC x   Sq Epi: x / Non Sq Epi: x / Bacteria: x      RADIOLOGY & ADDITIONAL STUDIES:  < from: CT Brain Stroke Protocol (02.14.24 @ 11:56) >  CT brain:  Acute left frontoparietalinfarct. No CT evidence for selene hemorrhagic   transformation.    CT brain perfusion:  Significantly limited by motion.    Cerebral blood flow less than 30% = 0 mL. No core infarct predicted.    Tmax greater than 6 seconds = 46 mL and involves the bilateral mesial   cerebellar hemispheres, the right temporal lobe, bilateral frontal lobes,   and left posterior temporal. This represents brain parenchyma predicted   to be at continued ischemic risk in the presence of neurologic symptoms.   This finding is likely spurious in nature.    Mismatch volume 46 mL  Mismatch ratio infinite    CTA brain:  Intraluminal filling defect involving the left ICA terminus and extending   into the proximal right A1 segment.    Normal flow-related enhancement of the remaining left LEBRON. Normal   flow-related enhancement of the left MCA.    Right MCA enhances to a lesser degree than the left MCA.    No vascular aneurysm or AVM.    CTA neck:  Approximately 50% short segment stenosis of the origin and proximal   segment of the left ICA due to calcified plaque. Normal flow-related   enhancement of the more distal vessel.    Approximately 75-80% short segment stenosis of the proximal segment of   the right internal carotid artery due to partially calcified plaque.   Normal flow-related enhancement of the more distal vessel.    No evidence for arterial dissection.    Dr. Ontiveros discussed these findings with Dr. Marshall on 2/14/2024 11:57 AM   with read back.    --- End of Report ---            RADHA ONTIVEROS MD; Attending Radiologist  This document has been electronically signed. Feb 14 2024 12:49PM    < end of copied text >  < from: CT Head No Cont (02.17.24 @ 09:14) >  IMPRESSION:    Acute left MCA territory infarct, as on the prior, with associated   hemorrhagic transformation centered in the ipsilateral basal ganglia, not   significantly changed. Associated 8-9 mm rightward midline shift is not   significantly changed. Slightly more pronounced gyriform hyperdensity may   reflect spared cortex versus cortical petechialhemorrhage. Additional   findings described in detail above.        --- End of Report ---           MONET ALDANA MD; Resident Radiologist  This document has been electronically signed.  DUKE PAYTON M.D., ATTENDING RADIOLOGIST  This document has been electronically signed. Feb 17 2024 11:20AM    < end of copied text >  < from: CT Chest No Cont (02.16.24 @ 12:30) >    IMPRESSION:  CHF and/or pneumonia with small bilateral pleural effusions.    --- End of Report ---            KYRA CHAVARRIA MD; Attending Radiologist  This document has been electronically signed.Feb 16 2024  1:32PM    < end of copied text >  < from: Xray Chest 1 View- PORTABLE-Urgent (02.14.24 @ 12:27) >  IMPRESSION:  Pulmonary vascular congestive changes    --- End of Report ---          PATRICK ESQUEDA DO; Resident Radiologist  This document has been electronically signed.  JULEE HERNDON MD; Attending Radiologist  This document has been electronically signed. Feb 14 2024 12:35PM    < end of copied text >  < from: TTE W or WO Ultrasound Enhancing Agent (02.14.24 @ 15:41) >  CONCLUSIONS:      1. Technically difficult image quality.   2. 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 No aortic regurgitation.   3. Severe mitral valve stenosis, secondary to dystrophic mitral annular calcification.   4. No prior echocardiogram is available for comparison.    < end of copied text >  < from: TTE W or WO Ultrasound Enhancing Agent (02.14.24 @ 15:41) >  ________________________________________________________________________________________  Electronically signed on 2/14/2024 at 7:32:22 PM by Cody Troy MD         *** Final ***    < end of copied text >      PROTEIN CALORIE MALNUTRITION PRESENT: [ ]mild [ ]moderate [ ]severe [ ]underweight [ ]morbid obesity  https://www.andeal.org/vault/2440/web/files/ONC/Table_Clinical%20Characteristics%20to%20Document%20Malnutrition-White%20JV%20et%20al%202012.pdf      Weight (kg): 43.091 (02-14-24 @ 16:30)    [ ]PPSV2 < or = to 30% [ ]significant weight loss  [ ]poor nutritional intake  [ ]anasarca[ ]Artificial Nutrition      Other REFERRALS:  [ ]Hospice  [ ]Child Life  [ ]Social Work  [ ]Case management [ ]Holistic Therapy                                 Care Coordination Assessment 201    COGNITIVE/LEARNING 201  Mental Status    Answers: Unable to assess    ADMISSION HISTORY 201  Admitted From    Answers: Home    Functional Status Prior to Admission    Answers: Assistive equipment,  Answers: Assistive person,  Answers: Completely dependent    Services Present on Admission    Answers: DME; specify Rolling Walker and Wheelchair    FINANCIAL 201  Does patient have financial concerns for discharge?    Answers: None    LIVING ARRANGEMENTS/SUPPORT 201  Housing Environment    Answers: Apartment,  Answers: Stairs, number of steps 4-5 JEANINE    Living Arrangements    Answers: Lives alone,  Answers: Other    Sources of support/caregivers    Answers: Children,  Answers: Hired Care    CAREGIVER CONTACT 201  Does the patient wish to identify a Caregiver?    Answers: Unable to assess    EMERGENCY CONTACTS OUTSIDE HOME 201  Emergency Contact    Answers: Emergency Contact Name Florencia Bustillo,  Answers: Emergency Contact Phone # [phone],  Answers: Emergency Contact Relationship daughter    DISCHARGE PLANNING 201  Potential Discharge Plan and Services    Answers: Anticipated Needs Unclear at Present    SCREENING 201  Social Work Screen and Referral    Answers: Adjustment to Illness/Difficulty Coping    SUMMARY 201  Initial Clinical Summary    Notes: Social work reviewed chart of patient on Stroke Unit for psychosocial  assessment and support. As per H&P, patient is a 89-year-old left-handed woman  with past medical history of CHF, HTN, pacemaker placement, valve replacement  presenting as a code stroke for AMS, ?R-facial droop, R sided weakness.     Patient unable to participate in assessment secondary to acuity of illness.  LMSW met with patients daughter, Florencia Bustillo(p:[phone]), at bedside to  introduce self and explain role in patients care. Daughter verbalized  understanding. LMSW provided contact card and assured availability. Daughter  confirmed patients demographics.      Patient resides alone in an apartment ([address]) in [place], [place]. As per  daughter, patient with 24/7 private hire aides. Daughter reports her and her  siblings (patients children) will remain as patients emergency contacts: Maria Luisa Bray (p:[phone] and  Alee Patel (p:[phone]). Children to act as  patients surrogate decision maker. Prior to admission, patient required  assistance with all ADLs and utilized a RW for short distances. Patient has a  wheelchair at home.        As per PT, patient recommended for NOHEMI. As per daughter, she would like for  patient to return home with Home PT. Patient with Medicare insurance. Spiritual  support remains available. Social work will continue to collaborate with  interdisciplinary team to assess needs.       Electronically signed by:  Deborah Curtis  Electronically signed on:  2024-02-15  14:54        Palliative Performance Scale:  http://npcrc.org/files/news/palliative_performance_scale_ppsv2.pdf  (Ctrl +  left click to view)  Respiratory Distress Observation Tool:  https://homecareinformation.net/handouts/hen/Respiratory_Distress_Observation_Scale.pdf (Ctrl +  left click to view)  PAINAD Score:  http://geriatrictoolkit.[place].[facility]/cog/painad.pdf (Ctrl +  left click to view)   HPI:  89-year-old left-handed woman with past medical history of CHF, HTN, pacemaker placement, valve replacement presenting as a code stroke for AMS, ?R-facial droop, R sided weakness.  Was found by home aide at 830 2/14 less responsive, not moving extremities as usually does. No prior known hx strokes per family.  At baseline patient alert, able to recognize/comprehend familiar faces, per daughters at bedside patient sometimes has difficulty with getting words out.      Patient with acute LMCA stroke with hemorrhagic conversion.  We are called to address goals of care.    SH: No smoking ETOH use recreational drug use.  LKW: 2130 2/13/24  NIHSS 18  MRS 4 (aide at bedside reports patient requires assistance with all ADLs, including getting out of bed, showering, changing clothes, walking, and since breaking L arm last year, requires also assistance with feeding)  BP per /70, glucose per .     Information obtained from aide at bedside, then after CT scanner family also at bedside. (14 Feb 2024 12:33)    PERTINENT PM/SXH:  as above HPI      FAMILY HISTORY: No family hx of pacemaker implant    Family Hx substance abuse [ ]yes [x ]no  ITEMS NOT CHECKED ARE NOT PRESENT    SOCIAL HISTORY:   Significant other/partner[ ]  Children[x ]  Faith/Spirituality: Confucianist  Substance hx:  [ ]   Tobacco hx:  [ ]   Alcohol hx: [ ]   Home Opioid hx:  [ ] I-Stop Reference No:  Living Situation: [x ]Home with 24 hr HHA [ ]Long term care  [ ]Rehab [ ]Other    ADVANCE DIRECTIVES:    DNR/MOLST  [ ]  Living Will  [ ]   DECISION MAKER(s):  [ ] Health Care Proxy(s)  [ ] Surrogate(s)  [ ] Guardian           Name(s):  All adult children (three)  Phone Number(s):  Maria Luisa [phone]  Florencia     BASELINE (I)ADL(s) (prior to admission):  [place]: [ ]Total  [ ] Moderate x[ ]Dependent    Allergies    No Known Allergies    Intolerances    MEDICATIONS  (STANDING):  acetaminophen   IVPB .. 650 milliGRAM(s) IV Intermittent once  albuterol/ipratropium for Nebulization 3 milliLiter(s) Nebulizer every 6 hours  amLODIPine   Tablet 5 milliGRAM(s) Oral daily  dextrose 5%. 1000 milliLiter(s) (75 mL/Hr) IV Continuous <Continuous>  enoxaparin Injectable 30 milliGRAM(s) SubCutaneous every 24 hours    MEDICATIONS  (PRN):    PRESENT SYMPTOMS: [x ]Unable to self-report see CPOT, PAINADs, RDOS  Source if other than patient:  [ ]Family   [ ]Team     Pain: [ ]yes [ ]no  QOL impact -   Location -                    Aggravating factors -  Quality -  Radiation -  Timing-  Severity (0-10 scale):  Minimal acceptable level (0-10 scale):       Dyspnea:                           [ ]Mild [ ]Moderate [ ]Severe  Anxiety:                             [ ]Mild [ ]Moderate [ ]Severe  Fatigue:                             [ ]Mild [ ]Moderate [ ]Severe  Nausea:                             [ ]Mild [ ]Moderate [ ]Severe  Loss of appetite:              [ ]Mild [ ]Moderate [ ]Severe  Constipation:                    [ ]Mild [ ]Moderate [ ]Severe    PCSSQ [Palliative Care Spiritual Screening Question]   Severity (0-10):  Score of 4 or > indicate consideration of Chaplaincy referral.  Chaplaincy Referral: [ ] yes [ ] refused [ ] following [x ] deferred    Caregiver [place]? : [ ] yes [x ] no [ ] deferred:  Social work referral [ ] Patient & Family Centered Care Referral [ ]     Anticipatory Grief Present?: [x ] yes [ ] no  [ ] deferred: Social work referral [ ]  Patient & Family Centered Care Referral [ ]       Other Symptoms:  [ ]All other review of systems negative The patient is unable to self-report.     PHYSICAL EXAM:  Vital Signs Last 24 Hrs  T(C): 37.1 (20 Feb 2024 08:00), Max: 37.1 (20 Feb 2024 08:00)  T(F): 98.7 (20 Feb 2024 08:00), Max: 98.7 (20 Feb 2024 08:00)  HR: 95 (20 Feb 2024 08:00) (84 - 102)  BP: 121/50 (20 Feb 2024 08:00) (108/45 - 172/59)  BP(mean): 70 (20 Feb 2024 08:00) (65 - 96)  RR: 22 (20 Feb 2024 08:00) (20 - 26)  SpO2: 97% (20 Feb 2024 08:00) (94% - 100%)    Parameters below as of 20 Feb 2024 08:00  Patient On (Oxygen Delivery Method): room air     I&O's Summary    19 Feb 2024 07:01  -  20 Feb 2024 07:00  --------------------------------------------------------  IN: 1320 mL / OUT: 550 mL / NET: 770 mL      GENERAL: [ ]Cachexia    [x ]Alert  [ ]Oriented x   [ ]Lethargic  [ ]Unarousable  [ ]Verbal  [x ]Non-Verbal  Behavioral:   [ ] Anxiety  [ ] Delirium [ ] Agitation [ ] Other  HEENT:  [ x]Normal   [ ]Dry mouth   [ ]ET Tube/Trach  [ ]Oral lesions  PULMONARY:   [x ]Clear [ ]Tachypnea  [ ]Audible excessive secretions   [ ]Rhonchi        [ ]Right [ ]Left [ ]Bilateral  [ ]Crackles        [ ]Right [ ]Left [ ]Bilateral  [ ]Wheezing     [ ]Right [ ]Left [ ]Bilateral  [ ]Diminished breath sounds [ ]right [ ]left [ ]bilateral  CARDIOVASCULAR:    [x ]Regular [ ]Irregular [ ]Tachy  [ ]Michael [ ]Murmur [ ]Other  GASTROINTESTINAL:  [x ]Soft  [ ]Distended   [x ]+BS  [x ]Non tender [ ]Tender  [ ]Other [ ]PEG [ ]OGT/ NGT  Last BM: fecal incontinence  GENITOURINARY:  [ ]Normal [x ] Incontinent   [ ]Oliguria/Anuria   [ ]Sousa  MUSCULOSKELETAL:   [ ]Normal   [ ]Weakness  [x ]Bed/Wheelchair bound [ ]Edema  NEUROLOGIC:   [ ]No focal deficits  [x ]Cognitive impairment  [x ]Dysphagia [ ]Dysarthria [ ]Paresis [ ]Other   SKIN: scattered ecchymosis  [x ]Normal  [ ]Rash  [ ]Other  [ ]Pressure ulcer(s)       Present on admission [ ]y [ ]n    CRITICAL CARE:  [ ] Shock Present  [ ]Septic [ ]Cardiogenic [ ]Neurologic [ ]Hypovolemic  [ ]  Vasopressors [ ]  Inotropes   [ ]Respiratory failure present [ ]Mechanical ventilation [ ]Non-invasive ventilatory support [ ]High flow    [ ]Acute  [ ]Chronic [ ]Hypoxic  [ ]Hypercarbic [ ]Other  [x ]Other organ failure  brain    LABS:                        8.0    5.38  )-----------( 168      ( 20 Feb 2024 03:53 )             26.7   02-20    157<H>  |  127<H>  |  34<H>  ----------------------------<  142<H>  3.5   |  19<L>  |  1.32<H>    Ca    8.5      20 Feb 2024 03:53        Urinalysis Basic - ( 20 Feb 2024 03:53 )    Color: x / Appearance: x / SG: x / pH: x  Gluc: 142 mg/dL / Ketone: x  / Bili: x / Urobili: x   Blood: x / Protein: x / Nitrite: x   Leuk Esterase: x / RBC: x / WBC x   Sq Epi: x / Non Sq Epi: x / Bacteria: x      RADIOLOGY & ADDITIONAL STUDIES:  < from: CT Brain Stroke Protocol (02.14.24 @ 11:56) >  CT brain:  Acute left frontoparietalinfarct. No CT evidence for selene hemorrhagic   transformation.    CT brain perfusion:  Significantly limited by motion.    Cerebral blood flow less than 30% = 0 mL. No core infarct predicted.    Tmax greater than 6 seconds = 46 mL and involves the bilateral mesial   cerebellar hemispheres, the right temporal lobe, bilateral frontal lobes,   and left posterior temporal. This represents brain parenchyma predicted   to be at continued ischemic risk in the presence of neurologic symptoms.   This finding is likely spurious in nature.    Mismatch volume 46 mL  Mismatch ratio infinite    CTA brain:  Intraluminal filling defect involving the left ICA terminus and extending   into the proximal right A1 segment.    Normal flow-related enhancement of the remaining left LEBRON. Normal   flow-related enhancement of the left MCA.    Right MCA enhances to a lesser degree than the left MCA.    No vascular aneurysm or AVM.    CTA neck:  Approximately 50% short segment stenosis of the origin and proximal   segment of the left ICA due to calcified plaque. Normal flow-related   enhancement of the more distal vessel.    Approximately 75-80% short segment stenosis of the proximal segment of   the right internal carotid artery due to partially calcified plaque.   Normal flow-related enhancement of the more distal vessel.    No evidence for arterial dissection.    Dr. Ontiveros discussed these findings with Dr. Marshall on 2/14/2024 11:57 AM   with read back.    --- End of Report ---            RADHA ONTIVEROS MD; Attending Radiologist  This document has been electronically signed. Feb 14 2024 12:49PM    < end of copied text >  < from: CT Head No Cont (02.17.24 @ 09:14) >  IMPRESSION:    Acute left MCA territory infarct, as on the prior, with associated   hemorrhagic transformation centered in the ipsilateral basal ganglia, not   significantly changed. Associated 8-9 mm rightward midline shift is not   significantly changed. Slightly more pronounced gyriform hyperdensity may   reflect spared cortex versus cortical petechialhemorrhage. Additional   findings described in detail above.        --- End of Report ---           MONET ALDANA MD; Resident Radiologist  This document has been electronically signed.  DUKE PAYTON M.D., ATTENDING RADIOLOGIST  This document has been electronically signed. Feb 17 2024 11:20AM    < end of copied text >  < from: CT Chest No Cont (02.16.24 @ 12:30) >    IMPRESSION:  CHF and/or pneumonia with small bilateral pleural effusions.    --- End of Report ---            KYRA CHAVARRIA MD; Attending Radiologist  This document has been electronically signed.Feb 16 2024  1:32PM    < end of copied text >  < from: Xray Chest 1 View- PORTABLE-Urgent (02.14.24 @ 12:27) >  IMPRESSION:  Pulmonary vascular congestive changes    --- End of Report ---          PATRICK ESQUEDA DO; Resident Radiologist  This document has been electronically signed.  JULEE HERNDON MD; Attending Radiologist  This document has been electronically signed. Feb 14 2024 12:35PM    < end of copied text >  < from: TTE W or WO Ultrasound Enhancing Agent (02.14.24 @ 15:41) >  CONCLUSIONS:      1. Technically difficult image quality.   2. 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 No aortic regurgitation.   3. Severe mitral valve stenosis, secondary to dystrophic mitral annular calcification.   4. No prior echocardiogram is available for comparison.    < end of copied text >  < from: TTE W or WO Ultrasound Enhancing Agent (02.14.24 @ 15:41) >  ________________________________________________________________________________________  Electronically signed on 2/14/2024 at 7:32:22 PM by Cody Troy MD         *** Final ***    < end of copied text >      PROTEIN CALORIE MALNUTRITION PRESENT: [ ]mild [ ]moderate [ ]severe [ ]underweight [ ]morbid obesity  https://www.andeal.org/vault/2440/web/files/ONC/Table_Clinical%20Characteristics%20to%20Document%20Malnutrition-White%20JV%20et%20al%202012.pdf      Weight (kg): 43.091 (02-14-24 @ 16:30)    [ ]PPSV2 < or = to 30% [ ]significant weight loss  [ ]poor nutritional intake  [ ]anasarca[ ]Artificial Nutrition      Other REFERRALS:  [ ]Hospice  [ ]Child Life  [ ]Social Work  [ ]Case management [ ]Holistic Therapy                                 Care Coordination Assessment 201    COGNITIVE/LEARNING 201  Mental Status    Answers: Unable to assess    ADMISSION HISTORY 201  Admitted From    Answers: Home    Functional Status Prior to Admission    Answers: Assistive equipment,  Answers: Assistive person,  Answers: Completely dependent    Services Present on Admission    Answers: DME; specify Rolling Walker and Wheelchair    FINANCIAL 201  Does patient have financial concerns for discharge?    Answers: None    LIVING ARRANGEMENTS/SUPPORT 201  Housing Environment    Answers: Apartment,  Answers: Stairs, number of steps 4-5 JEANINE    Living Arrangements    Answers: Lives alone,  Answers: Other    Sources of support/caregivers    Answers: Children,  Answers: Hired Care    CAREGIVER CONTACT 201  Does the patient wish to identify a Caregiver?    Answers: Unable to assess    EMERGENCY CONTACTS OUTSIDE HOME 201  Emergency Contact    Answers: Emergency Contact Name Florencia Bustillo,  Answers: Emergency Contact Phone # [phone],  Answers: Emergency Contact Relationship daughter    DISCHARGE PLANNING 201  Potential Discharge Plan and Services    Answers: Anticipated Needs Unclear at Present    SCREENING 201  Social Work Screen and Referral    Answers: Adjustment to Illness/Difficulty Coping    SUMMARY 201  Initial Clinical Summary    Notes: Social work reviewed chart of patient on Stroke Unit for psychosocial  assessment and support. As per H&P, patient is a 89-year-old left-handed woman  with past medical history of CHF, HTN, pacemaker placement, valve replacement  presenting as a code stroke for AMS, ?R-facial droop, R sided weakness.     Patient unable to participate in assessment secondary to acuity of illness.  LMSW met with patients daughter, Florencia Bustillo(p:[phone]), at bedside to  introduce self and explain role in patients care. Daughter verbalized  understanding. LMSW provided contact card and assured availability. Daughter  confirmed patients demographics.      Patient resides alone in an apartment ([address]) in [place], [place]. As per  daughter, patient with 24/7 private hire aides. Daughter reports her and her  siblings (patients children) will remain as patients emergency contacts: Maria Luisa Bray (p:[phone] and  Alee Patel (p:[phone]). Children to act as  patients surrogate decision maker. Prior to admission, patient required  assistance with all ADLs and utilized a RW for short distances. Patient has a  wheelchair at home.        As per PT, patient recommended for NOHEMI. As per daughter, she would like for  patient to return home with Home PT. Patient with Medicare insurance. Spiritual  support remains available. Social work will continue to collaborate with  interdisciplinary team to assess needs.       Electronically signed by:  Deborah Curtis  Electronically signed on:  2024-02-15  14:54        Palliative Performance Scale:  http://npcrc.org/files/news/palliative_performance_scale_ppsv2.pdf  (Ctrl +  left click to view)  Respiratory Distress Observation Tool:  https://homecareinformation.net/handouts/hen/Respiratory_Distress_Observation_Scale.pdf (Ctrl +  left click to view)  PAINAD Score:  http://geriatrictoolkit.[place].[facility]/cog/painad.pdf (Ctrl +  left click to view)

## 2024-02-20 NOTE — CONSULT NOTE ADULT - ASSESSMENT
89-year-old left-handed woman with past medical history of CHF, HTN, pacemaker placement, TAVR presenting as a code stroke for AMS, ?R-facial droop, R sided weakness.  Was found by home aide at 830 2/14 less responsive, not moving extremities as usually does. No prior known hx strokes per family.  At baseline patient alert, able to recognize/comprehend familiar faces, per daughters at bedside patient sometimes has difficulty with getting words out.  Information obtained from daughter  at bedside    CVA (cerebrovascular accident).   ·  Plan: Not candidate for tenecteplase as area of hypodensity already appreciated on CT head imaging/age, not candidate for thrombectomy given higher MRS.  -  (aide at bedside reports patient requires assistance with all ADLs, including getting out of bed, showering, changing clothes, walking, and since breaking L arm last year, requires also assistance with feeding)  - Monitor on Tele rule out PAF , but I suspect this to be the etiology given advanced age and severe MS  - Aspiration precautions.    hypernatremia  - c/w d5w  -monitor sodium    JOLLY  - unknown baseline  - monitor cre  - avoid nephrotoxins    h/o CHF  - monitoe vlume status  - strict Is and Os    dysphagia  - NGT  - swallow eval  - peg vs pleasure feeds     Mitral valve stenosis.   - Likely history of RHD   - s/p previous TAVR   - now severe MS  - Monitor for PAF.    Cardiac pacemaker.   - Interrogated   - VVI mode.    dispo  - PT/OT  - palliative care  _ full code

## 2024-02-20 NOTE — CONSULT NOTE ADULT - PROBLEM SELECTOR RECOMMENDATION 5
Family decisions guided by their marc.  They are optimistic that she will make some improvement.  All medical interventions to be pursued at this time.

## 2024-02-20 NOTE — PROGRESS NOTE ADULT - SUBJECTIVE AND OBJECTIVE BOX
Follow-up Pulm Progress Note    No new respiratory events overnight.  Denies SOB/CP.   O2 sats 97% RA     Medications:  MEDICATIONS  (STANDING):  acetaminophen   IVPB .. 650 milliGRAM(s) IV Intermittent once  albuterol/ipratropium for Nebulization 3 milliLiter(s) Nebulizer every 6 hours  amLODIPine   Tablet 5 milliGRAM(s) Oral daily  dextrose 5%. 1000 milliLiter(s) (75 mL/Hr) IV Continuous <Continuous>  enoxaparin Injectable 30 milliGRAM(s) SubCutaneous every 24 hours      Vital Signs Last 24 Hrs  T(C): 37.1 (20 Feb 2024 08:00), Max: 37.1 (20 Feb 2024 08:00)  T(F): 98.7 (20 Feb 2024 08:00), Max: 98.7 (20 Feb 2024 08:00)  HR: 87 (20 Feb 2024 12:00) (80 - 101)  BP: 101/49 (20 Feb 2024 12:00) (101/49 - 172/59)  BP(mean): 71 (20 Feb 2024 12:00) (65 - 90)  RR: 23 (20 Feb 2024 12:00) (17 - 24)  SpO2: 97% (20 Feb 2024 12:00) (94% - 97%)    Parameters below as of 20 Feb 2024 12:00  Patient On (Oxygen Delivery Method): room air              02-19 @ 07:01  -  02-20 @ 07:00  --------------------------------------------------------  IN: 1320 mL / OUT: 550 mL / NET: 770 mL          LABS:                        8.0    5.38  )-----------( 168      ( 20 Feb 2024 03:53 )             26.7     02-20    157<H>  |  127<H>  |  34<H>  ----------------------------<  142<H>  3.5   |  19<L>  |  1.32<H>    Ca    8.5      20 Feb 2024 03:53      Urinalysis Basic - ( 20 Feb 2024 03:53 )    Color: x / Appearance: x / SG: x / pH: x  Gluc: 142 mg/dL / Ketone: x  / Bili: x / Urobili: x   Blood: x / Protein: x / Nitrite: x   Leuk Esterase: x / RBC: x / WBC x   Sq Epi: x / Non Sq Epi: x / Bacteria: x      Physical Examination:  PULM: Decreased BS   CVS: S1, S2 heard    RADIOLOGY REVIEWED    CT chest:  < from: CT Chest No Cont (02.16.24 @ 12:30) >    FINDINGS:    AIRWAYS AND LUNGS: Tracheal bronchial secretions.  Patchy bilateral lung   opacities superimposed on mosaic attenuation.    MEDIASTINUM AND PLEURA: There are no enlarged mediastinal, hilar or   axillary lymph nodes. The visualized portion of the thyroid gland is   heterogeneous. There are small bilateral pleural effusions.  There is no   pneumothorax.    HEART AND VESSELS: There is mild cardiomegaly.  There are atherosclerotic   calcifications of the aorta and coronary arteries. There is a small   pericardial effusion.  TAVR. Left-sided pacemaker    UPPER ABDOMEN: Images of the upper abdomen demonstrate cholecystectomy.   Residual contrast within the kidneys..    BONES AND SOFT TISSUES: There are mild degenerative changes of the spine.    The soft tissues are unremarkable.      IMPRESSION:  CHF and/or pneumonia with small bilateral pleural effusions.    --- End of Report ---    < end of copied text >    < from: TTE W or WO Ultrasound Enhancing Agent (02.14.24 @ 15:41) >  _______________________________________________________________________________________     CONCLUSIONS:      1. Technically difficult image quality.   2. 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 No aortic regurgitation.   3. Severe mitral valve stenosis, secondary to dystrophic mitral annular calcification.   4. No prior echocardiogram is available for comparison.    ________________________________________________________________________________________  FINDINGS:     Left Ventricle:  There is moderate (grade 2) left ventricular diastolic dysfunction, with elevated filling pressure. Left ventricular endocardium is not well visualized.     Right Ventricle:  The right ventricle is not well visualized. A device lead is visualized.     Left Atrium:  The left atrium is severely dilated with an indexed volume of 64.47 ml/m².     Aortic Valve:  Transcatheter aortic valve replacement with normal gradient. No aortic regurgitation.     Mitral Valve:  There is severe calcification of the mitral valve annulus. There is severe leaflet calcification. There is severe mitral valve stenosis, secondary to dystrophic mitral annular calcification. The transmitral mean gradient is 15.76 mmHg at a heart rate of 73 bpm. There is mild mitral regurgitation.     Tricuspid Valve:  There is mild tricuspid regurgitation.     Pulmonic Valve:  The pulmonic valve was not well visualized.     Pericardium:  No pericardial effusion seen.  ____________________________________________________________________  QUANTITATIVE DATA:  Left Ventricle Measurements: (Indexed to BSA)     MV E Vmax:    1.88 m/s  MV A Vmax:    2.65 m/s  MV E/A:       0.71  e' lateral:   4.90 cm/s  e' medial:    3.37 cm/s  E/e' lateral: 38.37  E/e' medial:  55.79  E/e' Average: 45.47       Right Ventricle Measurements:     TV Aaliyah. S': 12.00 cm/s       LVOT / RVOT/ Qp/Qs Data: (Indexed to BSA)  LVOT Diameter: 1.90 cm  LVOT Vmax:     1.38 m/s  LVOT VTI:      25.59 cm  LVOT SV:       72.5 ml    47.25 ml/m²  LVOT CO:       7.69 l/min 5.01 l/min/m²    Aortic Valve Measurements:  AV Vmax:                1.5 m/s  AV Peak Gradient:       8.9 mmHg  AV Mean Gradient:       3.8 mmHg  AV VTI:                 28.2 cm  AV VTI Ratio:           0.91  AoV EOA, Contin:        2.57 cm²  AoV EOA, Contin i:      1.68 cm²/m²  AoV DimensionlessIndex 0.91    Mitral Valve Measurements:     MV VTI:         81.37 cm  MV VTI (tips):  74.60 cm  MV Mean Grad:   15.22 mmHg  MV E Vmax:      1.9 m/s  MV A Vmax:      2.6 m/s  MV E/A:         0.7  MV Gradient HR: 73 bpm       Tricuspid Valve Measurements:     TR Vmax:          3.1 m/s  TR Peak Gradient: 37.5 mmHg    ________________________________________________________________________________________  Electronically signed on 2/14/2024 at 7:32:22 PM by Cody Troy MD       < end of copied text >

## 2024-02-20 NOTE — CONSULT NOTE ADULT - PROBLEM SELECTOR RECOMMENDATION 3
CT chest with ?early PNA vs. mucous plugging LLL  -Check procalcitonin  -Start Levaquin (pt with PCN allergy)  -F/u official report  -Will continue/discontinue abx pending clinical status in next 24 hrs  -Keep sats >90%  -Duoneb q6h  -Suction PRN
Interrogated   VVI mode
Patient requires total support for all ADL's.  PPSV2 30   %.

## 2024-02-20 NOTE — CONSULT NOTE ADULT - ASSESSMENT
89-year-old left-handed woman with past medical history of CHF, HTN, pacemaker placement, valve replacement presenting as a code stroke for AMS, ?R-facial droop, R sided weakness.  Was found by home aide at 830 2/14 less responsive, not moving extremities as usually does. No prior known hx strokes per family.  At baseline patient alert, able to recognize/comprehend familiar faces, per daughters at bedside patient sometimes has difficulty with getting words out.      Patient with acute LMCA stroke with hemorrhagic conversion.  We are called to address goals of care.

## 2024-02-20 NOTE — CONSULT NOTE ADULT - NSCONSULTADDITIONALINFOA_GEN_ALL_CORE
Full consult to follow    MD Donte Thacker FACP  Available on Teams during regular business hours  Pager after hours [phone]  Division of Geriatrics and Palliative Medicine Consult completed  DOS 2 20 2024    Ary Love MD MSEavila FACP  Available on Teams during regular business hours  Pager after hours [phone]  Division of Geriatrics and Palliative Medicine

## 2024-02-21 DIAGNOSIS — I63.512 CEREBRAL INFARCTION DUE TO UNSPECIFIED OCCLUSION OR STENOSIS OF LEFT MIDDLE CEREBRAL ARTERY: ICD-10-CM

## 2024-02-21 DIAGNOSIS — Z51.5 ENCOUNTER FOR PALLIATIVE CARE: ICD-10-CM

## 2024-02-21 DIAGNOSIS — E87.0 HYPEROSMOLALITY AND HYPERNATREMIA: ICD-10-CM

## 2024-02-21 DIAGNOSIS — R53.2 FUNCTIONAL QUADRIPLEGIA: ICD-10-CM

## 2024-02-21 DIAGNOSIS — Z71.89 OTHER SPECIFIED COUNSELING: ICD-10-CM

## 2024-02-21 LAB
ANION GAP SERPL CALC-SCNC: 11 MMOL/L — SIGNIFICANT CHANGE UP (ref 5–17)
BUN SERPL-MCNC: 24 MG/DL — HIGH (ref 7–23)
CALCIUM SERPL-MCNC: 8.4 MG/DL — SIGNIFICANT CHANGE UP (ref 8.4–10.5)
CHLORIDE SERPL-SCNC: 122 MMOL/L — HIGH (ref 96–108)
CO2 SERPL-SCNC: 19 MMOL/L — LOW (ref 22–31)
CREAT SERPL-MCNC: 1.14 MG/DL — SIGNIFICANT CHANGE UP (ref 0.5–1.3)
EGFR: 44 ML/MIN/1.73M2 — LOW
GLUCOSE SERPL-MCNC: 118 MG/DL — HIGH (ref 70–99)
HCT VFR BLD CALC: 27.1 % — LOW (ref 34.5–45)
HGB BLD-MCNC: 8.2 G/DL — LOW (ref 11.5–15.5)
MCHC RBC-ENTMCNC: 28.2 PG — SIGNIFICANT CHANGE UP (ref 27–34)
MCHC RBC-ENTMCNC: 30.3 GM/DL — LOW (ref 32–36)
MCV RBC AUTO: 93.1 FL — SIGNIFICANT CHANGE UP (ref 80–100)
NRBC # BLD: 0 /100 WBCS — SIGNIFICANT CHANGE UP (ref 0–0)
PLATELET # BLD AUTO: 179 K/UL — SIGNIFICANT CHANGE UP (ref 150–400)
POTASSIUM SERPL-MCNC: 5.1 MMOL/L — SIGNIFICANT CHANGE UP (ref 3.5–5.3)
POTASSIUM SERPL-SCNC: 5.1 MMOL/L — SIGNIFICANT CHANGE UP (ref 3.5–5.3)
RBC # BLD: 2.91 M/UL — LOW (ref 3.8–5.2)
RBC # FLD: 19.3 % — HIGH (ref 10.3–14.5)
SODIUM SERPL-SCNC: 152 MMOL/L — HIGH (ref 135–145)
WBC # BLD: 5.91 K/UL — SIGNIFICANT CHANGE UP (ref 3.8–10.5)
WBC # FLD AUTO: 5.91 K/UL — SIGNIFICANT CHANGE UP (ref 3.8–10.5)

## 2024-02-21 PROCEDURE — 99233 SBSQ HOSP IP/OBS HIGH 50: CPT | Mod: FS

## 2024-02-21 RX ORDER — ONDANSETRON 8 MG/1
4 TABLET, FILM COATED ORAL ONCE
Refills: 0 | Status: COMPLETED | OUTPATIENT
Start: 2024-02-21 | End: 2024-02-21

## 2024-02-21 RX ORDER — PANTOPRAZOLE SODIUM 20 MG/1
40 TABLET, DELAYED RELEASE ORAL ONCE
Refills: 0 | Status: COMPLETED | OUTPATIENT
Start: 2024-02-21 | End: 2024-02-21

## 2024-02-21 RX ADMIN — ONDANSETRON 4 MILLIGRAM(S): 8 TABLET, FILM COATED ORAL at 10:17

## 2024-02-21 RX ADMIN — Medication 3 MILLILITER(S): at 22:58

## 2024-02-21 RX ADMIN — ENOXAPARIN SODIUM 30 MILLIGRAM(S): 100 INJECTION SUBCUTANEOUS at 17:48

## 2024-02-21 RX ADMIN — PANTOPRAZOLE SODIUM 40 MILLIGRAM(S): 20 TABLET, DELAYED RELEASE ORAL at 17:50

## 2024-02-21 RX ADMIN — SODIUM CHLORIDE 50 MILLILITER(S): 9 INJECTION, SOLUTION INTRAVENOUS at 12:18

## 2024-02-21 RX ADMIN — Medication 3 MILLILITER(S): at 05:30

## 2024-02-21 RX ADMIN — Medication 3 MILLILITER(S): at 17:47

## 2024-02-21 RX ADMIN — Medication 100 MILLIEQUIVALENT(S): at 02:50

## 2024-02-21 RX ADMIN — Medication 3 MILLILITER(S): at 12:18

## 2024-02-21 NOTE — DIETITIAN INITIAL EVALUATION ADULT - PERTINENT MEDS FT
MEDICATIONS  (STANDING):  albuterol/ipratropium for Nebulization 3 milliLiter(s) Nebulizer every 6 hours  dextrose 5%. 1000 milliLiter(s) (50 mL/Hr) IV Continuous <Continuous>  enoxaparin Injectable 30 milliGRAM(s) SubCutaneous every 24 hours  pantoprazole  Injectable 40 milliGRAM(s) IV Push once    MEDICATIONS  (PRN):

## 2024-02-21 NOTE — DIETITIAN INITIAL EVALUATION ADULT - REASON FOR ADMISSION
Cerebral infarction    Per chart, pt is a 89-year-old left-handed woman with past medical history of CHF, HTN, pacemaker placement, valve replacement presenting as a code stroke for AMS, ?R-facial droop, R sided weakness. Was found by home aide at 830 2/14 less responsive, not moving extremities as usually does. No prior known hx strokes per family. At baseline patient alert, able to recognize/comprehend familiar faces, per daughters at bedside patient sometimes has difficulty with getting words out. Not candidate for tenecteplase as area of hypodensity already appreciated on CT head imaging/age, not candidate for thrombectomy given higher MRS.

## 2024-02-21 NOTE — PROGRESS NOTE ADULT - ASSESSMENT
89-year-old left-handed woman with past medical history of CHF, HTN, pacemaker placement, TAVR presenting as a code stroke for AMS, ?R-facial droop, R sided weakness.  Was found by home aide at 830 2/14 less responsive, not moving extremities as usually does. No prior known hx strokes per family.  At baseline patient alert, able to recognize/comprehend familiar faces, per daughters at bedside patient sometimes has difficulty with getting words out.  Information obtained from daughter  at bedside    CVA (cerebrovascular accident).   ·  Plan: Not candidate for tenecteplase as area of hypodensity already appreciated on CT head imaging/age, not candidate for thrombectomy given higher MRS.  -  (aide at bedside reports patient requires assistance with all ADLs, including getting out of bed, showering, changing clothes, walking, and since breaking L arm last year, requires also assistance with feeding)  - Monitor on Tele rule out PAF , but I suspect this to be the etiology given advanced age and severe MS  - Aspiration precautions.    hypernatremia  - c/w d5w  -monitor sodium    JOLLY  - unknown baseline  - monitor cre  - avoid nephrotoxins    h/o CHF  - monitoe vlume status  - strict Is and Os    dysphagia  - swallow eval  - risks and benefits of peg vs pleasure feeds was discussed with 2 daughters at length     Mitral valve stenosis.   - Likely history of RHD   - s/p previous TAVR   - now severe MS  - Monitor for PAF.    Cardiac pacemaker.   - Interrogated   - VVI mode.    dispo  - PT/OT  - palliative care  - full code

## 2024-02-21 NOTE — DIETITIAN INITIAL EVALUATION ADULT - NSFNSGIIOFT_GEN_A_CORE
No nausea, vomiting, diarrhea noted at this time. Chronic constipation noted per aide. Last BM 2/15 per chart. Pt not currently ordered for bowel regimen.

## 2024-02-21 NOTE — CHART NOTE - NSCHARTNOTEFT_GEN_A_CORE
Met with family members at bedside.  At present, they are hopeful for recovery and will likely pursue peg with pleasure feeds as tolerated.  Patient to remain full code.      Patient to be discharged from the [facility] team consult service today.  Please call [phone] if we can be of further assistance.

## 2024-02-21 NOTE — PROGRESS NOTE ADULT - SUBJECTIVE AND OBJECTIVE BOX
THE PATIENT WAS SEEN AND EXAMINED BY STROKE TEAM DURING MORNING ROUNDS.     HPI:  89-year-old left-handed woman with past medical history of CHF, HTN, pacemaker placement, valve replacement presenting as a code stroke for AMS, ?R-facial droop, R sided weakness. Was found by home aide at 830 2/14 less responsive, not moving extremities as usually does. No prior known hx strokes per family. At baseline patient alert, able to recognize/comprehend familiar faces, per daughters at bedside patient sometimes has difficulty with getting words out. Information obtained from aide at bedside, then after CT scanner family also at bedside.    SUBJECTIVE: No events overnight.  No new neurologic complaints.      albuterol/ipratropium for Nebulization 3 milliLiter(s) Nebulizer every 6 hours  dextrose 5%. 1000 milliLiter(s) IV Continuous <Continuous>  enoxaparin Injectable 30 milliGRAM(s) SubCutaneous every 24 hours    PHYSICAL EXAM:   Vital Signs Last 24 Hrs  T(C): 36.7 (21 Feb 2024 00:00), Max: 37.1 (20 Feb 2024 08:00)  T(F): 98 (21 Feb 2024 00:00), Max: 98.7 (20 Feb 2024 08:00)  HR: 92 (21 Feb 2024 06:00) (80 - 105)  BP: 119/51 (21 Feb 2024 06:00) (101/49 - 136/56)  BP(mean): 74 (21 Feb 2024 06:00) (62 - 92)  RR: 25 (21 Feb 2024 06:00) (17 - 26)  SpO2: 96% (21 Feb 2024 06:00) (94% - 99%)    Parameters below as of 21 Feb 2024 00:00  Patient On (Oxygen Delivery Method): room air    Physical Examination:    General: No acute distress  Abdomen: Soft, nondistended   Extremities: No edema    NEUROLOGICAL EXAM:  Mental status: Opens eyes briefly to voice, does not respond to questions. No FC  Cranial Nerves: No facial asymmetry. Decreased blink to threat on Right.   Motor exam: Normal tone, some effort spontaneous against gravity throughout all extremities, L > R. Arthritic changes throughout extremities. RLE: some effort against gravity  Sensation: Grimaces to painful stimuli x4, less so on the right  Coordination/ Gait: Deferred    LABS:                        8.2    5.91  )-----------( 179      ( 21 Feb 2024 06:32 )             27.1    02-20    154<H>  |  122<H>  |  29<H>  ----------------------------<  147<H>  3.5   |  21<L>  |  1.21    Ca    8.8      20 Feb 2024 14:40  Phos  1.8     02-20  Mg     2.5     02-20    TPro  6.3  /  Alb  3.7  /  TBili  0.6  /  DBili  x   /  AST  15  /  ALT  13  /  AlkPhos  97  02-20  PT/INR - ( 20 Feb 2024 14:40 )   PT: 13.5 sec;   INR: 1.30 ratio      PTT - ( 20 Feb 2024 14:40 )  PTT:24.6 sec    IMAGING: Reviewed by me.     2/17/24 CT Head:   Acute left MCA territory infarct, as on the prior, with associated   hemorrhagic transformation centered in the ipsilateral basal ganglia, not   significantly changed. Associated 8-9 mm rightward midline shift is not   significantly changed. Slightly more pronounced gyriform hyperdensity may   reflect spared cortex versus cortical petechial hemorrhage. Additional   findings described in detail above.    2/16/24 CT Head:   IMPRESSION: Redemonstration of an evolving left-sided MCA distribution   acute/subacute infarct with a new small amount of hemorrhagic   transformation within the infarct bed in comparison with 2/14/2024.    Surrounding mass effect and shift of the midline structures from left to   right appears unchanged when compared to the most recent prior CT exam.    CT Head, CTA Head/Neck 2/14/24:    CT brain:  Acute left frontoparietalinfarct. No CT evidence for selene hemorrhagic   transformation.    CT brain perfusion:  Significantly limited by motion.    Cerebral blood flow less than 30% = 0 mL. No core infarct predicted.    Tmax greater than 6 seconds = 46 mL and involves the bilateral mesial   cerebellar hemispheres, the right temporal lobe, bilateral frontal lobes,   and left posterior temporal. This represents brain parenchyma predicted   to be at continued ischemic risk in the presence of neurologic symptoms.   This finding is likely spurious in nature.    Mismatch volume 46 mL  Mismatch ratio infinite    CTA brain:  Intraluminal filling defect involving the left ICA terminus and extending   into the proximal right A1 segment.    Normal flow-related enhancement of the remaining left LEBRON. Normal   flow-related enhancement of the left MCA.    Right MCA enhances to a lesser degree than the left MCA.    No vascular aneurysm or AVM.    CTA neck:  Approximately 50% short segment stenosis of the origin and proximal   segment of the left ICA due to calcified plaque. Normal flow-related   enhancement of the more distal vessel.    Approximately 75-80% short segment stenosis of the proximal segment of   the right internal carotid artery due to partially calcified plaque.   Normal flow-related enhancement of the more distal vessel.    2/14/24 TTE:    1. Technically difficult image quality.   2. 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 No aortic regurgitation.   3. Severe mitral valve stenosis, secondary to dystrophic mitral annular calcification.   4. No prior echocardiogram is available for comparison. THE PATIENT WAS SEEN AND EXAMINED BY STROKE TEAM DURING MORNING ROUNDS.     HPI:  89-year-old left-handed woman with past medical history of CHF, HTN, pacemaker placement, valve replacement presenting as a code stroke for AMS, ?R-facial droop, R sided weakness. Was found by home aide at 830 2/14 less responsive, not moving extremities as usually does. No prior known hx strokes per family. At baseline patient alert, able to recognize/comprehend familiar faces, per daughters at bedside patient sometimes has difficulty with getting words out. Information obtained from aide at bedside, then after CT scanner family also at bedside.    SUBJECTIVE: No events overnight.    albuterol/ipratropium for Nebulization 3 milliLiter(s) Nebulizer every 6 hours  dextrose 5%. 1000 milliLiter(s) IV Continuous <Continuous>  enoxaparin Injectable 30 milliGRAM(s) SubCutaneous every 24 hours    PHYSICAL EXAM:   Vital Signs Last 24 Hrs  T(C): 36.7 (21 Feb 2024 00:00), Max: 37.1 (20 Feb 2024 08:00)  T(F): 98 (21 Feb 2024 00:00), Max: 98.7 (20 Feb 2024 08:00)  HR: 92 (21 Feb 2024 06:00) (80 - 105)  BP: 119/51 (21 Feb 2024 06:00) (101/49 - 136/56)  BP(mean): 74 (21 Feb 2024 06:00) (62 - 92)  RR: 25 (21 Feb 2024 06:00) (17 - 26)  SpO2: 96% (21 Feb 2024 06:00) (94% - 99%)    Parameters below as of 21 Feb 2024 00:00  Patient On (Oxygen Delivery Method): room air    Physical Examination:    General: No acute distress  Abdomen: Soft, nondistended   Extremities: No edema    NEUROLOGICAL EXAM:  Mental status: Opens eyes briefly to voice, does not respond to questions.   Cranial Nerves: No facial asymmetry. Decreased blink to threat on Right.   Motor exam: Normal tone, some effort spontaneous against gravity throughout all extremities, L > R. Arthritic changes throughout extremities.  Sensation: Grimaces to painful stimuli x 4, less so on the right  Coordination/ Gait: Deferred    LABS:                        8.2    5.91  )-----------( 179      ( 21 Feb 2024 06:32 )             27.1    02-20    154<H>  |  122<H>  |  29<H>  ----------------------------<  147<H>  3.5   |  21<L>  |  1.21    Ca    8.8      20 Feb 2024 14:40  Phos  1.8     02-20  Mg     2.5     02-20    TPro  6.3  /  Alb  3.7  /  TBili  0.6  /  DBili  x   /  AST  15  /  ALT  13  /  AlkPhos  97  02-20  PT/INR - ( 20 Feb 2024 14:40 )   PT: 13.5 sec;   INR: 1.30 ratio      PTT - ( 20 Feb 2024 14:40 )  PTT:24.6 sec    IMAGING: Reviewed by me.     2/17/24 CT Head:   Acute left MCA territory infarct, as on the prior, with associated   hemorrhagic transformation centered in the ipsilateral basal ganglia, not   significantly changed. Associated 8-9 mm rightward midline shift is not   significantly changed. Slightly more pronounced gyriform hyperdensity may   reflect spared cortex versus cortical petechial hemorrhage. Additional   findings described in detail above.    2/16/24 CT Head:   IMPRESSION: Redemonstration of an evolving left-sided MCA distribution   acute/subacute infarct with a new small amount of hemorrhagic   transformation within the infarct bed in comparison with 2/14/2024.    Surrounding mass effect and shift of the midline structures from left to   right appears unchanged when compared to the most recent prior CT exam.    CT Head, CTA Head/Neck 2/14/24:    CT brain:  Acute left frontoparietalinfarct. No CT evidence for selene hemorrhagic   transformation.    CT brain perfusion:  Significantly limited by motion.    Cerebral blood flow less than 30% = 0 mL. No core infarct predicted.    Tmax greater than 6 seconds = 46 mL and involves the bilateral mesial   cerebellar hemispheres, the right temporal lobe, bilateral frontal lobes,   and left posterior temporal. This represents brain parenchyma predicted   to be at continued ischemic risk in the presence of neurologic symptoms.   This finding is likely spurious in nature.    Mismatch volume 46 mL  Mismatch ratio infinite    CTA brain:  Intraluminal filling defect involving the left ICA terminus and extending   into the proximal right A1 segment.    Normal flow-related enhancement of the remaining left LEBRON. Normal   flow-related enhancement of the left MCA.    Right MCA enhances to a lesser degree than the left MCA.    No vascular aneurysm or AVM.    CTA neck:  Approximately 50% short segment stenosis of the origin and proximal   segment of the left ICA due to calcified plaque. Normal flow-related   enhancement of the more distal vessel.    Approximately 75-80% short segment stenosis of the proximal segment of   the right internal carotid artery due to partially calcified plaque.   Normal flow-related enhancement of the more distal vessel.    2/14/24 TTE:    1. Technically difficult image quality.   2. 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 No aortic regurgitation.   3. Severe mitral valve stenosis, secondary to dystrophic mitral annular calcification.   4. No prior echocardiogram is available for comparison.

## 2024-02-21 NOTE — PROGRESS NOTE ADULT - ASSESSMENT
89-year-old left-handed woman with past medical history of CHF, HTN, pacemaker placement, valve replacement presenting as a code stroke for AMS, ?R-facial droop, R sided weakness. Was found by home aide at 830 2/14 less responsive, not moving extremities as usually does. No prior known hx strokes per family. At baseline patient alert, able to recognize/comprehend familiar faces, per daughters at bedside patient sometimes has difficulty with getting words out. Not candidate for tenecteplase as area of hypodensity already appreciated on CT head imaging/age, not candidate for thrombectomy given higher MRS.    IMPRESSION: Global aphasia, R hemiparesis due to L MCA infarct 2/2 L ICA partially occlusive thrombus, etiology likely cardioembolic in the setting of AF    NEURO: Neurologically slightly improved since admission. Continue close monitoring for neurologic deterioration. A1c 6.3, LDL 76 - high intensity statin when oral access obtained, titrate statin to LDL goal less than 70. CT Head, CTA Head/Neck as above. Maintain SBP < 140. Physical therapy/OT/Speech eval - NOHEMI    ANTITHROMBOTIC THERAPY: On hold given hemorrhagic transformation on CTH and PEG pending    PULMONARY: Protecting airway, saturating well. Continue home albuterol nebs. Now on supplemental O2. CT chest w/ small b/l pleural effusions and congestion. CT chest w/ mucous plugging LLL. Pulmonology following. Procalcitonin not significantly elevated. Continue monitoring off antibiotics.    CARDIOVASCULAR: TTE severe mitral stenosis, s/p previous TAVR. Cardiac pacemaker interrogated. Continue cardiac monitoring. Monitor for PAF. BP in the 170s, Norvasc 5mg daily added as per cardio.      SBP goal: <180    GASTROINTESTINAL: Dysphagia screen failed. Failed rpt bedside S/S eval. GI following for possible PEG placement on 2/20. Per GI, PEG on hold until hypernatremia is corrected/patient is medically optimized. Repeat swallow evaluation 2/19 - patient not a candidate for oral feeding at this time. Repeat Swallow evaluation on 2/19, patient did not pass. As per GI: PEG on hold until hypernatremia is corrected/pt is medically optimized.      Diet: NPO. Caution with IVF given mitral stenosis and concern for volume overload as seen on CT Chest     RENAL: Resolving JOLLY, likely in setting of contrast load vs. prerenal, Cr improving 1.14     Na Goal: Greater than 135. Na: 157, on D5W @ 75cc/hr, continue to trend      Sousa: No    HEMATOLOGY: H/H fluctuating, hemoglobin 8.2, will c/w trending. Platelets 179     DVT ppx: Lovenox 30 subq     ID: Afebrile without leukocytosis, monitoring off antibiotics.     OTHER: Plan discussed with daughter at bedside. Family wants FULL code. Palliative care consulted for goals of care discussion and support.     DISPOSITION: Rehab per PT eval once stable and workup is complete    CORE MEASURES:        Admission NIHSS: 18     TPA: NO      LDL/HDL: 76/45     Depression Screen: unable to assess     Statin Therapy: yes, when oral access obtained     Dysphagia Screen: FAIL     Smoking NO      Afib NO     Stroke Education YES    Obtain screening lower extremity venous ultrasound in patients who meet 1 or more of the following criteria as patient is high risk for DVT/PE on admission:   [] History of DVT/PE  [] Hypercoagulable states (Factor V Leiden, Cancer, OCP, etc. )  [x] Prolonged immobility (hemiplegia/hemiparesis/post operative or any other extended immobilization)  [] Transferred from outside facility (Rehab or Long term care)  [] Age </= to 50 89-year-old left-handed woman with past medical history of CHF, HTN, pacemaker placement, valve replacement presenting as a code stroke for AMS, ?R-facial droop, R sided weakness. Was found by home aide at 830 2/14 less responsive, not moving extremities as usually does. No prior known hx strokes per family. At baseline patient alert, able to recognize/comprehend familiar faces, per daughters at bedside patient sometimes has difficulty with getting words out. Not candidate for tenecteplase as area of hypodensity already appreciated on CT head imaging/age, not candidate for thrombectomy given higher MRS.    IMPRESSION: Global aphasia, R hemiparesis due to L MCA infarct 2/2 L ICA partially occlusive thrombus.    NEURO: Neurologically slightly improved since admission. Continue close monitoring for neurologic deterioration. A1c 6.3, LDL 76 - high intensity statin when oral access obtained, titrate statin to LDL goal less than 70. CT Head, CTA Head/Neck as above. Maintain SBP < 140. Physical therapy/OT/Speech eval - NOHEMI.    ANTITHROMBOTIC THERAPY: On hold given hemorrhagic transformation on CTH and PEG pending    PULMONARY: Protecting airway, saturating well. Continue home albuterol nebs. Now on supplemental O2. CT chest w/ small b/l pleural effusions and congestion. CT chest w/ mucous plugging LLL. Pulmonology following. Procalcitonin not significantly elevated. Continue monitoring off antibiotics.    CARDIOVASCULAR: TTE severe mitral stenosis, s/p previous TAVR. Cardiac pacemaker interrogated. Continue cardiac monitoring. Monitor for PAF. BP in the 170s, Norvasc 5mg daily added as per cardio.     SBP goal: <180    GASTROINTESTINAL: Dysphagia screen failed. Failed rpt bedside S/S eval. GI following for possible PEG placement, deferred on 2/20 due to hypernatremia. Hypernatremia now improving. Plan for PEG on 2/22. Repeat swallow evaluation 2/19, 2/21 - patient not a candidate for oral feeding at this time.         Diet: NPO. Caution with IVF given mitral stenosis and concern for volume overload as seen on CT Chest     RENAL: Resolving JOLLY, likely in setting of contrast load vs. prerenal, Cr improving 1.14. Na: 152, on D5W @ 50cc/hr, continue to trend      Na Goal: Greater than 135.      Sousa: No    HEMATOLOGY: H/H fluctuating, hemoglobin 8.2, will c/w trending. Platelets 179     DVT ppx: Lovenox 30 subq     ID: Afebrile without leukocytosis, monitoring off antibiotics.     OTHER: Plan discussed with daughter at bedside. Family wants FULL code. Palliative care consulted for goals of care discussion and support.     DISPOSITION: Rehab per PT eval once stable and workup is complete    CORE MEASURES:        Admission NIHSS: 18     TPA: NO      LDL/HDL: 76/45     Depression Screen: unable to assess     Statin Therapy: yes, when oral access obtained     Dysphagia Screen: FAIL     Smoking NO      Afib NO     Stroke Education YES    Obtain screening lower extremity venous ultrasound in patients who meet 1 or more of the following criteria as patient is high risk for DVT/PE on admission:   [] History of DVT/PE  [] Hypercoagulable states (Factor V Leiden, Cancer, OCP, etc. )  [x] Prolonged immobility (hemiplegia/hemiparesis/post operative or any other extended immobilization)  [] Transferred from outside facility (Rehab or Long term care)  [] Age </= to 50

## 2024-02-21 NOTE — DIETITIAN INITIAL EVALUATION ADULT - OTHER INFO
Reports weight has been declining over the past year or so. Daughter states pt used to be overweight at one point. Last year was ~130 lbs.  Dosing wt: 95 lbs (02-14)  Wt history per chart: 141 lbs (11/10/20). Based on family report, would suggest 27% wt loss x 1 year - clinically significant. RD to continue to monitor weight trends as able/available.     Height per family: 5'0"    Additional nutrition-related concerns:  - Hypernatremia ongoing, improved to 152 mmol/L today  - resolving JOLLY; K+ WNL, Phos low 2/20  - hx CHF

## 2024-02-21 NOTE — PROGRESS NOTE ADULT - SUBJECTIVE AND OBJECTIVE BOX
DATE OF SERVICE: 02-21-24 @ 14:42    Patient is a 89y old  Female who presents with a chief complaint of CVA (20 Feb 2024 11:32)      SUBJECTIVE / OVERNIGHT EVENTS:  NAd . opens eyes. nonverbal    MEDICATIONS  (STANDING):  albuterol/ipratropium for Nebulization 3 milliLiter(s) Nebulizer every 6 hours  dextrose 5%. 1000 milliLiter(s) (50 mL/Hr) IV Continuous <Continuous>  enoxaparin Injectable 30 milliGRAM(s) SubCutaneous every 24 hours  pantoprazole  Injectable 40 milliGRAM(s) IV Push once    MEDICATIONS  (PRN):      Vital Signs Last 24 Hrs  T(C): 36.7 (21 Feb 2024 08:00), Max: 37.1 (20 Feb 2024 16:00)  T(F): 98 (21 Feb 2024 08:00), Max: 98.7 (20 Feb 2024 16:00)  HR: 90 (21 Feb 2024 14:00) (81 - 110)  BP: 138/54 (21 Feb 2024 14:00) (107/45 - 138/61)  BP(mean): 78 (21 Feb 2024 14:00) (62 - 92)  RR: 25 (21 Feb 2024 14:00) (16 - 29)  SpO2: 96% (21 Feb 2024 14:00) (92% - 99%)    Parameters below as of 21 Feb 2024 14:00  Patient On (Oxygen Delivery Method): room air      CAPILLARY BLOOD GLUCOSE        I&O's Summary    20 Feb 2024 07:01  -  21 Feb 2024 07:00  --------------------------------------------------------  IN: 1450 mL / OUT: 860 mL / NET: 590 mL    21 Feb 2024 07:01  -  21 Feb 2024 14:42  --------------------------------------------------------  IN: 350 mL / OUT: 0 mL / NET: 350 mL        PHYSICAL EXAM:  GENERAL: NAD, well-developed  HEAD:  Atraumatic, Normocephalic  EYES: EOMI, PERRLA, conjunctiva and sclera clear  NECK: Supple, No JVD  CHEST/LUNG: Clear to auscultation bilaterally; No wheeze  HEART: Regular rate and rhythm; No murmurs, rubs, or gallops  ABDOMEN: Soft, Nontender, Nondistended; Bowel sounds present  EXTREMITIES:  2+ Peripheral Pulses, No clubbing, cyanosis, or edema  NEUROLOGICAL EXAM:  Mental status: Opens eyes briefly to voice, does not respond to questions. No FC  Cranial Nerves: No facial asymmetry. Decreased blink to threat on Right.   Motor exam: Normal tone, some effort spontaneous against gravity throughout all extremities, L > R. Arthritic changes throughout extremities. RLE: some effort against gravity  Sensation: Grimaces to painful stimuli x4, less so on the right  Coordination/ Gait: Deferred    LABS:                        8.2    5.91  )-----------( 179      ( 21 Feb 2024 06:32 )             27.1     02-21    152<H>  |  122<H>  |  24<H>  ----------------------------<  118<H>  5.1   |  19<L>  |  1.14    Ca    8.4      21 Feb 2024 06:32  Phos  1.8     02-20  Mg     2.5     02-20    TPro  6.3  /  Alb  3.7  /  TBili  0.6  /  DBili  x   /  AST  15  /  ALT  13  /  AlkPhos  97  02-20    PT/INR - ( 20 Feb 2024 14:40 )   PT: 13.5 sec;   INR: 1.30 ratio         PTT - ( 20 Feb 2024 14:40 )  PTT:24.6 sec      Urinalysis Basic - ( 21 Feb 2024 06:32 )    Color: x / Appearance: x / SG: x / pH: x  Gluc: 118 mg/dL / Ketone: x  / Bili: x / Urobili: x   Blood: x / Protein: x / Nitrite: x   Leuk Esterase: x / RBC: x / WBC x   Sq Epi: x / Non Sq Epi: x / Bacteria: x        RADIOLOGY & ADDITIONAL TESTS:    Imaging Personally Reviewed:    Consultant(s) Notes Reviewed:      Care Discussed with Consultants/Other Providers:

## 2024-02-21 NOTE — PROGRESS NOTE ADULT - SUBJECTIVE AND OBJECTIVE BOX
Follow-up Pulm Progress Note    No new respiratory events overnight.  Unable to participate in ROS, breathing nonlabored  o2 sats 96% RA     Medications:  MEDICATIONS  (STANDING):  albuterol/ipratropium for Nebulization 3 milliLiter(s) Nebulizer every 6 hours  dextrose 5%. 1000 milliLiter(s) (50 mL/Hr) IV Continuous <Continuous>  enoxaparin Injectable 30 milliGRAM(s) SubCutaneous every 24 hours  pantoprazole  Injectable 40 milliGRAM(s) IV Push once      Vital Signs Last 24 Hrs  T(C): 36.7 (21 Feb 2024 08:00), Max: 36.7 (21 Feb 2024 00:00)  T(F): 98 (21 Feb 2024 08:00), Max: 98 (21 Feb 2024 00:00)  HR: 90 (21 Feb 2024 14:00) (81 - 110)  BP: 138/54 (21 Feb 2024 14:00) (107/45 - 138/61)  BP(mean): 78 (21 Feb 2024 14:00) (62 - 88)  RR: 25 (21 Feb 2024 14:00) (16 - 29)  SpO2: 96% (21 Feb 2024 14:00) (92% - 99%)    Parameters below as of 21 Feb 2024 14:00  Patient On (Oxygen Delivery Method): room air              02-20 @ 07:01  -  02-21 @ 07:00  --------------------------------------------------------  IN: 1450 mL / OUT: 860 mL / NET: 590 mL          LABS:                        8.2    5.91  )-----------( 179      ( 21 Feb 2024 06:32 )             27.1     02-21    152<H>  |  122<H>  |  24<H>  ----------------------------<  118<H>  5.1   |  19<L>  |  1.14    Ca    8.4      21 Feb 2024 06:32  Phos  1.8     02-20  Mg     2.5     02-20    TPro  6.3  /  Alb  3.7  /  TBili  0.6  /  DBili  x   /  AST  15  /  ALT  13  /  AlkPhos  97  02-20          CAPILLARY BLOOD GLUCOSE        PT/INR - ( 20 Feb 2024 14:40 )   PT: 13.5 sec;   INR: 1.30 ratio         PTT - ( 20 Feb 2024 14:40 )  PTT:24.6 sec  Urinalysis Basic - ( 21 Feb 2024 06:32 )    Color: x / Appearance: x / SG: x / pH: x  Gluc: 118 mg/dL / Ketone: x  / Bili: x / Urobili: x   Blood: x / Protein: x / Nitrite: x   Leuk Esterase: x / RBC: x / WBC x   Sq Epi: x / Non Sq Epi: x / Bacteria: x    Physical Examination:  PULM: Decreased BS   CVS: S1, S2 heard    RADIOLOGY REVIEWED    CT chest:  < from: CT Chest No Cont (02.16.24 @ 12:30) >    FINDINGS:    AIRWAYS AND LUNGS: Tracheal bronchial secretions.  Patchy bilateral lung   opacities superimposed on mosaic attenuation.    MEDIASTINUM AND PLEURA: There are no enlarged mediastinal, hilar or   axillary lymph nodes. The visualized portion of the thyroid gland is   heterogeneous. There are small bilateral pleural effusions.  There is no   pneumothorax.    HEART AND VESSELS: There is mild cardiomegaly.  There are atherosclerotic   calcifications of the aorta and coronary arteries. There is a small   pericardial effusion.  TAVR. Left-sided pacemaker    UPPER ABDOMEN: Images of the upper abdomen demonstrate cholecystectomy.   Residual contrast within the kidneys..    BONES AND SOFT TISSUES: There are mild degenerative changes of the spine.    The soft tissues are unremarkable.      IMPRESSION:  CHF and/or pneumonia with small bilateral pleural effusions.    --- End of Report ---    < end of copied text >    < from: TTE W or WO Ultrasound Enhancing Agent (02.14.24 @ 15:41) >  _______________________________________________________________________________________     CONCLUSIONS:      1. Technically difficult image quality.   2. 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 No aortic regurgitation.   3. Severe mitral valve stenosis, secondary to dystrophic mitral annular calcification.   4. No prior echocardiogram is available for comparison.    ________________________________________________________________________________________  FINDINGS:     Left Ventricle:  There is moderate (grade 2) left ventricular diastolic dysfunction, with elevated filling pressure. Left ventricular endocardium is not well visualized.     Right Ventricle:  The right ventricle is not well visualized. A device lead is visualized.     Left Atrium:  The left atrium is severely dilated with an indexed volume of 64.47 ml/m².     Aortic Valve:  Transcatheter aortic valve replacement with normal gradient. No aortic regurgitation.     Mitral Valve:  There is severe calcification of the mitral valve annulus. There is severe leaflet calcification. There is severe mitral valve stenosis, secondary to dystrophic mitral annular calcification. The transmitral mean gradient is 15.76 mmHg at a heart rate of 73 bpm. There is mild mitral regurgitation.     Tricuspid Valve:  There is mild tricuspid regurgitation.     Pulmonic Valve:  The pulmonic valve was not well visualized.     Pericardium:  No pericardial effusion seen.  ____________________________________________________________________  QUANTITATIVE DATA:  Left Ventricle Measurements: (Indexed to BSA)     MV E Vmax:    1.88 m/s  MV A Vmax:    2.65 m/s  MV E/A:       0.71  e' lateral:   4.90 cm/s  e' medial:    3.37 cm/s  E/e' lateral: 38.37  E/e' medial:  55.79  E/e' Average: 45.47       Right Ventricle Measurements:     TV Aaliyah. S': 12.00 cm/s       LVOT / RVOT/ Qp/Qs Data: (Indexed to BSA)  LVOT Diameter: 1.90 cm  LVOT Vmax:     1.38 m/s  LVOT VTI:      25.59 cm  LVOT SV:       72.5 ml    47.25 ml/m²  LVOT CO:       7.69 l/min 5.01 l/min/m²    Aortic Valve Measurements:  AV Vmax:                1.5 m/s  AV Peak Gradient:       8.9 mmHg  AV Mean Gradient:       3.8 mmHg  AV VTI:                 28.2 cm  AV VTI Ratio:           0.91  AoV EOA, Contin:        2.57 cm²  AoV EOA, Contin i:      1.68 cm²/m²  AoV DimensionlessIndex 0.91    Mitral Valve Measurements:     MV VTI:         81.37 cm  MV VTI (tips):  74.60 cm  MV Mean Grad:   15.22 mmHg  MV E Vmax:      1.9 m/s  MV A Vmax:      2.6 m/s  MV E/A:         0.7  MV Gradient HR: 73 bpm       Tricuspid Valve Measurements:     TR Vmax:          3.1 m/s  TR Peak Gradient: 37.5 mmHg    ________________________________________________________________________________________  Electronically signed on 2/14/2024 at 7:32:22 PM by Cody Troy MD       < end of copied text >

## 2024-02-21 NOTE — PROGRESS NOTE ADULT - SUBJECTIVE AND OBJECTIVE BOX
Subjective: Patient seen and examined. No new events except as noted.   remains in Stroke unit   Daughter and son in law at bedside     REVIEW OF SYSTEMS:    CONSTITUTIONAL:+ weakness, fevers or chills  EYES/ENT: No visual changes;  No vertigo or throat pain   NECK: No pain or stiffness  RESPIRATORY: No cough, wheezing, hemoptysis; No shortness of breath  CARDIOVASCULAR: No chest pain or palpitations  GASTROINTESTINAL: No abdominal or epigastric pain. No nausea, vomiting, or hematemesis; No diarrhea or constipation. No melena or hematochezia.  GENITOURINARY: No dysuria, frequency or hematuria  NEUROLOGICAL: No numbness or weakness  SKIN: No itching, burning, rashes, or lesions   All other review of systems is negative unless indicated above.    MEDICATIONS:  MEDICATIONS  (STANDING):  albuterol/ipratropium for Nebulization 3 milliLiter(s) Nebulizer every 6 hours  dextrose 5%. 1000 milliLiter(s) (50 mL/Hr) IV Continuous <Continuous>  enoxaparin Injectable 30 milliGRAM(s) SubCutaneous every 24 hours      PHYSICAL EXAM:  T(C): 36.7 (02-21-24 @ 08:00), Max: 37.1 (02-20-24 @ 16:00)  HR: 83 (02-21-24 @ 08:00) (80 - 105)  BP: 115/50 (02-21-24 @ 08:00) (101/49 - 136/56)  RR: 23 (02-21-24 @ 08:00) (17 - 26)  SpO2: 95% (02-21-24 @ 08:00) (94% - 99%)  Wt(kg): --  I&O's Summary    20 Feb 2024 07:01  -  21 Feb 2024 07:00  --------------------------------------------------------  IN: 1450 mL / OUT: 860 mL / NET: 590 mL        Appearance: NAD  HEENT:   Dry oral mucosa, PERRL, EOMI	  Lymphatic: No lymphadenopathy  Cardiovascular: Normal S1 S2, No JVD, No murmurs, No edema  Respiratory: Decreased bs  Psychiatry: A & O x 0 sleepy  Gastrointestinal:  Soft, Non-tender, + BS	  Skin: No rashes, No ecchymoses, No cyanosis	  Neurologic Exam:  Mental status - eyes closed, opens to repeated verbal stimuli. Follows intermittent simple commands to squeeze L hand/give thumbs up in L hand. Does not respond to orientation questions.   Cranial nerves - R pupil appears ?sluggishly reactive. No BTT in R eye. Unable to open left eye. ?R facial droop but may be 2/2 positioning of patient's head to R.  Motor - Normal bulk. Increased tone in RUE. Does not maintain antigravity when asked throughout all extremities initially, however does squeeze hand on LUE, withdraws slightly to noxious stimuli in R hand.   Upon re-evaluation able to raise both legs antigravity.  Sensation - Withdraws to noxious stimuli throughout.  DTR's - deferred  Coordination -deferred  Gait and station - deferred  Extremities: Normal range of motion, No clubbing, cyanosis or edema  Vascular: Peripheral pulses palpable 2+ bilaterally        LABS:    CARDIAC MARKERS:                                8.2    5.91  )-----------( 179      ( 21 Feb 2024 06:32 )             27.1     02-21    152<H>  |  122<H>  |  24<H>  ----------------------------<  118<H>  5.1   |  19<L>  |  1.14    Ca    8.4      21 Feb 2024 06:32  Phos  1.8     02-20  Mg     2.5     02-20    TPro  6.3  /  Alb  3.7  /  TBili  0.6  /  DBili  x   /  AST  15  /  ALT  13  /  AlkPhos  97  02-20    proBNP:   Lipid Profile:   HgA1c:   TSH:     Trace          TELEMETRY: 	SR     ECG:  	  RADIOLOGY:   DIAGNOSTIC TESTING:  [ ] Echocardiogram:  [ ]  Catheterization:  [ ] Stress Test:    OTHER:

## 2024-02-21 NOTE — CHART NOTE - NSCHARTNOTEFT_GEN_A_CORE
89 yo female s/p L MCA stroke.     DYSPHAGIA COURSE THIS ADMISSION:  2/15 initial swallow exam recommending NPO, with non-oral nutrition/hydration/medications. Pt mentation and secretion management not appropriate for p.o. at this time. NGT placement unsuccessful after this exam.  2/19 repeat swallow exam recommending NPO, with non-oral nutrition/hydration/medications as pt mentation waxing/waning and pt unable to trigger pharyngeal swallow.    TODAY, pt seen for 2x re-evaluation of swallow. In AM, pt mentation not appropriate for oral intake, however, family reporting pt was more awake moments before, requesting re-evaluation later today pending pt improvement in mentation. This afternoon, pt seen again, with improvement in mentation, pt eyes open for duration of session, on room air. Pt nonverbal and attempting to follow directives, but limited.     Pt administered 1/5x tsp crushed ice chip and tsp dipped in moderately thick liquids. Pt noted with extremely limited oral management and limited movement of lingual/oral structures to manipulate material for oral transport. Pt required max verbal encouragement "swallow" w/ modeling for swallowing, by x2 daughters, x1 grandson, home health aidSUSAN Love, and SLP providing base of tongue pressure with teaspoon to trigger 1x swallow after 3 minute swallow delay.     IMPRESSION: Pt seen x3 by SLP services for dysphagia with multiple recommendations for NPO, with non-oral nutrition/hydration/medications. Pt p/w severe oropharyngeal dysphagia. Pt swallow profile not functional for oral diet and remains at high risk of aspiration.     RECOMMENDATIONS  > NPO, with non-oral nutrition/hydration/medications.   >Maintain strict aspiration precautions  >Maintain oral hygiene  >D/C rehab. If home, would recommend home SLP dysphagia services.    d/w x2 daughter, x1 grandson, home health aidSUSAN Love, MD [place]  SPEECH PATHOLOGY  [facility]-SLP *Teams preferred* ([phone]) 89 yo female s/p L MCA stroke.     DYSPHAGIA COURSE THIS ADMISSION:  2/15 initial swallow exam recommending NPO, with non-oral nutrition/hydration/medications. Pt mentation and secretion management not appropriate for p.o. at this time. NGT placement unsuccessful after this exam.  2/19 repeat swallow exam recommending NPO, with non-oral nutrition/hydration/medications as pt mentation waxing/waning and pt unable to trigger pharyngeal swallow.    TODAY, pt seen for 2x re-evaluation of swallow. In AM, pt mentation not appropriate for oral intake, however, family reporting pt was more awake moments before, requesting re-evaluation later today pending pt improvement in mentation. This afternoon, pt seen again, with improvement in mentation, pt eyes open for duration of session, on room air. Pt nonverbal and attempting to follow directives, but limited.     Pt administered 1/5x tsp crushed ice chip and tsp dipped in moderately thick liquids. Pt noted with extremely limited oral management and limited movement of lingual/oral structures to manipulate material for oral transport. Pt required max verbal encouragement "swallow" w/ modeling for swallowing, by x2 daughters, x1 grandson, home health aidSUSAN Love, and SLP providing base of tongue pressure with teaspoon to trigger 1x swallow after 3 minute swallow delay.     IMPRESSION: Pt seen x3 by SLP services for dysphagia with multiple recommendations for NPO, with non-oral nutrition/hydration/medications. Pt p/w severe oropharyngeal dysphagia. Pt swallow profile not functional for oral diet and remains at high risk of aspiration.     RECOMMENDATIONS  > NPO, with non-oral nutrition/hydration/medications.   >Maintain strict aspiration precautions  >Maintain oral hygiene  > Will remain on case to provide language and swallow treatment and any ongoing education   >D/C rehab. If home, would recommend home SLP dysphagia services.    d/w x2 daughter, x1 grandson, home health aidSUSAN Love MD [place]  SPEECH PATHOLOGY  Eli Mack [facility]-SLP *Teams preferred* ([phone])

## 2024-02-21 NOTE — DIETITIAN INITIAL EVALUATION ADULT - PHYSCIAL ASSESSMENT
Visual nutrition-focused physical examination conducted as pt nonverbal unable to consent to exam at this time. Visual findings noted.

## 2024-02-21 NOTE — DIETITIAN INITIAL EVALUATION ADULT - ENERGY INTAKE
Pt NPO x 8 days in-house. Per chart, plan for PEG placement per GOC discussions. IV dextrose running.

## 2024-02-21 NOTE — PROGRESS NOTE ADULT - ASSESSMENT
89F presented with a stroke    1. Stroke w. midline shift     2. Dysphagia   pending repeat swallow eval today, prognosis guarded. I had a lengthy discussion with the family regarding risks vs. benefits of PEG placement including bleeding, infection and perforation. They understand the risks that come with sedation including the possibility of intubation. PEG placement does not eliminate the risk of aspiration as she could still aspirate her oral secretions. Palliative service was at bedside during our conversation. Family is in agreement to proceed.     3. Hypernatremia, improving       I had a prolonged conversation with the patient regarding the hospital course, differential diagnosis, results of diagnostic tests this far, and therapeutic modalities available. Plan of care discussed with the patient after the evaluation. Patient expresses a clear understanding of the plan of care.      Jovon Ugalde D.O.  Gastroenterology and Hepatology  [address] [place] [address], [address]  [place], [place]  Office: [phone]

## 2024-02-21 NOTE — DIETITIAN INITIAL EVALUATION ADULT - REASON INDICATOR FOR ASSESSMENT
Pt seen for Inadequate diet order >3 days  Source: pt's daughters, aide, and grandson at bedside, medical record. Pt nonverbal, and unable to participate in interview at this time. Chart reviewed, events noted.

## 2024-02-21 NOTE — DIETITIAN INITIAL EVALUATION ADULT - PERTINENT LABORATORY DATA
02-21    152<H>  |  122<H>  |  24<H>  ----------------------------<  118<H>  5.1   |  19<L>  |  1.14    Ca    8.4      21 Feb 2024 06:32  Phos  1.8     02-20  Mg     2.5     02-20    TPro  6.3  /  Alb  3.7  /  TBili  0.6  /  DBili  x   /  AST  15  /  ALT  13  /  AlkPhos  97  02-20  A1C with Estimated Average Glucose Result: 6.3 % (02-15-24 @ 05:25)

## 2024-02-21 NOTE — DIETITIAN INITIAL EVALUATION ADULT - ORAL INTAKE PTA/DIET HISTORY
Pt's family and aide report pt with good appetite but limited ability to take sufficient PO PTA in setting of difficulty self feeding and limited ability to consume large meals PTA. Was following a soft, low sodium diet PTA.  Confirms NKFA.

## 2024-02-21 NOTE — PROGRESS NOTE ADULT - NS ATTEND AMEND GEN_ALL_CORE FT
I saw and examined the patient, reviewed diagnostic studies, and reviewed images personally. I agree with PA’s history, exam, orders placed, and plan of care. Medical issues needing to be addressed include: Cerebral infarction of L mca territory likely 2/2 pAfib, CHF, HTN, valve/pacer. Failed swallow w/ dysphagia, re-eval this afternoon. Multiple failed attempts for NG placement, plan for PEG tomorrow. Hypernatremia resolving, Na 152. Fluctuating mentation, but overall stable exam. Discussed case with two daughters and grandson at bedside- All questions answered, they express understanding of current situation and management plan.

## 2024-02-21 NOTE — PROGRESS NOTE ADULT - SUBJECTIVE AND OBJECTIVE BOX
Chief Complaint:  Patient is a 89y old  Female who presents with a chief complaint of CVA (20 Feb 2024 11:32)      Date of service 02-21-24 @ 13:51      Interval Events:   no acute events     Hospital Medications:  albuterol/ipratropium for Nebulization 3 milliLiter(s) Nebulizer every 6 hours  dextrose 5%. 1000 milliLiter(s) IV Continuous <Continuous>  enoxaparin Injectable 30 milliGRAM(s) SubCutaneous every 24 hours  pantoprazole  Injectable 40 milliGRAM(s) IV Push once        Review of Systems:  General:  No wt loss, fevers, chills, night sweats, fatigue,   Eyes:  Good vision, no reported pain  ENT:  No sore throat, pain, runny nose, dysphagia  CV:  No pain, palpitations, hypo/hypertension  Resp:  No dyspnea, cough, tachypnea, wheezing  GI:  See HPI  :  No pain, bleeding, incontinence, nocturia  Muscle:  No pain, weakness  Neuro:  No weakness, tingling, memory problems  Psych:  No fatigue, insomnia, mood problems, depression  Endocrine:  No polyuria, polydipsia, cold/heat intolerance  Heme:  No petechiae, ecchymosis, easy bruisability  Integumentary:  No rash, edema    PHYSICAL EXAM:   Vital Signs:  Vital Signs Last 24 Hrs  T(C): 36.7 (21 Feb 2024 08:00), Max: 37.1 (20 Feb 2024 16:00)  T(F): 98 (21 Feb 2024 08:00), Max: 98.7 (20 Feb 2024 16:00)  HR: 84 (21 Feb 2024 12:00) (81 - 110)  BP: 130/58 (21 Feb 2024 12:00) (107/45 - 138/61)  BP(mean): 83 (21 Feb 2024 12:00) (62 - 92)  RR: 16 (21 Feb 2024 12:00) (16 - 29)  SpO2: 96% (21 Feb 2024 12:00) (92% - 99%)    Parameters below as of 21 Feb 2024 10:30  Patient On (Oxygen Delivery Method): room air      Daily     Daily       PHYSICAL EXAM:     GENERAL:  Appears stated age, well-groomed, well-nourished, no distress  HEENT:  NC/AT,  conjunctivae anicteric, clear and pink,   NECK: supple, trachea midline  CHEST:  Full & symmetric excursion, no increased effort, breath sounds clear  HEART:  Regular rhythm, no JVD  ABDOMEN:  Soft, non-tender, non-distended, normoactive bowel sounds,  no masses , no hepatosplenomegaly  EXTREMITIES:  no cyanosis,clubbing or edema  SKIN:  No rash, erythema, or, ecchymoses, no jaundice  NEURO:  Alert, non-focal, no asterixis  PSYCH: Appropriate affect, oriented to place and time  RECTAL: Deferred      LABS Personally reviewed by me:                        8.2    5.91  )-----------( 179      ( 21 Feb 2024 06:32 )             27.1     Mean Cell Volume: 93.1 fl (02-21-24 @ 06:32)    02-21    152<H>  |  122<H>  |  24<H>  ----------------------------<  118<H>  5.1   |  19<L>  |  1.14    Ca    8.4      21 Feb 2024 06:32  Phos  1.8     02-20  Mg     2.5     02-20    TPro  6.3  /  Alb  3.7  /  TBili  0.6  /  DBili  x   /  AST  15  /  ALT  13  /  AlkPhos  97  02-20    LIVER FUNCTIONS - ( 20 Feb 2024 14:40 )  Alb: 3.7 g/dL / Pro: 6.3 g/dL / ALK PHOS: 97 U/L / ALT: 13 U/L / AST: 15 U/L / GGT: x           PT/INR - ( 20 Feb 2024 14:40 )   PT: 13.5 sec;   INR: 1.30 ratio         PTT - ( 20 Feb 2024 14:40 )  PTT:24.6 sec  Urinalysis Basic - ( 21 Feb 2024 06:32 )    Color: x / Appearance: x / SG: x / pH: x  Gluc: 118 mg/dL / Ketone: x  / Bili: x / Urobili: x   Blood: x / Protein: x / Nitrite: x   Leuk Esterase: x / RBC: x / WBC x   Sq Epi: x / Non Sq Epi: x / Bacteria: x                              8.2    5.91  )-----------( 179      ( 21 Feb 2024 06:32 )             27.1                         8.0    5.38  )-----------( 168      ( 20 Feb 2024 03:53 )             26.7                         8.5    6.86  )-----------( 183      ( 19 Feb 2024 15:41 )             28.0                         8.1    6.94  )-----------( 198      ( 19 Feb 2024 06:18 )             26.5                         8.5    7.73  )-----------( 175      ( 18 Feb 2024 18:04 )             27.8       Imaging personally reviewed by me:

## 2024-02-21 NOTE — DIETITIAN INITIAL EVALUATION ADULT - PERSON TAUGHT/METHOD
Discussed nutrition plan of care with pt's family and aide at the bedside. Discussed enteral nutrition and answered questions about feeding schedule, feeding administration, etc. All concerns addressed. Made aware RD remains available upon request and will follow-up per protocol.

## 2024-02-21 NOTE — DIETITIAN INITIAL EVALUATION ADULT - ADD RECOMMEND
1) When medically feasible, consider starting Jevity 1.2 @ 10 ml/hr, advance by 10 ml q6 hours to goal rate 35 ml/hr x 24 hours. Free water flushes per team.  - Will provide 840 ml formula, 1260 kcal, 54 g pro, and 678 ml free water.  - Will meet 29 kcal/kg and 1.2 g pro/kg based on dosing wt 43.1 kg (02-14)  2) Recommend a multivitamin and thiamine, pending no medical contraindications, for micronutrient coverage in setting of prolonged NPO status. Monitor K+, Phos, and Mg and replete PRN.  3) Monitor GI tolerance, skin integrity, labs, weight, and bowel movement regularity.   4) RD remains available upon request and will follow-up per protocol.  5) malnutrition alert placed in chart

## 2024-02-22 LAB
ANION GAP SERPL CALC-SCNC: 11 MMOL/L — SIGNIFICANT CHANGE UP (ref 5–17)
BUN SERPL-MCNC: 19 MG/DL — SIGNIFICANT CHANGE UP (ref 7–23)
CALCIUM SERPL-MCNC: 8.3 MG/DL — LOW (ref 8.4–10.5)
CHLORIDE SERPL-SCNC: 118 MMOL/L — HIGH (ref 96–108)
CO2 SERPL-SCNC: 19 MMOL/L — LOW (ref 22–31)
CREAT SERPL-MCNC: 1.04 MG/DL — SIGNIFICANT CHANGE UP (ref 0.5–1.3)
EGFR: 49 ML/MIN/1.73M2 — LOW
GLUCOSE SERPL-MCNC: 109 MG/DL — HIGH (ref 70–99)
HCT VFR BLD CALC: 28.1 % — LOW (ref 34.5–45)
HGB BLD-MCNC: 8.5 G/DL — LOW (ref 11.5–15.5)
MCHC RBC-ENTMCNC: 28.1 PG — SIGNIFICANT CHANGE UP (ref 27–34)
MCHC RBC-ENTMCNC: 30.2 GM/DL — LOW (ref 32–36)
MCV RBC AUTO: 93 FL — SIGNIFICANT CHANGE UP (ref 80–100)
NRBC # BLD: 0 /100 WBCS — SIGNIFICANT CHANGE UP (ref 0–0)
PLATELET # BLD AUTO: 195 K/UL — SIGNIFICANT CHANGE UP (ref 150–400)
POTASSIUM SERPL-MCNC: 4.5 MMOL/L — SIGNIFICANT CHANGE UP (ref 3.5–5.3)
POTASSIUM SERPL-SCNC: 4.5 MMOL/L — SIGNIFICANT CHANGE UP (ref 3.5–5.3)
RBC # BLD: 3.02 M/UL — LOW (ref 3.8–5.2)
RBC # FLD: 19.2 % — HIGH (ref 10.3–14.5)
SODIUM SERPL-SCNC: 148 MMOL/L — HIGH (ref 135–145)
WBC # BLD: 7.97 K/UL — SIGNIFICANT CHANGE UP (ref 3.8–10.5)
WBC # FLD AUTO: 7.97 K/UL — SIGNIFICANT CHANGE UP (ref 3.8–10.5)

## 2024-02-22 PROCEDURE — 99233 SBSQ HOSP IP/OBS HIGH 50: CPT | Mod: FS

## 2024-02-22 PROCEDURE — 71045 X-RAY EXAM CHEST 1 VIEW: CPT | Mod: 26

## 2024-02-22 DEVICE — PEG KT SAFETY 20FR: Type: IMPLANTABLE DEVICE | Status: FUNCTIONAL

## 2024-02-22 RX ORDER — SODIUM CHLORIDE 9 MG/ML
500 INJECTION INTRAMUSCULAR; INTRAVENOUS; SUBCUTANEOUS ONCE
Refills: 0 | Status: COMPLETED | OUTPATIENT
Start: 2024-02-22 | End: 2024-02-22

## 2024-02-22 RX ORDER — ACETAMINOPHEN 500 MG
650 TABLET ORAL ONCE
Refills: 0 | Status: COMPLETED | OUTPATIENT
Start: 2024-02-22 | End: 2024-02-22

## 2024-02-22 RX ORDER — SODIUM CHLORIDE 9 MG/ML
500 INJECTION INTRAMUSCULAR; INTRAVENOUS; SUBCUTANEOUS
Refills: 0 | Status: DISCONTINUED | OUTPATIENT
Start: 2024-02-22 | End: 2024-02-23

## 2024-02-22 RX ADMIN — Medication 3 MILLILITER(S): at 13:37

## 2024-02-22 RX ADMIN — Medication 3 MILLILITER(S): at 18:22

## 2024-02-22 RX ADMIN — SODIUM CHLORIDE 50 MILLILITER(S): 9 INJECTION, SOLUTION INTRAVENOUS at 18:22

## 2024-02-22 RX ADMIN — Medication 3 MILLILITER(S): at 06:00

## 2024-02-22 RX ADMIN — Medication 260 MILLIGRAM(S): at 21:34

## 2024-02-22 RX ADMIN — Medication 3 MILLILITER(S): at 23:18

## 2024-02-22 RX ADMIN — ENOXAPARIN SODIUM 30 MILLIGRAM(S): 100 INJECTION SUBCUTANEOUS at 18:22

## 2024-02-22 NOTE — CONSULT NOTE ADULT - SUBJECTIVE AND OBJECTIVE BOX
Vascular & Interventional Radiology Brief Consult Note    Evaluate for Procedure: G tube placement    HPI: 89 y/o F with PMH of CHF, HTN, PPM, valve replacement. Presents as a code stroke for AMS, ?R-facial droop, R sided weakness. Pt presenting with dysphagia. Per GI, G tube unable to be placed endoscopically without safe window.    IR consulted for G tube placement.  CT abd ordered by team, results pending.    Allergies: penicillins (Other)    Medications (Abx/Cardiac/Anticoagulation/Blood Products)    enoxaparin Injectable: 30 milliGRAM(s) SubCutaneous (02-21 @ 17:48)    Data:    T(C): 36.3  HR: 84  BP: 109/42  RR: 19  SpO2: 93%    -WBC 7.97 / HgB 8.5 / Hct 28.1 / Plt 195  -Na 148 / Cl 118 / BUN 19 / Glucose 109  -K 4.5 / CO2 19 / Cr 1.04  -ALT -- / Alk Phos -- / T.Bili --  -INR1.30    Imaging: CT Abd ordered

## 2024-02-22 NOTE — PROGRESS NOTE ADULT - SUBJECTIVE AND OBJECTIVE BOX
Subjective: Patient seen and examined. No new events except as noted.   remains in Stroke Unit     REVIEW OF SYSTEMS:    CONSTITUTIONAL: +weakness, fevers or chills  EYES/ENT: No visual changes;  No vertigo or throat pain   NECK: No pain or stiffness  RESPIRATORY: No cough, wheezing, hemoptysis; No shortness of breath  CARDIOVASCULAR: No chest pain or palpitations  GASTROINTESTINAL: No abdominal or epigastric pain. No nausea, vomiting, or hematemesis; No diarrhea or constipation. No melena or hematochezia.  GENITOURINARY: No dysuria, frequency or hematuria  NEUROLOGICAL: No numbness or weakness  SKIN: No itching, burning, rashes, or lesions   All other review of systems is negative unless indicated above.    MEDICATIONS:  MEDICATIONS  (STANDING):  albuterol/ipratropium for Nebulization 3 milliLiter(s) Nebulizer every 6 hours  clindamycin IVPB 600 milliGRAM(s) IV Intermittent once  dextrose 5%. 1000 milliLiter(s) (50 mL/Hr) IV Continuous <Continuous>  enoxaparin Injectable 30 milliGRAM(s) SubCutaneous every 24 hours  sodium chloride 0.9% Bolus 500 milliLiter(s) IV Bolus once  sodium chloride 0.9%. 500 milliLiter(s) (30 mL/Hr) IV Continuous <Continuous>      PHYSICAL EXAM:  T(C): 36.3 (02-22-24 @ 10:09), Max: 37 (02-22-24 @ 04:00)  HR: 75 (02-22-24 @ 10:09) (75 - 90)  BP: 133/63 (02-22-24 @ 10:09) (97/42 - 142/61)  RR: 17 (02-22-24 @ 10:09) (16 - 25)  SpO2: 95% (02-22-24 @ 10:09) (94% - 98%)  Wt(kg): --  I&O's Summary    21 Feb 2024 07:01  -  22 Feb 2024 07:00  --------------------------------------------------------  IN: 1200 mL / OUT: 600 mL / NET: 600 mL          Appearance: NAD  HEENT:   Dry oral mucosa, PERRL, EOMI	  Lymphatic: No lymphadenopathy  Cardiovascular: Normal S1 S2, No JVD, No murmurs, No edema  Respiratory: Decreased bs  Psychiatry: A & O x 0 sleepy  Gastrointestinal:  Soft, Non-tender, + BS	  Skin: No rashes, No ecchymoses, No cyanosis	  Neurologic Exam:  Mental status - eyes closed, opens to repeated verbal stimuli. Follows intermittent simple commands to squeeze L hand/give thumbs up in L hand. Does not respond to orientation questions.   Cranial nerves - R pupil appears ?sluggishly reactive. No BTT in R eye. Unable to open left eye. ?R facial droop but may be 2/2 positioning of patient's head to R.  Motor - Normal bulk. Increased tone in RUE. Does not maintain antigravity when asked throughout all extremities initially, however does squeeze hand on LUE, withdraws slightly to noxious stimuli in R hand.   Upon re-evaluation able to raise both legs antigravity.  Sensation - Withdraws to noxious stimuli throughout.  DTR's - deferred  Coordination -deferred  Gait and station - deferred  Extremities: Normal range of motion, No clubbing, cyanosis or edema  Vascular: Peripheral pulses palpable 2+ bilaterally        LABS:    CARDIAC MARKERS:                                8.5    7.97  )-----------( 195      ( 22 Feb 2024 06:21 )             28.1     02-22    148<H>  |  118<H>  |  19  ----------------------------<  109<H>  4.5   |  19<L>  |  1.04    Ca    8.3<L>      22 Feb 2024 06:24  Phos  1.8     02-20  Mg     2.5     02-20    TPro  6.3  /  Alb  3.7  /  TBili  0.6  /  DBili  x   /  AST  15  /  ALT  13  /  AlkPhos  97  02-20          TELEMETRY: 	SR planning for PEG       ECG:  	  RADIOLOGY:   DIAGNOSTIC TESTING:  [ ] Echocardiogram:  [ ]  Catheterization:  [ ] Stress Test:    OTHER:

## 2024-02-22 NOTE — PROGRESS NOTE ADULT - SUBJECTIVE AND OBJECTIVE BOX
THE PATIENT WAS SEEN AND EXAMINED BY ME WITH THE HOUSESTAFF AND STROKE TEAM DURING MORNING ROUNDS.   HPI:  89-year-old left-handed woman with past medical history of CHF, HTN, pacemaker placement, valve replacement presenting as a code stroke for AMS, ?R-facial droop, R sided weakness.  Was found by home aide at 830 2/14 less responsive, not moving extremities as usually does. No prior known hx strokes per family.  At baseline patient alert, able to recognize/comprehend familiar faces, per daughters at bedside patient sometimes has difficulty with getting words out.    SH: No smoking ETOH use recreational drug use.  LKW: 2130 2/13/24  NIHSS 18  MRS 4 (aide at bedside reports patient requires assistance with all ADLs, including getting out of bed, showering, changing clothes, walking, and since breaking L arm last year, requires also assistance with feeding)  BP per /70, glucose per .     Information obtained from aide at bedside, then after CT scanner family also at bedside.       SUBJECTIVE: No events overnight.  No new neurologic complaints.  ROS negative unless otherwise noted.     albuterol/ipratropium for Nebulization 3 milliLiter(s) Nebulizer every 6 hours  dextrose 5%. 1000 milliLiter(s) IV Continuous <Continuous>  enoxaparin Injectable 30 milliGRAM(s) SubCutaneous every 24 hours      PHYSICAL EXAM:   Vital Signs Last 24 Hrs  T(C): 37 (22 Feb 2024 04:00), Max: 37 (22 Feb 2024 04:00)  T(F): 98.6 (22 Feb 2024 04:00), Max: 98.6 (22 Feb 2024 04:00)  HR: 77 (22 Feb 2024 04:00) (77 - 110)  BP: 106/41 (22 Feb 2024 04:00) (106/41 - 142/61)  BP(mean): 59 (22 Feb 2024 04:00) (59 - 88)  RR: 21 (22 Feb 2024 04:00) (16 - 29)  SpO2: 97% (22 Feb 2024 04:00) (92% - 97%)    General: No acute distress  Abdomen: Soft, nondistended   Extremities: No edema    NEUROLOGICAL EXAM:  Mental status: Opens eyes briefly to voice, does not respond to questions. Follows some simple commands.  Cranial Nerves: No facial asymmetry. Decreased blink to threat on Right.   Motor exam: Normal tone, some effort spontaneous against gravity throughout all extremities, L > R. Arthritic changes throughout extremities.  Sensation: Grimaces to painful stimuli x 4, less so on the right  Coordination/ Gait: Deferred    LABS:                        8.5    7.97  )-----------( 195      ( 22 Feb 2024 06:21 )             28.1    02-22    148<H>  |  118<H>  |  19  ----------------------------<  109<H>  4.5   |  19<L>  |  1.04    Ca    8.3<L>      22 Feb 2024 06:24  Phos  1.8     02-20  Mg     2.5     02-20    TPro  6.3  /  Alb  3.7  /  TBili  0.6  /  DBili  x   /  AST  15  /  ALT  13  /  AlkPhos  97  02-20  PT/INR - ( 20 Feb 2024 14:40 )   PT: 13.5 sec;   INR: 1.30 ratio         PTT - ( 20 Feb 2024 14:40 )  PTT:24.6 sec      IMAGING: Reviewed by me.     2/17/24 CT Head:   Acute left MCA territory infarct, as on the prior, with associated   hemorrhagic transformation centered in the ipsilateral basal ganglia, not   significantly changed. Associated 8-9 mm rightward midline shift is not   significantly changed. Slightly more pronounced gyriform hyperdensity may   reflect spared cortex versus cortical petechial hemorrhage. Additional   findings described in detail above.    2/16/24 CT Head:   IMPRESSION: Redemonstration of an evolving left-sided MCA distribution   acute/subacute infarct with a new small amount of hemorrhagic   transformation within the infarct bed in comparison with 2/14/2024.    Surrounding mass effect and shift of the midline structures from left to   right appears unchanged when compared to the most recent prior CT exam.    CT Head, CTA Head/Neck 2/14/24:  CT brain:  Acute left frontoparietalinfarct. No CT evidence for selene hemorrhagic   transformation.    CT brain perfusion:  Significantly limited by motion.    Cerebral blood flow less than 30% = 0 mL. No core infarct predicted.    Tmax greater than 6 seconds = 46 mL and involves the bilateral mesial   cerebellar hemispheres, the right temporal lobe, bilateral frontal lobes,   and left posterior temporal. This represents brain parenchyma predicted   to be at continued ischemic risk in the presence of neurologic symptoms.   This finding is likely spurious in nature.    Mismatch volume 46 mL  Mismatch ratio infinite    CTA brain:  Intraluminal filling defect involving the left ICA terminus and extending   into the proximal right A1 segment.    Normal flow-related enhancement of the remaining left LEBRON. Normal   flow-related enhancement of the left MCA.    Right MCA enhances to a lesser degree than the left MCA.    No vascular aneurysm or AVM.    CTA neck:  Approximately 50% short segment stenosis of the origin and proximal   segment of the left ICA due to calcified plaque. Normal flow-related   enhancement of the more distal vessel.    Approximately 75-80% short segment stenosis of the proximal segment of   the right internal carotid artery due to partially calcified plaque.   Normal flow-related enhancement of the more distal vessel.    2/14/24 TTE:    1. Technically difficult image quality.   2. 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 No aortic regurgitation.   3. Severe mitral valve stenosis, secondary to dystrophic mitral annular calcification.   4. No prior echocardiogram is available for comparison. THE PATIENT WAS SEEN AND EXAMINED BY ME WITH THE HOUSESTAFF AND STROKE TEAM DURING MORNING ROUNDS.   HPI:  89-year-old left-handed woman with past medical history of CHF, HTN, pacemaker placement, valve replacement presenting as a code stroke for AMS, ?R-facial droop, R sided weakness.  Was found by home aide at 830 2/14 less responsive, not moving extremities as usually does. No prior known hx strokes per family.  At baseline patient alert, able to recognize/comprehend familiar faces, per daughters at bedside patient sometimes has difficulty with getting words out.    SH: No smoking ETOH use recreational drug use.  LKW: 2130 2/13/24  NIHSS 18  MRS 4 (aide at bedside reports patient requires assistance with all ADLs, including getting out of bed, showering, changing clothes, walking, and since breaking L arm last year, requires also assistance with feeding)  BP per /70, glucose per .     Information obtained from aide at bedside, then after CT scanner family also at bedside.       SUBJECTIVE: No events overnight.  No new neurologic complaints.  ROS negative unless otherwise noted.     albuterol/ipratropium for Nebulization 3 milliLiter(s) Nebulizer every 6 hours  dextrose 5%. 1000 milliLiter(s) IV Continuous <Continuous>  enoxaparin Injectable 30 milliGRAM(s) SubCutaneous every 24 hours      PHYSICAL EXAM:   Vital Signs Last 24 Hrs  T(C): 37 (22 Feb 2024 04:00), Max: 37 (22 Feb 2024 04:00)  T(F): 98.6 (22 Feb 2024 04:00), Max: 98.6 (22 Feb 2024 04:00)  HR: 77 (22 Feb 2024 04:00) (77 - 110)  BP: 106/41 (22 Feb 2024 04:00) (106/41 - 142/61)  BP(mean): 59 (22 Feb 2024 04:00) (59 - 88)  RR: 21 (22 Feb 2024 04:00) (16 - 29)  SpO2: 97% (22 Feb 2024 04:00) (92% - 97%)    General: No acute distress  Abdomen: Soft, nondistended   Extremities: No edema    NEUROLOGICAL EXAM:  Mental status: Opens eyes briefly to voice, does not respond to questions with purposeful verbal output, groans intermittently. Follows some simple commands.  Cranial Nerves: No facial asymmetry. Decreased blink to threat on Right.   Motor exam: Normal tone, some effort spontaneous against gravity throughout all extremities, L > R. Arthritic changes throughout extremities.  Sensation: Grimaces to painful stimuli x 4, less so on the right  Coordination/ Gait: Deferred    LABS:                        8.5    7.97  )-----------( 195      ( 22 Feb 2024 06:21 )             28.1    02-22    148<H>  |  118<H>  |  19  ----------------------------<  109<H>  4.5   |  19<L>  |  1.04    Ca    8.3<L>      22 Feb 2024 06:24  Phos  1.8     02-20  Mg     2.5     02-20    TPro  6.3  /  Alb  3.7  /  TBili  0.6  /  DBili  x   /  AST  15  /  ALT  13  /  AlkPhos  97  02-20  PT/INR - ( 20 Feb 2024 14:40 )   PT: 13.5 sec;   INR: 1.30 ratio         PTT - ( 20 Feb 2024 14:40 )  PTT:24.6 sec      IMAGING: Reviewed by me.     2/17/24 CT Head:   Acute left MCA territory infarct, as on the prior, with associated   hemorrhagic transformation centered in the ipsilateral basal ganglia, not   significantly changed. Associated 8-9 mm rightward midline shift is not   significantly changed. Slightly more pronounced gyriform hyperdensity may   reflect spared cortex versus cortical petechial hemorrhage. Additional   findings described in detail above.    2/16/24 CT Head:   IMPRESSION: Redemonstration of an evolving left-sided MCA distribution   acute/subacute infarct with a new small amount of hemorrhagic   transformation within the infarct bed in comparison with 2/14/2024.    Surrounding mass effect and shift of the midline structures from left to   right appears unchanged when compared to the most recent prior CT exam.    CT Head, CTA Head/Neck 2/14/24:  CT brain:  Acute left frontoparietalinfarct. No CT evidence for selene hemorrhagic   transformation.    CT brain perfusion:  Significantly limited by motion.    Cerebral blood flow less than 30% = 0 mL. No core infarct predicted.    Tmax greater than 6 seconds = 46 mL and involves the bilateral mesial   cerebellar hemispheres, the right temporal lobe, bilateral frontal lobes,   and left posterior temporal. This represents brain parenchyma predicted   to be at continued ischemic risk in the presence of neurologic symptoms.   This finding is likely spurious in nature.    Mismatch volume 46 mL  Mismatch ratio infinite    CTA brain:  Intraluminal filling defect involving the left ICA terminus and extending   into the proximal right A1 segment.    Normal flow-related enhancement of the remaining left LEBRON. Normal   flow-related enhancement of the left MCA.    Right MCA enhances to a lesser degree than the left MCA.    No vascular aneurysm or AVM.    CTA neck:  Approximately 50% short segment stenosis of the origin and proximal   segment of the left ICA due to calcified plaque. Normal flow-related   enhancement of the more distal vessel.    Approximately 75-80% short segment stenosis of the proximal segment of   the right internal carotid artery due to partially calcified plaque.   Normal flow-related enhancement of the more distal vessel.    2/14/24 TTE:    1. Technically difficult image quality.   2. 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 No aortic regurgitation.   3. Severe mitral valve stenosis, secondary to dystrophic mitral annular calcification.   4. No prior echocardiogram is available for comparison.

## 2024-02-22 NOTE — PROGRESS NOTE ADULT - ASSESSMENT
89-year-old left-handed woman with past medical history of CHF, HTN, pacemaker placement, TAVR presenting as a code stroke for AMS, ?R-facial droop, R sided weakness.  Was found by home aide at 830 2/14 less responsive, not moving extremities as usually does. No prior known hx strokes per family.  At baseline patient alert, able to recognize/comprehend familiar faces, per daughters at bedside patient sometimes has difficulty with getting words out.  Information obtained from daughter  at bedside    CVA (cerebrovascular accident).   ·  Plan: Not candidate for tenecteplase as area of hypodensity already appreciated on CT head imaging/age, not candidate for thrombectomy given higher MRS.  -  (aide at bedside reports patient requires assistance with all ADLs, including getting out of bed, showering, changing clothes, walking, and since breaking L arm last year, requires also assistance with feeding)  - Monitor on Tele rule out PAF , but I suspect this to be the etiology given advanced age and severe MS  - Aspiration precautions.    hypernatremia  - c/w d5w  -monitor sodium    JOLLY  - unknown baseline  - monitor cre  - avoid nephrotoxins    h/o CHF  - monitoe vlume status  - strict Is and Os    dysphagia  - PEG unsuccessful  CT Abd/P     Mitral valve stenosis.   - Likely history of RHD   - s/p previous TAVR   - now severe MS  - Monitor for PAF.    Cardiac pacemaker.   - Interrogated   - VVI mode.    dispo  - PT/OT  - palliative care  - full code

## 2024-02-22 NOTE — PROGRESS NOTE ADULT - PROBLEM SELECTOR PLAN 3
-NPO with TF   -Aspiration precautions   -PEG unable to be placed  - to be scheduled for tube placement in IR per family   -GI f/u.

## 2024-02-22 NOTE — PRE PROCEDURE NOTE - PRE PROCEDURE EVALUATION
Attending Physician: Dr Ireland                            Procedure: PEG placement    Indication for Procedure:   ________________________________________________________  PAST MEDICAL & SURGICAL HISTORY:    ALLERGIES:  penicillins (Other)    HOME MEDICATIONS:  acetaminophen 500 mg oral tablet: 2 tab(s) orally every 6 hours as needed for  moderate pain  atorvastatin 10 mg oral tablet: 1 tab(s) orally once a day (at bedtime)  D3 25 mcg (1000 intl units) oral tablet: 1 tab(s) orally once a day  esomeprazole 40 mg oral delayed release capsule: 1 cap(s) orally once a day before breakfast  folic acid 1 mg oral tablet: 1 tab(s) orally once a day  furosemide 40 mg oral tablet: 1 tab(s) orally once a day  losartan 25 mg oral tablet: 1 tab(s) orally once a day  metoprolol succinate 50 mg oral capsule, extended release: 1 cap(s) orally once a day  montelukast 10 mg oral tablet: 1 tab(s) orally once a day    AICD/PPM: [ ] yes   [ ] no    PERTINENT LAB DATA:                        8.5    7.97  )-----------( 195      ( 22 Feb 2024 06:21 )             28.1     02-22    148<H>  |  118<H>  |  19  ----------------------------<  109<H>  4.5   |  19<L>  |  1.04    Ca    8.3<L>      22 Feb 2024 06:24  Phos  1.8     02-20  Mg     2.5     02-20    TPro  6.3  /  Alb  3.7  /  TBili  0.6  /  DBili  x   /  AST  15  /  ALT  13  /  AlkPhos  97  02-20    PT/INR - ( 20 Feb 2024 14:40 )   PT: 13.5 sec;   INR: 1.30 ratio         PTT - ( 20 Feb 2024 14:40 )  PTT:24.6 sec            PHYSICAL EXAMINATION:    T(C): 36.3  HR: 75  BP: 133/63  RR: 17  SpO2: 95%    Constitutional: NAD    Neck:  No JVD  Respiratory: CTAB/L  Cardiovascular: S1 and S2  Gastrointestinal: BS+, soft, NT/ND  Extremities: No peripheral edema  Neurological: A/O x 3, no focal deficits        COMMENTS:    The patient is a suitable candidate for the planned procedure unless box checked [ ]  No, explain:     Attending Physician: Dr Ireland                            Procedure: PEG placement    Indication for Procedure: dysphagia   ________________________________________________________  PAST MEDICAL & SURGICAL HISTORY:    ALLERGIES:  penicillins (Other)    HOME MEDICATIONS:  acetaminophen 500 mg oral tablet: 2 tab(s) orally every 6 hours as needed for  moderate pain  atorvastatin 10 mg oral tablet: 1 tab(s) orally once a day (at bedtime)  D3 25 mcg (1000 intl units) oral tablet: 1 tab(s) orally once a day  esomeprazole 40 mg oral delayed release capsule: 1 cap(s) orally once a day before breakfast  folic acid 1 mg oral tablet: 1 tab(s) orally once a day  furosemide 40 mg oral tablet: 1 tab(s) orally once a day  losartan 25 mg oral tablet: 1 tab(s) orally once a day  metoprolol succinate 50 mg oral capsule, extended release: 1 cap(s) orally once a day  montelukast 10 mg oral tablet: 1 tab(s) orally once a day    AICD/PPM: [x] yes   [ ] no - PPM    PERTINENT LAB DATA:                        8.5    7.97  )-----------( 195      ( 22 Feb 2024 06:21 )             28.1     02-22    148<H>  |  118<H>  |  19  ----------------------------<  109<H>  4.5   |  19<L>  |  1.04    Ca    8.3<L>      22 Feb 2024 06:24  Phos  1.8     02-20  Mg     2.5     02-20    TPro  6.3  /  Alb  3.7  /  TBili  0.6  /  DBili  x   /  AST  15  /  ALT  13  /  AlkPhos  97  02-20    PT/INR - ( 20 Feb 2024 14:40 )   PT: 13.5 sec;   INR: 1.30 ratio         PTT - ( 20 Feb 2024 14:40 )  PTT:24.6 sec            PHYSICAL EXAMINATION:    T(C): 36.3  HR: 75  BP: 133/63  RR: 17  SpO2: 95%    Constitutional: NAD    Neck:  No JVD  Respiratory: no resp distress   Cardiovascular: rrr  Extremities: No peripheral edema

## 2024-02-22 NOTE — PROGRESS NOTE ADULT - NS ATTEND AMEND GEN_ALL_CORE FT
I saw and examined the patient, reviewed diagnostic studies, and reviewed images personally. I agree with PA’s history, exam, orders placed, and plan of care. Medical issues needing to be addressed include: Cerebral infarction of L mca territory likely 2/2 pAfib, CHF, HTN, valve/pacer. Failed swallow w/ dysphagia, re-eval this afternoon. Multiple failed attempts for NG placement, PEG planned for today by GI, but due to anatomy, unable to place- NG now in place, f up XR, will plan to start feeds today. Potential IR GT placement. I saw and examined the patient, reviewed diagnostic studies, and reviewed images personally. I agree with PA’s history, exam, orders placed, and plan of care. Medical issues needing to be addressed include: Cerebral infarction of L mca territory likely 2/2 pAfib, CHF, HTN, valve/pacer. Failed swallow w/ dysphagia, re-eval this afternoon. Multiple failed attempts for NG placement, PEG planned for today by GI, but due to anatomy, unable to place- NG now in place, f up XR, will plan to start feeds today. Potential IR GT placement. CT abdomen.

## 2024-02-22 NOTE — PROGRESS NOTE ADULT - ASSESSMENT
89-year-old left-handed woman with past medical history of CHF, HTN, pacemaker placement, valve replacement presenting as a code stroke for AMS, ?R-facial droop, R sided weakness. Was found by home aide at 830 2/14 less responsive, not moving extremities as usually does. No prior known hx strokes per family. At baseline patient alert, able to recognize/comprehend familiar faces, per daughters at bedside patient sometimes has difficulty with getting words out. Not candidate for tenecteplase as area of hypodensity already appreciated on CT head imaging/age, not candidate for thrombectomy given higher MRS.    IMPRESSION: Global aphasia, R hemiparesis due to L MCA infarct 2/2 L ICA partially occlusive thrombus.    NEURO: Neurologically slightly improved since admission. Continue close monitoring for neurologic deterioration. A1c 6.3. LDL 76 - high intensity statin when oral access obtained, titrate statin to LDL goal less than 70. CT Head, CTA Head/Neck as above. Maintain SBP < 140. Physical therapy/OT/Speech eval - NOHEMI.    ANTITHROMBOTIC THERAPY: On hold given hemorrhagic transformation on CTH and PEG pending    PULMONARY: Protecting airway, saturating well. Continue home albuterol nebs. Now on supplemental O2. CT chest w/ small b/l pleural effusions and congestion. CT chest w/ mucous plugging LLL. Pulmonology following. Procalcitonin not significantly elevated. Continue monitoring off antibiotics.    CARDIOVASCULAR: TTE severe mitral stenosis, s/p previous TAVR. Cardiac pacemaker interrogated. Continue cardiac monitoring. Monitor for PAF. BP in the 170s, Norvasc 5mg daily added as per cardio.     SBP goal: <180    GASTROINTESTINAL: Dysphagia screen failed. Failed rpt bedside S/S eval. GI following for possible PEG placement, deferred on 2/20 due to hypernatremia. Hypernatremia now improving. Plan for PEG on 2/22. Repeat swallow evaluation 2/19, 2/21 - patient not a candidate for oral feeding at this time.         Diet: NPO. Caution with IVF given mitral stenosis and concern for volume overload as seen on CT Chest     RENAL: Resolving JOLLY, likely in setting of contrast load vs. prerenal, Cr improving 1.04. Na: 148, on D5W @ 50cc/hr, continue to trend      Na Goal: Greater than 135.      Sousa: No    HEMATOLOGY: H/H fluctuating, hemoglobin 8.5, will c/w trending. Platelets 195     DVT ppx: Lovenox 30 subq     ID: Afebrile without leukocytosis, monitoring off antibiotics.     OTHER: Plan discussed with daughter at bedside. Family wants FULL code. Palliative care consulted for goals of care discussion and support.     DISPOSITION: Rehab per PT eval once stable and workup is complete    CORE MEASURES:        Admission NIHSS: 18     TPA: NO      LDL/HDL: 76/45     Depression Screen: unable to assess     Statin Therapy: yes, when oral access obtained     Dysphagia Screen: FAIL     Smoking NO      Afib NO     Stroke Education YES    Obtain screening lower extremity venous ultrasound in patients who meet 1 or more of the following criteria as patient is high risk for DVT/PE on admission:   [] History of DVT/PE  [] Hypercoagulable states (Factor V Leiden, Cancer, OCP, etc. )  [x] Prolonged immobility (hemiplegia/hemiparesis/post operative or any other extended immobilization)  [] Transferred from outside facility (Rehab or Long term care)  [] Age </= to 50 89-year-old left-handed woman with past medical history of CHF, HTN, pacemaker placement, valve replacement presenting as a code stroke for AMS, ?R-facial droop, R sided weakness. Was found by home aide at 830 2/14 less responsive, not moving extremities as usually does. No prior known hx strokes per family. At baseline patient alert, able to recognize/comprehend familiar faces, per daughters at bedside patient sometimes has difficulty with getting words out. Not candidate for tenecteplase as area of hypodensity already appreciated on CT head imaging/age, not candidate for thrombectomy given higher MRS.    IMPRESSION: Global aphasia, R hemiparesis due to L MCA infarct 2/2 L ICA partially occlusive thrombus.    NEURO: Neurologically slightly improved since admission. Continue close monitoring for neurologic deterioration. A1c 6.3. LDL 76 - high intensity statin when oral access obtained, titrate statin to LDL goal less than 70. CT Head, CTA Head/Neck as above. Maintain SBP < 140. Physical therapy/OT/Speech eval - NOHEMI.    ANTITHROMBOTIC THERAPY: On hold given hemorrhagic transformation on CTH    PULMONARY: Protecting airway, saturating well. Continue home albuterol nebs. Now on supplemental O2. CT chest w/ small b/l pleural effusions and congestion. CT chest w/ mucous plugging LLL. Pulmonology following. Procalcitonin not significantly elevated. Continue monitoring off antibiotics.    CARDIOVASCULAR: TTE severe mitral stenosis, s/p previous TAVR. Cardiac pacemaker interrogated. Continue cardiac monitoring. Monitor for PAF. BP in the 170s, Norvasc 5mg daily added as per cardio.     SBP goal: <180    GASTROINTESTINAL: Dysphagia screen failed. Repeat swallow evaluation 2/19, 2/21 - patient not a candidate for oral feeding at this time.   GI following for possible PEG placement, deferred on 2/20 due to hypernatremia. Hypernatremia now improving. Plan for PEG on 2/22- attempted however no safe window for placement, NGT placed endoscopically instead, GI will contact IR for possible G-tube placement.          Diet: NPO. Caution with IVF given mitral stenosis and concern for volume overload as seen on CT Chest     RENAL: Resolving JOLLY, likely in setting of contrast load vs. prerenal, Cr improving 1.04. Na: 148, on D5W @ 50cc/hr, continue to trend      Na Goal: Greater than 135.      Sousa: No    HEMATOLOGY: H/H fluctuating, hemoglobin 8.5, will c/w trending. Platelets 195     DVT ppx: Lovenox 30 subq     ID: Afebrile without leukocytosis, monitoring off antibiotics.     OTHER: Plan discussed with daughter at bedside. Family wants FULL code. Palliative care consulted for goals of care discussion and support.     DISPOSITION: Rehab per PT eval once stable and workup is complete    CORE MEASURES:        Admission NIHSS: 18     TPA: NO      LDL/HDL: 76/45     Depression Screen: unable to assess     Statin Therapy: yes, when oral access obtained     Dysphagia Screen: FAIL     Smoking NO      Afib NO     Stroke Education YES    Obtain screening lower extremity venous ultrasound in patients who meet 1 or more of the following criteria as patient is high risk for DVT/PE on admission:   [] History of DVT/PE  [] Hypercoagulable states (Factor V Leiden, Cancer, OCP, etc. )  [x] Prolonged immobility (hemiplegia/hemiparesis/post operative or any other extended immobilization)  [] Transferred from outside facility (Rehab or Long term care)  [] Age </= to 50

## 2024-02-22 NOTE — PROGRESS NOTE ADULT - SUBJECTIVE AND OBJECTIVE BOX
Follow-up Pulm Progress Note    Unable to participate in ROS  Breathing nonlabored  O2 sats 94% RA     Medications:  MEDICATIONS  (STANDING):  albuterol/ipratropium for Nebulization 3 milliLiter(s) Nebulizer every 6 hours  clindamycin IVPB 600 milliGRAM(s) IV Intermittent once  dextrose 5%. 1000 milliLiter(s) (50 mL/Hr) IV Continuous <Continuous>  enoxaparin Injectable 30 milliGRAM(s) SubCutaneous every 24 hours  sodium chloride 0.9% Bolus 500 milliLiter(s) IV Bolus once  sodium chloride 0.9%. 500 milliLiter(s) (30 mL/Hr) IV Continuous <Continuous>    Vital Signs Last 24 Hrs  T(C): 36.3 (22 Feb 2024 10:09), Max: 37 (22 Feb 2024 04:00)  T(F): 97.3 (22 Feb 2024 09:27), Max: 98.6 (22 Feb 2024 04:00)  HR: 84 (22 Feb 2024 12:00) (75 - 92)  BP: 109/42 (22 Feb 2024 12:00) (95/52 - 142/61)  BP(mean): 61 (22 Feb 2024 12:00) (59 - 78)  RR: 19 (22 Feb 2024 12:00) (17 - 25)  SpO2: 93% (22 Feb 2024 12:00) (93% - 100%)    Parameters below as of 22 Feb 2024 11:17  Patient On (Oxygen Delivery Method): room air      02-21 @ 07:01  -  02-22 @ 07:00  --------------------------------------------------------  IN: 1200 mL / OUT: 600 mL / NET: 600 mL          LABS:                        8.5    7.97  )-----------( 195      ( 22 Feb 2024 06:21 )             28.1     02-22    148<H>  |  118<H>  |  19  ----------------------------<  109<H>  4.5   |  19<L>  |  1.04    Ca    8.3<L>      22 Feb 2024 06:24  Phos  1.8     02-20  Mg     2.5     02-20    TPro  6.3  /  Alb  3.7  /  TBili  0.6  /  DBili  x   /  AST  15  /  ALT  13  /  AlkPhos  97  02-20      PT/INR - ( 20 Feb 2024 14:40 )   PT: 13.5 sec;   INR: 1.30 ratio         PTT - ( 20 Feb 2024 14:40 )  PTT:24.6 sec  Urinalysis Basic - ( 22 Feb 2024 06:24 )    Color: x / Appearance: x / SG: x / pH: x  Gluc: 109 mg/dL / Ketone: x  / Bili: x / Urobili: x   Blood: x / Protein: x / Nitrite: x   Leuk Esterase: x / RBC: x / WBC x   Sq Epi: x / Non Sq Epi: x / Bacteria: x      Physical Examination:  PULM: Decreased BS   CVS: S1, S2 heard    RADIOLOGY REVIEWED    CT chest:  < from: CT Chest No Cont (02.16.24 @ 12:30) >    FINDINGS:    AIRWAYS AND LUNGS: Tracheal bronchial secretions.  Patchy bilateral lung   opacities superimposed on mosaic attenuation.    MEDIASTINUM AND PLEURA: There are no enlarged mediastinal, hilar or   axillary lymph nodes. The visualized portion of the thyroid gland is   heterogeneous. There are small bilateral pleural effusions.  There is no   pneumothorax.    HEART AND VESSELS: There is mild cardiomegaly.  There are atherosclerotic   calcifications of the aorta and coronary arteries. There is a small   pericardial effusion.  TAVR. Left-sided pacemaker    UPPER ABDOMEN: Images of the upper abdomen demonstrate cholecystectomy.   Residual contrast within the kidneys..    BONES AND SOFT TISSUES: There are mild degenerative changes of the spine.    The soft tissues are unremarkable.      IMPRESSION:  CHF and/or pneumonia with small bilateral pleural effusions.    --- End of Report ---    < end of copied text >    < from: TTE W or WO Ultrasound Enhancing Agent (02.14.24 @ 15:41) >  _______________________________________________________________________________________     CONCLUSIONS:      1. Technically difficult image quality.   2. 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 No aortic regurgitation.   3. Severe mitral valve stenosis, secondary to dystrophic mitral annular calcification.   4. No prior echocardiogram is available for comparison.    ________________________________________________________________________________________  FINDINGS:     Left Ventricle:  There is moderate (grade 2) left ventricular diastolic dysfunction, with elevated filling pressure. Left ventricular endocardium is not well visualized.     Right Ventricle:  The right ventricle is not well visualized. A device lead is visualized.     Left Atrium:  The left atrium is severely dilated with an indexed volume of 64.47 ml/m².     Aortic Valve:  Transcatheter aortic valve replacement with normal gradient. No aortic regurgitation.     Mitral Valve:  There is severe calcification of the mitral valve annulus. There is severe leaflet calcification. There is severe mitral valve stenosis, secondary to dystrophic mitral annular calcification. The transmitral mean gradient is 15.76 mmHg at a heart rate of 73 bpm. There is mild mitral regurgitation.     Tricuspid Valve:  There is mild tricuspid regurgitation.     Pulmonic Valve:  The pulmonic valve was not well visualized.     Pericardium:  No pericardial effusion seen.  ____________________________________________________________________  QUANTITATIVE DATA:  Left Ventricle Measurements: (Indexed to BSA)     MV E Vmax:    1.88 m/s  MV A Vmax:    2.65 m/s  MV E/A:       0.71  e' lateral:   4.90 cm/s  e' medial:    3.37 cm/s  E/e' lateral: 38.37  E/e' medial:  55.79  E/e' Average: 45.47       Right Ventricle Measurements:     TV Aaliyah. S': 12.00 cm/s       LVOT / RVOT/ Qp/Qs Data: (Indexed to BSA)  LVOT Diameter: 1.90 cm  LVOT Vmax:     1.38 m/s  LVOT VTI:      25.59 cm  LVOT SV:       72.5 ml    47.25 ml/m²  LVOT CO:       7.69 l/min 5.01 l/min/m²    Aortic Valve Measurements:  AV Vmax:                1.5 m/s  AV Peak Gradient:       8.9 mmHg  AV Mean Gradient:       3.8 mmHg  AV VTI:                 28.2 cm  AV VTI Ratio:           0.91  AoV EOA, Contin:        2.57 cm²  AoV EOA, Contin i:      1.68 cm²/m²  AoV DimensionlessIndex 0.91    Mitral Valve Measurements:     MV VTI:         81.37 cm  MV VTI (tips):  74.60 cm  MV Mean Grad:   15.22 mmHg  MV E Vmax:      1.9 m/s  MV A Vmax:      2.6 m/s  MV E/A:         0.7  MV Gradient HR: 73 bpm       Tricuspid Valve Measurements:     TR Vmax:          3.1 m/s  TR Peak Gradient: 37.5 mmHg    ________________________________________________________________________________________  Electronically signed on 2/14/2024 at 7:32:22 PM by Cody Troy MD       < end of copied text >

## 2024-02-22 NOTE — CHART NOTE - NSCHARTNOTEFT_GEN_A_CORE
Nutrition Interim Note  Patient seen for: Consult - Tube Feeding Assessment     Chart reviewed, events noted.    "89-year-old left-handed woman with past medical history of CHF, HTN, pacemaker placement, valve replacement presenting as a code stroke for AMS, ?R-facial droop, R sided weakness.  Was found by home aide at 830 2/14 less responsive, not moving extremities as usually does. No prior known hx strokes per family.  At baseline patient alert, able to recognize/comprehend familiar faces, per daughters at bedside patient sometimes has difficulty with getting words out."    - Nutrition-related concerns:  - per SLP note 2/21 recommends "NPO, with non-oral nutrition/hydration/medications. "  - Per Palliative care note 2/21 "At present, they are hopeful for recovery and will likely pursue peg with pleasure feeds as tolerated."  - PEG attempted 2/22 unable to be placed endoscopically, per IR note 2/22 plan to "evaluate once CT Abd is performed, after review  of imaging"    Estimated Nutrient needs:   Kcal: 28-33 kcal/kg = 1206-1422kcal  Pro: 1.1-1.3 g pro/kg = 47-56 g pro   Fluids per team     Energy and protein needs based on dosing wt: 95 lbs/43.1 kg (02-14)    Recommend Jevity 1.2 @ 10 ml/hr, advance by 10 ml q6 hours to goal rate 35 ml/hr x 24 hours. Free water flushes per team.  - Will provide 840 ml formula, 1260 kcal, 54 g pro, and 678 ml free water.  - Will meet 29 kcal/kg and 1.2 g pro/kg based on dosing wt 43.1 kg (02-14)    Monitoring and Evaluation:   Continue to monitor tube feeding tolerance to diet prescription, weights, labs, skin integrity    RD remains available upon request and will follow up per protocol  Kraig Bradford RD

## 2024-02-22 NOTE — CONSULT NOTE ADULT - ASSESSMENT
Assessment:    89 y/o F with PMH of CHF, HTN, PPM, valve replacement. Presents as a code stroke for AMS, ?R-facial droop, R sided weakness. Pt with dysphagia. Per GI, PEG tube unable to be placed endoscopically without safe window.    IR consulted for G tube placement.  CT abd ordered by team, results pending.    Plan:  - Discussed with attending  - Agree to reevaluate once CT Abd is performed, after review  of imaging  - Palliative has been following pt, most recent note 2/21, family amenable to PEG tube.    - Nonemergent consults:  [facility] order "Consult- Interventional Radiology", no page required  - Emergent issues (pager): [facility] [phone]; [facility] [phone]; [phone]; DO NOT PAGE FOR SCHEDULING QUESTIONS  - Scheduling questions 8am-6pm : [facility] [phone]; [facility] [phone],   - Clinic/outpatient booking: [facility] [phone]; [facility] [phone]   Assessment:    89 y/o F with PMH of CHF, HTN, PPM, valve replacement. Presents as a code stroke for AMS, ?R-facial droop, R sided weakness. Pt with dysphagia. Per GI, PEG tube unable to be placed endoscopically without safe window.    IR consulted for G tube placement.  CT abd ordered by team, results pending.    Plan:  - Discussed with attending  - Can evaluate once CT Abd is performed, after review  of imaging  - Palliative has been following pt, most recent note 2/21, family amenable to gastrostomy tube.  - Please call IR once CT performed    - Nonemergent consults:  [facility] order "Consult- Interventional Radiology", no page required  - Emergent issues (pager): [facility] [phone]; [facility] [phone]; [phone]; DO NOT PAGE FOR SCHEDULING QUESTIONS  - Scheduling questions 8am-6pm : [facility] [phone]; [facility] [phone],   - Clinic/outpatient booking: [facility] [phone]; [facility] [phone]

## 2024-02-23 LAB
ADD ON TEST-SPECIMEN IN LAB: SIGNIFICANT CHANGE UP
ADD ON TEST-SPECIMEN IN LAB: SIGNIFICANT CHANGE UP
ANION GAP SERPL CALC-SCNC: 10 MMOL/L — SIGNIFICANT CHANGE UP (ref 5–17)
ANION GAP SERPL CALC-SCNC: 10 MMOL/L — SIGNIFICANT CHANGE UP (ref 5–17)
BUN SERPL-MCNC: 21 MG/DL — SIGNIFICANT CHANGE UP (ref 7–23)
BUN SERPL-MCNC: 21 MG/DL — SIGNIFICANT CHANGE UP (ref 7–23)
CALCIUM SERPL-MCNC: 8 MG/DL — LOW (ref 8.4–10.5)
CALCIUM SERPL-MCNC: 8.1 MG/DL — LOW (ref 8.4–10.5)
CHLORIDE SERPL-SCNC: 113 MMOL/L — HIGH (ref 96–108)
CHLORIDE SERPL-SCNC: 116 MMOL/L — HIGH (ref 96–108)
CO2 SERPL-SCNC: 15 MMOL/L — LOW (ref 22–31)
CO2 SERPL-SCNC: 20 MMOL/L — LOW (ref 22–31)
CREAT SERPL-MCNC: 1.05 MG/DL — SIGNIFICANT CHANGE UP (ref 0.5–1.3)
CREAT SERPL-MCNC: 1.07 MG/DL — SIGNIFICANT CHANGE UP (ref 0.5–1.3)
EGFR: 48 ML/MIN/1.73M2 — LOW
EGFR: 49 ML/MIN/1.73M2 — LOW
GLUCOSE SERPL-MCNC: 135 MG/DL — HIGH (ref 70–99)
GLUCOSE SERPL-MCNC: 143 MG/DL — HIGH (ref 70–99)
HCT VFR BLD CALC: 26 % — LOW (ref 34.5–45)
HCT VFR BLD CALC: 27.9 % — LOW (ref 34.5–45)
HGB BLD-MCNC: 7.9 G/DL — LOW (ref 11.5–15.5)
HGB BLD-MCNC: 8.6 G/DL — LOW (ref 11.5–15.5)
MAGNESIUM SERPL-MCNC: 2.1 MG/DL — SIGNIFICANT CHANGE UP (ref 1.6–2.6)
MCHC RBC-ENTMCNC: 28.1 PG — SIGNIFICANT CHANGE UP (ref 27–34)
MCHC RBC-ENTMCNC: 28.5 PG — SIGNIFICANT CHANGE UP (ref 27–34)
MCHC RBC-ENTMCNC: 30.4 GM/DL — LOW (ref 32–36)
MCHC RBC-ENTMCNC: 30.8 GM/DL — LOW (ref 32–36)
MCV RBC AUTO: 91.2 FL — SIGNIFICANT CHANGE UP (ref 80–100)
MCV RBC AUTO: 93.9 FL — SIGNIFICANT CHANGE UP (ref 80–100)
NRBC # BLD: 0 /100 WBCS — SIGNIFICANT CHANGE UP (ref 0–0)
NRBC # BLD: 0 /100 WBCS — SIGNIFICANT CHANGE UP (ref 0–0)
PHOSPHATE SERPL-MCNC: 2.5 MG/DL — SIGNIFICANT CHANGE UP (ref 2.5–4.5)
PLATELET # BLD AUTO: 174 K/UL — SIGNIFICANT CHANGE UP (ref 150–400)
PLATELET # BLD AUTO: SIGNIFICANT CHANGE UP K/UL (ref 150–400)
POTASSIUM SERPL-MCNC: 3.8 MMOL/L — SIGNIFICANT CHANGE UP (ref 3.5–5.3)
POTASSIUM SERPL-MCNC: 4.2 MMOL/L — SIGNIFICANT CHANGE UP (ref 3.5–5.3)
POTASSIUM SERPL-SCNC: 3.8 MMOL/L — SIGNIFICANT CHANGE UP (ref 3.5–5.3)
POTASSIUM SERPL-SCNC: 4.2 MMOL/L — SIGNIFICANT CHANGE UP (ref 3.5–5.3)
RBC # BLD: 2.77 M/UL — LOW (ref 3.8–5.2)
RBC # BLD: 3.06 M/UL — LOW (ref 3.8–5.2)
RBC # FLD: 19.1 % — HIGH (ref 10.3–14.5)
RBC # FLD: 19.5 % — HIGH (ref 10.3–14.5)
SODIUM SERPL-SCNC: 141 MMOL/L — SIGNIFICANT CHANGE UP (ref 135–145)
SODIUM SERPL-SCNC: 143 MMOL/L — SIGNIFICANT CHANGE UP (ref 135–145)
WBC # BLD: 7.44 K/UL — SIGNIFICANT CHANGE UP (ref 3.8–10.5)
WBC # BLD: 8.76 K/UL — SIGNIFICANT CHANGE UP (ref 3.8–10.5)
WBC # FLD AUTO: 7.44 K/UL — SIGNIFICANT CHANGE UP (ref 3.8–10.5)
WBC # FLD AUTO: 8.76 K/UL — SIGNIFICANT CHANGE UP (ref 3.8–10.5)

## 2024-02-23 PROCEDURE — 99233 SBSQ HOSP IP/OBS HIGH 50: CPT | Mod: FS

## 2024-02-23 PROCEDURE — 74176 CT ABD & PELVIS W/O CONTRAST: CPT | Mod: 26

## 2024-02-23 PROCEDURE — 93970 EXTREMITY STUDY: CPT | Mod: 26

## 2024-02-23 RX ORDER — SODIUM CHLORIDE 9 MG/ML
1000 INJECTION, SOLUTION INTRAVENOUS
Refills: 0 | Status: DISCONTINUED | OUTPATIENT
Start: 2024-02-23 | End: 2024-02-24

## 2024-02-23 RX ORDER — SODIUM CHLORIDE 9 MG/ML
250 INJECTION INTRAMUSCULAR; INTRAVENOUS; SUBCUTANEOUS ONCE
Refills: 0 | Status: COMPLETED | OUTPATIENT
Start: 2024-02-23 | End: 2024-02-23

## 2024-02-23 RX ADMIN — Medication 3 MILLILITER(S): at 05:55

## 2024-02-23 RX ADMIN — Medication 3 MILLILITER(S): at 17:13

## 2024-02-23 RX ADMIN — ENOXAPARIN SODIUM 30 MILLIGRAM(S): 100 INJECTION SUBCUTANEOUS at 19:38

## 2024-02-23 RX ADMIN — SODIUM CHLORIDE 250 MILLILITER(S): 9 INJECTION INTRAMUSCULAR; INTRAVENOUS; SUBCUTANEOUS at 17:13

## 2024-02-23 RX ADMIN — Medication 3 MILLILITER(S): at 11:44

## 2024-02-23 RX ADMIN — Medication 650 MILLIGRAM(S): at 00:00

## 2024-02-23 RX ADMIN — SODIUM CHLORIDE 30 MILLILITER(S): 9 INJECTION, SOLUTION INTRAVENOUS at 19:39

## 2024-02-23 NOTE — PROGRESS NOTE ADULT - ASSESSMENT
89-year-old left-handed woman with past medical history of CHF, HTN, pacemaker placement, valve replacement presenting as a code stroke for AMS, ?R-facial droop, R sided weakness. Was found by home aide at 830 2/14 less responsive, not moving extremities as usually does. No prior known hx strokes per family. At baseline patient alert, able to recognize/comprehend familiar faces, per daughters at bedside patient sometimes has difficulty with getting words out. Not candidate for tenecteplase as area of hypodensity already appreciated on CT head imaging/age, not candidate for thrombectomy given higher MRS.    IMPRESSION: Global aphasia, R hemiparesis due to L MCA infarct 2/2 L ICA partially occlusive thrombus.    NEURO: Neurologically slightly improved since admission. Continue close monitoring for neurologic deterioration. A1c 6.3. LDL 76 - high intensity statin when oral access obtained, titrate statin to LDL goal less than 70. CT Head, CTA Head/Neck as above. Maintain SBP < 140. Physical therapy/OT/Speech eval - NOHEMI.    ANTITHROMBOTIC THERAPY: On hold given hemorrhagic transformation on CTH    PULMONARY: Protecting airway, saturating well. Continue home albuterol nebs. Now on supplemental O2. CT chest w/ small b/l pleural effusions and congestion. CT chest w/ mucous plugging LLL. Pulmonology following. Procalcitonin not significantly elevated. Continue monitoring off antibiotics.    CARDIOVASCULAR: TTE severe mitral stenosis, s/p previous TAVR. Cardiac pacemaker interrogated. Continue cardiac monitoring. Monitor for PAF. BP in the 170s, Norvasc 5mg daily added as per cardio.     SBP goal: <180    GASTROINTESTINAL: Dysphagia screen failed. Repeat swallow evaluation 2/19, 2/21 - patient not a candidate for oral feeding at this time.   GI following for possible PEG placement, deferred on 2/20 due to hypernatremia. Hypernatremia now improving. Plan for PEG on 2/22 - attempted however no safe window for placement, NGT placed endoscopically instead. IR contacted for possible G-tube placement, pending CT A/P.          Diet: NPO. Caution with IVF given mitral stenosis and concern for volume overload as seen on CT Chest     RENAL: Resolving JOLLY, likely in setting of contrast load vs. prerenal, Cr improving 1.04. Na: 148, on D5W @ 50cc/hr, continue to trend      Na Goal: Greater than 135.      Sousa: No    HEMATOLOGY: H/H fluctuating, hemoglobin 8.5, will c/w trending. Platelets 195     DVT ppx: Lovenox 30 subq     ID: Afebrile without leukocytosis, monitoring off antibiotics.     OTHER: Plan discussed with daughter at bedside. Family wants FULL code. Palliative care consulted for goals of care discussion and support.     DISPOSITION: Rehab per PT eval once stable and workup is complete    CORE MEASURES:        Admission NIHSS: 18     TPA: NO      LDL/HDL: 76/45     Depression Screen: unable to assess     Statin Therapy: yes, when oral access obtained     Dysphagia Screen: FAIL     Smoking NO      Afib NO     Stroke Education YES    Obtain screening lower extremity venous ultrasound in patients who meet 1 or more of the following criteria as patient is high risk for DVT/PE on admission:   [] History of DVT/PE  [] Hypercoagulable states (Factor V Leiden, Cancer, OCP, etc. )  [x] Prolonged immobility (hemiplegia/hemiparesis/post operative or any other extended immobilization)  [] Transferred from outside facility (Rehab or Long term care)  [] Age </= to 50 89-year-old left-handed woman with past medical history of CHF, HTN, pacemaker placement, valve replacement presenting as a code stroke for AMS, ?R-facial droop, R sided weakness. Was found by home aide at 830 2/14 less responsive, not moving extremities as usually does. No prior known hx strokes per family. At baseline patient alert, able to recognize/comprehend familiar faces, per daughters at bedside patient sometimes has difficulty with getting words out. Not candidate for tenecteplase as area of hypodensity already appreciated on CT head imaging/age, not candidate for thrombectomy given higher MRS.    IMPRESSION: Global aphasia, R hemiparesis due to L MCA infarct 2/2 L ICA partially occlusive thrombus. Mechanism ESUS vs. ICAD.     NEURO: Neurologically slightly improved since admission. Continue close monitoring for neurologic deterioration. A1c 6.3. LDL 76 - high intensity statin when oral access obtained, titrate statin to LDL goal less than 70. CT Head, CTA Head/Neck as above. Maintain SBP < 140. Physical therapy/OT/Speech eval - NOHEMI.    ANTITHROMBOTIC THERAPY: On hold given hemorrhagic transformation on CT Head    PULMONARY: Protecting airway, saturating well. Continue duonebs. Now on supplemental O2. CT chest w/ small b/l pleural effusions and congestion, mucous plugging LLL. Pulmonology following. Procalcitonin not significantly elevated. Continue monitoring off antibiotics.    CARDIOVASCULAR: TTE severe mitral stenosis, s/p previous TAVR. Cardiac pacemaker interrogated. Continue cardiac monitoring. Monitor for PAF, per Cardiology high suspicion due to advanced age and severe MS.     SBP goal: <160    GASTROINTESTINAL: Dysphagia screen failed. Repeat swallow evaluation 2/19, 2/21 - patient not a candidate for oral feeding at this time.  GI following for possible PEG placement, deferred on 2/20 due to hypernatremia. Plan for PEG on 2/22 - attempted however no safe window for placement, NGT placed endoscopically instead. IR contacted for possible G-tube placement. CT A/P completed today per their recs.      Diet: NG tube w/ tube feeds    RENAL: Resolving JOLLY, likely in setting of contrast load vs. prerenal, Cr improved. Na: 141, on D5W @ 30cc/hr, continue to trend      Na Goal: Greater than 135.      Sousa: No    HEMATOLOGY: H/H fluctuating, hemoglobin 7.9, will c/w trending. Platelets 195 on 2/22     DVT ppx: Lovenox 30 subq     ID: Afebrile without leukocytosis, monitoring off antibiotics.     OTHER: Plan discussed with daughter at bedside. Family wants FULL code. Palliative care consulted for goals of care discussion and support.     DISPOSITION: Rehab per PT eval once stable and workup is complete    CORE MEASURES:        Admission NIHSS: 18     TPA: NO      LDL/HDL: 76/45     Depression Screen: unable to assess     Statin Therapy: yes, when oral access obtained     Dysphagia Screen: FAIL     Smoking NO      Afib NO     Stroke Education YES    Obtain screening lower extremity venous ultrasound in patients who meet 1 or more of the following criteria as patient is high risk for DVT/PE on admission:   [] History of DVT/PE  [] Hypercoagulable states (Factor V Leiden, Cancer, OCP, etc. )  [x] Prolonged immobility (hemiplegia/hemiparesis/post operative or any other extended immobilization)  [] Transferred from outside facility (Rehab or Long term care)  [] Age </= to 50

## 2024-02-23 NOTE — PROGRESS NOTE ADULT - PROBLEM SELECTOR PLAN 3
-NPO with TF   -Aspiration precautions   -PEG unable to be placed, no window. Plans for CT A/P then possible IR placement of G tube.   -GI f/u.

## 2024-02-23 NOTE — PROGRESS NOTE ADULT - NS ATTEND AMEND GEN_ALL_CORE FT
I saw and examined the patient, reviewed diagnostic studies, and reviewed images personally. I agree with PA’s history, exam, orders placed, and plan of care. Medical issues needing to be addressed include: Cerebral infarction of L mca territory likely 2/2 pAfib, CHF, HTN, dysphagia, speech re-evaluation.  NG now in place, feeding to goal, CT abdomen, possible IR GT placement. Pending. Neurowise stable.

## 2024-02-23 NOTE — PROGRESS NOTE ADULT - SUBJECTIVE AND OBJECTIVE BOX
THE PATIENT WAS SEEN AND EXAMINED BY ME WITH THE HOUSESTAFF AND STROKE TEAM DURING MORNING ROUNDS.     HPI:  89-year-old left-handed woman with past medical history of CHF, HTN, pacemaker placement, valve replacement presenting as a code stroke for AMS, ?R-facial droop, R sided weakness. Was found by home aide at 830 2/14 less responsive, not moving extremities as usually does. No prior known hx strokes per family. At baseline patient alert, able to recognize/comprehend familiar faces, per daughters at bedside patient sometimes has difficulty with getting words out. Information obtained from aide at bedside, then after CT scanner family also at bedside.     SUBJECTIVE: No events overnight.  No new neurologic complaints.     albuterol/ipratropium for Nebulization 3 milliLiter(s) Nebulizer every 6 hours  clindamycin IVPB 600 milliGRAM(s) IV Intermittent once  dextrose 5%. 1000 milliLiter(s) IV Continuous <Continuous>  enoxaparin Injectable 30 milliGRAM(s) SubCutaneous every 24 hours  sodium chloride 0.9%. 500 milliLiter(s) IV Continuous <Continuous>    PHYSICAL EXAM:   Vital Signs Last 24 Hrs  T(C): 37.1 (23 Feb 2024 06:00), Max: 37.1 (23 Feb 2024 06:00)  T(F): 98.8 (23 Feb 2024 06:00), Max: 98.8 (23 Feb 2024 06:00)  HR: 72 (23 Feb 2024 06:00) (72 - 101)  BP: 108/46 (23 Feb 2024 06:00) (95/52 - 133/63)  BP(mean): 54 (23 Feb 2024 06:00) (54 - 76)  RR: 18 (23 Feb 2024 06:00) (17 - 25)  SpO2: 100% (23 Feb 2024 06:00) (93% - 100%)    Parameters below as of 23 Feb 2024 06:00  Patient On (Oxygen Delivery Method): room air    General: No acute distress  Abdomen: Soft, nondistended   Extremities: No edema    NEUROLOGICAL EXAM:  Mental status: Opens eyes briefly to voice, does not respond to questions with purposeful verbal output, groans intermittently. Follows some simple commands.  Cranial Nerves: No facial asymmetry. Decreased blink to threat on Right.   Motor exam: Normal tone, some effort spontaneous against gravity throughout all extremities, L > R. Arthritic changes throughout extremities.  Sensation: Grimaces to painful stimuli x 4, less so on the right  Coordination/ Gait: Deferred    LABS:                        7.9    8.76  )-----------( Clumped    ( 23 Feb 2024 05:39 )             26.0    02-23    141  |  116<H>  |  21  ----------------------------<  135<H>  4.2   |  15<L>  |  1.05    Ca    8.0<L>      23 Feb 2024 05:39    IMAGING: Reviewed by me.     2/17/24 CT Head:   Acute left MCA territory infarct, as on the prior, with associated   hemorrhagic transformation centered in the ipsilateral basal ganglia, not   significantly changed. Associated 8-9 mm rightward midline shift is not   significantly changed. Slightly more pronounced gyriform hyperdensity may   reflect spared cortex versus cortical petechial hemorrhage. Additional   findings described in detail above.    2/16/24 CT Head:   IMPRESSION: Redemonstration of an evolving left-sided MCA distribution   acute/subacute infarct with a new small amount of hemorrhagic   transformation within the infarct bed in comparison with 2/14/2024.    Surrounding mass effect and shift of the midline structures from left to   right appears unchanged when compared to the most recent prior CT exam.    CT Head, CTA Head/Neck 2/14/24:  CT brain:  Acute left frontoparietalinfarct. No CT evidence for selene hemorrhagic   transformation.    CT brain perfusion:  Significantly limited by motion.    Cerebral blood flow less than 30% = 0 mL. No core infarct predicted.    Tmax greater than 6 seconds = 46 mL and involves the bilateral mesial   cerebellar hemispheres, the right temporal lobe, bilateral frontal lobes,   and left posterior temporal. This represents brain parenchyma predicted   to be at continued ischemic risk in the presence of neurologic symptoms.   This finding is likely spurious in nature.    Mismatch volume 46 mL  Mismatch ratio infinite    CTA brain:  Intraluminal filling defect involving the left ICA terminus and extending   into the proximal right A1 segment.    Normal flow-related enhancement of the remaining left LEBRON. Normal   flow-related enhancement of the left MCA.    Right MCA enhances to a lesser degree than the left MCA.    No vascular aneurysm or AVM.    CTA neck:  Approximately 50% short segment stenosis of the origin and proximal   segment of the left ICA due to calcified plaque. Normal flow-related   enhancement of the more distal vessel.    Approximately 75-80% short segment stenosis of the proximal segment of   the right internal carotid artery due to partially calcified plaque.   Normal flow-related enhancement of the more distal vessel.    2/14/24 TTE:    1. Technically difficult image quality.   2. 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 No aortic regurgitation.   3. Severe mitral valve stenosis, secondary to dystrophic mitral annular calcification.   4. No prior echocardiogram is available for comparison. THE PATIENT WAS SEEN AND EXAMINED BY ME WITH THE HOUSESTAFF AND STROKE TEAM DURING MORNING ROUNDS.     HPI:  89-year-old left-handed woman with past medical history of CHF, HTN, pacemaker placement, valve replacement presenting as a code stroke for AMS, ?R-facial droop, R sided weakness. Was found by home aide at 830 2/14 less responsive, not moving extremities as usually does. No prior known hx strokes per family. At baseline patient alert, able to recognize/comprehend familiar faces, per daughters at bedside patient sometimes has difficulty with getting words out. Information obtained from aide at bedside, then after CT scanner family also at bedside.     SUBJECTIVE: No events overnight.    albuterol/ipratropium for Nebulization 3 milliLiter(s) Nebulizer every 6 hours  clindamycin IVPB 600 milliGRAM(s) IV Intermittent once  dextrose 5%. 1000 milliLiter(s) IV Continuous <Continuous>  enoxaparin Injectable 30 milliGRAM(s) SubCutaneous every 24 hours  sodium chloride 0.9%. 500 milliLiter(s) IV Continuous <Continuous>    PHYSICAL EXAM:   Vital Signs Last 24 Hrs  T(C): 37.1 (23 Feb 2024 06:00), Max: 37.1 (23 Feb 2024 06:00)  T(F): 98.8 (23 Feb 2024 06:00), Max: 98.8 (23 Feb 2024 06:00)  HR: 72 (23 Feb 2024 06:00) (72 - 101)  BP: 108/46 (23 Feb 2024 06:00) (95/52 - 133/63)  BP(mean): 54 (23 Feb 2024 06:00) (54 - 76)  RR: 18 (23 Feb 2024 06:00) (17 - 25)  SpO2: 100% (23 Feb 2024 06:00) (93% - 100%)    Parameters below as of 23 Feb 2024 06:00  Patient On (Oxygen Delivery Method): room air    General: No acute distress  Abdomen: Soft, nondistended   Extremities: No edema    NEUROLOGICAL EXAM:  Mental status: Opens eyes briefly to voice, does not respond to questions, groans intermittently. Follows some simple commands.  Cranial Nerves: No facial asymmetry. Decreased blink to threat on Right.   Motor exam: Normal tone, some effort spontaneous against gravity throughout all extremities, L > R. Arthritic changes throughout extremities.  Sensation: Grimaces to painful stimuli x 4, less so on the right  Coordination/ Gait: Deferred    LABS:                        7.9    8.76  )-----------( Clumped    ( 23 Feb 2024 05:39 )             26.0    02-23    141  |  116<H>  |  21  ----------------------------<  135<H>  4.2   |  15<L>  |  1.05    Ca    8.0<L>      23 Feb 2024 05:39    IMAGING: Reviewed by me.     2/17/24 CT Head:   Acute left MCA territory infarct, as on the prior, with associated   hemorrhagic transformation centered in the ipsilateral basal ganglia, not   significantly changed. Associated 8-9 mm rightward midline shift is not   significantly changed. Slightly more pronounced gyriform hyperdensity may   reflect spared cortex versus cortical petechial hemorrhage. Additional   findings described in detail above.    2/16/24 CT Head:   IMPRESSION: Redemonstration of an evolving left-sided MCA distribution   acute/subacute infarct with a new small amount of hemorrhagic   transformation within the infarct bed in comparison with 2/14/2024.    Surrounding mass effect and shift of the midline structures from left to   right appears unchanged when compared to the most recent prior CT exam.    CT Head, CTA Head/Neck 2/14/24:  CT brain:  Acute left frontoparietalinfarct. No CT evidence for selene hemorrhagic   transformation.    CT brain perfusion:  Significantly limited by motion.    Cerebral blood flow less than 30% = 0 mL. No core infarct predicted.    Tmax greater than 6 seconds = 46 mL and involves the bilateral mesial   cerebellar hemispheres, the right temporal lobe, bilateral frontal lobes,   and left posterior temporal. This represents brain parenchyma predicted   to be at continued ischemic risk in the presence of neurologic symptoms.   This finding is likely spurious in nature.    Mismatch volume 46 mL  Mismatch ratio infinite    CTA brain:  Intraluminal filling defect involving the left ICA terminus and extending   into the proximal right A1 segment.    Normal flow-related enhancement of the remaining left LEBRON. Normal   flow-related enhancement of the left MCA.    Right MCA enhances to a lesser degree than the left MCA.    No vascular aneurysm or AVM.    CTA neck:  Approximately 50% short segment stenosis of the origin and proximal   segment of the left ICA due to calcified plaque. Normal flow-related   enhancement of the more distal vessel.    Approximately 75-80% short segment stenosis of the proximal segment of   the right internal carotid artery due to partially calcified plaque.   Normal flow-related enhancement of the more distal vessel.    2/14/24 TTE:    1. Technically difficult image quality.   2. 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 No aortic regurgitation.   3. Severe mitral valve stenosis, secondary to dystrophic mitral annular calcification.   4. No prior echocardiogram is available for comparison.

## 2024-02-23 NOTE — PROGRESS NOTE ADULT - SUBJECTIVE AND OBJECTIVE BOX
Subjective: Patient seen and examined. No new events except as noted.   remains in Stroke unit   Aid and family at bedside   For CT abdomen to day to evaluate for IR G tube placement     REVIEW OF SYSTEMS:  Unable to obtain     MEDICATIONS:  MEDICATIONS  (STANDING):  albuterol/ipratropium for Nebulization 3 milliLiter(s) Nebulizer every 6 hours  clindamycin IVPB 600 milliGRAM(s) IV Intermittent once  dextrose 5%. 1000 milliLiter(s) (30 mL/Hr) IV Continuous <Continuous>  enoxaparin Injectable 30 milliGRAM(s) SubCutaneous every 24 hours      PHYSICAL EXAM:  T(C): 37.1 (02-23-24 @ 06:00), Max: 37.1 (02-23-24 @ 06:00)  HR: 84 (02-23-24 @ 08:00) (72 - 101)  BP: 120/58 (02-23-24 @ 08:00) (106/55 - 120/58)  RR: 22 (02-23-24 @ 08:00) (18 - 25)  SpO2: 97% (02-23-24 @ 08:00) (93% - 100%)  Wt(kg): --  I&O's Summary    22 Feb 2024 07:01  -  23 Feb 2024 07:00  --------------------------------------------------------  IN: 650 mL / OUT: 250 mL / NET: 400 mL          Appearance: NAD  HEENT:   Dry oral mucosa, PERRL, EOMI	  Lymphatic: No lymphadenopathy +NGT  Cardiovascular: Normal S1 S2, No JVD, No murmurs, No edema  Respiratory: Decreased bs  Psychiatry: A & O x 0 sleepy  Gastrointestinal:  Soft, Non-tender, + BS	  Skin: No rashes, No ecchymoses, No cyanosis	  Neurologic Exam:  Mental status - eyes closed, opens to repeated verbal stimuli. Follows intermittent simple commands to squeeze L hand/give thumbs up in L hand. Does not respond to orientation questions.   Cranial nerves - R pupil appears ?sluggishly reactive. No BTT in R eye. Unable to open left eye. ?R facial droop but may be 2/2 positioning of patient's head to R.  Motor - Normal bulk. Increased tone in RUE. Does not maintain antigravity when asked throughout all extremities initially, however does squeeze hand on LUE, withdraws slightly to noxious stimuli in R hand.   Upon re-evaluation able to raise both legs antigravity.  Sensation - Withdraws to noxious stimuli throughout.  DTR's - deferred  Coordination -deferred  Gait and station - deferred  Extremities: Normal range of motion, No clubbing, cyanosis or edema  Vascular: Peripheral pulses palpable 2+ bilaterally        LABS:    CARDIAC MARKERS:                                7.9    8.76  )-----------( Clumped    ( 23 Feb 2024 05:39 )             26.0     02-23    141  |  116<H>  |  21  ----------------------------<  135<H>  4.2   |  15<L>  |  1.05    Ca    8.0<L>      23 Feb 2024 05:39      proBNP:   Lipid Profile:   HgA1c:   TSH:     Trace          TELEMETRY: 	 SR    ECG:  	  RADIOLOGY:   DIAGNOSTIC TESTING:  [ ] Echocardiogram:  [ ]  Catheterization:  [ ] Stress Test:    OTHER:

## 2024-02-23 NOTE — CONSULT NOTE ADULT - ASSESSMENT
Assessment:  89-year-old left-handed woman with past medical history of CHF, HTN, pacemaker placement, TAVR presenting as a code stroke for AMS, ?R-facial droop, R sided weakness.  Was found by home aide at 830 2/14 less responsive, not moving extremities as usually does. No prior known hx strokes per family.  Pt was presenting with dysphagia, G tube placement attempted by GI endoscopically, found no safe window,  IR consulted for G tube placement.    Plan:    Imaging reviewed with attending   Pneumoperitoneum , with transverse colon interposed between stomach and abdominal wall.   No safe window for IR placement of G tube  Will defer, recommend surgery consult        - Nonemergent consults:  [facility] order "Consult- Interventional Radiology", no page required  - Emergent issues (pager): [facility] [phone]; [facility] [phone]; [phone]; DO NOT PAGE FOR SCHEDULING QUESTIONS  - Scheduling questions 8am-6pm : [facility] [phone]; [facility] [phone],   - Clinic/outpatient booking: [facility] [phone]; [facility] [phone]   Assessment:  89-year-old left-handed woman with past medical history of CHF, HTN, pacemaker placement, TAVR presenting as a code stroke for AMS, ?R-facial droop, R sided weakness.  Was found by home aide at 830 2/14 less responsive, not moving extremities as usually does. No prior known hx strokes per family.  Pt was presenting with dysphagia, G tube placement attempted by GI endoscopically, found no safe window,  IR consulted for G tube placement.    Plan:    Imaging reviewed with attending   No safe window for IR placement of G tube; transverse colon interposed between stomach and abdominal wall.   Recommend surgery consult  Reconsult IR as needed        - Nonemergent consults:  [facility] order "Consult- Interventional Radiology", no page required  - Emergent issues (pager): [facility] [phone]; [facility] [phone]; [phone]; DO NOT PAGE FOR SCHEDULING QUESTIONS  - Scheduling questions 8am-6pm : [facility] [phone]; [facility] [phone],   - Clinic/outpatient booking: [facility] [phone]; [facility] [phone]

## 2024-02-23 NOTE — CONSULT NOTE ADULT - SUBJECTIVE AND OBJECTIVE BOX
Vascular & Interventional Radiology Brief Consult Note    Evaluate for Procedure: G Tube placement    HPI: 89-year-old left-handed woman with past medical history of CHF, HTN, pacemaker placement, TAVR presenting as a code stroke for AMS, ?R-facial droop, R sided weakness.  Was found by home aide at 830 2/14 less responsive, not moving extremities as usually does. No prior known hx strokes per family.    Pt was presenting with dysphagia, G tube placement attempted by GI endoscopically, found no safe window.    IR consulted for G tube placement.    Allergies: penicillins (Other)    Medications (Abx/Cardiac/Anticoagulation/Blood Products)    enoxaparin Injectable: 30 milliGRAM(s) SubCutaneous (02-22 @ 18:22)    Data:    T(C): 36.9  HR: 80  BP: 91/37  RR: 24  SpO2: 100%    -WBC 7.44 / HgB 8.6 / Hct 27.9 / Plt 174  -Na 143 / Cl 113 / BUN 21 / Glucose 143  -K 3.8 / CO2 20 / Cr 1.07  -ALT -- / Alk Phos -- / T.Bili --  -INR1.30    Imaging: Reviewed, pneumoperitoneum , with transverse colon interposed between stomach and abdominal wall. No safe window.

## 2024-02-23 NOTE — PROGRESS NOTE ADULT - ASSESSMENT
89F presented with a stroke    1. Stroke     2. Dysphagia   PEG attempted yesterday, no window   IR consulted, awaiting CT A/P       I had a prolonged conversation with the patient regarding the hospital course, differential diagnosis, results of diagnostic tests this far, and therapeutic modalities available. Plan of care discussed with the patient after the evaluation. Patient expresses a clear understanding of the plan of care.      Jovon Ugalde D.O.  Gastroenterology and Hepatology  [address] [place] [address], [address]  [place], [place]  Office: [phone]

## 2024-02-23 NOTE — PROGRESS NOTE ADULT - SUBJECTIVE AND OBJECTIVE BOX
Chief Complaint:  Patient is a 89y old  Female who presents with a chief complaint of Code stroke (22 Feb 2024 13:51)      Date of service 02-23-24 @ 08:54      Interval Events:   no acute events     Hospital Medications:  albuterol/ipratropium for Nebulization 3 milliLiter(s) Nebulizer every 6 hours  clindamycin IVPB 600 milliGRAM(s) IV Intermittent once  dextrose 5%. 1000 milliLiter(s) IV Continuous <Continuous>  enoxaparin Injectable 30 milliGRAM(s) SubCutaneous every 24 hours        Review of Systems:  unable to obtain     PHYSICAL EXAM:   Vital Signs:  Vital Signs Last 24 Hrs  T(C): 37.1 (23 Feb 2024 06:00), Max: 37.1 (23 Feb 2024 06:00)  T(F): 98.8 (23 Feb 2024 06:00), Max: 98.8 (23 Feb 2024 06:00)  HR: 84 (23 Feb 2024 08:00) (72 - 101)  BP: 120/58 (23 Feb 2024 08:00) (95/52 - 133/63)  BP(mean): 84 (23 Feb 2024 08:00) (54 - 84)  RR: 22 (23 Feb 2024 08:00) (17 - 25)  SpO2: 97% (23 Feb 2024 08:00) (93% - 100%)    Parameters below as of 23 Feb 2024 08:00  Patient On (Oxygen Delivery Method): room air      Daily     Daily       PHYSICAL EXAM:     GENERAL:  Appears stated age, well-groomed, well-nourished, no distress  HEENT:  NC/AT,  conjunctivae anicteric, clear and pink,   NECK: supple, trachea midline  CHEST:  Full & symmetric excursion, no increased effort, breath sounds clear  HEART:  Regular rhythm, no JVD  ABDOMEN:  Soft, non-tender, non-distended, normoactive bowel sounds,  no masses , no hepatosplenomegaly  EXTREMITIES:  no cyanosis,clubbing or edema  SKIN:  No rash, erythema, or, ecchymoses, no jaundice  NEURO:  Alert, non-focal, no asterixis  PSYCH: Appropriate affect, oriented to place and time  RECTAL: Deferred      LABS Personally reviewed by me:                        7.9    8.76  )-----------( Clumped    ( 23 Feb 2024 05:39 )             26.0     Mean Cell Volume: 93.9 fl (02-23-24 @ 05:39)    02-23    141  |  116<H>  |  21  ----------------------------<  135<H>  4.2   |  15<L>  |  1.05    Ca    8.0<L>      23 Feb 2024 05:39          Urinalysis Basic - ( 23 Feb 2024 05:39 )    Color: x / Appearance: x / SG: x / pH: x  Gluc: 135 mg/dL / Ketone: x  / Bili: x / Urobili: x   Blood: x / Protein: x / Nitrite: x   Leuk Esterase: x / RBC: x / WBC x   Sq Epi: x / Non Sq Epi: x / Bacteria: x                              7.9    8.76  )-----------( Clumped    ( 23 Feb 2024 05:39 )             26.0                         8.5    7.97  )-----------( 195      ( 22 Feb 2024 06:21 )             28.1                         8.2    5.91  )-----------( 179      ( 21 Feb 2024 06:32 )             27.1       Imaging personally reviewed by me:

## 2024-02-23 NOTE — PROGRESS NOTE ADULT - SUBJECTIVE AND OBJECTIVE BOX
Follow-up Pulm Progress Note    ROS unable to be obtained  pt lethargic  sats 99% on RA  non labored     Medications:  MEDICATIONS  (STANDING):  albuterol/ipratropium for Nebulization 3 milliLiter(s) Nebulizer every 6 hours  clindamycin IVPB 600 milliGRAM(s) IV Intermittent once  dextrose 5%. 1000 milliLiter(s) (30 mL/Hr) IV Continuous <Continuous>  enoxaparin Injectable 30 milliGRAM(s) SubCutaneous every 24 hours        Vital Signs Last 24 Hrs  T(C): 37.1 (23 Feb 2024 06:00), Max: 37.1 (23 Feb 2024 06:00)  T(F): 98.8 (23 Feb 2024 06:00), Max: 98.8 (23 Feb 2024 06:00)  HR: 84 (23 Feb 2024 08:00) (72 - 101)  BP: 120/58 (23 Feb 2024 08:00) (95/52 - 133/63)  BP(mean): 84 (23 Feb 2024 08:00) (54 - 84)  RR: 22 (23 Feb 2024 08:00) (17 - 25)  SpO2: 97% (23 Feb 2024 08:00) (93% - 100%)    Parameters below as of 23 Feb 2024 08:00  Patient On (Oxygen Delivery Method): room air              02-22 @ 07:01  -  02-23 @ 07:00  --------------------------------------------------------  IN: 650 mL / OUT: 250 mL / NET: 400 mL          LABS:                        7.9    8.76  )-----------( Clumped    ( 23 Feb 2024 05:39 )             26.0     02-23    141  |  116<H>  |  21  ----------------------------<  135<H>  4.2   |  15<L>  |  1.05    Ca    8.0<L>      23 Feb 2024 05:39          Urinalysis Basic - ( 23 Feb 2024 05:39 )    Color: x / Appearance: x / SG: x / pH: x  Gluc: 135 mg/dL / Ketone: x  / Bili: x / Urobili: x   Blood: x / Protein: x / Nitrite: x   Leuk Esterase: x / RBC: x / WBC x   Sq Epi: x / Non Sq Epi: x / Bacteria: x              Physical Examination:  PULM: Decreased BS   CVS: S1, S2 heard    RADIOLOGY REVIEWED    CT chest:  < from: CT Chest No Cont (02.16.24 @ 12:30) >    FINDINGS:    AIRWAYS AND LUNGS: Tracheal bronchial secretions.  Patchy bilateral lung   opacities superimposed on mosaic attenuation.    MEDIASTINUM AND PLEURA: There are no enlarged mediastinal, hilar or   axillary lymph nodes. The visualized portion of the thyroid gland is   heterogeneous. There are small bilateral pleural effusions.  There is no   pneumothorax.    HEART AND VESSELS: There is mild cardiomegaly.  There are atherosclerotic   calcifications of the aorta and coronary arteries. There is a small   pericardial effusion.  TAVR. Left-sided pacemaker    UPPER ABDOMEN: Images of the upper abdomen demonstrate cholecystectomy.   Residual contrast within the kidneys..    BONES AND SOFT TISSUES: There are mild degenerative changes of the spine.    The soft tissues are unremarkable.      IMPRESSION:  CHF and/or pneumonia with small bilateral pleural effusions.    --- End of Report ---    < end of copied text >    < from: TTE W or WO Ultrasound Enhancing Agent (02.14.24 @ 15:41) >  _______________________________________________________________________________________     CONCLUSIONS:      1. Technically difficult image quality.   2. 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 No aortic regurgitation.   3. Severe mitral valve stenosis, secondary to dystrophic mitral annular calcification.   4. No prior echocardiogram is available for comparison.    ________________________________________________________________________________________  FINDINGS:     Left Ventricle:  There is moderate (grade 2) left ventricular diastolic dysfunction, with elevated filling pressure. Left ventricular endocardium is not well visualized.     Right Ventricle:  The right ventricle is not well visualized. A device lead is visualized.     Left Atrium:  The left atrium is severely dilated with an indexed volume of 64.47 ml/m².     Aortic Valve:  Transcatheter aortic valve replacement with normal gradient. No aortic regurgitation.     Mitral Valve:  There is severe calcification of the mitral valve annulus. There is severe leaflet calcification. There is severe mitral valve stenosis, secondary to dystrophic mitral annular calcification. The transmitral mean gradient is 15.76 mmHg at a heart rate of 73 bpm. There is mild mitral regurgitation.     Tricuspid Valve:  There is mild tricuspid regurgitation.     Pulmonic Valve:  The pulmonic valve was not well visualized.     Pericardium:  No pericardial effusion seen.  ____________________________________________________________________  QUANTITATIVE DATA:  Left Ventricle Measurements: (Indexed to BSA)     MV E Vmax:    1.88 m/s  MV A Vmax:    2.65 m/s  MV E/A:       0.71  e' lateral:   4.90 cm/s  e' medial:    3.37 cm/s  E/e' lateral: 38.37  E/e' medial:  55.79  E/e' Average: 45.47       Right Ventricle Measurements:     TV Aaliyah. S': 12.00 cm/s       LVOT / RVOT/ Qp/Qs Data: (Indexed to BSA)  LVOT Diameter: 1.90 cm  LVOT Vmax:     1.38 m/s  LVOT VTI:      25.59 cm  LVOT SV:       72.5 ml    47.25 ml/m²  LVOT CO:       7.69 l/min 5.01 l/min/m²    Aortic Valve Measurements:  AV Vmax:                1.5 m/s  AV Peak Gradient:       8.9 mmHg  AV Mean Gradient:       3.8 mmHg  AV VTI:                 28.2 cm  AV VTI Ratio:           0.91  AoV EOA, Contin:        2.57 cm²  AoV EOA, Contin i:      1.68 cm²/m²  AoV DimensionlessIndex 0.91    Mitral Valve Measurements:     MV VTI:         81.37 cm  MV VTI (tips):  74.60 cm  MV Mean Grad:   15.22 mmHg  MV E Vmax:      1.9 m/s  MV A Vmax:      2.6 m/s  MV E/A:         0.7  MV Gradient HR: 73 bpm       Tricuspid Valve Measurements:     TR Vmax:          3.1 m/s  TR Peak Gradient: 37.5 mmHg    ________________________________________________________________________________________  Electronically signed on 2/14/2024 at 7:32:22 PM by Cody Troy MD       < end of copied text >

## 2024-02-24 LAB
ANION GAP SERPL CALC-SCNC: 11 MMOL/L — SIGNIFICANT CHANGE UP (ref 5–17)
BUN SERPL-MCNC: 21 MG/DL — SIGNIFICANT CHANGE UP (ref 7–23)
CALCIUM SERPL-MCNC: 8.3 MG/DL — LOW (ref 8.4–10.5)
CHLORIDE SERPL-SCNC: 115 MMOL/L — HIGH (ref 96–108)
CO2 SERPL-SCNC: 18 MMOL/L — LOW (ref 22–31)
CREAT SERPL-MCNC: 1 MG/DL — SIGNIFICANT CHANGE UP (ref 0.5–1.3)
EGFR: 52 ML/MIN/1.73M2 — LOW
GLUCOSE SERPL-MCNC: 143 MG/DL — HIGH (ref 70–99)
HCT VFR BLD CALC: 25.1 % — LOW (ref 34.5–45)
HGB BLD-MCNC: 7.9 G/DL — LOW (ref 11.5–15.5)
MCHC RBC-ENTMCNC: 28.8 PG — SIGNIFICANT CHANGE UP (ref 27–34)
MCHC RBC-ENTMCNC: 31.5 GM/DL — LOW (ref 32–36)
MCV RBC AUTO: 91.6 FL — SIGNIFICANT CHANGE UP (ref 80–100)
NRBC # BLD: 0 /100 WBCS — SIGNIFICANT CHANGE UP (ref 0–0)
PLATELET # BLD AUTO: 164 K/UL — SIGNIFICANT CHANGE UP (ref 150–400)
POTASSIUM SERPL-MCNC: 4.2 MMOL/L — SIGNIFICANT CHANGE UP (ref 3.5–5.3)
POTASSIUM SERPL-SCNC: 4.2 MMOL/L — SIGNIFICANT CHANGE UP (ref 3.5–5.3)
RBC # BLD: 2.74 M/UL — LOW (ref 3.8–5.2)
RBC # FLD: 19.5 % — HIGH (ref 10.3–14.5)
SODIUM SERPL-SCNC: 144 MMOL/L — SIGNIFICANT CHANGE UP (ref 135–145)
WBC # BLD: 6.95 K/UL — SIGNIFICANT CHANGE UP (ref 3.8–10.5)
WBC # FLD AUTO: 6.95 K/UL — SIGNIFICANT CHANGE UP (ref 3.8–10.5)

## 2024-02-24 PROCEDURE — 99233 SBSQ HOSP IP/OBS HIGH 50: CPT | Mod: FS

## 2024-02-24 RX ORDER — ATORVASTATIN CALCIUM 80 MG/1
20 TABLET, FILM COATED ORAL AT BEDTIME
Refills: 0 | Status: DISCONTINUED | OUTPATIENT
Start: 2024-02-24 | End: 2024-03-07

## 2024-02-24 RX ORDER — ACETAMINOPHEN 500 MG
650 TABLET ORAL ONCE
Refills: 0 | Status: COMPLETED | OUTPATIENT
Start: 2024-02-24 | End: 2024-02-24

## 2024-02-24 RX ADMIN — ATORVASTATIN CALCIUM 20 MILLIGRAM(S): 80 TABLET, FILM COATED ORAL at 23:05

## 2024-02-24 RX ADMIN — Medication 3 MILLILITER(S): at 19:13

## 2024-02-24 RX ADMIN — ENOXAPARIN SODIUM 30 MILLIGRAM(S): 100 INJECTION SUBCUTANEOUS at 19:49

## 2024-02-24 RX ADMIN — Medication 260 MILLIGRAM(S): at 21:18

## 2024-02-24 RX ADMIN — Medication 3 MILLILITER(S): at 23:06

## 2024-02-24 RX ADMIN — Medication 650 MILLIGRAM(S): at 22:15

## 2024-02-24 RX ADMIN — Medication 3 MILLILITER(S): at 05:02

## 2024-02-24 RX ADMIN — Medication 3 MILLILITER(S): at 11:30

## 2024-02-24 RX ADMIN — Medication 3 MILLILITER(S): at 00:53

## 2024-02-24 NOTE — CONSULT NOTE ADULT - ATTENDING COMMENTS
Patient examined by me.   89 year old woman admitted s/p CVA.   Continues to have poor mental status and dysphasia.  PEG tube attempted by GI but no safe window.  Not a candidate for IR feeding tube placement.     PMH significant for CHF, HTN, pacemaker placement, valve replacement  PSH significant for , hysterectomy, cholecystectomy and hiatal hernia repair.     Vitals stable.   Abdomen non tender, non distended.     Labs grossly unremarkable.     A/P: 89 year old woman being evaluated for G-tube placement.   -Patient evaluated by cardiology and deemed high risk for procedure.   -Options discussed with family. Will speak amongst themselves and decide.   -Patient tentatively added on for lap assisted G-tube placement tomorrow.   -NPO at midnight.
Patient was seen and examined by me and fellow, Dr. Calderon. EO spont, appears aphasic, but attends to examiners and her aid at bedside. Respiration appears comfortable, no hypoxia or distress. Large L stroke with cytotoxic edema and brain compression on CTH. Agree with stroke service management. Na 145-155. Not a candidate for consideration for decompressive craniectomy given age and dominant side large territory stroke, risks of a large craniectomy surgery outweighs benefit. Per stroke team, family is currently wishing full code. If Patient develops respiratory distress, requiring ventilator support, please re-consult.     30min cc spent  Lorrie Juares neurointensivist

## 2024-02-24 NOTE — PROGRESS NOTE ADULT - ASSESSMENT
89F presented with a stroke    1. Stroke     2. Dysphagia   PEG attempted, no window   CT reviewed by IR, no window for them   surgery consult if in line with GOC     I had a prolonged conversation with the patient regarding the hospital course, differential diagnosis, results of diagnostic tests this far, and therapeutic modalities available. Plan of care discussed with the patient after the evaluation. Patient expresses a clear understanding of the plan of care.      Jovon Ugalde D.O.  Gastroenterology and Hepatology  [address] [place] [address], [address]  [place], [place]  Office: [phone]

## 2024-02-24 NOTE — PROGRESS NOTE ADULT - ASSESSMENT
89-year-old left-handed woman with past medical history of CHF, HTN, pacemaker placement, valve replacement presenting as a code stroke for AMS, ?R-facial droop, R sided weakness. Was found by home aide at 830 2/14 less responsive, not moving extremities as usually does. No prior known hx strokes per family. At baseline patient alert, able to recognize/comprehend familiar faces, per daughters at bedside patient sometimes has difficulty with getting words out. Not candidate for tenecteplase as area of hypodensity already appreciated on CT head imaging/age, not candidate for thrombectomy given higher MRS.    IMPRESSION: Global aphasia, R hemiparesis due to L MCA infarct with associated hemorrhagic transformation 2/2 L ICA partially occlusive thrombus. Mechanism ESUS vs. ICAD.      NEURO: Neurologically slightly improved since admission. Continue close monitoring for cerebral edema mass effect and neurologic deterioration. A1c 6.3. LDL 76 -40mg statin ordered, titrate statin to LDL goal less than 70. CT Head, CTA Head/Neck as above. Maintain - 140. Physical therapy/OT/Speech eval - NOHEMI.    ANTITHROMBOTIC THERAPY: On hold given hemorrhagic transformation on CT Head    PULMONARY: CXR (02/23) Clear, Protecting airway, saturating well. Continue duonebs. Now on supplemental O2. CT chest w/ small b/l pleural effusions and congestion, mucous plugging LLL. Pulmonology following. Procalcitonin not significantly elevated. Continue monitoring off antibiotics.    CARDIOVASCULAR: TTE severe mitral stenosis, s/p previous TAVR. Cardiac pacemaker interrogated. Continue cardiac monitoring. Monitor for PAF, as per Dr Hoskins (Cardiology) high suspicion due to advanced age and severe MS.     SBP goal: 110-160mmHg    GASTROINTESTINAL: Dysphagia screen failed. Repeat swallow evaluation 2/19, 2/21 - patient not a candidate for oral feeding at this time.  GI PEG placement, deferred on 2/20 due to hypernatremia.  As per GI and IR team unable to place PEG due to lack of safe window for placement, NGT placed endoscopically instead, tolerating feedingd. Surgical team contacted on 02/24/24 for possible PEG tube placement.      Diet: NG tube w/ tube feeds    RENAL: BUN/CR 21/1.00     Na Goal: Greater than 135.      Sousa: No    HEMATOLOGY: H/H fluctuating, hemoglobin 7.9, will c/w trending. Platelets 164. LE doppler: No evidence of deep venous thrombosis in either lower extremity.      DVT ppx: Lovenox 30 subq     ID: Afebrile without leukocytosis, monitoring off antibiotics.     OTHER: Plan discussed with daughter at bedside. Family wants FULL code. Palliative care consulted for goals of care discussion and support.     DISPOSITION: NOHEMI as per PT/OT eval once stable and workup is complete    CORE MEASURES:        Admission NIHSS: 18     TPA: NO      LDL/HDL: 76/45     Depression Screen: unable to assess     Statin Therapy: yes,      Dysphagia Screen: FAIL     Smoking NO      Afib NO     Stroke Education YES    Obtain screening lower extremity venous ultrasound in patients who meet 1 or more of the following criteria as patient is high risk for DVT/PE on admission:   [] History of DVT/PE  [] Hypercoagulable states (Factor V Leiden, Cancer, OCP, etc. )  [x] Prolonged immobility (hemiplegia/hemiparesis/post operative or any other extended immobilization)  [] Transferred from outside facility (Rehab or Long term care)  [] Age </= to 50

## 2024-02-24 NOTE — CONSULT NOTE ADULT - SUBJECTIVE AND OBJECTIVE BOX
GENERAL SURGERY CONSULT NOTE  --------------------------------------------------------------------------------------------  HPI:  89y/o F w/ PMHx CHF, hx TAVR, now w/ severe MS, hx PPM presented as a code stroke for AMS and R sided weakness, found to have L MCA CVA w/ hemorrhagic transformation, now with aphasia, dysphagia and R hemiparesis. PEG was attempted w/ GI with no safe window, no safe window also deemed by IR after imaging review due to transverse colon interposed between stomach and abdominal wall. General surgery consulted for G tube placement.    Patient seen and examined and daughter spoken with over the phone. PSHx of  x3, lap rebeca ~30yrs ago, hysterectomy ~40yrs ago and a strangulated hiatal hernia repair.       PAST MEDICAL & SURGICAL HISTORY:    FAMILY HISTORY:  [] Family history not pertinent as reviewed with the patient and family    SOCIAL HISTORY:    ALLERGIES: penicillins (Other)      HOME MEDICATIONS:    CURRENT MEDICATIONS  MEDICATIONS (STANDING): albuterol/ipratropium for Nebulization 3 milliLiter(s) Nebulizer every 6 hours  atorvastatin 20 milliGRAM(s) Oral at bedtime  clindamycin IVPB 600 milliGRAM(s) IV Intermittent once  enoxaparin Injectable 30 milliGRAM(s) SubCutaneous every 24 hours    MEDICATIONS (PRN):  --------------------------------------------------------------------------------------------    Vitals:   T(C): 36.7 (24 @ 04:00), Max: 36.9 (24 @ 17:00)  HR: 91 (24 @ 04:00) (80 - 92)  BP: 140/54 (24 @ 04:00) (91/37 - 140/54)  RR: 23 (24 @ 04:00) (21 - 24)  SpO2: 97% (24 @ 04:00) (97% - 100%)  CAPILLARY BLOOD GLUCOSE           @ 07:01  -   @ 07:00  --------------------------------------------------------  IN:    dextrose 5%: 360 mL    Jevity 1.2: 420 mL  Total IN: 780 mL    OUT:    Intermittent Catheterization - Urethral (mL): 450 mL    Voided (mL): 140 mL  Total OUT: 590 mL    Total NET: 190 mL          PHYSICAL EXAM:   General: NAD, Sitting in bed  Neuro: Opening eyes spontaneously and tracking, not answering questions  Cardio: Regular rate  Resp: Good effort, no signs of respiratory distress  GI/Abd: Soft, nontender, nondistended, no visible scars  Musculoskeletal: R hemiparesis  --------------------------------------------------------------------------------------------    LABS  CBC ( @ 01:33)                              7.9<L>                         6.95    )----------------(  164        --    % Neutrophils, --    % Lymphocytes, ANC: --                                  25.1<L>  CBC ( @ 13:44)                              8.6<L>                         7.44    )----------------(  174        --    % Neutrophils, --    % Lymphocytes, ANC: --                                  27.9<L>    BMP ( @ 01:33)             144     |  115<H>  |  21    		Ca++ --      Ca 8.3<L>             ---------------------------------( 143<H>		Mg --                 4.2     |  18<L>   |  1.00  			Ph --      BMP ( @ 13:44)             143     |  113<H>  |  21    		Ca++ --      Ca 8.1<L>             ---------------------------------( 143<H>		Mg 2.1                3.8     |  20<L>   |  1.07  			Ph 2.5                 --------------------------------------------------------------------------------------------    MICROBIOLOGY  Urinalysis ( @ 01:33):     Color:  / Appearance:  / SG:  / pH:  / Gluc: 143<H> / Ketones:  / Bili:  / Urobili:  / Protein : / Nitrites:  / Leuk.Est:  / RBC:  / WBC:  / Sq Epi:  / Non Sq Epi:  / Bacteria          --------------------------------------------------------------------------------------------    IMAGING      --------------------------------------------------------------------------------------------

## 2024-02-24 NOTE — PROGRESS NOTE ADULT - NS ATTEND AMEND GEN_ALL_CORE FT
I saw and examined the patient, reviewed diagnostic studies, and reviewed images personally. I agree with PA’s history, exam, orders placed, and plan of care. Medical issues needing to be addressed include: Cerebral infarction of L mca territory likely 2/2 cardioembolism (NO PROVEN AFIB PER CARDIOLOGY, but high suspicion w/ SIGNIFICANT MITRAL VALVE STENOSIS), CHF, HTN, dysphagia, speech re-evaluation.  NG now in place, feeding to goal. Per GI/IR, pt will require surgical Peg/GT placement by gen surge. Consulted. Exam stable.

## 2024-02-24 NOTE — PROGRESS NOTE ADULT - SUBJECTIVE AND OBJECTIVE BOX
THE PATIENT WAS SEEN AND EXAMINED BY ME WITH THE HOUSESTAFF AND STROKE TEAM DURING MORNING ROUNDS.   HPI:  89-year-old left-handed woman with PMH of CHF, HTN, pacemaker placement, valve replacement presenting as a code stroke for AMS, ?R-facial droop, R sided weakness. Was found by home aide at 830 2/14 less responsive, not moving extremities as usually does. No prior known hx strokes per family. At baseline patient alert, able to recognize/comprehend familiar faces, per daughters at bedside patient sometimes has difficulty with getting words out. Information obtained from aide at bedside, then after CT scanner family also at bedside.     SUBJECTIVE: No events overnight.  No new neurologic complaints, ROS reported negative unless otherwise noted.      albuterol/ipratropium for Nebulization 3 milliLiter(s) Nebulizer every 6 hours  clindamycin IVPB 600 milliGRAM(s) IV Intermittent once  dextrose 5%. 1000 milliLiter(s) IV Continuous <Continuous>  enoxaparin Injectable 30 milliGRAM(s) SubCutaneous every 24 hours      PHYSICAL EXAM:   Vital Signs Last 24 Hrs  T(C): 36.7 (24 Feb 2024 04:00), Max: 36.9 (23 Feb 2024 17:00)  T(F): 98 (24 Feb 2024 04:00), Max: 98.5 (23 Feb 2024 17:00)  HR: 91 (24 Feb 2024 04:00) (80 - 92)  BP: 140/54 (24 Feb 2024 04:00) (91/37 - 140/54)  BP(mean): 78 (24 Feb 2024 04:00) (53 - 87)  RR: 23 (24 Feb 2024 04:00) (21 - 24)  SpO2: 97% (24 Feb 2024 04:00) (97% - 100%)    Parameters below as of 24 Feb 2024 04:00  Patient On (Oxygen Delivery Method): room air        General: No acute distress  Abdomen: Soft, nondistended   Extremities: No edema    NEUROLOGICAL EXAM:  Mental status: Opens closed, opens eyes briefly to voice, does not respond to questions, groans intermittently. Follows some simple commands.  Cranial Nerves: No facial asymmetry. Decreased blink to threat on Right.   Motor exam: Normal tone, some effort spontaneous against gravity throughout all extremities, L > R. Arthritic changes throughout extremities.  Sensation: Grimaces to painful stimuli x 4, less so on the right  Coordination/ Gait: Deferred  LABS:                        7.9    6.95  )-----------( 164      ( 24 Feb 2024 01:33 )             25.1    02-24    144  |  115<H>  |  21  ----------------------------<  143<H>  4.2   |  18<L>  |  1.00    Ca    8.3<L>      24 Feb 2024 01:33  Phos  2.5     02-23  Mg     2.1     02-23          IMAGING: Reviewed by me.     2/17/24 CT Head: Acute left MCA territory infarct, as on the prior, with associated hemorrhagic transformation centered in the ipsilateral basal ganglia, not significantly changed. Associated 8-9 mm rightward midline shift is not   significantly changed. Slightly more pronounced gyriform hyperdensity may reflect spared cortex versus cortical petechial hemorrhage.    2/16/24 CT Head:  Redemonstration of an evolving left-sided MCA distribution acute/subacute infarct with a new small amount of hemorrhagic transformation within the infarct bed in comparison with 2/14/2024. Surrounding mass effect and shift of the midline structures from left to right appears unchanged when compared to the most recent prior CT exam.      CT brain 2/14/24: Acute left frontoparietalinfarct. No CT evidence for selene hemorrhagic transformation.    CT brain perfusion 2/14/24: Significantly limited by motion. Cerebral blood flow less than 30% = 0 mL. No core infarct predicted. Tmax greater than 6 seconds = 46 mL and involves the bilateral mesial cerebellar hemispheres, the right temporal lobe, bilateral frontal lobes, and left posterior temporal. This represents brain parenchyma predicted to be at continued ischemic risk in the presence of neurologic symptoms. This finding is likely spurious in nature.  Mismatch volume 46 mL, Mismatch ratio infinite    CTA brain 2/14/24: Intraluminal filling defect involving the left ICA terminus and extending into the proximal right A1 segment. Normal flow-related enhancement of the remaining left LEBRON. Normal flow-related enhancement of the left MCA.  Right MCA enhances to a lesser degree than the left MCA. No vascular aneurysm or AVM.    CTA neck: Approximately 50% short segment stenosis of the origin and proximal segment of the left ICA due to calcified plaque. Normal flow-related enhancement of the more distal vessel. Approximately 75-80% short segment stenosis of the proximal segment of the right internal carotid artery due to partially calcified plaque. Normal flow-related enhancement of the more distal vessel.

## 2024-02-24 NOTE — CONSULT NOTE ADULT - ASSESSMENT
ASSESSMENT: 89y/o F w/ PMHx CHF, hx TAVR, now w/ severe MS, hx PPM presented as a code stroke for AMS and R sided weakness, found to have L MCA CVA w/ hemorrhagic transformation, now with aphasia, dysphagia and R hemiparesis. PEG unable to be performed by GI/IR due to no safe window. General surgery consulted for G tube placement.    PLAN:   - Will plan for lap-assisted G tube placement next week if family is amenable to surgical intervention  - AC is held due to hemorrhagic transformation of CVA  - Continue medical optimization      Patient discussed with ACS attending, Dr. Winkler.    Angelita Castillo, PGY-2  ACS  [id number] ASSESSMENT: 89y/o F w/ PMHx CHF, hx TAVR, now w/ severe MS, hx PPM presented as a code stroke for AMS and R sided weakness, found to have L MCA CVA w/ hemorrhagic transformation, now with aphasia, dysphagia and R hemiparesis. PEG unable to be performed by GI/IR due to no safe window. General surgery consulted for G tube placement.    PLAN:   - Plan for lap-assisted G tube placement w/ Dr. Wray tomorrow, 2/25 - added on to OR schedule  - Spoke with family over the phone - would like to discuss amongst their siblings tonight, team will call again in AM to consent  - Continue holding AC  - NPO at MN  - Preop labs - CBC, BMP, mag, phos, coags, type&screen      Patient discussed with ACS attending, Dr. Winkler.    Angelita Castillo, PGY-2  ACS  [id number] ASSESSMENT: 89y/o F w/ PMHx CHF, hx TAVR, now w/ severe MS, hx PPM presented as a code stroke for AMS and R sided weakness, found to have L MCA CVA w/ hemorrhagic transformation, now with aphasia, dysphagia and R hemiparesis. PEG unable to be performed by GI/IR due to no safe window. General surgery consulted for G tube placement.    PLAN:   - Plan for lap-assisted G tube placement w/ Dr. Wray tomorrow, 2/25 - added on to OR schedule  - Spoke with family over the phone - would like to discuss amongst their siblings tonight, team will call again in AM to consent  - Continue holding AC  - NPO at MN  - Preop labs - CBC, BMP, mag, phos, coags, type&screen      ***update***  - Family is not amenable to lap-assisted G tube placement 2/25, would like to discuss more with family and patient's PCP  - Cancelled from OR schedule      Patient discussed with ACS attending, Dr. Winkler.    Angelita Castillo, PGY-2  ACS  [id number]

## 2024-02-24 NOTE — PROGRESS NOTE ADULT - SUBJECTIVE AND OBJECTIVE BOX
Subjective: Patient seen and examined. No new events except as noted.   moved out of stroke unit   surgery consulted for  lap-assisted G tube placement  REVIEW OF SYSTEMS:    CONSTITUTIONAL: +weakness, fevers or chills  EYES/ENT: No visual changes;  No vertigo or throat pain   NECK: No pain or stiffness  RESPIRATORY: No cough, wheezing, hemoptysis; No shortness of breath  CARDIOVASCULAR: No chest pain or palpitations  GASTROINTESTINAL: No abdominal or epigastric pain. No nausea, vomiting, or hematemesis; No diarrhea or constipation. No melena or hematochezia.  GENITOURINARY: No dysuria, frequency or hematuria  NEUROLOGICAL: No numbness or weakness  SKIN: No itching, burning, rashes, or lesions   All other review of systems is negative unless indicated above.    MEDICATIONS:  MEDICATIONS  (STANDING):  albuterol/ipratropium for Nebulization 3 milliLiter(s) Nebulizer every 6 hours  atorvastatin 20 milliGRAM(s) Oral at bedtime  clindamycin IVPB 600 milliGRAM(s) IV Intermittent once  enoxaparin Injectable 30 milliGRAM(s) SubCutaneous every 24 hours      PHYSICAL EXAM:  T(C): 36.7 (02-24-24 @ 04:00), Max: 36.8 (02-24-24 @ 00:00)  HR: 93 (02-24-24 @ 16:00) (83 - 94)  BP: 114/47 (02-24-24 @ 16:00) (103/49 - 140/54)  RR: 28 (02-24-24 @ 16:00) (22 - 28)  SpO2: 96% (02-24-24 @ 16:00) (96% - 99%)  Wt(kg): --  I&O's Summary    23 Feb 2024 07:01  -  24 Feb 2024 07:00  --------------------------------------------------------  IN: 780 mL / OUT: 590 mL / NET: 190 mL    24 Feb 2024 07:01  -  24 Feb 2024 19:44  --------------------------------------------------------  IN: 0 mL / OUT: 250 mL / NET: -250 mL          Appearance: NAD  HEENT:   Dry oral mucosa, PERRL, EOMI	  Lymphatic: No lymphadenopathy +NGT  Cardiovascular: Normal S1 S2, No JVD, No murmurs, No edema  Respiratory: Decreased bs  Psychiatry: A & O x 0 sleepy  Gastrointestinal:  Soft, Non-tender, + BS	  Skin: No rashes, No ecchymoses, No cyanosis	  Neurologic Exam:  Mental status - eyes closed, opens to repeated verbal stimuli. Follows intermittent simple commands to squeeze L hand/give thumbs up in L hand. Does not respond to orientation questions.   Cranial nerves - R pupil appears ?sluggishly reactive. No BTT in R eye. Unable to open left eye. ?R facial droop but may be 2/2 positioning of patient's head to R.  Motor - Normal bulk. Increased tone in RUE. Does not maintain antigravity when asked throughout all extremities initially, however does squeeze hand on LUE, withdraws slightly to noxious stimuli in R hand.   Upon re-evaluation able to raise both legs antigravity.  Sensation - Withdraws to noxious stimuli throughout.  DTR's - deferred  Coordination -deferred  Gait and station - deferred  Extremities: Normal range of motion, No clubbing, cyanosis or edema  Vascular: Peripheral pulses palpable 2+ bilaterally          LABS:    CARDIAC MARKERS:                                7.9    6.95  )-----------( 164      ( 24 Feb 2024 01:33 )             25.1     02-24    144  |  115<H>  |  21  ----------------------------<  143<H>  4.2   |  18<L>  |  1.00    Ca    8.3<L>      24 Feb 2024 01:33  Phos  2.5     02-23  Mg     2.1     02-23      proBNP:   Lipid Profile:   HgA1c:   TSH:             TELEMETRY: SR	    ECG:  	  RADIOLOGY: c< from: CT Abdomen and Pelvis No Cont (02.23.24 @ 11:12) >    ACC: [id number] EXAM:  CT ABDOMEN AND PELVIS   ORDERED BY: KHUSHBU CAMPBELL     PROCEDURE DATE:  02/23/2024          INTERPRETATION:  CLINICAL INFORMATION: Evaluate for access for   percutaneous gastrostomy tube. Failed PEG placement.    COMPARISON: [facility] 2/16/2024.    CONTRAST/COMPLICATIONS:  IV Contrast: None  Oral Contrast: None  Complications: None    PROCEDURE:  CT of the Abdomen and Pelvis was performed.  Sagittal and coronal reformats were performed.    FINDINGS:  Evaluation of the solid organs and vasculature is limited without   intravenous contrast.    LOWER CHEST: Small bilateral pleural effusions and pericardial effusion,   unchanged. TAVR. Partially visualized cardiac device leads. Mitral   annular calcification.    LIVER: Hepatic cysts and additional subcentimeter hypodense foci too   small to characterize.  BILE DUCTS: Normal caliber.  GALLBLADDER: Cholecystectomy.  SPLEEN: Within normal limits.  PANCREAS: Atrophic.  ADRENALS: Bilateral adrenal gland thickening.  KIDNEYS/URETERS: 2 mm nonobstructing left renal stone. No hydronephrosis.    BLADDER: Within normal limits.  REPRODUCTIVE ORGANS: Hysterectomy.    BOWEL: Enteric tube terminating in the duodenal bulb. No bowel   obstruction. Appendix is normal. Large rectal stool burden.  PERITONEUM: No ascites. Trace foci of pneumoperitoneum in the upper   abdomen.  VESSELS: Atherosclerotic changes.  RETROPERITONEUM/LYMPH NODES: No lymphadenopathy.  ABDOMINAL WALL: Postsurgical changes. Subcutaneous edema. Fat-containing   right inguinal hernia.  BONES: Degenerative changes. Grade 1 anterolisthesis of L4 on L5. Chronic   appearing fracture deformity of the left distal humerus.    IMPRESSION:  Trace pneumoperitoneum. This finding was discussed by Dr. Reyez with   Lisa Costa NP on 2/23/2024 11:44 AM with read back confirmation.    Small bilateral pleural effusions and pericardial effusion, unchanged.    Chronic appearing fracture deformity of the left distal humerus.    --- End of Report ---           JEREMY BERNAL; Resident Radiologist  This document has been electronically signed.  SHAHZAD SETH MD; Attending Radiologist  This document has been electronically signed. Feb 23 2024 12:05PM    < end of copied text >    DIAGNOSTIC TESTING:  [ ] Echocardiogram:  [ ]  Catheterization:  [ ] Stress Test:    OTHER:

## 2024-02-24 NOTE — PROGRESS NOTE ADULT - ASSESSMENT
89-year-old left-handed woman with past medical history of CHF, HTN, pacemaker placement, TAVR presenting as a code stroke for AMS, ?R-facial droop, R sided weakness.  Was found by home aide at 830 2/14 less responsive, not moving extremities as usually does. No prior known hx strokes per family.     Dysphagia:   PEG attempted by GI, no window   CT reviewed by IR, no window for them   surgery consult     Global aphasia, R hemiparesis due to L MCA infarct     Neuro f/up appreciated

## 2024-02-24 NOTE — PROGRESS NOTE ADULT - SUBJECTIVE AND OBJECTIVE BOX
Chief Complaint:  Patient is a 89y old  Female who presents with a chief complaint of CVA (23 Feb 2024 17:30)      Date of service 02-24-24 @ 11:02      Interval Events:   no acute events     Hospital Medications:  albuterol/ipratropium for Nebulization 3 milliLiter(s) Nebulizer every 6 hours  clindamycin IVPB 600 milliGRAM(s) IV Intermittent once  dextrose 5%. 1000 milliLiter(s) IV Continuous <Continuous>  enoxaparin Injectable 30 milliGRAM(s) SubCutaneous every 24 hours        Review of Systems:  General:  No wt loss, fevers, chills, night sweats, fatigue,   Eyes:  Good vision, no reported pain  ENT:  No sore throat, pain, runny nose, dysphagia  CV:  No pain, palpitations, hypo/hypertension  Resp:  No dyspnea, cough, tachypnea, wheezing  GI:  See HPI  :  No pain, bleeding, incontinence, nocturia  Muscle:  No pain, weakness  Neuro:  No weakness, tingling, memory problems  Psych:  No fatigue, insomnia, mood problems, depression  Endocrine:  No polyuria, polydipsia, cold/heat intolerance  Heme:  No petechiae, ecchymosis, easy bruisability  Integumentary:  No rash, edema    PHYSICAL EXAM:   Vital Signs:  Vital Signs Last 24 Hrs  T(C): 36.7 (24 Feb 2024 04:00), Max: 36.9 (23 Feb 2024 17:00)  T(F): 98 (24 Feb 2024 04:00), Max: 98.5 (23 Feb 2024 17:00)  HR: 91 (24 Feb 2024 04:00) (80 - 92)  BP: 140/54 (24 Feb 2024 04:00) (91/37 - 140/54)  BP(mean): 78 (24 Feb 2024 04:00) (53 - 87)  RR: 23 (24 Feb 2024 04:00) (21 - 25)  SpO2: 97% (24 Feb 2024 04:00) (97% - 100%)    Parameters below as of 24 Feb 2024 04:00  Patient On (Oxygen Delivery Method): room air      Daily     Daily       PHYSICAL EXAM:     GENERAL:  Appears stated age, well-groomed, well-nourished, no distress  HEENT:  NC/AT,  conjunctivae anicteric, clear and pink,   NECK: supple, trachea midline  CHEST:  Full & symmetric excursion, no increased effort, breath sounds clear  HEART:  Regular rhythm, no JVD  ABDOMEN:  Soft, non-tender, non-distended, normoactive bowel sounds,  no masses , no hepatosplenomegaly  EXTREMITIES:  no cyanosis,clubbing or edema  SKIN:  No rash, erythema, or, ecchymoses, no jaundice  NEURO:  Alert, non-focal, no asterixis  PSYCH: Appropriate affect, oriented to place and time  RECTAL: Deferred      LABS Personally reviewed by me:                        7.9    6.95  )-----------( 164      ( 24 Feb 2024 01:33 )             25.1     Mean Cell Volume: 91.6 fl (02-24-24 @ 01:33)    02-24    144  |  115<H>  |  21  ----------------------------<  143<H>  4.2   |  18<L>  |  1.00    Ca    8.3<L>      24 Feb 2024 01:33  Phos  2.5     02-23  Mg     2.1     02-23          Urinalysis Basic - ( 24 Feb 2024 01:33 )    Color: x / Appearance: x / SG: x / pH: x  Gluc: 143 mg/dL / Ketone: x  / Bili: x / Urobili: x   Blood: x / Protein: x / Nitrite: x   Leuk Esterase: x / RBC: x / WBC x   Sq Epi: x / Non Sq Epi: x / Bacteria: x                              7.9    6.95  )-----------( 164      ( 24 Feb 2024 01:33 )             25.1                         8.6    7.44  )-----------( 174      ( 23 Feb 2024 13:44 )             27.9                         7.9    8.76  )-----------( Clumped    ( 23 Feb 2024 05:39 )             26.0                         8.5    7.97  )-----------( 195      ( 22 Feb 2024 06:21 )             28.1       Imaging personally reviewed by me:

## 2024-02-24 NOTE — PROGRESS NOTE ADULT - SUBJECTIVE AND OBJECTIVE BOX
Patient is a 89y old  Female who presents with a chief complaint of CVA (23 Feb 2024 17:30)      SUBJECTIVE / OVERNIGHT EVENTS:    Events noted.  Awake  No N/V    MEDICATIONS  (STANDING):  albuterol/ipratropium for Nebulization 3 milliLiter(s) Nebulizer every 6 hours  atorvastatin 20 milliGRAM(s) Oral at bedtime  clindamycin IVPB 600 milliGRAM(s) IV Intermittent once  enoxaparin Injectable 30 milliGRAM(s) SubCutaneous every 24 hours    MEDICATIONS  (PRN):        CAPILLARY BLOOD GLUCOSE        I&O's Summary    23 Feb 2024 07:01  -  24 Feb 2024 07:00  --------------------------------------------------------  IN: 780 mL / OUT: 590 mL / NET: 190 mL    24 Feb 2024 07:01  -  25 Feb 2024 00:06  --------------------------------------------------------  IN: 0 mL / OUT: 250 mL / NET: -250 mL        T(C): 36.9 (02-24-24 @ 21:00), Max: 36.9 (02-24-24 @ 21:00)  HR: 93 (02-24-24 @ 21:00) (88 - 94)  BP: 140/74 (02-24-24 @ 21:00) (103/49 - 140/74)  RR: 18 (02-24-24 @ 21:00) (18 - 28)  SpO2: 99% (02-24-24 @ 21:00) (96% - 99%)    PHYSICAL EXAM:  GENERAL: NAD  NECK: Supple, No JVD  CHEST/LUNG: Clear to auscultation bilaterally; No wheezing.  HEART: Regular rate and rhythm; No murmurs, rubs, or gallops  ABDOMEN: Soft, Nontender, Nondistended; Bowel sounds present  EXTREMITIES:   No edema  NEUROLOGY: AAO       LABS:                        7.9    6.95  )-----------( 164      ( 24 Feb 2024 01:33 )             25.1     02-24    144  |  115<H>  |  21  ----------------------------<  143<H>  4.2   |  18<L>  |  1.00    Ca    8.3<L>      24 Feb 2024 01:33  Phos  2.5     02-23  Mg     2.1     02-23            Urinalysis Basic - ( 24 Feb 2024 01:33 )    Color: x / Appearance: x / SG: x / pH: x  Gluc: 143 mg/dL / Ketone: x  / Bili: x / Urobili: x   Blood: x / Protein: x / Nitrite: x   Leuk Esterase: x / RBC: x / WBC x   Sq Epi: x / Non Sq Epi: x / Bacteria: x      CAPILLARY BLOOD GLUCOSE            RADIOLOGY & ADDITIONAL TESTS:    Imaging Personally Reviewed:    Consultant(s) Notes Reviewed:      Care Discussed with Consultants/Other Providers:    Logan Putnam MD, CMD, FACP    [id number] [place] [address]  Office Tel: [phone]  Cell: [phone]

## 2024-02-25 LAB
ANION GAP SERPL CALC-SCNC: 9 MMOL/L — SIGNIFICANT CHANGE UP (ref 5–17)
BUN SERPL-MCNC: 26 MG/DL — HIGH (ref 7–23)
CALCIUM SERPL-MCNC: 8.2 MG/DL — LOW (ref 8.4–10.5)
CHLORIDE SERPL-SCNC: 114 MMOL/L — HIGH (ref 96–108)
CO2 SERPL-SCNC: 19 MMOL/L — LOW (ref 22–31)
CREAT SERPL-MCNC: 0.99 MG/DL — SIGNIFICANT CHANGE UP (ref 0.5–1.3)
EGFR: 52 ML/MIN/1.73M2 — LOW
GLUCOSE SERPL-MCNC: 138 MG/DL — HIGH (ref 70–99)
HCT VFR BLD CALC: 24 % — LOW (ref 34.5–45)
HGB BLD-MCNC: 7.5 G/DL — LOW (ref 11.5–15.5)
MCHC RBC-ENTMCNC: 28.5 PG — SIGNIFICANT CHANGE UP (ref 27–34)
MCHC RBC-ENTMCNC: 31.3 GM/DL — LOW (ref 32–36)
MCV RBC AUTO: 91.3 FL — SIGNIFICANT CHANGE UP (ref 80–100)
NRBC # BLD: 0 /100 WBCS — SIGNIFICANT CHANGE UP (ref 0–0)
PLATELET # BLD AUTO: 169 K/UL — SIGNIFICANT CHANGE UP (ref 150–400)
POTASSIUM SERPL-MCNC: 4.7 MMOL/L — SIGNIFICANT CHANGE UP (ref 3.5–5.3)
POTASSIUM SERPL-SCNC: 4.7 MMOL/L — SIGNIFICANT CHANGE UP (ref 3.5–5.3)
RBC # BLD: 2.63 M/UL — LOW (ref 3.8–5.2)
RBC # FLD: 20.1 % — HIGH (ref 10.3–14.5)
SODIUM SERPL-SCNC: 142 MMOL/L — SIGNIFICANT CHANGE UP (ref 135–145)
WBC # BLD: 6.06 K/UL — SIGNIFICANT CHANGE UP (ref 3.8–10.5)
WBC # FLD AUTO: 6.06 K/UL — SIGNIFICANT CHANGE UP (ref 3.8–10.5)

## 2024-02-25 PROCEDURE — 99233 SBSQ HOSP IP/OBS HIGH 50: CPT | Mod: FS

## 2024-02-25 PROCEDURE — 99233 SBSQ HOSP IP/OBS HIGH 50: CPT | Mod: 57

## 2024-02-25 PROCEDURE — 71045 X-RAY EXAM CHEST 1 VIEW: CPT | Mod: 26

## 2024-02-25 RX ORDER — SODIUM CHLORIDE 9 MG/ML
1000 INJECTION INTRAMUSCULAR; INTRAVENOUS; SUBCUTANEOUS
Refills: 0 | Status: DISCONTINUED | OUTPATIENT
Start: 2024-02-25 | End: 2024-02-25

## 2024-02-25 RX ORDER — SODIUM CHLORIDE 9 MG/ML
1000 INJECTION INTRAMUSCULAR; INTRAVENOUS; SUBCUTANEOUS
Refills: 0 | Status: DISCONTINUED | OUTPATIENT
Start: 2024-02-25 | End: 2024-02-26

## 2024-02-25 RX ORDER — ACETYLCYSTEINE 200 MG/ML
4 VIAL (ML) MISCELLANEOUS
Refills: 0 | Status: DISCONTINUED | OUTPATIENT
Start: 2024-02-25 | End: 2024-02-27

## 2024-02-25 RX ADMIN — Medication 3 MILLILITER(S): at 06:20

## 2024-02-25 RX ADMIN — Medication 4 MILLILITER(S): at 18:08

## 2024-02-25 RX ADMIN — Medication 3 MILLILITER(S): at 18:08

## 2024-02-25 RX ADMIN — Medication 3 MILLILITER(S): at 11:42

## 2024-02-25 RX ADMIN — ENOXAPARIN SODIUM 30 MILLIGRAM(S): 100 INJECTION SUBCUTANEOUS at 18:08

## 2024-02-25 NOTE — PROGRESS NOTE ADULT - SUBJECTIVE AND OBJECTIVE BOX
GENERAL SURGERY DAILY PROGRESS NOTE:    Subjective:  Patient seen and examined    Vital Signs Last 24 Hrs  T(C): 36.5 (25 Feb 2024 08:40), Max: 36.9 (24 Feb 2024 21:00)  T(F): 97.7 (25 Feb 2024 08:40), Max: 98.4 (24 Feb 2024 21:00)  HR: 84 (25 Feb 2024 08:40) (84 - 95)  BP: 124/72 (25 Feb 2024 08:40) (98/60 - 140/74)  BP(mean): 68 (24 Feb 2024 16:00) (68 - 70)  RR: 18 (25 Feb 2024 08:40) (18 - 28)  SpO2: 99% (25 Feb 2024 08:40) (94% - 99%)    Parameters below as of 25 Feb 2024 08:40  Patient On (Oxygen Delivery Method): room air        Exam:  Gen: NAD, resting in bed, ngt keotube in place  Resp: Airway patent, non-labored respirations  Abd: Soft, ND, NT, no rebound or guarding. +low midline surgical scar  Ext: No edema, WWP    I&O's Detail    24 Feb 2024 07:01  -  25 Feb 2024 07:00  --------------------------------------------------------  IN:  Total IN: 0 mL    OUT:    Intermittent Catheterization - Urethral (mL): 450 mL  Total OUT: 450 mL    Total NET: -450 mL          Daily     Daily     MEDICATIONS  (STANDING):  albuterol/ipratropium for Nebulization 3 milliLiter(s) Nebulizer every 6 hours  atorvastatin 20 milliGRAM(s) Oral at bedtime  clindamycin IVPB 600 milliGRAM(s) IV Intermittent once  enoxaparin Injectable 30 milliGRAM(s) SubCutaneous every 24 hours    MEDICATIONS  (PRN):      LABS:                        7.5    6.06  )-----------( 169      ( 25 Feb 2024 08:36 )             24.0     02-25    142  |  114<H>  |  26<H>  ----------------------------<  138<H>  4.7   |  19<L>  |  0.99    Ca    8.2<L>      25 Feb 2024 08:36  Phos  2.5     02-23  Mg     2.1     02-23        Urinalysis Basic - ( 25 Feb 2024 08:36 )    Color: x / Appearance: x / SG: x / pH: x  Gluc: 138 mg/dL / Ketone: x  / Bili: x / Urobili: x   Blood: x / Protein: x / Nitrite: x   Leuk Esterase: x / RBC: x / WBC x   Sq Epi: x / Non Sq Epi: x / Bacteria: x

## 2024-02-25 NOTE — PROGRESS NOTE ADULT - NS ATTEND AMEND GEN_ALL_CORE FT
I saw and examined the patient, reviewed diagnostic studies, and reviewed images personally. I agree with PA’s history, exam, orders placed, and plan of care. Medical issues needing to be addressed include: Cerebral infarction of L mca territory likely 2/2 cardioembolism (NO PROVEN AFIB PER CARDIOLOGY, but high suspicion w/ SIGNIFICANT MITRAL VALVE STENOSIS), CHF, HTN, dysphagia, speech re-evaluation.  NG now in place, feeding to goal. Per GI/IR, pt will require surgical Peg/GT placement by gen surge. Surgery team was ready to take the patient to OR, but family refused. Multiple discussions in the past regarding feeding tube with all teams including neuro, GI, palliative. Need for surgical placement was discussed also after running into difficulty a few days ago to place PEG. Pt pending feeding tube placement prior to DC.

## 2024-02-25 NOTE — PROVIDER CONTACT NOTE (OTHER) - ASSESSMENT
pt blood pressure rechecked multiple times, blood pressure consistently 90s/30s.
Tachypneic, tachycardic and using accessory muscle use
patient lethargic but arousable, VSS. patient needed encouragement to participate with exam. Drifts noted on lower extremeties, no effort on RUE, some effort on LUE., resulting in NIH score of 17.

## 2024-02-25 NOTE — PROVIDER CONTACT NOTE (OTHER) - BACKGROUND
89 year old female FULL CODE presenting with L MCA infarct  2/16 LMCA with small hemrrgagic,edema,midline shift
patient admitted for stroke
pt admitted with L MCA

## 2024-02-25 NOTE — PROVIDER CONTACT NOTE (OTHER) - SITUATION
patient admitted to stroke unit from ED. In report from ED nurse, NIH was 12. Upon admission to stroke unit assessment, NIH was 17.
Pt returned from doppler; vital sign check completed upon arrival and patient found to be hypotensive. mental status intact, strength intact.
pt in respiratory distress S/P deep suctioning and removal of large mucus plug. pt tachypneic, tachycardic and accessory muscle usage.

## 2024-02-25 NOTE — PROVIDER CONTACT NOTE (OTHER) - ACTION/TREATMENT ORDERED:
JAN Oseguera at bedside to assess patient, no interventions at this time, pending MRI
pt repositioned, deep suctioned with RT present, and CXR and Mucomyst ordered. Tube feeds held, pt NPO now.
250mL normal saline bolus. reassess BP

## 2024-02-25 NOTE — PROGRESS NOTE ADULT - SUBJECTIVE AND OBJECTIVE BOX
Chief Complaint:  Patient is a 89y old  Female who presents with a chief complaint of CVA (23 Feb 2024 17:30)      Date of service 02-25-24 @ 10:53      Interval Events:   no acute events     Hospital Medications:  albuterol/ipratropium for Nebulization 3 milliLiter(s) Nebulizer every 6 hours  atorvastatin 20 milliGRAM(s) Oral at bedtime  clindamycin IVPB 600 milliGRAM(s) IV Intermittent once  enoxaparin Injectable 30 milliGRAM(s) SubCutaneous every 24 hours        Review of Systems:  General:  No wt loss, fevers, chills, night sweats, fatigue,   Eyes:  Good vision, no reported pain  ENT:  No sore throat, pain, runny nose, dysphagia  CV:  No pain, palpitations, hypo/hypertension  Resp:  No dyspnea, cough, tachypnea, wheezing  GI:  See HPI  :  No pain, bleeding, incontinence, nocturia  Muscle:  No pain, weakness  Neuro:  No weakness, tingling, memory problems  Psych:  No fatigue, insomnia, mood problems, depression  Endocrine:  No polyuria, polydipsia, cold/heat intolerance  Heme:  No petechiae, ecchymosis, easy bruisability  Integumentary:  No rash, edema    PHYSICAL EXAM:   Vital Signs:  Vital Signs Last 24 Hrs  T(C): 36.5 (25 Feb 2024 08:40), Max: 36.9 (24 Feb 2024 21:00)  T(F): 97.7 (25 Feb 2024 08:40), Max: 98.4 (24 Feb 2024 21:00)  HR: 84 (25 Feb 2024 08:40) (84 - 95)  BP: 124/72 (25 Feb 2024 08:40) (98/60 - 140/74)  BP(mean): 68 (24 Feb 2024 16:00) (68 - 70)  RR: 18 (25 Feb 2024 08:40) (18 - 28)  SpO2: 99% (25 Feb 2024 08:40) (94% - 99%)    Parameters below as of 25 Feb 2024 08:40  Patient On (Oxygen Delivery Method): room air      Daily     Daily       PHYSICAL EXAM:     GENERAL:  Appears stated age, well-groomed, well-nourished, no distress  HEENT:  NC/AT,  conjunctivae anicteric, clear and pink,   NECK: supple, trachea midline  CHEST:  Full & symmetric excursion, no increased effort, breath sounds clear  HEART:  Regular rhythm, no JVD  ABDOMEN:  Soft, non-tender, non-distended, normoactive bowel sounds,  no masses , no hepatosplenomegaly  EXTREMITIES:  no cyanosis,clubbing or edema  SKIN:  No rash, erythema, or, ecchymoses, no jaundice  NEURO:  Alert, non-focal, no asterixis  PSYCH: Appropriate affect, oriented to place and time  RECTAL: Deferred      LABS Personally reviewed by me:                        7.5    6.06  )-----------( 169      ( 25 Feb 2024 08:36 )             24.0     Mean Cell Volume: 91.3 fl (02-25-24 @ 08:36)    02-25    142  |  114<H>  |  26<H>  ----------------------------<  138<H>  4.7   |  19<L>  |  0.99    Ca    8.2<L>      25 Feb 2024 08:36  Phos  2.5     02-23  Mg     2.1     02-23          Urinalysis Basic - ( 25 Feb 2024 08:36 )    Color: x / Appearance: x / SG: x / pH: x  Gluc: 138 mg/dL / Ketone: x  / Bili: x / Urobili: x   Blood: x / Protein: x / Nitrite: x   Leuk Esterase: x / RBC: x / WBC x   Sq Epi: x / Non Sq Epi: x / Bacteria: x                              7.5    6.06  )-----------( 169      ( 25 Feb 2024 08:36 )             24.0                         7.9    6.95  )-----------( 164      ( 24 Feb 2024 01:33 )             25.1                         8.6    7.44  )-----------( 174      ( 23 Feb 2024 13:44 )             27.9                         7.9    8.76  )-----------( Clumped    ( 23 Feb 2024 05:39 )             26.0       Imaging personally reviewed by me:

## 2024-02-25 NOTE — PROGRESS NOTE ADULT - ASSESSMENT
ASSESSMENT: 89F w/ PMHx CHF, hx TAVR, severe MS, PPM, admitted for L MCA CVA w/ hemorrhagic transformation, now with aphasia, dysphagia and R hemiparesis. General surgery consulted for G tube placement    PLAN:   - Family decline surgical g-tube placement at this time  - reconsult when family is amendable to surgery    ACS  [id number]

## 2024-02-25 NOTE — PROGRESS NOTE ADULT - SUBJECTIVE AND OBJECTIVE BOX
THE PATIENT WAS SEEN AND EXAMINED BY ME WITH THE HOUSESTAFF AND STROKE TEAM DURING MORNING ROUNDS.   HPI:  89-year-old left-handed woman with PMH of CHF, HTN, pacemaker placement, valve replacement presenting as a code stroke for AMS, ?R-facial droop, R sided weakness. Was found by home aide at 830 2/14 less responsive, not moving extremities as usually does. No prior known hx strokes per family. At baseline patient alert, able to recognize/comprehend familiar faces, per daughters at bedside patient sometimes has difficulty with getting words out. Information obtained from aide at bedside, then after CT scanner family also at bedside.     SUBJECTIVE: No events overnight.  No new neurologic complaints, ROS reported negative unless otherwise noted.      albuterol/ipratropium for Nebulization 3 milliLiter(s) Nebulizer every 6 hours  atorvastatin 20 milliGRAM(s) Oral at bedtime  clindamycin IVPB 600 milliGRAM(s) IV Intermittent once  enoxaparin Injectable 30 milliGRAM(s) SubCutaneous every 24 hours      PHYSICAL EXAM:   Vital Signs Last 24 Hrs  T(C): 36.5 (25 Feb 2024 08:40), Max: 36.9 (24 Feb 2024 21:00)  T(F): 97.7 (25 Feb 2024 08:40), Max: 98.4 (24 Feb 2024 21:00)  HR: 84 (25 Feb 2024 08:40) (84 - 95)  BP: 124/72 (25 Feb 2024 08:40) (98/60 - 140/74)  BP(mean): 68 (24 Feb 2024 16:00) (68 - 68)  RR: 18 (25 Feb 2024 08:40) (18 - 28)  SpO2: 99% (25 Feb 2024 08:40) (94% - 99%)    Parameters below as of 25 Feb 2024 08:40  Patient On (Oxygen Delivery Method): room air        General: No acute distress  Abdomen: Soft, nondistended   Extremities: No edema    NEUROLOGICAL EXAM:  Mental status: Opens closed, opens eyes briefly to voice, does not respond to questions, groans intermittently. Follows some simple commands.  Cranial Nerves: No facial asymmetry. Decreased blink to threat on Right.   Motor exam: Normal tone, some effort spontaneous against gravity throughout all extremities, L > R. Arthritic changes throughout extremities.  Sensation: Grimaces to painful stimuli x 4, less so on the right  Coordination/ Gait: Deferred      LABS:                        7.5    6.06  )-----------( 169      ( 25 Feb 2024 08:36 )             24.0    02-25    142  |  114<H>  |  26<H>  ----------------------------<  138<H>  4.7   |  19<L>  |  0.99    Ca    8.2<L>      25 Feb 2024 08:36  Phos  2.5     02-23  Mg     2.1     02-23          IMAGING: Reviewed by me.     2/17/24 CT Head: Acute left MCA territory infarct, as on the prior, with associated hemorrhagic transformation centered in the ipsilateral basal ganglia, not significantly changed. Associated 8-9 mm rightward midline shift is not   significantly changed. Slightly more pronounced gyriform hyperdensity may reflect spared cortex versus cortical petechial hemorrhage.    2/16/24 CT Head:  Redemonstration of an evolving left-sided MCA distribution acute/subacute infarct with a new small amount of hemorrhagic transformation within the infarct bed in comparison with 2/14/2024. Surrounding mass effect and shift of the midline structures from left to right appears unchanged when compared to the most recent prior CT exam.      CT brain 2/14/24: Acute left frontoparietalinfarct. No CT evidence for selene hemorrhagic transformation.    CT brain perfusion 2/14/24: Significantly limited by motion. Cerebral blood flow less than 30% = 0 mL. No core infarct predicted. Tmax greater than 6 seconds = 46 mL and involves the bilateral mesial cerebellar hemispheres, the right temporal lobe, bilateral frontal lobes, and left posterior temporal. This represents brain parenchyma predicted to be at continued ischemic risk in the presence of neurologic symptoms. This finding is likely spurious in nature.  Mismatch volume 46 mL, Mismatch ratio infinite    CTA brain 2/14/24: Intraluminal filling defect involving the left ICA terminus and extending into the proximal right A1 segment. Normal flow-related enhancement of the remaining left LEBRON. Normal flow-related enhancement of the left MCA.  Right MCA enhances to a lesser degree than the left MCA. No vascular aneurysm or AVM.    CTA neck: Approximately 50% short segment stenosis of the origin and proximal segment of the left ICA due to calcified plaque. Normal flow-related enhancement of the more distal vessel. Approximately 75-80% short segment stenosis of the proximal segment of the right internal carotid artery due to partially calcified plaque. Normal flow-related enhancement of the more distal vessel.

## 2024-02-25 NOTE — PROVIDER CONTACT NOTE (OTHER) - REASON
respiratory distress
Pt blood pressure low upon arrival back to unit
increase in NIH score from 12 to 17

## 2024-02-25 NOTE — PROGRESS NOTE ADULT - ASSESSMENT
89F presented with a stroke    1. Stroke     2. Dysphagia   PEG attempted, no window   CT reviewed by IR, no window for them   Surgery offering lap-assited G-tube, family declined  GOC discussion ongoing     I had a prolonged conversation with the patient regarding the hospital course, differential diagnosis, results of diagnostic tests this far, and therapeutic modalities available. Plan of care discussed with the patient after the evaluation. Patient expresses a clear understanding of the plan of care.      Jovon Ugalde D.O.  Gastroenterology and Hepatology  [address] [place] [address], [address]  [place], [place]  Office: [phone]

## 2024-02-25 NOTE — PROGRESS NOTE ADULT - SUBJECTIVE AND OBJECTIVE BOX
Subjective: Patient seen and examined. No new events except as noted.     REVIEW OF SYSTEMS:    CONSTITUTIONAL: +weakness, fevers or chills  EYES/ENT: No visual changes;  No vertigo or throat pain   NECK: No pain or stiffness  RESPIRATORY: No cough, wheezing, hemoptysis; No shortness of breath  CARDIOVASCULAR: No chest pain or palpitations  GASTROINTESTINAL: No abdominal or epigastric pain. No nausea, vomiting, or hematemesis; No diarrhea or constipation. No melena or hematochezia.  GENITOURINARY: No dysuria, frequency or hematuria  NEUROLOGICAL: No numbness or weakness  SKIN: No itching, burning, rashes, or lesions   All other review of systems is negative unless indicated above.    MEDICATIONS:  MEDICATIONS  (STANDING):  albuterol/ipratropium for Nebulization 3 milliLiter(s) Nebulizer every 6 hours  atorvastatin 20 milliGRAM(s) Oral at bedtime  clindamycin IVPB 600 milliGRAM(s) IV Intermittent once  enoxaparin Injectable 30 milliGRAM(s) SubCutaneous every 24 hours      PHYSICAL EXAM:  T(C): 36.5 (02-25-24 @ 08:40), Max: 36.9 (02-24-24 @ 21:00)  HR: 84 (02-25-24 @ 08:40) (84 - 95)  BP: 124/72 (02-25-24 @ 08:40) (98/60 - 140/74)  RR: 18 (02-25-24 @ 08:40) (18 - 28)  SpO2: 99% (02-25-24 @ 08:40) (94% - 99%)  Wt(kg): --  I&O's Summary    24 Feb 2024 07:01  -  25 Feb 2024 07:00  --------------------------------------------------------  IN: 0 mL / OUT: 450 mL / NET: -450 mL          Appearance: NAD  HEENT:   Dry oral mucosa, PERRL, EOMI	  Lymphatic: No lymphadenopathy +NGT  Cardiovascular: Normal S1 S2, No JVD, No murmurs, No edema  Respiratory: Decreased bs  Psychiatry: A & O x 0 sleepy  Gastrointestinal:  Soft, Non-tender, + BS	  Skin: No rashes, No ecchymoses, No cyanosis	  Neurologic Exam:  Mental status - eyes closed, opens to repeated verbal stimuli. Follows intermittent simple commands to squeeze L hand/give thumbs up in L hand. Does not respond to orientation questions.   Cranial nerves - R pupil appears ?sluggishly reactive. No BTT in R eye. Unable to open left eye. ?R facial droop but may be 2/2 positioning of patient's head to R.  Motor - Normal bulk. Increased tone in RUE. Does not maintain antigravity when asked throughout all extremities initially, however does squeeze hand on LUE, withdraws slightly to noxious stimuli in R hand.   Upon re-evaluation able to raise both legs antigravity.  Sensation - Withdraws to noxious stimuli throughout.  DTR's - deferred  Coordination -deferred  Gait and station - deferred  Extremities: Normal range of motion, No clubbing, cyanosis or edema  Vascular: Peripheral pulses palpable 2+ bilaterally      LABS:    CARDIAC MARKERS:                                7.5    6.06  )-----------( 169      ( 25 Feb 2024 08:36 )             24.0     02-25    142  |  114<H>  |  26<H>  ----------------------------<  138<H>  4.7   |  19<L>  |  0.99    Ca    8.2<L>      25 Feb 2024 08:36  Phos  2.5     02-23  Mg     2.1     02-23      proBNP:   Lipid Profile:   HgA1c:   TSH:             TELEMETRY: 	SR    ECG:  	  RADIOLOGY:   DIAGNOSTIC TESTING:  [ ] Echocardiogram:  [ ]  Catheterization:  [ ] Stress Test:    OTHER:

## 2024-02-25 NOTE — PROGRESS NOTE ADULT - SUBJECTIVE AND OBJECTIVE BOX
DATE OF SERVICE: 02-25-24 @ 16:57    Patient is a 89y old  Female who presents with a chief complaint of CVA (23 Feb 2024 17:30)      SUBJECTIVE / OVERNIGHT EVENTS:  NAD    MEDICATIONS  (STANDING):  albuterol/ipratropium for Nebulization 3 milliLiter(s) Nebulizer every 6 hours  atorvastatin 20 milliGRAM(s) Oral at bedtime  clindamycin IVPB 600 milliGRAM(s) IV Intermittent once  enoxaparin Injectable 30 milliGRAM(s) SubCutaneous every 24 hours    MEDICATIONS  (PRN):      Vital Signs Last 24 Hrs  T(C): 36.5 (25 Feb 2024 12:00), Max: 36.9 (24 Feb 2024 21:00)  T(F): 97.7 (25 Feb 2024 12:00), Max: 98.4 (24 Feb 2024 21:00)  HR: 90 (25 Feb 2024 12:00) (84 - 95)  BP: 119/68 (25 Feb 2024 12:00) (98/60 - 140/74)  BP(mean): --  RR: 18 (25 Feb 2024 12:00) (18 - 18)  SpO2: 96% (25 Feb 2024 12:00) (94% - 99%)    Parameters below as of 25 Feb 2024 12:00  Patient On (Oxygen Delivery Method): room air      CAPILLARY BLOOD GLUCOSE        I&O's Summary    24 Feb 2024 07:01  -  25 Feb 2024 07:00  --------------------------------------------------------  IN: 0 mL / OUT: 450 mL / NET: -450 mL        PHYSICAL EXAM:  GENERAL: NAD, well-developed  HEAD:  Atraumatic, Normocephalic  EYES: EOMI, PERRLA, conjunctiva and sclera clear  NECK: Supple, No JVD  CHEST/LUNG: bryce rhonchi 2/2 upper airway secretions  HEART: Regular rate and rhythm; No murmurs, rubs, or gallops  ABDOMEN: Soft, Nontender, Nondistended; Bowel sounds present  EXTREMITIES:  2+ Peripheral Pulses, No clubbing, cyanosis, or edema  Mental status: Opens closed, opens eyes briefly to voice, does not respond to questions, groans intermittently. Follows some simple commands.  Cranial Nerves: No facial asymmetry. Decreased blink to threat on Right.   Motor exam: Normal tone, some effort spontaneous against gravity throughout all extremities, L > R. Arthritic changes throughout extremities.  Sensation: Grimaces to painful stimuli x 4, less so on the right  Coordination/ Gait: Deferred    LABS:                        7.5    6.06  )-----------( 169      ( 25 Feb 2024 08:36 )             24.0     02-25    142  |  114<H>  |  26<H>  ----------------------------<  138<H>  4.7   |  19<L>  |  0.99    Ca    8.2<L>      25 Feb 2024 08:36            Urinalysis Basic - ( 25 Feb 2024 08:36 )    Color: x / Appearance: x / SG: x / pH: x  Gluc: 138 mg/dL / Ketone: x  / Bili: x / Urobili: x   Blood: x / Protein: x / Nitrite: x   Leuk Esterase: x / RBC: x / WBC x   Sq Epi: x / Non Sq Epi: x / Bacteria: x        RADIOLOGY & ADDITIONAL TESTS:    Imaging Personally Reviewed:    Consultant(s) Notes Reviewed:      Care Discussed with Consultants/Other Providers:

## 2024-02-25 NOTE — PROGRESS NOTE ADULT - ASSESSMENT
89-year-old left-handed woman with past medical history of CHF, HTN, pacemaker placement, TAVR presenting as a code stroke for AMS, ?R-facial droop, R sided weakness.  Was found by home aide at 830 2/14 less responsive, not moving extremities as usually does. No prior known hx strokes per family.     Dysphagia:   PEG attempted by GI, no window   CT reviewed by IR, no window for them   surgery consult   family declining Surgery  - GOC discussion  - cont NGT feeds for now    upper airway secretions  - aggressive suctioning      Global aphasia, R hemiparesis due to L MCA infarct     Neuro f/up appreciated

## 2024-02-25 NOTE — PROGRESS NOTE ADULT - ASSESSMENT
89-year-old left-handed woman with past medical history of CHF, HTN, pacemaker placement, valve replacement presenting as a code stroke for AMS, ?R-facial droop, R sided weakness. Was found by home aide at 830 2/14 less responsive, not moving extremities as usually does. No prior known hx strokes per family. At baseline patient alert, able to recognize/comprehend familiar faces, per daughters at bedside patient sometimes has difficulty with getting words out. Not candidate for tenecteplase as area of hypodensity already appreciated on CT head imaging/age, not candidate for thrombectomy given higher MRS.    IMPRESSION: Global aphasia, R hemiparesis due to L MCA infarct with associated hemorrhagic transformation 2/2 L ICA partially occlusive thrombus. Mechanism ESUS vs. ICAD.      NEURO: Neurologically slightly improved since admission. Continue close monitoring for cerebral edema mass effect and neurologic deterioration. A1c 6.3. LDL 76 -40mg statin ordered, titrate statin to LDL goal less than 70. CT Head, CTA Head/Neck as above. Maintain - 140. Physical therapy/OT/Speech eval - NOHEMI.    ANTITHROMBOTIC THERAPY: On hold given hemorrhagic transformation on CT Head    PULMONARY: CXR (02/23) Clear, Protecting airway, saturating well. Continue duonebs. Now on supplemental O2. CT chest w/ small b/l pleural effusions and congestion, mucous plugging LLL. Pulmonology following. Procalcitonin not significantly elevated. Continue monitoring off antibiotics.    CARDIOVASCULAR: TTE severe mitral stenosis, s/p previous TAVR. Cardiac pacemaker interrogated. Continue cardiac monitoring. Monitor for PAF, as per Dr Hoskins (Cardiology) high suspicion due to advanced age and severe MS.     SBP goal: 110-160mmHg    GASTROINTESTINAL: Pt was scheduled for PEG placement with surgical team on 02/25/23: Family is not amenable to lap-assisted G tube placement on 2/25, they would like to discuss more with family and patient's PCP. Initial dysphagia screen failed. Repeat swallow evaluation 2/19, 2/21 - patient not a candidate for oral feeding at this time.  GI PEG placement, deferred on 2/20 due to hypernatremia.  As per GI and IR team unable to place PEG due to lack of safe window for placement, NGT placed endoscopically instead, tolerating feedings.      Diet: NG tube w/ tube feeds    RENAL: BUN/CR 21/1.00     Na Goal: Greater than 135.      Sousa: No    HEMATOLOGY: H/H fluctuating, hemoglobin 7.9, will c/w trending. Platelets 164. LE doppler: No evidence of deep venous thrombosis in either lower extremity.      DVT ppx: Lovenox 30 subq     ID: Afebrile without leukocytosis, monitoring off antibiotics.     OTHER: Plan discussed with daughter at bedside. Family wants FULL code. Palliative care consulted for goals of care discussion and support. RUE swelling: US ordered.     DISPOSITION: NOHEMI as per PT/OT eval once stable and workup is complete    CORE MEASURES:        Admission NIHSS: 18     TPA: NO      LDL/HDL: 76/45     Depression Screen: unable to assess     Statin Therapy: yes,      Dysphagia Screen: FAIL     Smoking NO      Afib NO     Stroke Education YES    Obtain screening lower extremity venous ultrasound in patients who meet 1 or more of the following criteria as patient is high risk for DVT/PE on admission:   [] History of DVT/PE  [] Hypercoagulable states (Factor V Leiden, Cancer, OCP, etc. )  [x] Prolonged immobility (hemiplegia/hemiparesis/post operative or any other extended immobilization)  [] Transferred from outside facility (Rehab or Long term care)  [] Age </= to 50

## 2024-02-26 LAB
ANION GAP SERPL CALC-SCNC: 12 MMOL/L — SIGNIFICANT CHANGE UP (ref 5–17)
BUN SERPL-MCNC: 24 MG/DL — HIGH (ref 7–23)
CALCIUM SERPL-MCNC: 8.7 MG/DL — SIGNIFICANT CHANGE UP (ref 8.4–10.5)
CHLORIDE SERPL-SCNC: 113 MMOL/L — HIGH (ref 96–108)
CO2 SERPL-SCNC: 21 MMOL/L — LOW (ref 22–31)
CREAT SERPL-MCNC: 1.07 MG/DL — SIGNIFICANT CHANGE UP (ref 0.5–1.3)
EGFR: 48 ML/MIN/1.73M2 — LOW
GLUCOSE SERPL-MCNC: 148 MG/DL — HIGH (ref 70–99)
HCT VFR BLD CALC: 24.5 % — LOW (ref 34.5–45)
HGB BLD-MCNC: 7.9 G/DL — LOW (ref 11.5–15.5)
MCHC RBC-ENTMCNC: 28.8 PG — SIGNIFICANT CHANGE UP (ref 27–34)
MCHC RBC-ENTMCNC: 32.2 GM/DL — SIGNIFICANT CHANGE UP (ref 32–36)
MCV RBC AUTO: 89.4 FL — SIGNIFICANT CHANGE UP (ref 80–100)
NRBC # BLD: 0 /100 WBCS — SIGNIFICANT CHANGE UP (ref 0–0)
PLATELET # BLD AUTO: 208 K/UL — SIGNIFICANT CHANGE UP (ref 150–400)
POTASSIUM SERPL-MCNC: 4.2 MMOL/L — SIGNIFICANT CHANGE UP (ref 3.5–5.3)
POTASSIUM SERPL-SCNC: 4.2 MMOL/L — SIGNIFICANT CHANGE UP (ref 3.5–5.3)
RBC # BLD: 2.74 M/UL — LOW (ref 3.8–5.2)
RBC # FLD: 20 % — HIGH (ref 10.3–14.5)
SODIUM SERPL-SCNC: 146 MMOL/L — HIGH (ref 135–145)
WBC # BLD: 6.16 K/UL — SIGNIFICANT CHANGE UP (ref 3.8–10.5)
WBC # FLD AUTO: 6.16 K/UL — SIGNIFICANT CHANGE UP (ref 3.8–10.5)

## 2024-02-26 PROCEDURE — 71250 CT THORAX DX C-: CPT | Mod: 26

## 2024-02-26 RX ORDER — FUROSEMIDE 40 MG
20 TABLET ORAL ONCE
Refills: 0 | Status: COMPLETED | OUTPATIENT
Start: 2024-02-26 | End: 2024-02-26

## 2024-02-26 RX ORDER — IOHEXOL 300 MG/ML
30 INJECTION, SOLUTION INTRAVENOUS ONCE
Refills: 0 | Status: COMPLETED | OUTPATIENT
Start: 2024-02-26 | End: 2024-02-26

## 2024-02-26 RX ADMIN — Medication 4 MILLILITER(S): at 17:29

## 2024-02-26 RX ADMIN — Medication 3 MILLILITER(S): at 11:27

## 2024-02-26 RX ADMIN — ATORVASTATIN CALCIUM 20 MILLIGRAM(S): 80 TABLET, FILM COATED ORAL at 21:00

## 2024-02-26 RX ADMIN — Medication 3 MILLILITER(S): at 21:09

## 2024-02-26 RX ADMIN — Medication 3 MILLILITER(S): at 00:33

## 2024-02-26 RX ADMIN — IOHEXOL 30 MILLILITER(S): 300 INJECTION, SOLUTION INTRAVENOUS at 22:58

## 2024-02-26 RX ADMIN — Medication 4 MILLILITER(S): at 05:09

## 2024-02-26 RX ADMIN — Medication 3 MILLILITER(S): at 05:19

## 2024-02-26 RX ADMIN — Medication 20 MILLIGRAM(S): at 10:56

## 2024-02-26 RX ADMIN — Medication 3 MILLILITER(S): at 17:28

## 2024-02-26 NOTE — PROGRESS NOTE ADULT - ASSESSMENT
89F presented with a stroke    1. Stroke     2. Dysphagia   PEG attempted, no window   IR to attempt tomorrow         Jovon Ugalde D.O.  Gastroenterology and Hepatology  [address], [address]  [place], [place]  Office: [phone]

## 2024-02-26 NOTE — PROGRESS NOTE ADULT - SUBJECTIVE AND OBJECTIVE BOX
Interventional Radiology pre-op note    HPI: 89-year-old left-handed woman with past medical history of CHF, HTN, pacemaker placement, TAVR presenting as a code stroke for AMS, ?R-facial droop, R sided weakness.  Was found by home aide at 830 2/14 less responsive, not moving extremities as usually does. No prior known hx strokes per family. Pt was presenting with dysphagia, G tube placement attempted by GI endoscopically, found no safe window. IR requested for Percutaneous gastrostomy tube placement.       Diagnosis: Dysphagia   Procedure: Percutaneous gastrostomy tube placement                             7.5    6.06  )-----------( 169      ( 25 Feb 2024 08:36 )             24.0     02-25    142  |  114<H>  |  26<H>  ----------------------------<  138<H>  4.7   |  19<L>  |  0.99    Ca    8.2<L>      25 Feb 2024 08:36      Assessment & Plan: 89-year-old left-handed woman with past medical history of CHF, HTN, pacemaker placement, TAVR presenting as a code stroke for AMS, ?R-facial droop, R sided weakness.  Was found by home aide at 830 2/14 less responsive, not moving extremities as usually does. No prior known hx strokes per family. Pt was presenting with dysphagia, G tube placement attempted by GI endoscopically, found no safe window. IR requested for Percutaneous gastrostomy tube placement.     -Will plan for Percutaneous gastrostomy tube placement tomorrow.   - please place IR procedure order under Dr. Penaloza  - STAT labs in AM (cbc,coags, bmp, T&S)  - Place NG tube and administer oral contrast cocktail at 10pm the night before the procedure  - Oral Contrast Cocktail: Omnipaque (Oral Contrast): Iohexol 300mg/30mL mixed with 470cc sterile water.    - hold Lovenox 2/27  - NPO today 2/26 at 11pm  - d/w primary team  -the procedure risks/ benefits/ alternatives were discussed with patient's daughter Florencia Bustillo @[phone] and Informed consent obtained.       Please contact IR with any questions/ concerns regarding above plan at [phone].   Also available on teams.      Interventional Radiology pre-op note    HPI: 89-year-old left-handed woman with past medical history of CHF, HTN, pacemaker placement, TAVR presenting as a code stroke for AMS, ?R-facial droop, R sided weakness.  Was found by home aide at 830 2/14 less responsive, not moving extremities as usually does. No prior known hx strokes per family. Pt was presenting with dysphagia, G tube placement attempted by GI endoscopically, found no safe window. IR requested for Percutaneous gastrostomy tube placement.       Diagnosis: Dysphagia   Procedure: Percutaneous gastrostomy tube placement                             7.5    6.06  )-----------( 169      ( 25 Feb 2024 08:36 )             24.0     02-25    142  |  114<H>  |  26<H>  ----------------------------<  138<H>  4.7   |  19<L>  |  0.99    Ca    8.2<L>      25 Feb 2024 08:36      Assessment & Plan: 89-year-old left-handed woman with past medical history of CHF, HTN, pacemaker placement, TAVR presenting as a code stroke for AMS, ?R-facial droop, R sided weakness.  Was found by home aide at 830 2/14 less responsive, not moving extremities as usually does. No prior known hx strokes per family. Pt was presenting with dysphagia, G tube placement attempted by GI endoscopically, found no safe window. IR requested for Percutaneous gastrostomy tube placement.     -Will plan for Percutaneous gastrostomy tube placement tomorrow.   - please place IR procedure order under Dr. Penaloza  - STAT labs in AM (cbc,coags, bmp, T&S)  - Place NG tube and administer oral contrast cocktail at 10pm the night before the procedure  - Oral Contrast Cocktail: Omnipaque (Oral Contrast): Iohexol 300mg/30mL mixed with 470cc sterile water.    - hold Lovenox 2/27  - NPO today 2/26 at 11pm  -Interrogation report in chart from 2/15/24  - d/w primary team  -the procedure risks/ benefits/ alternatives were discussed with patient's daughter Florencia Bustillo @[phone] and Informed consent obtained.       Please contact IR with any questions/ concerns regarding above plan at [phone].   Also available on teams.

## 2024-02-26 NOTE — PROGRESS NOTE ADULT - ASSESSMENT
89 y/o F with PMH of CHF, HTN, PPM, valve replacement. Presents as a code stroke for AMS, ?R-facial droop, R sided weakness. At baseline patient reportedly alert, able to recognize/comprehend familiar faces, per daughters at bedside patient sometimes has difficulty with getting words out. Found to have acute left frontoparietal infarct. Called to consult for concern of aspiration PNA - CT chest with pl effusions, congestion.

## 2024-02-26 NOTE — PROGRESS NOTE ADULT - PROBLEM SELECTOR PLAN 3
-NPO with TF   -Aspiration precautions   -PEG unable to be placed, no window. Surgery recs noted - possible laparoscopic G-tube / possible open G-tube if family in agreement. Pulm clearance pending further optimization.   -GI f/u, surgery f/u

## 2024-02-26 NOTE — PROGRESS NOTE ADULT - SUBJECTIVE AND OBJECTIVE BOX
THE PATIENT WAS SEEN AND EXAMINED BY ME WITH THE HOUSESTAFF AND STROKE TEAM DURING MORNING ROUNDS.   HPI:  89-year-old left-handed woman with PMH of CHF, HTN, pacemaker placement, valve replacement presenting as a code stroke for AMS, ?R-facial droop, R sided weakness. Was found by home aide at 830 2/14 less responsive, not moving extremities as usually does. No prior known hx strokes per family. At baseline patient alert, able to recognize/comprehend familiar faces, per daughters at bedside patient sometimes has difficulty with getting words out. Information obtained from aide at bedside, then after CT scanner family also at bedside.       SUBJECTIVE: No events overnight.  No new neurologic complaints.      acetylcysteine 20%  Inhalation 4 milliLiter(s) Inhalation two times a day  albuterol/ipratropium for Nebulization 3 milliLiter(s) Nebulizer every 6 hours  atorvastatin 20 milliGRAM(s) Oral at bedtime  clindamycin IVPB 600 milliGRAM(s) IV Intermittent once  enoxaparin Injectable 30 milliGRAM(s) SubCutaneous every 24 hours  iohexol 300 mG (iodine)/mL Oral Solution 30 milliLiter(s) Oral once  sodium chloride 0.9%. 1000 milliLiter(s) IV Continuous <Continuous>      PHYSICAL EXAM:   Vital Signs Last 24 Hrs  T(C): 37 (26 Feb 2024 08:18), Max: 37.2 (25 Feb 2024 19:44)  T(F): 98.6 (26 Feb 2024 08:18), Max: 98.9 (25 Feb 2024 19:44)  HR: 94 (26 Feb 2024 08:18) (90 - 113)  BP: 101/66 (26 Feb 2024 08:18) (90/50 - 130/88)  RR: 18 (26 Feb 2024 08:18) (18 - 29)  SpO2: 95% (26 Feb 2024 08:18) (94% - 99%)    Parameters below as of 26 Feb 2024 08:18  Patient On (Oxygen Delivery Method): nasal cannula        General: No acute distress  Abdomen: Soft, nondistended   Extremities: No edema    NEUROLOGICAL EXAM:  Mental status: Opens closed, opens eyes briefly to voice, does not respond to questions, groans intermittently. intermittently follows some simple commands.  Cranial Nerves: No facial asymmetry. Decreased blink to threat on Right.   Motor exam: Normal tone, some effort spontaneous against gravity throughout all extremities, L > R. Arthritic changes throughout extremities.  Sensation: Grimaces to painful stimuli x 4, less so on the right  Coordination/ Gait: Deferred    LABS:                        7.5    6.06  )-----------( 169      ( 25 Feb 2024 08:36 )             24.0    02-25    142  |  114<H>  |  26<H>  ----------------------------<  138<H>  4.7   |  19<L>  |  0.99    Ca    8.2<L>      25 Feb 2024 08:36          IMAGING: Reviewed by me.     2/17/24 CT Head: Acute left MCA territory infarct, as on the prior, with associated hemorrhagic transformation centered in the ipsilateral basal ganglia, not significantly changed. Associated 8-9 mm rightward midline shift is not   significantly changed. Slightly more pronounced gyriform hyperdensity may reflect spared cortex versus cortical petechial hemorrhage.    2/16/24 CT Head:  Redemonstration of an evolving left-sided MCA distribution acute/subacute infarct with a new small amount of hemorrhagic transformation within the infarct bed in comparison with 2/14/2024. Surrounding mass effect and shift of the midline structures from left to right appears unchanged when compared to the most recent prior CT exam.      CT brain 2/14/24: Acute left frontoparietalinfarct. No CT evidence for selene hemorrhagic transformation.    CT brain perfusion 2/14/24: Significantly limited by motion. Cerebral blood flow less than 30% = 0 mL. No core infarct predicted. Tmax greater than 6 seconds = 46 mL and involves the bilateral mesial cerebellar hemispheres, the right temporal lobe, bilateral frontal lobes, and left posterior temporal. This represents brain parenchyma predicted to be at continued ischemic risk in the presence of neurologic symptoms. This finding is likely spurious in nature.  Mismatch volume 46 mL, Mismatch ratio infinite    CTA brain 2/14/24: Intraluminal filling defect involving the left ICA terminus and extending into the proximal right A1 segment. Normal flow-related enhancement of the remaining left LEBRON. Normal flow-related enhancement of the left MCA.  Right MCA enhances to a lesser degree than the left MCA. No vascular aneurysm or AVM.    CTA neck: Approximately 50% short segment stenosis of the origin and proximal segment of the left ICA due to calcified plaque. Normal flow-related enhancement of the more distal vessel. Approximately 75-80% short segment stenosis of the proximal segment of the right internal carotid artery due to partially calcified plaque. Normal flow-related enhancement of the more distal vessel.

## 2024-02-26 NOTE — PROGRESS NOTE ADULT - SUBJECTIVE AND OBJECTIVE BOX
DATE OF SERVICE: 02-26-24 @ 13:24    Patient is a 89y old  Female who presents with a chief complaint of CVA (23 Feb 2024 17:30)      SUBJECTIVE / OVERNIGHT EVENTS  nad    MEDICATIONS  (STANDING):  acetylcysteine 20%  Inhalation 4 milliLiter(s) Inhalation two times a day  albuterol/ipratropium for Nebulization 3 milliLiter(s) Nebulizer every 6 hours  atorvastatin 20 milliGRAM(s) Oral at bedtime  clindamycin IVPB 600 milliGRAM(s) IV Intermittent once  iohexol 300 mG (iodine)/mL Oral Solution 30 milliLiter(s) Oral once  sodium chloride 0.9%. 1000 milliLiter(s) (50 mL/Hr) IV Continuous <Continuous>    MEDICATIONS  (PRN):      Vital Signs Last 24 Hrs  T(C): 37 (26 Feb 2024 08:18), Max: 37.2 (25 Feb 2024 19:44)  T(F): 98.6 (26 Feb 2024 08:18), Max: 98.9 (25 Feb 2024 19:44)  HR: 94 (26 Feb 2024 08:18) (91 - 113)  BP: 101/66 (26 Feb 2024 08:18) (90/50 - 130/88)  BP(mean): --  RR: 18 (26 Feb 2024 08:18) (18 - 29)  SpO2: 95% (26 Feb 2024 08:18) (94% - 99%)    Parameters below as of 26 Feb 2024 08:18  Patient On (Oxygen Delivery Method): nasal cannula      CAPILLARY BLOOD GLUCOSE        I&O's Summary    25 Feb 2024 07:01  -  26 Feb 2024 07:00  --------------------------------------------------------  IN: 577 mL / OUT: 560 mL / NET: 17 mL    26 Feb 2024 07:01  -  26 Feb 2024 13:24  --------------------------------------------------------  IN: 0 mL / OUT: 600 mL / NET: -600 mL        PHYSICAL EXAM:  GENERAL: NAD, well-developed  HEAD:  Atraumatic, Normocephalic  EYES: EOMI, PERRLA, conjunctiva and sclera clear  NECK: Supple, No JVD  CHEST/LUNG: Clear to auscultation bilaterally; No wheeze  HEART: Regular rate and rhythm; No murmurs, rubs, or gallops  ABDOMEN: Soft, Nontender, Nondistended; Bowel sounds present  EXTREMITIES:  2+ Peripheral Pulses, No clubbing, cyanosis, or edema  NEUROLOGICAL EXAM:  Mental status: Opens closed, opens eyes briefly to voice, does not respond to questions, groans intermittently. intermittently follows some simple commands.  Cranial Nerves: No facial asymmetry. Decreased blink to threat on Right.   Motor exam: Normal tone, some effort spontaneous against gravity throughout all extremities, L > R. Arthritic changes throughout extremities.  Sensation: Grimaces to painful stimuli x 4, less so on the right  Coordination/ Gait: Deferred      LABS:                        7.5    6.06  )-----------( 169      ( 25 Feb 2024 08:36 )             24.0     02-25    142  |  114<H>  |  26<H>  ----------------------------<  138<H>  4.7   |  19<L>  |  0.99    Ca    8.2<L>      25 Feb 2024 08:36            Urinalysis Basic - ( 25 Feb 2024 08:36 )    Color: x / Appearance: x / SG: x / pH: x  Gluc: 138 mg/dL / Ketone: x  / Bili: x / Urobili: x   Blood: x / Protein: x / Nitrite: x   Leuk Esterase: x / RBC: x / WBC x   Sq Epi: x / Non Sq Epi: x / Bacteria: x    < from: CT Chest No Cont (02.26.24 @ 08:49) >  FINDINGS:    LUNGS, PLEURA, AND AIRWAYS: No endobronchial lesion. Small bilateral   pleural effusions with partial compressive atelectasis of the lower   lobes, similar to 2/16/2024. There has been some interval improvement of   bilateral upper lobe patchy opacities. Interlobular septal thickening.  MEDIASTINUM AND BERRY: No lymphadenopathy.  VESSELS: Calcification of the aorta and its branches.  HEART: Heart size is normal. Small pericardial effusion, similar to   2/16/2024. Status post TAVR. Mitral annular and coronary artery   calcifications. Left chest wall cardiac device.  CHEST WALL AND LOWER NECK: Enlarged multinodular thyroid gland with a   nodule measuring 2.5 cm on the left.  VISUALIZED UPPER ABDOMEN: Cholecystectomy. Hepatic cyst. Enteric tube   terminates in the distal stomach or proximal duodenum.  BONES: Degenerative changes.    IMPRESSION:    Likely pulmonary edema with small bilateral pleural effusions.    Patchy opacities in the bilateral upper lobes appear slightly improved   compared to 2/16/2024 CT chest.      < end of copied text >      RADIOLOGY & ADDITIONAL TESTS:    Imaging Personally Reviewed:    Consultant(s) Notes Reviewed:      Care Discussed with Consultants/Other Providers:

## 2024-02-26 NOTE — PROGRESS NOTE ADULT - SUBJECTIVE AND OBJECTIVE BOX
Subjective: Patient seen and examined. No new events except as noted.   s/p CT chest this am   case discussed with her daughter at bedside   ]    REVIEW OF SYSTEMS:    CONSTITUTIONAL: +weakness, fevers or chills  EYES/ENT: No visual changes;  No vertigo or throat pain   NECK: No pain or stiffness  RESPIRATORY: No cough, wheezing, hemoptysis; No shortness of breath  CARDIOVASCULAR: No chest pain or palpitations  GASTROINTESTINAL: No abdominal or epigastric pain. No nausea, vomiting, or hematemesis; No diarrhea or constipation. No melena or hematochezia.  GENITOURINARY: No dysuria, frequency or hematuria  NEUROLOGICAL: No numbness or weakness  SKIN: No itching, burning, rashes, or lesions   All other review of systems is negative unless indicated above.    MEDICATIONS:  MEDICATIONS  (STANDING):  acetylcysteine 20%  Inhalation 4 milliLiter(s) Inhalation two times a day  albuterol/ipratropium for Nebulization 3 milliLiter(s) Nebulizer every 6 hours  atorvastatin 20 milliGRAM(s) Oral at bedtime  clindamycin IVPB 600 milliGRAM(s) IV Intermittent once  enoxaparin Injectable 30 milliGRAM(s) SubCutaneous every 24 hours  sodium chloride 0.9%. 1000 milliLiter(s) (50 mL/Hr) IV Continuous <Continuous>      PHYSICAL EXAM:  T(C): 37 (02-26-24 @ 08:18), Max: 37.2 (02-25-24 @ 19:44)  HR: 94 (02-26-24 @ 08:18) (90 - 113)  BP: 101/66 (02-26-24 @ 08:18) (90/50 - 130/88)  RR: 18 (02-26-24 @ 08:18) (18 - 29)  SpO2: 95% (02-26-24 @ 08:18) (94% - 99%)  Wt(kg): --  I&O's Summary    25 Feb 2024 07:01  -  26 Feb 2024 07:00  --------------------------------------------------------  IN: 577 mL / OUT: 560 mL / NET: 17 mL          Appearance: NAD  HEENT:   Dry oral mucosa, PERRL, EOMI	  Lymphatic: No lymphadenopathy +NGT  Cardiovascular: Normal S1 S2, No JVD, No murmurs, No edema  Respiratory: Decreased bs  Psychiatry: A & O x 0 sleepy  Gastrointestinal:  Soft, Non-tender, + BS	  Skin: No rashes, No ecchymoses, No cyanosis	  Neurologic Exam:  Mental status - eyes closed, opens to repeated verbal stimuli. Follows intermittent simple commands to squeeze L hand/give thumbs up in L hand. Does not respond to orientation questions.   Cranial nerves - R pupil appears ?sluggishly reactive. No BTT in R eye. Unable to open left eye. ?R facial droop but may be 2/2 positioning of patient's head to R.  Motor - Normal bulk. Increased tone in RUE. Does not maintain antigravity when asked throughout all extremities initially, however does squeeze hand on LUE, withdraws slightly to noxious stimuli in R hand.   Upon re-evaluation able to raise both legs antigravity.  Sensation - Withdraws to noxious stimuli throughout.  DTR's - deferred  Coordination -deferred  Gait and station - deferred  Extremities: Normal range of motion, No clubbing, cyanosis or edema  Vascular: Peripheral pulses palpable 2+ bilaterally      LABS:    CARDIAC MARKERS:                                7.5    6.06  )-----------( 169      ( 25 Feb 2024 08:36 )             24.0     02-25    142  |  114<H>  |  26<H>  ----------------------------<  138<H>  4.7   |  19<L>  |  0.99    Ca    8.2<L>      25 Feb 2024 08:36            TELEMETRY: 	SR     ECG:  	  RADIOLOGY: < from: Xray Chest 1 View- PORTABLE-Routine (Xray Chest 1 View- PORTABLE-Routine .) (02.25.24 @ 18:45) >    ******PRELIMINARY REPORT******      ******PRELIMINARY REPORT******         ACC: [id number] EXAM:  XR CHEST PORTABLE ROUTINE 1V   ORDERED BY: RAFAELA HARRISON     PROCEDURE DATE:  02/25/2024    ******PRELIMINARY REPORT******      ******PRELIMINARY REPORT******           INTERPRETATION:  no focal consolidation, though limited evaluation of   obscured bilateral apices        ******PRELIMINARY REPORT******      ******PRELIMINARY REPORT******       KENDALL WHITT DO; Resident Radiologist  This document is a PRELIMINARY interpretation and is pending final   attending approval. Feb 25 2024      < end of copied text >  < from: CT Chest No Cont (02.26.24 @ 08:49) >    ACC: [id number] EXAM:  CT CHEST   ORDERED BY: MINA TAPIA     PROCEDURE DATE:  02/26/2024          INTERPRETATION:  CLINICAL INFORMATION: Opacities on chest x-ray,   tachypnea, increased work of breathing    COMPARISON: CT chest 2/16/2024.    CONTRAST/COMPLICATIONS:  IV Contrast: NONE  Oral Contrast: NONE  Complications: None reported at time of study completion    PROCEDURE:  CT of the Chest was performed.  Sagittal and coronal reformats were performed.    FINDINGS:    LUNGS, PLEURA, AND AIRWAYS: No endobronchial lesion. Small bilateral   pleural effusions with partial compressive atelectasis of the lower   lobes, similar to 2/16/2024. There has been some interval improvement of   bilateral upper lobe patchy opacities. Interlobular septal thickening.  MEDIASTINUM AND BERRY: No lymphadenopathy.  VESSELS: Calcification of the aorta and its branches.  HEART: Heart size is normal. Small pericardial effusion, similar to   2/16/2024. Status post TAVR. Mitral annular and coronary artery   calcifications. Left chest wall cardiac device.  CHEST WALL AND LOWER NECK: Enlarged multinodular thyroid gland with a   nodule measuring 2.5 cm on the left.  VISUALIZED UPPER ABDOMEN: Cholecystectomy. Hepatic cyst. Enteric tube   terminates in the distal stomach or proximal duodenum.  BONES: Degenerative changes.    IMPRESSION:    Likely pulmonary edema with small bilateral pleural effusions.    Patchy opacities in the bilateral upper lobes appear slightly improved   compared to 2/16/2024 CT chest.    --- End of Report ---           ANDRE WONG MD; Resident Radiologist  This document has been electronically signed.   DENNYS POSEY MD; Attending Radiologist  This document has been electronically signed. Feb 26 2024  9:05AM    < end of copied text >    DIAGNOSTIC TESTING:  [ ] Echocardiogram:  [ ]  Catheterization:  [ ] Stress Test:    OTHER:

## 2024-02-26 NOTE — CHART NOTE - NSCHARTNOTEFT_GEN_A_CORE
Nutrition Follow Up Note  Patient seen for: Malnutrition follow-up     Chart reviewed, events noted.    Source: [] Patient       [x] Medical Record        [] RN        [x] Family at bedside - pt's 2 daughters      [] Other:    -If unable to interview patient: [] Trach/Vent/BiPAP  [x] Disoriented/confused/inappropriate to interview    Diet Order:   Diet, NPO after Midnight:      NPO Start Date: 26-Feb-2024,   NPO Start Time: 23:59 (02-26-24)  Diet, NPO with Tube Feed:   Tube Feeding Modality: Nasogastric  Jevity 1.2 Cristo (JEVITY1.2RTH)  Total Volume for 24 Hours (mL): 840  Continuous  Starting Tube Feed Rate {mL per Hour}: 10  Increase Tube Feed Rate by (mL): 10     Every 3 hours  Until Goal Tube Feed Rate (mL per Hour): 35  Tube Feed Duration (in Hours): 24  Tube Feed Start Time: 11:00    Start Time: 21:00 (02-26-24)        EN provision x4 days:  2/25-2/26: EN held secondary to respiratory distress 2/25 per chart, continues to be held for potential g-tube placement via IR  2/24: 280 mL formula (33% of goal)  2/23: 140 mL formula (17% of goal)  - Inadequate provision noted, NPO day 2, see recommendations below   - Pt NPO x8 days prior to TF initiation    - Nutrition-related concerns:  - Family noting pt with very good appetite typically, follows a low carbohydrate diet, does not eat rice, bread, pasta, etc. Daughters endorsing pt with trouble chewing/swallowing, noting she would cough when drinking water and needed food cut up into small pieces  - Family voicing concern for prolonged lack of nutrition, pt NPO day 2 s/p attempt at PEG placement endoscopically - unsuccessful, now with plan for IR placement of g-tube as family declining surgical placement at this time  - GOC discussions ongoing  - Ordered for IV NaCl 0.9% @ 50mL/hr at this time  - Noted A1C 6.3% (2/15), indicates good glycemic control with respect for age  - Renal: JOLLY resolved per chart, renal electrolytes WDL, continues ordered for lasix, of note pt with moderate edema of the right arm    GI: No N/V or diarrhea/constipation noted in chart, Last BM 2/26 per chart.   Bowel Regimen? [] Yes   [x] No    Weights:   No daily wts to assess, RD obtained bedscale wt 71 pounds. Dosing wt 95 pounds. ?accuracy of bedscale wt due to large discrepancy.   RD to continue to monitor wt as able     Nutritionally Pertinent MEDICATIONS  (STANDING):  atorvastatin  clindamycin IVPB  sodium chloride 0.9%.    Pertinent Labs:   A1C with Estimated Average Glucose Result: 6.3 % (02-15-24 @ 05:25)    Skin per nursing documentation: Per flowsheet, no pressure injuries noted    Edema: (+3) R arm    Estimated Needs:   [x] no change since previous assessment  Needs based on dosing wt 43.1 kg (2/14)  Estimated kcal needs: 28-33 kcal/kg (1794-6189 kcal/day)  Estimated protein needs: 1.1-1.3 g pro/kg (47-56 g pro/day)  Fluid needs deferred to team.     Previous Nutrition Diagnosis: (1) Severe acute on chronic malnutrition, (2) Underweight  Nutrition Diagnosis is: [x] ongoing - Being addressed with EN, micronutrients     New Nutrition Diagnosis: [x] Not applicable    Nutrition Care Plan:  [x] In Progress  [] Achieved  [] Not applicable    Nutrition Interventions:     Education Provided:       [x] Yes: spoke with daughters about EN and NGT vs. PEG, answered all questions       Recommendations:       1) Defer EN re-initiation to team, if warranted, new EN recommendation of Jevity 1.2 with GOAL of 45mL/hr x 24hr, total of 1080 mL formula/day. Provides 1296 kcal/day, 60g pro/day, and 872mL fluid/day, meeting 30kcal/kg and 1.4g pro/kg based on dosing wt 43.1kg. Defer free water flushes to team.    2) Add multivitamin daily for micronutrient coverage pending no medical contraindications     3) Continue to monitor goals of care as they relate to nutrition management     Monitoring and Evaluation:   Continue to monitor nutritional intake, tolerance to diet prescription, weights, labs, skin integrity    RD remains available upon request and will follow up per protocol  Jasmyne Byrd RD - available on [facility] TE[employer] Nutrition Follow Up Note  Patient seen for: Malnutrition follow-up     Chart reviewed, events noted.    Source: [] Patient       [x] Medical Record        [] RN        [x] Family at bedside - pt's 2 daughters      [] Other:    -If unable to interview patient: [] Trach/Vent/BiPAP  [x] Disoriented/confused/inappropriate to interview    Diet Order:   Diet, NPO after Midnight:      NPO Start Date: 26-Feb-2024,   NPO Start Time: 23:59 (02-26-24)  Diet, NPO with Tube Feed:   Tube Feeding Modality: Nasogastric  Jevity 1.2 Cristo (JEVITY1.2RTH)  Total Volume for 24 Hours (mL): 840  Continuous  Starting Tube Feed Rate {mL per Hour}: 10  Increase Tube Feed Rate by (mL): 10     Every 3 hours  Until Goal Tube Feed Rate (mL per Hour): 35  Tube Feed Duration (in Hours): 24  Tube Feed Start Time: 11:00    Start Time: 21:00 (02-26-24)        EN provision x4 days:  2/25-2/26: EN held secondary to respiratory distress 2/25 per chart, continues to be held for potential g-tube placement via IR  2/24: 280 mL formula (33% of goal)  2/23: 140 mL formula (17% of goal)  - Inadequate provision noted, NPO day 2, pt NPO x8 days prior to TF initiation, pt at HIGH RISK for refeeding syndrome* see below    - Nutrition-related concerns:  - Family noting pt with very good appetite typically, follows a low carbohydrate diet, does not eat rice, bread, pasta, etc. Daughters endorsing pt with trouble chewing/swallowing, noting she would cough when drinking water and needed food cut up into small pieces  - Family voicing concern for prolonged lack of nutrition, pt NPO day 2 s/p attempt at PEG placement endoscopically - unsuccessful, now with plan for IR placement of g-tube as family declining surgical placement at this time  - GOC discussions ongoing  - Ordered for IV NaCl 0.9% @ 50mL/hr at this time  - Noted A1C 6.3% (2/15), indicates good glycemic control with respect for age  - Renal: JOLLY resolved per chart, renal electrolytes WDL, continues ordered for lasix, of note pt with moderate edema of the right arm    GI: No N/V or diarrhea/constipation noted in chart, Last BM 2/26 per chart.   Bowel Regimen? [] Yes   [x] No    Weights:   No daily wts to assess, RD obtained bedscale wt 71 pounds. Dosing wt 95 pounds. ?accuracy of bedscale wt due to large discrepancy.   RD to continue to monitor wt as able     Nutritionally Pertinent MEDICATIONS  (STANDING):  atorvastatin  clindamycin IVPB  sodium chloride 0.9%.    Pertinent Labs:   A1C with Estimated Average Glucose Result: 6.3 % (02-15-24 @ 05:25)    Skin per nursing documentation: Per flowsheet, no pressure injuries noted    Edema: (+3) R arm    Estimated Needs:   [x] no change since previous assessment  Needs based on dosing wt 43.1 kg (2/14)  Estimated kcal needs: 28-33 kcal/kg (1757-9435 kcal/day)  Estimated protein needs: 1.1-1.3 g pro/kg (47-56 g pro/day)  Fluid needs deferred to team.     Previous Nutrition Diagnosis: (1) Severe acute on chronic malnutrition, (2) Underweight  Nutrition Diagnosis is: [x] ongoing - Being addressed with EN, micronutrients     New Nutrition Diagnosis: [x] Not applicable    Nutrition Care Plan:  [x] In Progress  [] Achieved  [] Not applicable    Nutrition Interventions:     Education Provided:       [x] Yes: spoke with daughters about EN and NGT vs. PEG, answered all questions       Recommendations:       1) Defer EN re-initiation to team, if warranted, new EN recommendation of Jevity 1.2 with GOAL of 45mL/hr x 24hr, total of 1080 mL formula/day. Provides 1296 kcal/day, 60g pro/day, and 872mL fluid/day, meeting 30kcal/kg and 1.4g pro/kg based on dosing wt 43.1kg. Defer free water flushes to team.    2) Add multivitamin and thiamine daily for micronutrient coverage pending no medical contraindications as pt at HIGH RISK FOR REFEEDING SYNDROME - monitor phosphorus, magnesium, and potassium and replenish prior to re-initiation or advancement of EN   3) Continue to monitor goals of care as they relate to nutrition management     Monitoring and Evaluation:   Continue to monitor nutritional intake, tolerance to diet prescription, weights, labs, skin integrity    RD remains available upon request and will follow up per protocol  Jasmyne Byrd RD - available on [facility] TE[facility] Nutrition Follow Up Note  Patient seen for: Malnutrition follow-up     Chart reviewed, events noted.    Source: [] Patient       [x] Medical Record        [] RN        [x] Family at bedside - pt's 2 daughters      [] Other:    -If unable to interview patient: [] Trach/Vent/BiPAP  [x] Disoriented/confused/inappropriate to interview    Diet Order:   Diet, NPO after Midnight:      NPO Start Date: 26-Feb-2024,   NPO Start Time: 23:59 (02-26-24)  Diet, NPO with Tube Feed:   Tube Feeding Modality: Nasogastric  Jevity 1.2 Cristo (JEVITY1.2RTH)  Total Volume for 24 Hours (mL): 840  Continuous  Starting Tube Feed Rate {mL per Hour}: 10  Increase Tube Feed Rate by (mL): 10     Every 3 hours  Until Goal Tube Feed Rate (mL per Hour): 35  Tube Feed Duration (in Hours): 24  Tube Feed Start Time: 11:00    Start Time: 21:00 (02-26-24)        EN provision x4 days:  2/25-2/26: EN held secondary to respiratory distress 2/25 per chart, continues to be held for potential g-tube placement via IR  2/24: 280 mL formula (33% of goal)  2/23: 140 mL formula (17% of goal)  - Inadequate provision noted, NPO day 2, pt NPO x8 days prior to TF initiation, pt at HIGH RISK for refeeding syndrome* see below    - Nutrition-related concerns:  - Family noting pt with very good appetite typically, follows a low carbohydrate diet, does not eat rice, bread, pasta, etc. Daughters endorsing pt with trouble chewing/swallowing, noting she would cough when drinking water and needed food cut up into small pieces  - Family voicing concern for prolonged lack of nutrition, pt NPO day 2 s/p attempt at PEG placement endoscopically - unsuccessful, now with plan for IR placement of g-tube as family declining surgical placement at this time  - GOC discussions ongoing  - Ordered for IV NaCl 0.9% @ 50mL/hr at this time  - Noted A1C 6.3% (2/15), indicates good glycemic control with respect for age  - Renal: JOLLY resolved per chart, renal electrolytes WDL, continues ordered for lasix, of note pt with moderate edema of the right arm    GI: No N/V or diarrhea/constipation noted in chart, Last BM 2/26 per chart.   Bowel Regimen? [] Yes   [x] No    Weights:   No daily wts to assess, RD obtained bedscale wt 71 pounds. Dosing wt 95 pounds. ?accuracy of bedscale wt due to large discrepancy.   RD to continue to monitor wt as able     Nutritionally Pertinent MEDICATIONS  (STANDING):  atorvastatin  clindamycin IVPB  sodium chloride 0.9%.    Pertinent Labs:   A1C with Estimated Average Glucose Result: 6.3 % (02-15-24 @ 05:25)    Skin per nursing documentation: Per flowsheet, no pressure injuries noted    Edema: (+3) R arm    Estimated Needs:   [x] no change since previous assessment  Needs based on dosing wt 43.1 kg (2/14)  Estimated kcal needs: 28-33 kcal/kg (6040-1369 kcal/day)  Estimated protein needs: 1.1-1.3 g pro/kg (47-56 g pro/day)  Fluid needs deferred to team.     Previous Nutrition Diagnosis: (1) Severe acute on chronic malnutrition, (2) Underweight  Nutrition Diagnosis is: [x] ongoing - Being addressed with EN, micronutrients     New Nutrition Diagnosis: [x] Not applicable    Nutrition Care Plan:  [x] In Progress  [] Achieved  [] Not applicable    Nutrition Interventions:     Education Provided:       [x] Yes: spoke with daughters about EN and NGT vs. PEG, answered all questions       Recommendations:       1) Defer EN re-initiation to team, if warranted, new EN recommendation of Jevity 1.2 initiated at 10mL/hr and titrated up to GOAL of 45mL/hr x 24hr, total of 1080 mL formula/day. Provides 1296 kcal/day, 60g pro/day, and 872mL fluid/day, meeting 30kcal/kg and 1.4g pro/kg based on dosing wt 43.1kg. Defer free water flushes to team.    2) Add multivitamin and thiamine daily for micronutrient coverage pending no medical contraindications as pt at HIGH RISK FOR REFEEDING SYNDROME - monitor phosphorus, magnesium, and potassium and replenish prior to re-initiation or advancement of EN   3) Continue to monitor goals of care as they relate to nutrition management     Monitoring and Evaluation:   Continue to monitor nutritional intake, tolerance to diet prescription, weights, labs, skin integrity    RD remains available upon request and will follow up per protocol  Jasmyne Byrd RD - available on [facility] TEAMS

## 2024-02-26 NOTE — PROGRESS NOTE ADULT - SUBJECTIVE AND OBJECTIVE BOX
Chief Complaint:  Patient is a 89y old  Female who presents with a chief complaint of CVA (23 Feb 2024 17:30)      Date of service 02-26-24 @ 11:05      Interval Events: no GI complaints    Hospital Medications:  acetylcysteine 20%  Inhalation 4 milliLiter(s) Inhalation two times a day  albuterol/ipratropium for Nebulization 3 milliLiter(s) Nebulizer every 6 hours  atorvastatin 20 milliGRAM(s) Oral at bedtime  clindamycin IVPB 600 milliGRAM(s) IV Intermittent once  enoxaparin Injectable 30 milliGRAM(s) SubCutaneous every 24 hours  iohexol 300 mG (iodine)/mL Oral Solution 30 milliLiter(s) Oral once  sodium chloride 0.9%. 1000 milliLiter(s) IV Continuous <Continuous>          PHYSICAL EXAM:   Vital Signs:  Vital Signs Last 24 Hrs  T(C): 37 (26 Feb 2024 08:18), Max: 37.2 (25 Feb 2024 19:44)  T(F): 98.6 (26 Feb 2024 08:18), Max: 98.9 (25 Feb 2024 19:44)  HR: 94 (26 Feb 2024 08:18) (90 - 113)  BP: 101/66 (26 Feb 2024 08:18) (90/50 - 130/88)  BP(mean): --  RR: 18 (26 Feb 2024 08:18) (18 - 29)  SpO2: 95% (26 Feb 2024 08:18) (94% - 99%)    Parameters below as of 26 Feb 2024 08:18  Patient On (Oxygen Delivery Method): nasal cannula      Daily     Daily       PHYSICAL EXAM:     GENERAL:  Appears frail, no distress  HEENT:  NC/AT,  conjunctivae anicteric, clear and pink,   NECK: supple, trachea midline  CHEST:  Full & symmetric excursion, no increased effort, breath sounds clear  HEART:  Regular rhythm, no JVD  ABDOMEN:  Soft, non-tender, non-distended, normoactive bowel sounds,  no masses , no hepatosplenomegaly  EXTREMITIES:  no cyanosis,clubbing or edema  SKIN:  No rash, erythema, or, ecchymoses, no jaundice  NEURO:  , non-focal, no asterixis    RECTAL: Deferred      LABS Personally reviewed by me:                        7.5    6.06  )-----------( 169      ( 25 Feb 2024 08:36 )             24.0       02-25    142  |  114<H>  |  26<H>  ----------------------------<  138<H>  4.7   |  19<L>  |  0.99    Ca    8.2<L>      25 Feb 2024 08:36          Urinalysis Basic - ( 25 Feb 2024 08:36 )    Color: x / Appearance: x / SG: x / pH: x  Gluc: 138 mg/dL / Ketone: x  / Bili: x / Urobili: x   Blood: x / Protein: x / Nitrite: x   Leuk Esterase: x / RBC: x / WBC x   Sq Epi: x / Non Sq Epi: x / Bacteria: x                              7.5    6.06  )-----------( 169      ( 25 Feb 2024 08:36 )             24.0                         7.9    6.95  )-----------( 164      ( 24 Feb 2024 01:33 )             25.1                         8.6    7.44  )-----------( 174      ( 23 Feb 2024 13:44 )             27.9       Imaging personally reviewed by me:

## 2024-02-26 NOTE — CHART NOTE - NSCHARTNOTEFT_GEN_A_CORE
IR Follow-Up     Interventional Radiology    Evaluate for Procedure: G-tube placement    HPI: 89-year-old left-handed woman with past medical history of CHF, HTN, pacemaker placement, TAVR presenting as a code stroke for AMS, ?R-facial droop, R sided weakness.  Was found by home aide at 830 2/14 less responsive, not moving extremities as usually does. No prior known hx strokes per family. Pt was presenting with dysphagia, G tube placement attempted by GI endoscopically, found no safe window.     Allergies: penicillins (Other)    Medications (Abx/Cardiac/Anticoagulation/Blood Products)    enoxaparin Injectable: 30 milliGRAM(s) SubCutaneous (02-25 @ 18:08)    Data:    T(C): 37  HR: 94  BP: 101/66  RR: 18  SpO2: 95%    -WBC 6.06 / HgB 7.5 / Hct 24.0 / Plt 169  -Na 142 / Cl 114 / BUN 26 / Glucose 138  -K 4.7 / CO2 19 / Cr 0.99  -ALT -- / Alk Phos -- / T.Bili --  -INR 1.30 / PTT 24.6    Radiology: Reviewed    Assessment/Plan:   89-year-old left-handed woman with past medical history of CHF, HTN, pacemaker placement, TAVR presenting as a code stroke for AMS, ?R-facial droop, R sided weakness.  Was found by home aide at 830 2/14 less responsive, not moving extremities as usually does. No prior known hx strokes per family. Pt was presenting with dysphagia, G tube placement attempted by GI endoscopically, found no safe window.     - IR originally deferred procedure d/t lack of percutaneous window however after discussion with GI Dr. Ireland will prep patient and attempt g-tube placement in IR as family wants to proceed with G-tube placement and will require surgical placement if IR unable.  - case reviewed and approved for tomorrow 2/26  - please place IR procedure order under Dr. Penaloza  - STAT labs in AM (cbc,coags, bmp, T&S)  - Place NG tube and administer oral contrast cocktail at 10pm the night before the procedure  - Oral Contrast Cocktail: Omnipaque (Oral Contrast): Iohexol 300mg/30mL mixed with 470cc sterile water    - hold Lovenox 2/27  - NPO today 2/26 at 11pm  - d/w primary team    --  Alex Kay NP  Interventional Radiology  Available on [employer] TEAMS / [employer]    For EMERGENT inquiries/questions:  IR Pager ([facility]): [phone]    For non-emergent consults/questions:   Please place a sunrise order "Consult- Interventional Radiology" with an appropriate callback number    For questions about scheduling during appropriate work hours, call IR :  [facility]: [phone]    For outpatient IR booking:  [facility]: [phone]        --  Alex Kay NP  Interventional Radiology  Available on [employer] TEAMS / [email or web address]    For EMERGENT inquiries/questions:  IR Pager ([facility]): [phone]    For non-emergent consults/questions:   Please place a sunrise order "Consult- Interventional Radiology" with an appropriate callback number    For questions about scheduling during appropriate work hours, call IR :  [facility]: [phone]    For outpatient IR booking:  [facility]: [phone]

## 2024-02-26 NOTE — PROGRESS NOTE ADULT - ASSESSMENT
89-year-old left-handed woman with past medical history of CHF, HTN, pacemaker placement, TAVR presenting as a code stroke for AMS, ?R-facial droop, R sided weakness.  Was found by home aide at 830 2/14 less responsive, not moving extremities as usually does. No prior known hx strokes per family.     Dysphagia:   PEG attempted by GI, no window   surgery consult   family declining Surgery  CT reviewed by IR, will attempt g tube placement tomorrow  - cont NGT feeds for now  - hold feeds after MN    upper airway secretions  - aggressive suctioning    pulm edema and bryce pleural effusions  - lasix 20 iv x 1  - cardio following      Global aphasia, R hemiparesis due to L MCA infarct     Neuro f/up appreciated

## 2024-02-26 NOTE — PROGRESS NOTE ADULT - ASSESSMENT
89-year-old left-handed woman with past medical history of CHF, HTN, pacemaker placement, valve replacement presenting as a code stroke for AMS, ?R-facial droop, R sided weakness. Was found by home aide at 830 2/14 less responsive, not moving extremities as usually does. No prior known hx strokes per family. At baseline patient alert, able to recognize/comprehend familiar faces, per daughters at bedside patient sometimes has difficulty with getting words out. Not candidate for tenecteplase as area of hypodensity already appreciated on CT head imaging/age, not candidate for thrombectomy given higher MRS.    IMPRESSION: Global aphasia, R hemiparesis due to L MCA infarct with associated hemorrhagic transformation 2/2 L ICA partially occlusive thrombus. Mechanism ESUS vs. ICAD.      NEURO: Neurologically slightly improved since admission. Continue close monitoring for cerebral edema mass effect and neurologic deterioration. A1c 6.3. LDL 76 -40mg statin ordered, titrate statin to LDL goal less than 70. CT Head, CTA Head/Neck as above. Maintain - 140. Physical therapy/OT/Speech eval - NOHEMI.    ANTITHROMBOTIC THERAPY: On hold given hemorrhagic transformation on CT Head    PULMONARY: CXR (02/23) Clear, Protecting airway, saturating well. Continue duonebs. Now on supplemental O2. CT chest w/ small b/l pleural effusions and congestion, mucous plugging LLL. Pulmonology following. Procalcitonin not significantly elevated. Continue monitoring off antibiotics.    CARDIOVASCULAR: TTE severe mitral stenosis, s/p previous TAVR. Cardiac pacemaker interrogated. Continue cardiac monitoring. Monitor for PAF, as per Dr Hoskins (Cardiology) high suspicion due to advanced age and severe MS.     SBP goal: 110-160mmHg    GASTROINTESTINAL: Pt was scheduled for PEG placement with surgical team on 02/25/23: Family is not amenable to lap-assisted G tube placement on 2/25, they would like to discuss more with family and patient's PCP. Initial dysphagia screen failed. Repeat swallow evaluation 2/19, 2/21 - patient not a candidate for oral feeding at this time.  GI PEG placement, deferred on 2/20 due to hypernatremia.  As per GI and IR team unable to place PEG due to lack of safe window for placement, NGT placed endoscopically instead, tolerating feedings. Dr. Ireland discussed with IR team, plan for G-tube placement with IR on 02/27.      Diet: NG tube w/ tube feeds    RENAL: BUN/CR 26/.99     Na Goal: Greater than 135.      Sousa: No    HEMATOLOGY: H/H fluctuating, hemoglobin 7.6, will c/w trending. Platelets 169. LE doppler: No evidence of deep venous thrombosis in either lower extremity.      DVT ppx: Lovenox 30 subq     ID: Afebrile without leukocytosis, monitoring off antibiotics.     OTHER: Plan discussed with daughters at bedside, all questions and concerns were addressed. Family wants FULL code. Palliative care consulted for goals of care discussion and support. RUE swelling: US ordered.     DISPOSITION: NOHEMI as per PT/OT eval once stable and workup is complete    CORE MEASURES:        Admission NIHSS: 18     TPA: NO      LDL/HDL: 76/45     Depression Screen: unable to assess     Statin Therapy: yes,      Dysphagia Screen: FAIL     Smoking NO      Afib NO     Stroke Education YES    Obtain screening lower extremity venous ultrasound in patients who meet 1 or more of the following criteria as patient is high risk for DVT/PE on admission:   [] History of DVT/PE  [] Hypercoagulable states (Factor V Leiden, Cancer, OCP, etc. )  [x] Prolonged immobility (hemiplegia/hemiparesis/post operative or any other extended immobilization)  [] Transferred from outside facility (Rehab or Long term care)  [] Age </= to 50

## 2024-02-26 NOTE — CHART NOTE - NSCHARTNOTEFT_GEN_A_CORE
EVENT:   Called to patients bedside by nurse for change in neuro exam  According to RN assessment, patient is not alert, not moving all 4 extremities,  or following commands. Upon my assessment, patient was asleep. She was then sternal rubbed as name calling was unsuccessful. She then opened her eyes for a few minutes, exhaled deeply before falling back to sleep. Unable to further assess, appears clinically stable and just tired. vital signs stable and no acute distress noted at this time.       acetylcysteine 20%  Inhalation 4 milliLiter(s) Inhalation two times a day  albuterol/ipratropium for Nebulization 3 milliLiter(s) Nebulizer every 6 hours  atorvastatin 20 milliGRAM(s) Oral at bedtime  clindamycin IVPB 600 milliGRAM(s) IV Intermittent once  enoxaparin Injectable 30 milliGRAM(s) SubCutaneous every 24 hours  sodium chloride 0.9%. 1000 milliLiter(s) IV Continuous <Continuous>        All other review of systems is negative unless indicated above.        PHYSICAL EXAM:   Vital Signs Last 24 Hrs  T(C): 36.8 (26 Feb 2024 00:08), Max: 37.2 (25 Feb 2024 19:44)  T(F): 98.2 (26 Feb 2024 00:08), Max: 98.9 (25 Feb 2024 19:44)  HR: 91 (26 Feb 2024 00:08) (84 - 113)  BP: 98/65 (26 Feb 2024 00:08) (90/50 - 124/72)  BP(mean): --  RR: 21 (26 Feb 2024 00:08) (18 - 29)  SpO2: 94% (26 Feb 2024 00:08) (94% - 99%)    Parameters below as of 26 Feb 2024 00:08  Patient On (Oxygen Delivery Method): nasal cannula        General: Alert with repeat stimuli. No acute Distress. Well Nourished, Well Developed  Cardiac: Regular Heart Rate and Rhythm. No Murmur. No Jugular Venous Distension. No Peripheral Edema.  Pulmonary: diminished breath Sounds to Auscultation Bilaterally.   Abdomen: Soft, Nontender, Nondistended. No palpable Mass. Normal Bowel Sounds    NEUROLOGICAL EXAM:  Mental status: Awake with repeat stimuli   Coordination/ Gait: gait not tested    LABS:                        7.5    6.06  )-----------( 169      ( 25 Feb 2024 08:36 )             24.0    02-25    142  |  114<H>  |  26<H>  ----------------------------<  138<H>  4.7   |  19<L>  |  0.99    Ca    8.2<L>      25 Feb 2024 08:36        Plan: Sleep protection ordered  Nursing Interventions; Continue Telemetry Monitor, Continuous Pulse Oximeter, Neuro checks r4h (outside of sleep protection), Oxygen 2 L NC PRN,   Will reassess in 1 hour    Time spent with patient:  15 minutes  Event discussed with: Dr Aaron Ruffin Fellow

## 2024-02-26 NOTE — PROGRESS NOTE ADULT - NS ATTEND AMEND GEN_ALL_CORE FT
PEG to be attempted tomorrow by IR as unable to be placed by GI  Hgb borderline - will eventually need decision on restarting antiplatelet. Would consider possible AC for intraluminal thrombus post PEG if Hgb  remains stable. No clear evidence of Afib yet but high clinical suspicion

## 2024-02-26 NOTE — PROGRESS NOTE ADULT - SUBJECTIVE AND OBJECTIVE BOX
Follow-up Pulm Progress Note    mildly labored  clear secretions reportedly suctioned orally over the weekend  ROS unable to be obtained  sats 100% on 2LNC     Medications:  MEDICATIONS  (STANDING):  acetylcysteine 20%  Inhalation 4 milliLiter(s) Inhalation two times a day  albuterol/ipratropium for Nebulization 3 milliLiter(s) Nebulizer every 6 hours  atorvastatin 20 milliGRAM(s) Oral at bedtime  clindamycin IVPB 600 milliGRAM(s) IV Intermittent once  enoxaparin Injectable 30 milliGRAM(s) SubCutaneous every 24 hours  furosemide   Injectable 20 milliGRAM(s) IV Push once  sodium chloride 0.9%. 1000 milliLiter(s) (50 mL/Hr) IV Continuous <Continuous>            Vital Signs Last 24 Hrs  T(C): 37 (26 Feb 2024 08:18), Max: 37.2 (25 Feb 2024 19:44)  T(F): 98.6 (26 Feb 2024 08:18), Max: 98.9 (25 Feb 2024 19:44)  HR: 94 (26 Feb 2024 08:18) (90 - 113)  BP: 101/66 (26 Feb 2024 08:18) (90/50 - 130/88)  BP(mean): --  RR: 18 (26 Feb 2024 08:18) (18 - 29)  SpO2: 95% (26 Feb 2024 08:18) (94% - 99%)    Parameters below as of 26 Feb 2024 08:18  Patient On (Oxygen Delivery Method): nasal cannula              02-25 @ 07:01  -  02-26 @ 07:00  --------------------------------------------------------  IN: 577 mL / OUT: 560 mL / NET: 17 mL          LABS:                        7.5    6.06  )-----------( 169      ( 25 Feb 2024 08:36 )             24.0     02-25    142  |  114<H>  |  26<H>  ----------------------------<  138<H>  4.7   |  19<L>  |  0.99    Ca    8.2<L>      25 Feb 2024 08:36            Urinalysis Basic - ( 25 Feb 2024 08:36 )    Color: x / Appearance: x / SG: x / pH: x  Gluc: 138 mg/dL / Ketone: x  / Bili: x / Urobili: x   Blood: x / Protein: x / Nitrite: x   Leuk Esterase: x / RBC: x / WBC x   Sq Epi: x / Non Sq Epi: x / Bacteria: x          Physical Examination:  PULM: decreased BS at bases   CVS: S1, S2 heard    RADIOLOGY REVIEWED    CT chest:    < from: CT Chest No Cont (02.26.24 @ 08:49) >  FINDINGS:    LUNGS, PLEURA, AND AIRWAYS: No endobronchial lesion. Small bilateral   pleural effusions with partial compressive atelectasis of the lower   lobes, similar to 2/16/2024. There has been some interval improvement of   bilateral upper lobe patchy opacities. Interlobular septal thickening.  MEDIASTINUM AND BERRY: No lymphadenopathy.  VESSELS: Calcification of the aorta and its branches.  HEART: Heart size is normal. Small pericardial effusion, similar to   2/16/2024. Status post TAVR. Mitral annular and coronary artery   calcifications. Left chest wall cardiac device.  CHEST WALL AND LOWER NECK: Enlarged multinodular thyroid gland with a   nodule measuring 2.5 cm on the left.  VISUALIZED UPPER ABDOMEN: Cholecystectomy. Hepatic cyst. Enteric tube   terminates in the distal stomach or proximal duodenum.  BONES: Degenerative changes.    IMPRESSION:    Likely pulmonary edema with small bilateral pleural effusions.    Patchy opacities in the bilateral upper lobes appear slightly improved   compared to 2/16/2024 CT chest.    --- End of Report ---      < end of copied text >

## 2024-02-27 LAB
ALBUMIN SERPL ELPH-MCNC: 3.2 G/DL — LOW (ref 3.3–5)
ALP SERPL-CCNC: 92 U/L — SIGNIFICANT CHANGE UP (ref 40–120)
ALT FLD-CCNC: 11 U/L — SIGNIFICANT CHANGE UP (ref 10–45)
ANION GAP SERPL CALC-SCNC: 10 MMOL/L — SIGNIFICANT CHANGE UP (ref 5–17)
ANION GAP SERPL CALC-SCNC: 13 MMOL/L — SIGNIFICANT CHANGE UP (ref 5–17)
ANISOCYTOSIS BLD QL: SLIGHT — SIGNIFICANT CHANGE UP
APTT BLD: 24.7 SEC — SIGNIFICANT CHANGE UP (ref 24.5–35.6)
APTT BLD: 26.1 SEC — SIGNIFICANT CHANGE UP (ref 24.5–35.6)
AST SERPL-CCNC: 23 U/L — SIGNIFICANT CHANGE UP (ref 10–40)
BASOPHILS # BLD AUTO: 0 K/UL — SIGNIFICANT CHANGE UP (ref 0–0.2)
BASOPHILS NFR BLD AUTO: 0 % — SIGNIFICANT CHANGE UP (ref 0–2)
BILIRUB SERPL-MCNC: 0.8 MG/DL — SIGNIFICANT CHANGE UP (ref 0.2–1.2)
BLD GP AB SCN SERPL QL: NEGATIVE — SIGNIFICANT CHANGE UP
BUN SERPL-MCNC: 24 MG/DL — HIGH (ref 7–23)
BUN SERPL-MCNC: 24 MG/DL — HIGH (ref 7–23)
BURR CELLS BLD QL SMEAR: SLIGHT — SIGNIFICANT CHANGE UP
CALCIUM SERPL-MCNC: 8.1 MG/DL — LOW (ref 8.4–10.5)
CALCIUM SERPL-MCNC: 8.4 MG/DL — SIGNIFICANT CHANGE UP (ref 8.4–10.5)
CHLORIDE SERPL-SCNC: 112 MMOL/L — HIGH (ref 96–108)
CHLORIDE SERPL-SCNC: 113 MMOL/L — HIGH (ref 96–108)
CO2 SERPL-SCNC: 19 MMOL/L — LOW (ref 22–31)
CO2 SERPL-SCNC: 21 MMOL/L — LOW (ref 22–31)
CREAT SERPL-MCNC: 0.98 MG/DL — SIGNIFICANT CHANGE UP (ref 0.5–1.3)
CREAT SERPL-MCNC: 1.01 MG/DL — SIGNIFICANT CHANGE UP (ref 0.5–1.3)
EGFR: 51 ML/MIN/1.73M2 — LOW
EGFR: 53 ML/MIN/1.73M2 — LOW
ELLIPTOCYTES BLD QL SMEAR: SIGNIFICANT CHANGE UP
EOSINOPHIL # BLD AUTO: 0.11 K/UL — SIGNIFICANT CHANGE UP (ref 0–0.5)
EOSINOPHIL NFR BLD AUTO: 2 % — SIGNIFICANT CHANGE UP (ref 0–6)
GAS PNL BLDA: SIGNIFICANT CHANGE UP
GLUCOSE BLDC GLUCOMTR-MCNC: 131 MG/DL — HIGH (ref 70–99)
GLUCOSE BLDC GLUCOMTR-MCNC: 162 MG/DL — HIGH (ref 70–99)
GLUCOSE SERPL-MCNC: 105 MG/DL — HIGH (ref 70–99)
GLUCOSE SERPL-MCNC: 144 MG/DL — HIGH (ref 70–99)
HCT VFR BLD CALC: 21.5 % — LOW (ref 34.5–45)
HCT VFR BLD CALC: 21.6 % — LOW (ref 34.5–45)
HCT VFR BLD CALC: 30.7 % — LOW (ref 34.5–45)
HGB BLD-MCNC: 6.7 G/DL — CRITICAL LOW (ref 11.5–15.5)
HGB BLD-MCNC: 6.8 G/DL — CRITICAL LOW (ref 11.5–15.5)
HGB BLD-MCNC: 9.8 G/DL — LOW (ref 11.5–15.5)
HYPOCHROMIA BLD QL: SLIGHT — SIGNIFICANT CHANGE UP
INR BLD: 1.16 RATIO — SIGNIFICANT CHANGE UP (ref 0.85–1.18)
LYMPHOCYTES # BLD AUTO: 0.84 K/UL — LOW (ref 1–3.3)
LYMPHOCYTES # BLD AUTO: 16 % — SIGNIFICANT CHANGE UP (ref 13–44)
MACROCYTES BLD QL: SLIGHT — SIGNIFICANT CHANGE UP
MAGNESIUM SERPL-MCNC: 2.1 MG/DL — SIGNIFICANT CHANGE UP (ref 1.6–2.6)
MANUAL SMEAR VERIFICATION: SIGNIFICANT CHANGE UP
MCHC RBC-ENTMCNC: 27.8 PG — SIGNIFICANT CHANGE UP (ref 27–34)
MCHC RBC-ENTMCNC: 28.4 PG — SIGNIFICANT CHANGE UP (ref 27–34)
MCHC RBC-ENTMCNC: 28.7 PG — SIGNIFICANT CHANGE UP (ref 27–34)
MCHC RBC-ENTMCNC: 31 GM/DL — LOW (ref 32–36)
MCHC RBC-ENTMCNC: 31.6 GM/DL — LOW (ref 32–36)
MCHC RBC-ENTMCNC: 31.9 GM/DL — LOW (ref 32–36)
MCV RBC AUTO: 87.2 FL — SIGNIFICANT CHANGE UP (ref 80–100)
MCV RBC AUTO: 90.7 FL — SIGNIFICANT CHANGE UP (ref 80–100)
MCV RBC AUTO: 91.5 FL — SIGNIFICANT CHANGE UP (ref 80–100)
MONOCYTES # BLD AUTO: 0.21 K/UL — SIGNIFICANT CHANGE UP (ref 0–0.9)
MONOCYTES NFR BLD AUTO: 4 % — SIGNIFICANT CHANGE UP (ref 2–14)
NEUTROPHILS # BLD AUTO: 4.1 K/UL — SIGNIFICANT CHANGE UP (ref 1.8–7.4)
NEUTROPHILS NFR BLD AUTO: 78 % — HIGH (ref 43–77)
NRBC # BLD: 0 /100 WBCS — SIGNIFICANT CHANGE UP (ref 0–0)
NRBC # BLD: 0 /100 WBCS — SIGNIFICANT CHANGE UP (ref 0–0)
PHOSPHATE SERPL-MCNC: 3.6 MG/DL — SIGNIFICANT CHANGE UP (ref 2.5–4.5)
PLAT MORPH BLD: NORMAL — SIGNIFICANT CHANGE UP
PLATELET # BLD AUTO: 192 K/UL — SIGNIFICANT CHANGE UP (ref 150–400)
PLATELET # BLD AUTO: 193 K/UL — SIGNIFICANT CHANGE UP (ref 150–400)
PLATELET # BLD AUTO: 213 K/UL — SIGNIFICANT CHANGE UP (ref 150–400)
POIKILOCYTOSIS BLD QL AUTO: SLIGHT — SIGNIFICANT CHANGE UP
POLYCHROMASIA BLD QL SMEAR: SLIGHT — SIGNIFICANT CHANGE UP
POTASSIUM SERPL-MCNC: 4.2 MMOL/L — SIGNIFICANT CHANGE UP (ref 3.5–5.3)
POTASSIUM SERPL-MCNC: 4.4 MMOL/L — SIGNIFICANT CHANGE UP (ref 3.5–5.3)
POTASSIUM SERPL-SCNC: 4.2 MMOL/L — SIGNIFICANT CHANGE UP (ref 3.5–5.3)
POTASSIUM SERPL-SCNC: 4.4 MMOL/L — SIGNIFICANT CHANGE UP (ref 3.5–5.3)
PROT SERPL-MCNC: 6.2 G/DL — SIGNIFICANT CHANGE UP (ref 6–8.3)
PROTHROM AB SERPL-ACNC: 12.1 SEC — SIGNIFICANT CHANGE UP (ref 9.5–13)
RBC # BLD: 2.36 M/UL — LOW (ref 3.8–5.2)
RBC # BLD: 2.37 M/UL — LOW (ref 3.8–5.2)
RBC # BLD: 3.52 M/UL — LOW (ref 3.8–5.2)
RBC # FLD: 19.8 % — HIGH (ref 10.3–14.5)
RBC # FLD: 19.9 % — HIGH (ref 10.3–14.5)
RBC # FLD: 19.9 % — HIGH (ref 10.3–14.5)
RBC BLD AUTO: ABNORMAL
RH IG SCN BLD-IMP: POSITIVE — SIGNIFICANT CHANGE UP
SCHISTOCYTES BLD QL AUTO: SLIGHT — SIGNIFICANT CHANGE UP
SODIUM SERPL-SCNC: 144 MMOL/L — SIGNIFICANT CHANGE UP (ref 135–145)
SODIUM SERPL-SCNC: 144 MMOL/L — SIGNIFICANT CHANGE UP (ref 135–145)
WBC # BLD: 11.53 K/UL — HIGH (ref 3.8–10.5)
WBC # BLD: 5.2 K/UL — SIGNIFICANT CHANGE UP (ref 3.8–10.5)
WBC # BLD: 5.25 K/UL — SIGNIFICANT CHANGE UP (ref 3.8–10.5)
WBC # FLD AUTO: 11.53 K/UL — HIGH (ref 3.8–10.5)
WBC # FLD AUTO: 5.2 K/UL — SIGNIFICANT CHANGE UP (ref 3.8–10.5)
WBC # FLD AUTO: 5.25 K/UL — SIGNIFICANT CHANGE UP (ref 3.8–10.5)

## 2024-02-27 PROCEDURE — 70450 CT HEAD/BRAIN W/O DYE: CPT | Mod: 26

## 2024-02-27 PROCEDURE — 71045 X-RAY EXAM CHEST 1 VIEW: CPT | Mod: 26

## 2024-02-27 PROCEDURE — 49440 PLACE GASTROSTOMY TUBE PERC: CPT

## 2024-02-27 PROCEDURE — 99291 CRITICAL CARE FIRST HOUR: CPT

## 2024-02-27 RX ORDER — CHLORHEXIDINE GLUCONATE 213 G/1000ML
1 SOLUTION TOPICAL
Refills: 0 | Status: DISCONTINUED | OUTPATIENT
Start: 2024-02-27 | End: 2024-03-07

## 2024-02-27 RX ORDER — ACETAMINOPHEN 500 MG
650 TABLET ORAL ONCE
Refills: 0 | Status: COMPLETED | OUTPATIENT
Start: 2024-02-27 | End: 2024-02-27

## 2024-02-27 RX ORDER — BUMETANIDE 0.25 MG/ML
1 INJECTION INTRAMUSCULAR; INTRAVENOUS ONCE
Refills: 0 | Status: DISCONTINUED | OUTPATIENT
Start: 2024-02-27 | End: 2024-02-27

## 2024-02-27 RX ORDER — BUMETANIDE 0.25 MG/ML
1 INJECTION INTRAMUSCULAR; INTRAVENOUS ONCE
Refills: 0 | Status: COMPLETED | OUTPATIENT
Start: 2024-02-27 | End: 2024-02-27

## 2024-02-27 RX ORDER — MEROPENEM 1 G/30ML
500 INJECTION INTRAVENOUS EVERY 12 HOURS
Refills: 0 | Status: COMPLETED | OUTPATIENT
Start: 2024-02-27 | End: 2024-02-29

## 2024-02-27 RX ORDER — CHLORHEXIDINE GLUCONATE 213 G/1000ML
15 SOLUTION TOPICAL EVERY 12 HOURS
Refills: 0 | Status: DISCONTINUED | OUTPATIENT
Start: 2024-02-27 | End: 2024-02-28

## 2024-02-27 RX ORDER — VANCOMYCIN HCL 1 G
500 VIAL (EA) INTRAVENOUS EVERY 12 HOURS
Refills: 0 | Status: DISCONTINUED | OUTPATIENT
Start: 2024-02-27 | End: 2024-02-28

## 2024-02-27 RX ORDER — FENTANYL CITRATE 50 UG/ML
25 INJECTION INTRAVENOUS EVERY 4 HOURS
Refills: 0 | Status: DISCONTINUED | OUTPATIENT
Start: 2024-02-27 | End: 2024-02-29

## 2024-02-27 RX ORDER — IPRATROPIUM/ALBUTEROL SULFATE 18-103MCG
3 AEROSOL WITH ADAPTER (GRAM) INHALATION EVERY 6 HOURS
Refills: 0 | Status: DISCONTINUED | OUTPATIENT
Start: 2024-02-27 | End: 2024-03-01

## 2024-02-27 RX ORDER — SODIUM CHLORIDE 9 MG/ML
4 INJECTION INTRAMUSCULAR; INTRAVENOUS; SUBCUTANEOUS EVERY 6 HOURS
Refills: 0 | Status: DISCONTINUED | OUTPATIENT
Start: 2024-02-27 | End: 2024-02-27

## 2024-02-27 RX ORDER — SODIUM CHLORIDE 9 MG/ML
1000 INJECTION, SOLUTION INTRAVENOUS
Refills: 0 | Status: DISCONTINUED | OUTPATIENT
Start: 2024-02-27 | End: 2024-02-27

## 2024-02-27 RX ORDER — ENOXAPARIN SODIUM 100 MG/ML
30 INJECTION SUBCUTANEOUS EVERY 24 HOURS
Refills: 0 | Status: DISCONTINUED | OUTPATIENT
Start: 2024-02-27 | End: 2024-02-28

## 2024-02-27 RX ADMIN — BUMETANIDE 1 MILLIGRAM(S): 0.25 INJECTION INTRAMUSCULAR; INTRAVENOUS at 21:00

## 2024-02-27 RX ADMIN — Medication 650 MILLIGRAM(S): at 21:13

## 2024-02-27 RX ADMIN — Medication 4 MILLILITER(S): at 05:32

## 2024-02-27 RX ADMIN — CHLORHEXIDINE GLUCONATE 15 MILLILITER(S): 213 SOLUTION TOPICAL at 20:48

## 2024-02-27 RX ADMIN — Medication 100 MILLIGRAM(S): at 20:48

## 2024-02-27 RX ADMIN — SODIUM CHLORIDE 4 MILLILITER(S): 9 INJECTION INTRAMUSCULAR; INTRAVENOUS; SUBCUTANEOUS at 16:50

## 2024-02-27 RX ADMIN — Medication 3 MILLILITER(S): at 14:10

## 2024-02-27 RX ADMIN — Medication 3 MILLILITER(S): at 16:50

## 2024-02-27 RX ADMIN — ENOXAPARIN SODIUM 30 MILLIGRAM(S): 100 INJECTION SUBCUTANEOUS at 20:48

## 2024-02-27 RX ADMIN — CHLORHEXIDINE GLUCONATE 1 APPLICATION(S): 213 SOLUTION TOPICAL at 20:48

## 2024-02-27 RX ADMIN — Medication 3 MILLILITER(S): at 05:32

## 2024-02-27 RX ADMIN — MEROPENEM 100 MILLIGRAM(S): 1 INJECTION INTRAVENOUS at 20:48

## 2024-02-27 RX ADMIN — Medication 260 MILLIGRAM(S): at 20:47

## 2024-02-27 NOTE — PRE-OP CHECKLIST - HAIR REMOVAL
Problem: Self Care Deficits Care Plan (Adult)  Goal: *Acute Goals and Plan of Care (Insert Text)  1. Patient will complete lower body bathing and dressing with minimal assistance and adaptive equipment as needed. 2. Patient will complete toileting with minimal assistance   3. Patient will tolerate 30 minutes of OT treatment with 1-2 rest breaks to increase activity tolerance for ADLs. 4. Patient will complete functional transfers with CGA and adaptive equipment as needed. 5. Patient will verbalize with independence 3 ways to complete energy conservation with ADL. Timeframe: 7 visits       OCCUPATIONAL THERAPY: Daily Note, Treatment Day: 2nd and AM 1/22/2018  INPATIENT: Hospital Day: 10  Payor: Biju Hicks / Plan: [employer] MEDICARE CHOICE PPO/PFFS / Product Type: [employer] Medicare /      NAME/AGE/GENDER: Elodia Jain is a 68 y.o. male   PRIMARY DIAGNOSIS:  Ileus of unspecified type ([facility] [address]  Partial small bowel obstruction Partial small bowel obstruction Partial small bowel obstruction        ICD-10: Treatment Diagnosis:    · Generalized Muscle Weakness (M62.81)  · Other lack of cordination (R27.8)   Precautions/Allergies:     Pcn [penicillins]      ASSESSMENT:   Mr. Gale Sullivan was admitted for the above diagnosis. Pt presents supine upon arrival. Pt was able to complete bed mobility with supervision only today. Demonstrates good sitting balance at edge of bed while MD was in to see patient. Pt was able to stand with CGA and transferred over to chair. Pt did well once on his feet. Pt is to be getting KUB soon so he was left in chair with all needs in reach. Will continue to benefit from skilled OT during stay. This section established at most recent assessment   PROBLEM LIST (Impairments causing functional limitations):  1. Decreased Strength  2. Decreased ADL/Functional Activities  3. Decreased Transfer Abilities  4. Decreased Ambulation Ability/Technique  5. Decreased Balance  6.  Decreased Activity Tolerance  7. Decreased Pacing Skills  8. Decreased Work Simplification/Energy Conservation Techniques  9. Increased Fatigue  10. Increased Shortness of Breath  11. Decreased Flexibility/Joint Mobility  12. Edema/Girth  13. Decreased Skin Integrity/Hygeine  14. Decreased [place] with Home Exercise Program   INTERVENTIONS PLANNED: (Benefits and precautions of occupational therapy have been discussed with the patient.)  1. Therapeutic Exercises  2. Therapeutic Activity  3. Adaptive equipment training  4. ADL  5. Neuromuscular Re-education  6. Donning/doffing training  7. Energy conservation      TREATMENT PLAN: Frequency/Duration: Follow patient 3 times per week to address above goals. Rehabilitation Potential For Stated Goals: Excellent      RECOMMENDED REHABILITATION/EQUIPMENT: (at time of discharge pending progress): Due to the probability of continued deficits (see above) this patient will likely need continued skilled occupational therapy after discharge. Equipment:    TBD              OCCUPATIONAL PROFILE AND HISTORY:   History of Present Injury/Illness (Reason for Referral):  See H&P  Past Medical History/Comorbidities:   Mr. Ghazala Sanders  has a past medical history of A-fib ([facility] [address] (8/5/2015); CHF (congestive heart failure) ([facility] [address] COPD (chronic obstructive pulmonary disease) ([address] Diabetes ([facility] [address] Duodenal ulcer hemorrhage (8/21/2015); H/O: GI bleed; HTN (hypertension); Ileus ([address] MARCIE (obstructive sleep apnea); Peripheral neuropathy; Pleural Effusion-right-parapneumonic? (3/3/2010); Pneumonia-right (3/1/2010); Stroke [facility]); and Venous stasis dermatitis of both lower extremities.   Mr. Ghazala Sanders  has a past surgical history that includes hx orthopaedic and pr cardiac surg procedure unlist.  Social History/Living Environment:   Home Environment: Private residence  Wheelchair Ramp: Yes  One/Two Story Residence: One story  Living Alone: No  Support Systems: Spouse/Significant Other/Partner  Patient Expects to be Discharged to[de-identified] Private residence  Current DME Used/Available at Home: Commode, bedside, Walker, rollator, Walker, rolling, Wheelchair  Tub or Shower Type: Tub/Shower combination  Prior Level of Function/Work/Activity:  Pt lives at home with his wife and daughter. Daughter works during the day, but wife is home with him other than running errands. Pt needs some assistance with sponge bathing and LE dressing. Pt able to complete his own toileting needs. Pt uses a rolling walker at home and wheelchair/rollator out of in the community. Personal Factors:          Social Background:  Home alone occasionally        Past/Current Experience: Hx of multiple hospitalizations; rehab stay        Other factors that influence how disability is experienced by the patient:  Multiple co-morbidities    Number of Personal Factors/Comorbidities that affect the Plan of Care: Extensive review of physical, cognitive, and psychosocial performance (3+):  HIGH COMPLEXITY   ASSESSMENT OF OCCUPATIONAL PERFORMANCE[de-identified]   Activities of Daily Living:           Basic ADLs (From Assessment) Complex ADLs (From Assessment)   Basic ADL  Feeding: Setup  Oral Facial Hygiene/Grooming: Minimum assistance  Bathing: Moderate assistance  Upper Body Dressing: Minimum assistance  Lower Body Dressing: Maximum assistance  Toileting: Maximum assistance Instrumental ADL  Meal Preparation: Total assistance  Homemaking: Total assistance   Grooming/Bathing/Dressing Activities of Daily Living     Cognitive Retraining  Safety/Judgement: Awareness of environment; Fall prevention                       Bed/Mat Mobility  Rolling:  (up in chair on arrival)  Supine to Sit: Supervision  Sit to Supine:  (left up in chair )  Sit to Stand: Minimum assistance (from chair; low surface)  Bed to Chair: Contact guard assistance       Most Recent Physical Functioning:   Gross Assessment:                  Posture:  Posture (WDL): Within defined limits  Posture Assessment:  Forward head, Rounded shoulders  Balance:  Sitting: Intact  Standing: Impaired  Standing - Static: Good;Fair  Standing - Dynamic : Fair Bed Mobility:  Rolling:  (up in chair on arrival)  Supine to Sit: Supervision  Sit to Supine:  (left up in chair )  Wheelchair Mobility:     Transfers:  Sit to Stand: Minimum assistance (from chair; low surface)  Stand to Sit: Contact guard assistance;Minimum assistance  Bed to Chair: Contact guard assistance              Patient Vitals for the past 6 hrs:   BP SpO2 O2 Flow Rate (L/min) Pulse   18 0802 117/75 96 % - 73   18 0833 - 98 % 3 l/min -   18 1153 112/69 99 % - 69   18 1157 - 97 % 3 l/min -       Mental Status  Neurologic State: Alert  Orientation Level: Oriented X4  Cognition: Appropriate decision making, Follows commands  Perception: Appears intact  Perseveration: No perseveration noted  Safety/Judgement: Awareness of environment, Fall prevention                          Physical Skills Involved:  1. Range of Motion  2. Balance  3. Strength  4. Activity Tolerance  5. Pain (acute)  6. Edema  7. Skin Integrity Cognitive Skills Affected (resulting in the inability to perform in a timely and safe manner):  1. [facility] Psychosocial Skills Affected:  1. Habits/Routines   Number of elements that affect the Plan of Care: 5+:  HIGH COMPLEXITY   CLINICAL DECISION MAKIN [facility] [address] [id number] AM-PAC 6 Clicks   Daily Activity Inpatient Short Form  How much help from another person does the patient currently need. .. Total A Lot A Little None   1. Putting on and taking off regular lower body clothing? [] 1   [x] 2   [] 3   [] 4   2. Bathing (including washing, rinsing, drying)? [] 1   [x] 2   [] 3   [] 4   3. Toileting, which includes using toilet, bedpan or urinal?   [] 1   [x] 2   [] 3   [] 4   4. Putting on and taking off regular upper body clothing? [] 1   [] 2   [x] 3   [] 4   5. Taking care of personal grooming such as brushing teeth?    [] 1   [] 2   [x] 3 [] 4   6. Eating meals? [] 1   [] 2   [x] 3   [] 4   © 2007, Trustees of [address] [place] [address] [address] [address], under license to [employer]. All rights reserved      Score:  Initial: 15 Most Recent: X (Date: -- )    Interpretation of Tool:  Represents activities that are increasingly more difficult (i.e. Bed mobility, Transfers, Gait). Score 24 23 22-20 19-15 14-10 9-7 6     Modifier CH CI CJ CK CL CM CN      ? Self Care:     - CURRENT STATUS: CK - 40%-59% impaired, limited or restricted    - GOAL STATUS: CJ - 20%-39% impaired, limited or restricted    - D/C STATUS:  ---------------To be determined---------------  Payor: [employer] MEDICARE / Plan: [facility] [employer] MEDIC[facility] CHOICE PPO/PFFS / Product Type: Managed Care Medicare /      Medical Necessity:     · Patient demonstrates good rehab potential due to higher previous functional level. Reason for Services/Other Comments:  · Patient continues to require skilled intervention due to decreased indepednence with ADL/functional transfers. Use of outcome tool(s) and clinical judgement create a POC that gives a: LOW COMPLEXITY         TREATMENT:   (In addition to Assessment/Re-Assessment sessions the following treatments were rendered)     Pre-treatment Symptoms/Complaints:    Pain: Initial:   Pain Intensity 1: 0  Post Session:  same     Therapeutic Activity: (10 minutes): Therapeutic activities including Bed transfers and Chair transfers to improve mobility, strength and balance. Required minimal Safety awareness training;Verbal cues to promote static and dynamic balance in standing. Braces/Orthotics/Lines/Etc:   · O2 Device: Nasal cannula  Treatment/Session Assessment:    · Response to Treatment:  Evaluation only.    · Interdisciplinary Collaboration:   o Certified Occupational Therapy Assistant  o Registered Nurse  · After treatment position/precautions:   o Up in chair  o Bed/Chair-wheels locked  o Call light within reach  o RN notified   · Compliance with Program/Exercises: Will assess as treatment progresses. · Recommendations/Intent for next treatment session: \"Next visit will focus on advancements to more challenging activities and reduction in assistance provided\".   Total Treatment Duration:  OT Patient Time In/Time Out  Time In: 1045  Time Out: 401 [facility] David Kerns hair removal not indicated

## 2024-02-27 NOTE — PROGRESS NOTE ADULT - ASSESSMENT
89F presented with a stroke    1. Stroke     2. Dysphagia   PEG attempted, no window   IR g-tube pending         Jovon Ugalde D.O.  Gastroenterology and Hepatology  [address], [address]  [place], [place]  Office: [phone]

## 2024-02-27 NOTE — PROGRESS NOTE ADULT - ASSESSMENT
89-year-old left-handed woman with past medical history of CHF, HTN, pacemaker placement, valve replacement presenting as a code stroke for AMS, ?R-facial droop, R sided weakness. Was found by home aide at 830 2/14 less responsive, not moving extremities as usually does. No prior known hx strokes per family. At baseline patient alert, able to recognize/comprehend familiar faces, per daughters at bedside patient sometimes has difficulty with getting words out. Not candidate for tenecteplase as area of hypodensity already appreciated on CT head imaging/age, not candidate for thrombectomy given higher MRS.    IMPRESSION: Global aphasia, R hemiparesis due to L MCA infarct with associated hemorrhagic transformation 2/2 L ICA partially occlusive thrombus. Mechanism ESUS vs. ICAD.      NEURO: Neurologically slightly improved since admission. Continue close monitoring for cerebral edema mass effect and neurologic deterioration. A1c 6.3. LDL 76 -40mg statin ordered, titrate statin to LDL goal less than 70. CT Head, CTA Head/Neck as above. Maintain - 140. Physical therapy/OT/Speech eval - [facility].    ANTITHROMBOTIC THERAPY: On hold given hemorrhagic transformation on CT Head    PULMONARY: CXR (02/23) Clear, Protecting airway, saturating well. Continue duonebs. Now on supplemental O2. CT chest w/ small b/l pleural effusions and congestion, mucous plugging LLL. Pulmonology following. Procalcitonin not significantly elevated. Continue monitoring off antibiotics.    CARDIOVASCULAR: TTE severe mitral stenosis, s/p previous TAVR. Cardiac pacemaker interrogated. Continue cardiac monitoring. Monitor for PAF, as per Dr Hoskins (Cardiology) high suspicion due to advanced age and severe MS.     SBP goal: 110-160mmHg    GASTROINTESTINAL: Pt was scheduled for PEG placement with surgical team on 02/25/23: Family is not amenable to lap-assisted G tube placement on 2/25, they would like to discuss more with family and patient's PCP. Initial dysphagia screen failed. Repeat swallow evaluation 2/19, 2/21 - patient not a candidate for oral feeding at this time.  GI PEG placement, deferred on 2/20 due to hypernatremia.  As per GI and IR team unable to place PEG due to lack of safe window for placement, NGT placed endoscopically instead, tolerating feedings. Dr. Ireland discussed with IR team, plan for G-tube placement with IR on 02/27. pending repeat CBC after 1 unit PRBC.       Diet: NPO for possible g tube placement     RENAL: BUN/CR 24/1.01     Na Goal: Greater than 135.      Sousa: No    HEMATOLOGY: H/H fluctuating, hemoglobin 6.8, s/p 1 unit PRBC, pending repeat CBC. Platelets 193. LE doppler: No evidence of deep venous thrombosis in either lower extremity.      DVT ppx: Venodynes, lovenox on hold for IR feeding tube and for low hemoglobin     ID: Afebrile without leukocytosis, monitoring off antibiotics.     OTHER: Plan discussed with daughter at bedside, all questions and concerns were addressed. Family wants FULL code. Palliative care consulted for goals of care discussion and support. RUE swelling: US ordered.     DISPOSITION: [facility] as per PT/OT eval once stable and workup is complete    CORE MEASURES:        Admission NIHSS: 18     TPA: NO      LDL/HDL: 76/45     Depression Screen: unable to assess     Statin Therapy: yes,      Dysphagia Screen: FAIL     Smoking NO      Afib NO     Stroke Education YES    Obtain screening lower extremity venous ultrasound in patients who meet 1 or more of the following criteria as patient is high risk for DVT/PE on admission:   [] History of DVT/PE  [] Hypercoagulable states (Factor V Leiden, Cancer, OCP, etc. )  [x] Prolonged immobility (hemiplegia/hemiparesis/post operative or any other extended immobilization)  [] Transferred from outside facility (Rehab or Long term care)  [] Age </= to 50   89-year-old left-handed woman with past medical history of CHF, HTN, pacemaker placement, valve replacement presenting as a code stroke for AMS, ?R-facial droop, R sided weakness. Was found by home aide at 830 2/14 less responsive, not moving extremities as usually does. No prior known hx strokes per family. At baseline patient alert, able to recognize/comprehend familiar faces, per daughters at bedside patient sometimes has difficulty with getting words out. Not candidate for tenecteplase as area of hypodensity already appreciated on CT head imaging/age, not candidate for thrombectomy given higher MRS.    IMPRESSION: Global aphasia, R hemiparesis due to L MCA infarct with associated hemorrhagic transformation 2/2 L ICA partially occlusive thrombus. Mechanism ESUS vs. ICAD.      NEURO: Neurologically slightly improved since admission. Continue close monitoring for cerebral edema mass effect and neurologic deterioration. A1c 6.3. LDL 76 -40mg statin ordered, titrate statin to LDL goal less than 70. CT Head, CTA Head/Neck as above. Maintain - 140. Physical therapy/OT/Speech eval - [facility].    ANTITHROMBOTIC THERAPY: On hold given hemorrhagic transformation on CT Head, anemia    PULMONARY: CXR (02/23) Clear, Protecting airway, saturating well. Continue duonebs. Now on supplemental O2. CT chest w/ small b/l pleural effusions and congestion, mucous plugging LLL. Pulmonology following. Procalcitonin not significantly elevated. Continue monitoring off antibiotics.    CARDIOVASCULAR: TTE severe mitral stenosis, s/p previous TAVR. Cardiac pacemaker interrogated. Continue cardiac monitoring. Monitor for PAF, as per Dr Hoskins (Cardiology) high suspicion due to advanced age and severe MS.     SBP goal: 110-160mmHg    GASTROINTESTINAL: Pt was scheduled for PEG placement with surgical team on 02/25/23: Family is not amenable to lap-assisted G tube placement on 2/25, they would like to discuss more with family and patient's PCP. Initial dysphagia screen failed. Repeat swallow evaluation 2/19, 2/21 - patient not a candidate for oral feeding at this time.  GI PEG placement, deferred on 2/20 due to hypernatremia.  As per GI and IR team unable to place PEG due to lack of safe window for placement, NGT placed endoscopically instead, tolerating feedings. Dr. Ireland discussed with IR team, plan for G-tube placement with IR on 02/27. pending repeat CBC after 1 unit PRBC.       Diet: NPO for possible g tube placement     RENAL: BUN/CR 24/1.01     Na Goal: Greater than 135.      Sousa: No    HEMATOLOGY: H/H fluctuating, hemoglobin 6.8, s/p 1 unit PRBC, pending repeat CBC. Platelets 193. LE doppler: No evidence of deep venous thrombosis in either lower extremity.      DVT ppx: Venodynes, lovenox on hold for IR feeding tube and for low hemoglobin     ID: Afebrile without leukocytosis, monitoring off antibiotics.     OTHER: Plan discussed with daughter at bedside, all questions and concerns were addressed. Family wants FULL code. Palliative care consulted for goals of care discussion and support. RUE swelling: US ordered.     DISPOSITION: [facility] as per PT/OT eval once stable and workup is complete    CORE MEASURES:        Admission NIHSS: 18     TPA: NO      LDL/HDL: 76/45     Depression Screen: unable to assess     Statin Therapy: yes,      Dysphagia Screen: FAIL     Smoking NO      Afib NO     Stroke Education YES    Obtain screening lower extremity venous ultrasound in patients who meet 1 or more of the following criteria as patient is high risk for DVT/PE on admission:   [] History of DVT/PE  [] Hypercoagulable states (Factor V Leiden, Cancer, OCP, etc. )  [x] Prolonged immobility (hemiplegia/hemiparesis/post operative or any other extended immobilization)  [] Transferred from outside facility (Rehab or Long term care)  [] Age </= to 50

## 2024-02-27 NOTE — PRE-ANESTHESIA EVALUATION ADULT - NSANTHOSAYNRD_GEN_A_CORE
No. IRAJ screening performed.  STOP BANG Legend: 0-2 = LOW Risk; 3-4 = INTERMEDIATE Risk; 5-8 = HIGH Risk
No. IRAJ screening performed.  STOP BANG Legend: 0-2 = LOW Risk; 3-4 = INTERMEDIATE Risk; 5-8 = HIGH Risk

## 2024-02-27 NOTE — PRE-ANESTHESIA EVALUATION ADULT - NSANTHPEFT_GEN_ALL_CORE
cor reg  lungs clear  abd soft  awake
Fetal position  Arms contracted   +Murmur  Decrease breath sounds BL

## 2024-02-27 NOTE — PROCEDURE NOTE - PROCEDURE FINDINGS AND DETAILS
Successful fluoroscopic guided gastrostomy placement.   Pt tolerated the procedure well without immediate complication.

## 2024-02-27 NOTE — PROCEDURE NOTE - PLAN
- Do not use gastrostomy for 24h. IR will evaluate tomorrow PM and reach out to team if okay to use - Do not use gastrostomy for 24h. IR will evaluate tomorrow PM and reach out to team if okay to use  - 24h NPO

## 2024-02-27 NOTE — PROGRESS NOTE ADULT - SUBJECTIVE AND OBJECTIVE BOX
Anesthesia called for emergency intubation at apporx 17:45.  On arrival, pt found minimally responsive with NRB. vitals 131/68, p111 spo2 100. brief report from primary team, recent ischemic cva + severe ms. pre O2, induction with 4mg etomidate + 50mg rocuronium. levophed gtt started at .05mcg/kg/min.  DL x _1_ with MAC 3 blade, grade _1__ view, _7.5__ cuffed ETT passed without trauma, + ETCO2 via EasyCap, + BBS, taped at _20___ cm, teeth intact, no complications. post procedure 141/88, p88, spo2 100

## 2024-02-27 NOTE — CHART NOTE - NSCHARTNOTEFT_GEN_A_CORE
MICU ACCEPT NOTE    CHIEF COMPLAINT: Patient is a 89y old  Female who presents with a chief complaint of CVA (23 Feb 2024 17:30)      HPI:  89-year-old left-handed woman with past medical history of CHF, HTN, pacemaker placement, valve replacement presenting as a code stroke for AMS, ?R-facial droop, R sided weakness.  Was found by home aide at 830 2/14 less responsive, not moving extremities as usually does. No prior known hx strokes per family.  At baseline patient alert, able to recognize/comprehend familiar faces, per daughters at bedside patient sometimes has difficulty with getting words out.    SH: No smoking ETOH use recreational drug use.  LKW: 2130 2/13/24  NIHSS 18  MRS 4 (aide at bedside reports patient requires assistance with all ADLs, including getting out of bed, showering, changing clothes, walking, and since breaking L arm last year, requires also assistance with feeding)  BP per /70, glucose per .     Information obtained from aide at bedside, then after CT scanner family also at bedside. (14 Feb 2024 12:33)      PAST MEDICAL & SURGICAL HISTORY:      FAMILY HISTORY:      SOCIAL HISTORY:  Smoking:   Substance Use:   EtOH Use:   Advance Directives:     MEDICATIONS  (STANDING):  albuterol/ipratropium for Nebulization 3 milliLiter(s) Nebulizer every 6 hours  atorvastatin 20 milliGRAM(s) Oral at bedtime  clindamycin IVPB 600 milliGRAM(s) IV Intermittent once  sodium chloride 3%  Inhalation 4 milliLiter(s) Inhalation every 6 hours    MEDICATIONS  (PRN):      Allergies    penicillins (Other)    Intolerances        REVIEW OF SYSTEMS:  Unable to assess ROS because intubated    OBJECTIVE:  ICU Vital Signs Last 24 Hrs  T(C): 36.3 (27 Feb 2024 15:05), Max: 37.3 (27 Feb 2024 06:36)  T(F): 97.3 (27 Feb 2024 15:05), Max: 99.1 (27 Feb 2024 06:36)  HR: 105 (27 Feb 2024 18:29) (74 - 105)  BP: 133/75 (27 Feb 2024 18:28) (91/39 - 136/75)  BP(mean): 94 (27 Feb 2024 18:28) (54 - 94)  ABP: --  ABP(mean): --  RR: 26 (27 Feb 2024 18:28) (18 - 26)  SpO2: 100% (27 Feb 2024 18:29) (96% - 100%)    O2 Parameters below as of 27 Feb 2024 18:28  Patient On (Oxygen Delivery Method): ventilator    O2 Concentration (%): 100      Mode: AC/ CMV (Assist Control/ Continuous Mandatory Ventilation), RR (machine): 16, TV (machine): 350, FiO2: 100, PEEP: 5, ITime: 1, MAP: 11, PIP: 35    02-26 @ 07:01  -  02-27 @ 07:00  --------------------------------------------------------  IN: 715 mL / OUT: 1625 mL / NET: -910 mL      CAPILLARY BLOOD GLUCOSE      POCT Blood Glucose.: 162 mg/dL (27 Feb 2024 17:23)      PHYSICAL EXAM:  GENERAL: NAD, lying in bed comfortably  HEAD:  Atraumatic, normocephalic  EYES: EOMI, PERRLA, conjunctiva and sclera clear  ENT: Moist mucous membranes  NECK: Supple, no JVD  HEART: S1, S2, regular rate and rhythm, no murmurs, rubs, or gallops  LUNGS: Unlabored respirations.  Clear to auscultation bilaterally, no crackles, wheezing, or rhonchi  ABDOMEN: Soft, nontender, nondistended, +BS  EXTREMITIES: 2+ peripheral pulses bilaterally. No clubbing, cyanosis, or edema  NERVOUS SYSTEM:  A&Ox3, no focal deficits   SKIN: No rashes or lesions  LINES:     LABS:                        6.8    5.25  )-----------( 193      ( 27 Feb 2024 07:03 )             21.5     Hgb Trend: 6.8<--, 6.7<--, 7.9<--, 7.5<--, 7.9<--  02-27    144  |  113<H>  |  24<H>  ----------------------------<  105<H>  4.2   |  21<L>  |  1.01    Ca    8.1<L>      27 Feb 2024 06:03      Creatinine Trend: 1.01<--, 1.07<--, 0.99<--, 1.00<--, 1.07<--, 1.05<--  PT/INR - ( 27 Feb 2024 06:03 )   PT: 12.1 sec;   INR: 1.16 ratio         PTT - ( 27 Feb 2024 06:03 )  PTT:26.1 sec  Urinalysis Basic - ( 27 Feb 2024 06:03 )    Color: x / Appearance: x / SG: x / pH: x  Gluc: 105 mg/dL / Ketone: x  / Bili: x / Urobili: x   Blood: x / Protein: x / Nitrite: x   Leuk Esterase: x / RBC: x / WBC x   Sq Epi: x / Non Sq Epi: x / Bacteria: x      Arterial Blood Gas:  02-27 @ 18:30  7.39/33/303/20/99.7/-4.3  ABG lactate: --        MICROBIOLOGY:     RADIOLOGY & ADDITIONAL TESTS:    ASSESSMENT  89-year-old with CHF, HTN, PPM, TAVR, who presented as a code stroke for AMS, R facial droop and R hemiparesis found to have L MCA infarct with hemorrhagic transformation. PEG tube placement 2/27. Transferred to MICU after patient intubated following oxygen saturation decreasing down to 30s-40s few hours after PEG tube placement.       PLAN  =====Neurologic=====  #CVA  #Hemorrhagic transformation  Patient presented with AMS, possible R sided facial droop and R hemiparesis. Code stroke called, but not candidate for tenecteplace given time course and higher MRS.   Continues to have global aphasia and R hemiparesis  CT scans from 2/14-2/17 with evolving L MCA distribution infarct with hemorrhagic transformation and 8-9mm rightward shift.   - repeat CT head 2/27 with reolving hemorrhagic infarct and decrease in midline shift  - Per neuro:  - SBP goal 110-140.   - atorvastatin 40mg with LDL goal < 70    =====Pulmonary=====  #AHRF  #Aspiration  #Mechanical Ventillation  Patient O2 sat at 30s-40s after PEG placement. Possible 2/2 aspiration. Intubated as per GOC.   - Vent settings:  - repeat ABG  - treatment of possible aspiration PNA/pneumonitis as below  - aggressive suctioning    =====Cardiovascular=====  #HFpEF  Patient with recetn TTE with moderate (grade 2) diastolic dysfunction.   - was given lasix 20mg IV x1 on floors  - continue diuresis with bumex (?)    #Valvular dysfunction  Severe mitral stenosis seen on TTE 2/14.  s/p TAVR, no aortic regurgitation    #HTN  Patient currently normotensive.     #PPM  Patient with pacemaker, interrogated by cardiology per notes  - VVI mode    =====GI=====  #PEG placement  Patient with PEG placed on 2/27 after GOC discussions with family.   - per GI cannot use for 24 hours after placement  - for now give low rate D5 1/2 NS    =====Renal/=====  #Electrolytes  - Maintain K>4, Phos>3, Mag>2, iCal>1    =====Endocrine=====  #NPO  D5 1/2 NS for now given cannot use PEG tube for 24 hours  - fingersticks q6h    =====Infectious Disease=====  #?Aspiration pneumonitis vs pneumonia  Patient's desaturations likely 2/2 aspiration  Will cover for now, further evaluate  - Bcx2  - MRSA swab  - empiric zosyn    =====Heme/Onc=====  #DVT Ppx  - Lovenox 40mg SQ qd, has been getting and resolving hemorrhagic tranformation on head CT    =====Ethics=====  FULL CODE, palliative consulted previously, saw patient with discussion involving that patient to be full code. MICU ACCEPT NOTE    CHIEF COMPLAINT: Patient is a 89y old  Female who presents with a chief complaint of CVA (23 Feb 2024 17:30)      HPI:  89-year-old left-handed woman with past medical history of CHF, HTN, pacemaker placement, valve replacement presenting as a code stroke for AMS, ?R-facial droop, R sided weakness.  Was found by home aide at 830 2/14 less responsive, not moving extremities as usually does. No prior known hx strokes per family.  At baseline patient alert, able to recognize/comprehend familiar faces, per daughters at bedside patient sometimes has difficulty with getting words out.    SH: No smoking ETOH use recreational drug use.  LKW: 2130 2/13/24  NIHSS 18  MRS 4 (aide at bedside reports patient requires assistance with all ADLs, including getting out of bed, showering, changing clothes, walking, and since breaking L arm last year, requires also assistance with feeding)  BP per /70, glucose per .     Information obtained from aide at bedside, then after CT scanner family also at bedside. (14 Feb 2024 12:33)    Patient with MCA infarct with hemorrhagic transformation seen on CT head up to 2/17, but stable. PEG tube placed on 2/27 after discussion with family on GOC. Few hours after, patient's oxygen saturation down to 30s-40s, patient unable to protect airway given AMS from CVA, intubated and transferred to MICU.       PAST MEDICAL & SURGICAL HISTORY:  HFpEF  HTN  CVA  s/p PPM placement  s/p TAVR    FAMILY HISTORY:  non contributory    SOCIAL HISTORY:  Smoking: none  Substance Use: none  EtOH Use: none  Advance Directives: Full code    MEDICATIONS  (STANDING):  albuterol/ipratropium for Nebulization 3 milliLiter(s) Nebulizer every 6 hours  atorvastatin 20 milliGRAM(s) Oral at bedtime  clindamycin IVPB 600 milliGRAM(s) IV Intermittent once  sodium chloride 3%  Inhalation 4 milliLiter(s) Inhalation every 6 hours    MEDICATIONS  (PRN):      Allergies    penicillins (Other)    Intolerances        REVIEW OF SYSTEMS:  Unable to assess ROS because intubated    OBJECTIVE:  ICU Vital Signs Last 24 Hrs  T(C): 36.3 (27 Feb 2024 15:05), Max: 37.3 (27 Feb 2024 06:36)  T(F): 97.3 (27 Feb 2024 15:05), Max: 99.1 (27 Feb 2024 06:36)  HR: 105 (27 Feb 2024 18:29) (74 - 105)  BP: 133/75 (27 Feb 2024 18:28) (91/39 - 136/75)  BP(mean): 94 (27 Feb 2024 18:28) (54 - 94)  ABP: --  ABP(mean): --  RR: 26 (27 Feb 2024 18:28) (18 - 26)  SpO2: 100% (27 Feb 2024 18:29) (96% - 100%)    O2 Parameters below as of 27 Feb 2024 18:28  Patient On (Oxygen Delivery Method): ventilator    O2 Concentration (%): 100      Mode: AC/ CMV (Assist Control/ Continuous Mandatory Ventilation), RR (machine): 16, TV (machine): 350, FiO2: 100, PEEP: 5, ITime: 1, MAP: 11, PIP: 35    02-26 @ 07:01  -  02-27 @ 07:00  --------------------------------------------------------  IN: 715 mL / OUT: 1625 mL / NET: -910 mL      CAPILLARY BLOOD GLUCOSE      POCT Blood Glucose.: 162 mg/dL (27 Feb 2024 17:23)      PHYSICAL EXAM:  GENERAL: cachectic  HEAD:  Atraumatic, normocephalic  EYES: EOMI, PERRLA, conjunctiva and sclera clear  ENT: Moist mucous membranes  HEART: S1, S2, regular rate and rhythm, no murmurs appreciated  LUNGS: Rhonchi present diffusely bilaterally  ABDOMEN: Soft, nontender, nondistended, +BS  EXTREMITIES: Bilateral ecchymosis in bilateral upper extremities. No clubbing, cyanosis, or edema  NERVOUS SYSTEM:  A&Ox0      LABS:                        6.8    5.25  )-----------( 193      ( 27 Feb 2024 07:03 )             21.5     Hgb Trend: 6.8<--, 6.7<--, 7.9<--, 7.5<--, 7.9<--  02-27    144  |  113<H>  |  24<H>  ----------------------------<  105<H>  4.2   |  21<L>  |  1.01    Ca    8.1<L>      27 Feb 2024 06:03      Creatinine Trend: 1.01<--, 1.07<--, 0.99<--, 1.00<--, 1.07<--, 1.05<--  PT/INR - ( 27 Feb 2024 06:03 )   PT: 12.1 sec;   INR: 1.16 ratio         PTT - ( 27 Feb 2024 06:03 )  PTT:26.1 sec  Urinalysis Basic - ( 27 Feb 2024 06:03 )    Color: x / Appearance: x / SG: x / pH: x  Gluc: 105 mg/dL / Ketone: x  / Bili: x / Urobili: x   Blood: x / Protein: x / Nitrite: x   Leuk Esterase: x / RBC: x / WBC x   Sq Epi: x / Non Sq Epi: x / Bacteria: x      Arterial Blood Gas:  02-27 @ 18:30  7.39/33/303/20/99.7/-4.3  ABG lactate: --        MICROBIOLOGY:     RADIOLOGY & ADDITIONAL TESTS:    ASSESSMENT  89-year-old with CHF, HTN, PPM, TAVR, who presented as a code stroke for AMS, R facial droop and R hemiparesis found to have L MCA infarct with hemorrhagic transformation. PEG tube placement 2/27. Transferred to MICU after patient intubated following oxygen saturation decreasing down to 30s-40s few hours after PEG tube placement.       PLAN  =====Neurologic=====  #CVA  #Hemorrhagic transformation  Patient presented with increased AMS (baseline patient with neurocognitive difficulties with ability to recognize familiar faces and often trouble getting words out), possible R sided facial droop and R hemiparesis. Code stroke called, but not candidate for tenecteplase given time course and higher MRS.   Continues to have global aphasia and R hemiparesis  CT scans from 2/14-2/17 with evolving L MCA distribution infarct with hemorrhagic transformation and 8-9mm rightward shift.   - repeat CT head 2/27 with resolving hemorrhagic infarct and decrease in midline shift  - Per neuro:  - SBP goal 110-140.   - atorvastatin 40mg with LDL goal < 70, on hold for now    =====Pulmonary=====  #AHRF  #Aspiration  #Mechanical Ventilation  vent settings: 16/350/5/40, will reassess after repeat ABG  Patient O2 sat at 30s-40s after PEG placement. Possible 2/2 aspiration. Intubated as per GOC. CT chest on 2/26 with pulmonary edema and bilateral pleural effusions.   - repeat ABG  - treatment of possible aspiration PNA/pneumonitis as below  - aggressive suctioning  - diuresis as below    =====Cardiovascular=====  #HFpEF  Patient with recent TTE with moderate (grade 2) diastolic dysfunction.  -Additionally, CT chest 2/26 with pulmonary edema and bilateral pleural effusions, was given lasix 20mg IV x1.   - continue diuresis with bumex 1mg     #Valvular dysfunction  Severe mitral stenosis seen on TTE 2/14.  s/p TAVR, no aortic regurgitation    #HTN  Patient currently normotensive.     #PPM  Patient with pacemaker, interrogated by cardiology per notes  - VVI mode    =====GI=====  #PEG placement  Patient with PEG placed on 2/27 after GOC discussions with family.   - per GI cannot use for 24 hours after placement  - for now give low rate D5 1/2 NS    =====Renal/=====  #Electrolytes  - Maintain K>4, Phos>3, Mag>2, iCal>1    =====Endocrine=====  #NPO  D5 1/2 NS for now given cannot use PEG tube for 24 hours  - fingersticks q6h    =====Infectious Disease=====  #?Aspiration pneumonitis vs pneumonia  Patient's desaturations likely 2/2 aspiration. Now with leukocytosis to 11.   - Bcx2  - sputum culture  - MRSA swab  - empiric darian given possible zosyn allergy as listed penicillin allergy    =====Heme/Onc=====  #Anemia  Hgb 6.7, repeat 6.8 this AM. s/p 1 u pRBC 2/27 AM , with repeat Hgb 9.8 (likely concentrated).  No overt source of bleeding. MCV normocytic.   Baseline Hgb in 8s.   - CTM Hgb  - transfuse if Hgb <7    #DVT Ppx  - Lovenox 30mg SQ qd given resolving hemorrhagic transformation on head CT    =====Ethics=====  FULL CODE, palliative consulted previously, saw patient with discussion involving that patient to be full code.

## 2024-02-27 NOTE — PROGRESS NOTE ADULT - SUBJECTIVE AND OBJECTIVE BOX
INTERVAL HPI/OVERNIGHT EVENTS:    h/h noted, no overt gi bleeding   pending IR gtube     MEDICATIONS  (STANDING):  acetylcysteine 20%  Inhalation 4 milliLiter(s) Inhalation two times a day  albuterol/ipratropium for Nebulization 3 milliLiter(s) Nebulizer every 6 hours  atorvastatin 20 milliGRAM(s) Oral at bedtime  clindamycin IVPB 600 milliGRAM(s) IV Intermittent once    MEDICATIONS  (PRN):      Allergies    penicillins (Other)    Intolerances        Review of Systems:    General:  No wt loss, fevers, chills, night sweats, fatigue   Eyes:  Good vision, no reported pain  ENT:  No sore throat, pain, runny nose, dysphagia  CV:  No pain, palpitations, hypo/hypertension  Resp:  No dyspnea, cough, tachypnea, wheezing  GI:  No pain, No nausea, No vomiting, No diarrhea, No constipation, No weight loss, No fever, No pruritis, No rectal bleeding, No melena, No dysphagia  :  No pain, bleeding, incontinence, nocturia  Muscle:  No pain, weakness  Neuro:  No weakness, tingling, memory problems  Psych:  No fatigue, insomnia, mood problems, depression  Endocrine:  No polyuria, polydypsia, cold/heat intolerance  Heme:  No petechiae, ecchymosis, easy bruisability  Skin:  No rash, tattoos, scars, edema      Vital Signs Last 24 Hrs  T(C): 36.8 (27 Feb 2024 11:19), Max: 37.3 (27 Feb 2024 06:36)  T(F): 98.2 (27 Feb 2024 11:19), Max: 99.1 (27 Feb 2024 06:36)  HR: 87 (27 Feb 2024 11:19) (87 - 95)  BP: 101/97 (27 Feb 2024 11:19) (97/64 - 136/75)  BP(mean): --  RR: 19 (27 Feb 2024 11:19) (18 - 19)  SpO2: 98% (27 Feb 2024 11:19) (96% - 98%)    Parameters below as of 27 Feb 2024 10:27  Patient On (Oxygen Delivery Method): room air        PHYSICAL EXAM:    Constitutional: NAD  HEENT: EOMI, throat clear  Neck: No LAD, supple  Respiratory: CTA and P  Cardiovascular: S1 and S2, RRR, no M  Gastrointestinal: BS+, soft, NT/ND, neg HSM,  Extremities: No peripheral edema, neg clubbing, cyanosis  Vascular: 2+ peripheral pulses  Neurological: A/O x 3, no focal deficits  Psychiatric: Normal mood, normal affect  Skin: No rashes      LABS:                        6.8    5.25  )-----------( 193      ( 27 Feb 2024 07:03 )             21.5     02-27    144  |  113<H>  |  24<H>  ----------------------------<  105<H>  4.2   |  21<L>  |  1.01    Ca    8.1<L>      27 Feb 2024 06:03      PT/INR - ( 27 Feb 2024 06:03 )   PT: 12.1 sec;   INR: 1.16 ratio         PTT - ( 27 Feb 2024 06:03 )  PTT:26.1 sec  Urinalysis Basic - ( 27 Feb 2024 06:03 )    Color: x / Appearance: x / SG: x / pH: x  Gluc: 105 mg/dL / Ketone: x  / Bili: x / Urobili: x   Blood: x / Protein: x / Nitrite: x   Leuk Esterase: x / RBC: x / WBC x   Sq Epi: x / Non Sq Epi: x / Bacteria: x        RADIOLOGY & ADDITIONAL TESTS:

## 2024-02-27 NOTE — PROGRESS NOTE ADULT - SUBJECTIVE AND OBJECTIVE BOX
THE PATIENT WAS SEEN AND EXAMINED BY ME WITH THE HOUSESTAFF AND STROKE TEAM DURING MORNING ROUNDS.   HPI:89-year-old left-handed woman with past medical history of CHF, HTN, pacemaker placement, valve replacement presenting as a code stroke for AMS, ?R-facial droop, R sided weakness.  Was found by home aide at 830 2/14 less responsive, not moving extremities as usually does. No prior known hx strokes per family.  At baseline patient alert, able to recognize/comprehend familiar faces, per daughters at bedside patient sometimes has difficulty with getting words out.  SH: No smoking ETOH use recreational drug use.  LKW: 2130 2/13/24  NIHSS 18  MRS 4 (aide at bedside reports patient requires assistance with all ADLs, including getting out of bed, showering, changing clothes, walking, and since breaking L arm last year, requires also assistance with feeding)  BP per /70, glucose per .     Information obtained from aide at bedside, then after CT scanner family also at bedside. (14 Feb 2024 12:33)    SUBJECTIVE: No events overnight.  No new neurologic complaints.      acetylcysteine 20%  Inhalation 4 milliLiter(s) Inhalation two times a day  albuterol/ipratropium for Nebulization 3 milliLiter(s) Nebulizer every 6 hours  atorvastatin 20 milliGRAM(s) Oral at bedtime  clindamycin IVPB 600 milliGRAM(s) IV Intermittent once      PHYSICAL EXAM:   Vital Signs Last 24 Hrs  T(C): 36.9 (27 Feb 2024 10:27), Max: 37.3 (27 Feb 2024 06:36)  T(F): 98.5 (27 Feb 2024 10:27), Max: 99.1 (27 Feb 2024 06:36)  HR: 91 (27 Feb 2024 10:27) (89 - 95)  BP: 97/64 (27 Feb 2024 10:27) (97/64 - 136/75)  RR: 18 (27 Feb 2024 10:27) (18 - 18)  SpO2: 98% (27 Feb 2024 10:27) (96% - 98%)    Parameters below as of 27 Feb 2024 10:27  Patient On (Oxygen Delivery Method): room air      General: No acute distress  Abdomen: Soft, nondistended   Extremities: No edema    NEUROLOGICAL EXAM:  Mental status: Opens closed, opens eyes briefly to voice, does not respond to questions, groans intermittently. intermittently follows some simple commands.  Cranial Nerves: No facial asymmetry. Decreased blink to threat on Right.   Motor exam: Normal tone, some effort spontaneous against gravity throughout all extremities, L > R. Arthritic changes throughout extremities.  Sensation: Grimaces to painful stimuli x 4, less so on the right  Coordination/ Gait: Deferred    LABS:                        6.8    5.25  )-----------( 193      ( 27 Feb 2024 07:03 )             21.5    02-27    144  |  113<H>  |  24<H>  ----------------------------<  105<H>  4.2   |  21<L>  |  1.01    Ca    8.1<L>      27 Feb 2024 06:03    PT/INR - ( 27 Feb 2024 06:03 )   PT: 12.1 sec;   INR: 1.16 ratio         PTT - ( 27 Feb 2024 06:03 )  PTT:26.1 sec      IMAGING: Reviewed by me.     2/17/24 CT Head: Acute left MCA territory infarct, as on the prior, with associated hemorrhagic transformation centered in the ipsilateral basal ganglia, not significantly changed. Associated 8-9 mm rightward midline shift is not   significantly changed. Slightly more pronounced gyriform hyperdensity may reflect spared cortex versus cortical petechial hemorrhage.    2/16/24 CT Head:  Redemonstration of an evolving left-sided MCA distribution acute/subacute infarct with a new small amount of hemorrhagic transformation within the infarct bed in comparison with 2/14/2024. Surrounding mass effect and shift of the midline structures from left to right appears unchanged when compared to the most recent prior CT exam.      CT brain 2/14/24: Acute left frontoparietalinfarct. No CT evidence for selene hemorrhagic transformation.    CT brain perfusion 2/14/24: Significantly limited by motion. Cerebral blood flow less than 30% = 0 mL. No core infarct predicted. Tmax greater than 6 seconds = 46 mL and involves the bilateral mesial cerebellar hemispheres, the right temporal lobe, bilateral frontal lobes, and left posterior temporal. This represents brain parenchyma predicted to be at continued ischemic risk in the presence of neurologic symptoms. This finding is likely spurious in nature.  Mismatch volume 46 mL, Mismatch ratio infinite    CTA brain 2/14/24: Intraluminal filling defect involving the left ICA terminus and extending into the proximal right A1 segment. Normal flow-related enhancement of the remaining left LEBRON. Normal flow-related enhancement of the left MCA.  Right MCA enhances to a lesser degree than the left MCA. No vascular aneurysm or AVM.    CTA neck: Approximately 50% short segment stenosis of the origin and proximal segment of the left ICA due to calcified plaque. Normal flow-related enhancement of the more distal vessel. Approximately 75-80% short segment stenosis of the proximal segment of the right internal carotid artery due to partially calcified plaque. Normal flow-related enhancement of the more distal vessel.

## 2024-02-27 NOTE — CHART NOTE - NSCHARTNOTEFT_GEN_A_CORE
*****NEUROLOGY ACP EVENT NOTE*****    EVENT:   Notified by RN that patient was having labored breathing along with oxygen desaturation. On arrival to bedside patient with oxygen saturation high 80s on non-rebreather. Audible secretions, attempted suctioning at bedside and requested respiratory therapy to assist with suctioning. Nebulizer treatments administered. Patient then noted to have further desaturations to low 80s. RRT activated. While awaiting arrival of RRT patient with rapid desaturation to 70s and then 60s. Anesthesia paged overhead. Crash cart and ambu bag at bedside. While awaiting arrival of anesthesia oxygen desaturations noted to be 30-50s and bag mask was applied. Oxygen saturations returned to 100% with bagging and patient was then placed back on non-rebreather. Breathing noted to be be persistently labored with agonal breaths. After discussion with family the decision was made to proceed with intubation.     Exam:  Pulmonary: Labored breathing. accessory muscle use noted.   Abdomen: Soft, Nontender, Nondistended. PEG tube in place, small amount of dried blood noted around insertion site.     NEUROLOGICAL EXAM:  Mental status: Lethargic, eyes open briefly to noxious stimuli. Not responding to questioning, not following commands.   Cranial Nerves: PERRL, no facial asymmetry  Motor exam: Moves all extremities to noxious stimuli L>R  Sensation: Intact to noxious stimuli  Coordination/ Gait: Deferred    IMAGING:     CT Head 2/27/24:  Redemonstration of recent large left MCA territory infarct.  Resolving hemorrhagic transformation.  Decreased mass effect upon the left lateral ventricle.  Slightly decreased rightward midline shift.  Basal cisterns well visualized.    Plan:  Transferred to MICU s/p intubation    Event discussed with: Dr. No Pedroza

## 2024-02-27 NOTE — RAPID RESPONSE TEAM SUMMARY - NSSITUATIONBACKGROUNDRRT_GEN_ALL_CORE
89-year-old with CHF, HTN, PPM, TAVR, who presented as a code stroke for AMS, R facial droop and R hemiparesis found to have L MCA infarct with hemorrhagic transformation. S/p PEG tube placement earlier today. Upon arrival patient hypoxic to 30s-60s. Bag mask was applied and O2 saturation improved to 100%. However patient with increased work of breathing and agonal breaths. HCP agreed to intubation and the patient was intubated. Started on levophed gtt. CT Head was performed. Patient was transferred to MICU for further management.

## 2024-02-27 NOTE — PROGRESS NOTE ADULT - SUBJECTIVE AND OBJECTIVE BOX
Subjective: Patient seen and examined. No new events except as noted.   seen this am on 4c   plan for IR G tube placement   aid at bedside   REVIEW OF SYSTEMS:    Unable to obtain       MEDICATIONS:  MEDICATIONS  (STANDING):  atorvastatin 20 milliGRAM(s) Oral at bedtime  buMETAnide 1 milliGRAM(s) Oral once  chlorhexidine 0.12% Liquid 15 milliLiter(s) Oral Mucosa every 12 hours  chlorhexidine 2% Cloths 1 Application(s) Topical <User Schedule>  dextrose 5% + sodium chloride 0.45%. 1000 milliLiter(s) (75 mL/Hr) IV Continuous <Continuous>  enoxaparin Injectable 30 milliGRAM(s) SubCutaneous every 24 hours  meropenem  IVPB 500 milliGRAM(s) IV Intermittent every 12 hours  vancomycin  IVPB 500 milliGRAM(s) IV Intermittent every 12 hours      PHYSICAL EXAM:  T(C): 36.7 (02-27-24 @ 18:28), Max: 37.3 (02-27-24 @ 06:36)  HR: 102 (02-27-24 @ 19:00) (74 - 105)  BP: 167/72 (02-27-24 @ 19:00) (91/39 - 167/72)  RR: 15 (02-27-24 @ 19:00) (15 - 26)  SpO2: 100% (02-27-24 @ 19:00) (96% - 100%)  Wt(kg): --  I&O's Summary    26 Feb 2024 07:01  -  27 Feb 2024 07:00  --------------------------------------------------------  IN: 715 mL / OUT: 1625 mL / NET: -910 mL            Appearance: NAD  HEENT:   Dry oral mucosa, PERRL, EOMI	  Lymphatic: No lymphadenopathy +NGT  Cardiovascular: Normal S1 S2, No JVD, No murmurs, No edema  Respiratory: Decreased bs  Psychiatry: A & O x 0 sleepy  Gastrointestinal:  Soft, Non-tender, + BS	  Skin: No rashes, No ecchymoses, No cyanosis	  Neurologic Exam:  Mental status - eyes closed, opens to repeated verbal stimuli. Follows intermittent simple commands to squeeze L hand/give thumbs up in L hand. Does not respond to orientation questions.   Cranial nerves - R pupil appears ?sluggishly reactive. No BTT in R eye. Unable to open left eye. ?R facial droop but may be 2/2 positioning of patient's head to R.  Motor - Normal bulk. Increased tone in RUE. Does not maintain antigravity when asked throughout all extremities initially, however does squeeze hand on LUE, withdraws slightly to noxious stimuli in R hand.   Upon re-evaluation able to raise both legs antigravity.  Sensation - Withdraws to noxious stimuli throughout.  DTR's - deferred  Coordination -deferred  Gait and station - deferred  Extremities: Normal range of motion, No clubbing, cyanosis or edema  Vascular: Peripheral pulses palpable 2+ bilaterally      LABS:    CARDIAC MARKERS:                                9.8    11.53 )-----------( 213      ( 27 Feb 2024 18:49 )             30.7     02-27    144  |  112<H>  |  24<H>  ----------------------------<  144<H>  4.4   |  19<L>  |  0.98    Ca    8.4      27 Feb 2024 18:49  Phos  3.6     02-27  Mg     2.1     02-27    TPro  6.2  /  Alb  3.2<L>  /  TBili  0.8  /  DBili  x   /  AST  23  /  ALT  11  /  AlkPhos  92  02-27    proBNP:   Lipid Profile:   HgA1c:   TSH:             TELEMETRY: 	 SR   ECG:  	  RADIOLOGY:   DIAGNOSTIC TESTING:  [ ] Echocardiogram:  [ ]  Catheterization:  [ ] Stress Test:    OTHER:

## 2024-02-27 NOTE — PRE PROCEDURE NOTE - PRE PROCEDURE EVALUATION
Interventional Radiology    HPI: 89y Female with past medical history of CHF, HTN, pacemaker placement, TAVR presenting as a code stroke for AMS, ?R-facial droop, R sided weakness. Was found by home aide at 830 2/14 less responsive, not moving extremities as usually does. No prior known hx strokes per family. Pt was presenting with dysphagia, G tube placement attempted by GI endoscopically, found no safe window. Patient presents to IR today for Percutaneous gastrostomy tube placement.     Allergies: penicillins (Other)    Medications (Abx/Cardiac/Anticoagulation/Blood Products)  enoxaparin Injectable: 30 milliGRAM(s) SubCutaneous (02-25 @ 18:08)  furosemide   Injectable: 20 milliGRAM(s) IV Push (02-26 @ 10:56)    Data:  T(C): 36.8  HR: 87  BP: 101/97  RR: 19  SpO2: 98%    Exam  General: No acute distress  Chest: Non labored breathing  Abdomen: Non-distended  Extremities: No swelling, warm    -WBC 5.25 / HgB 6.8 / Hct 21.5 / Plt 193  -Na 144 / Cl 113 / BUN 24 / Glucose 105  -K 4.2 / CO2 21 / Cr 1.01  -INR1.16    Plan: 89y Female with AMS and dysphagia presents for Percutaneous gastrostomy tube placement  -Risks/Benefits/alternatives explained with the patient and/or healthcare proxy and witnessed informed consent obtained.

## 2024-02-28 DIAGNOSIS — J96.01 ACUTE RESPIRATORY FAILURE WITH HYPOXIA: ICD-10-CM

## 2024-02-28 LAB
ALBUMIN SERPL ELPH-MCNC: 3.1 G/DL — LOW (ref 3.3–5)
ALP SERPL-CCNC: 88 U/L — SIGNIFICANT CHANGE UP (ref 40–120)
ALT FLD-CCNC: 14 U/L — SIGNIFICANT CHANGE UP (ref 10–45)
ANION GAP SERPL CALC-SCNC: 15 MMOL/L — SIGNIFICANT CHANGE UP (ref 5–17)
APPEARANCE UR: CLEAR — SIGNIFICANT CHANGE UP
APTT BLD: 27.5 SEC — SIGNIFICANT CHANGE UP (ref 24.5–35.6)
AST SERPL-CCNC: 25 U/L — SIGNIFICANT CHANGE UP (ref 10–40)
BACTERIA # UR AUTO: NEGATIVE /HPF — SIGNIFICANT CHANGE UP
BILIRUB SERPL-MCNC: 0.6 MG/DL — SIGNIFICANT CHANGE UP (ref 0.2–1.2)
BILIRUB UR-MCNC: NEGATIVE — SIGNIFICANT CHANGE UP
BUN SERPL-MCNC: 26 MG/DL — HIGH (ref 7–23)
CALCIUM SERPL-MCNC: 8.4 MG/DL — SIGNIFICANT CHANGE UP (ref 8.4–10.5)
CAST: 21 /LPF — HIGH (ref 0–4)
CHLORIDE SERPL-SCNC: 113 MMOL/L — HIGH (ref 96–108)
CO2 SERPL-SCNC: 17 MMOL/L — LOW (ref 22–31)
COLOR SPEC: YELLOW — SIGNIFICANT CHANGE UP
CREAT SERPL-MCNC: 1.07 MG/DL — SIGNIFICANT CHANGE UP (ref 0.5–1.3)
DIFF PNL FLD: ABNORMAL
EGFR: 48 ML/MIN/1.73M2 — LOW
GAS PNL BLDA: SIGNIFICANT CHANGE UP
GLUCOSE BLDC GLUCOMTR-MCNC: 112 MG/DL — HIGH (ref 70–99)
GLUCOSE BLDC GLUCOMTR-MCNC: 124 MG/DL — HIGH (ref 70–99)
GLUCOSE BLDC GLUCOMTR-MCNC: 125 MG/DL — HIGH (ref 70–99)
GLUCOSE SERPL-MCNC: 125 MG/DL — HIGH (ref 70–99)
GLUCOSE UR QL: NEGATIVE MG/DL — SIGNIFICANT CHANGE UP
HCT VFR BLD CALC: 30.6 % — LOW (ref 34.5–45)
HGB BLD-MCNC: 9.5 G/DL — LOW (ref 11.5–15.5)
HYALINE CASTS # UR AUTO: PRESENT
INR BLD: 1.1 RATIO — SIGNIFICANT CHANGE UP (ref 0.85–1.18)
KETONES UR-MCNC: NEGATIVE MG/DL — SIGNIFICANT CHANGE UP
LEUKOCYTE ESTERASE UR-ACNC: NEGATIVE — SIGNIFICANT CHANGE UP
MCHC RBC-ENTMCNC: 27.6 PG — SIGNIFICANT CHANGE UP (ref 27–34)
MCHC RBC-ENTMCNC: 31 GM/DL — LOW (ref 32–36)
MCV RBC AUTO: 89 FL — SIGNIFICANT CHANGE UP (ref 80–100)
MRSA PCR RESULT.: SIGNIFICANT CHANGE UP
NITRITE UR-MCNC: NEGATIVE — SIGNIFICANT CHANGE UP
NRBC # BLD: 0 /100 WBCS — SIGNIFICANT CHANGE UP (ref 0–0)
PH UR: 5 — SIGNIFICANT CHANGE UP (ref 5–8)
PLATELET # BLD AUTO: 212 K/UL — SIGNIFICANT CHANGE UP (ref 150–400)
POTASSIUM SERPL-MCNC: 4.6 MMOL/L — SIGNIFICANT CHANGE UP (ref 3.5–5.3)
POTASSIUM SERPL-SCNC: 4.6 MMOL/L — SIGNIFICANT CHANGE UP (ref 3.5–5.3)
PROT SERPL-MCNC: 6 G/DL — SIGNIFICANT CHANGE UP (ref 6–8.3)
PROT UR-MCNC: NEGATIVE MG/DL — SIGNIFICANT CHANGE UP
PROTHROM AB SERPL-ACNC: 12.1 SEC — SIGNIFICANT CHANGE UP (ref 9.5–13)
RBC # BLD: 3.44 M/UL — LOW (ref 3.8–5.2)
RBC # FLD: 20.3 % — HIGH (ref 10.3–14.5)
RBC CASTS # UR COMP ASSIST: 2 /HPF — SIGNIFICANT CHANGE UP (ref 0–4)
REVIEW: SIGNIFICANT CHANGE UP
S AUREUS DNA NOSE QL NAA+PROBE: SIGNIFICANT CHANGE UP
SODIUM SERPL-SCNC: 145 MMOL/L — SIGNIFICANT CHANGE UP (ref 135–145)
SP GR SPEC: 1.01 — SIGNIFICANT CHANGE UP (ref 1–1.03)
SQUAMOUS # UR AUTO: 4 /HPF — SIGNIFICANT CHANGE UP (ref 0–5)
UROBILINOGEN FLD QL: 0.2 MG/DL — SIGNIFICANT CHANGE UP (ref 0.2–1)
WBC # BLD: 10.23 K/UL — SIGNIFICANT CHANGE UP (ref 3.8–10.5)
WBC # FLD AUTO: 10.23 K/UL — SIGNIFICANT CHANGE UP (ref 3.8–10.5)
WBC UR QL: 1 /HPF — SIGNIFICANT CHANGE UP (ref 0–5)

## 2024-02-28 PROCEDURE — 99291 CRITICAL CARE FIRST HOUR: CPT | Mod: FS

## 2024-02-28 PROCEDURE — 93971 EXTREMITY STUDY: CPT | Mod: 26,RT

## 2024-02-28 PROCEDURE — 99291 CRITICAL CARE FIRST HOUR: CPT

## 2024-02-28 RX ORDER — HEPARIN SODIUM 5000 [USP'U]/ML
INJECTION INTRAVENOUS; SUBCUTANEOUS
Qty: 25000 | Refills: 0 | Status: DISCONTINUED | OUTPATIENT
Start: 2024-02-28 | End: 2024-02-28

## 2024-02-28 RX ORDER — BUMETANIDE 0.25 MG/ML
1 INJECTION INTRAMUSCULAR; INTRAVENOUS ONCE
Refills: 0 | Status: COMPLETED | OUTPATIENT
Start: 2024-02-28 | End: 2024-02-28

## 2024-02-28 RX ORDER — HEPARIN SODIUM 5000 [USP'U]/ML
5000 INJECTION INTRAVENOUS; SUBCUTANEOUS EVERY 8 HOURS
Refills: 0 | Status: DISCONTINUED | OUTPATIENT
Start: 2024-02-28 | End: 2024-02-28

## 2024-02-28 RX ORDER — HEPARIN SODIUM 5000 [USP'U]/ML
800 INJECTION INTRAVENOUS; SUBCUTANEOUS
Qty: 25000 | Refills: 0 | Status: DISCONTINUED | OUTPATIENT
Start: 2024-02-28 | End: 2024-02-28

## 2024-02-28 RX ORDER — HEPARIN SODIUM 5000 [USP'U]/ML
5000 INJECTION INTRAVENOUS; SUBCUTANEOUS EVERY 12 HOURS
Refills: 0 | Status: DISCONTINUED | OUTPATIENT
Start: 2024-02-28 | End: 2024-03-07

## 2024-02-28 RX ORDER — HEPARIN SODIUM 5000 [USP'U]/ML
3500 INJECTION INTRAVENOUS; SUBCUTANEOUS EVERY 6 HOURS
Refills: 0 | Status: DISCONTINUED | OUTPATIENT
Start: 2024-02-28 | End: 2024-02-28

## 2024-02-28 RX ORDER — HEPARIN SODIUM 5000 [USP'U]/ML
1500 INJECTION INTRAVENOUS; SUBCUTANEOUS EVERY 6 HOURS
Refills: 0 | Status: DISCONTINUED | OUTPATIENT
Start: 2024-02-28 | End: 2024-02-28

## 2024-02-28 RX ORDER — ASPIRIN/CALCIUM CARB/MAGNESIUM 324 MG
81 TABLET ORAL DAILY
Refills: 0 | Status: DISCONTINUED | OUTPATIENT
Start: 2024-02-28 | End: 2024-02-29

## 2024-02-28 RX ADMIN — Medication 100 MILLIGRAM(S): at 05:25

## 2024-02-28 RX ADMIN — MEROPENEM 100 MILLIGRAM(S): 1 INJECTION INTRAVENOUS at 17:06

## 2024-02-28 RX ADMIN — BUMETANIDE 1 MILLIGRAM(S): 0.25 INJECTION INTRAMUSCULAR; INTRAVENOUS at 11:20

## 2024-02-28 RX ADMIN — CHLORHEXIDINE GLUCONATE 15 MILLILITER(S): 213 SOLUTION TOPICAL at 06:33

## 2024-02-28 RX ADMIN — MEROPENEM 100 MILLIGRAM(S): 1 INJECTION INTRAVENOUS at 05:24

## 2024-02-28 RX ADMIN — CHLORHEXIDINE GLUCONATE 1 APPLICATION(S): 213 SOLUTION TOPICAL at 05:24

## 2024-02-28 RX ADMIN — CHLORHEXIDINE GLUCONATE 15 MILLILITER(S): 213 SOLUTION TOPICAL at 17:07

## 2024-02-28 RX ADMIN — HEPARIN SODIUM 5000 UNIT(S): 5000 INJECTION INTRAVENOUS; SUBCUTANEOUS at 21:55

## 2024-02-28 RX ADMIN — ATORVASTATIN CALCIUM 20 MILLIGRAM(S): 80 TABLET, FILM COATED ORAL at 21:40

## 2024-02-28 NOTE — PROGRESS NOTE ADULT - CRITICAL CARE ATTENDING COMMENT
Advanced care planning was discussed with  family.  Advanced care planning forms were reviewed and discussed as appropriate.  Differential diagnosis and plan of care discussed with family after the evaluation.   Pain assessed and judicious use of narcotics when appropriate was discussed.  Importance of Fall prevention discussed.  Counseling on Smoking and Alcohol cessation was offered when appropriate.  Counseling on Diet, exercise, and medication compliance was done.
Advanced care planning was discussed with  family.  Advanced care planning forms were reviewed and discussed as appropriate.  Differential diagnosis and plan of care discussed with family after the evaluation.   Pain assessed and judicious use of narcotics when appropriate was discussed.  Importance of Fall prevention discussed.  Counseling on Smoking and Alcohol cessation was offered when appropriate.  Counseling on Diet, exercise, and medication compliance was done.
Neurologically improving, said "good morning" and showed 2 fingers to command.   Remains hypernatremic, will switch fluids to 1/2 NS and monitor. No NGT per discussion with family  Hgb remains low but stable, will monitor - no aspirin for now but will start DVT ppx  Pending repeat swallow evaluation and possible PEG Tuesday if fails  Rest of plan as above
Patient well-known to stroke service, currently intubated in the ICU.  Awake, does not follow meaningful commands bilateral upper extremity 2/5 strength.  Recommend continuing aspirin and DVT prophylaxis with Lovenox.  Recent discovered to have right upper limb deep vein thrombosis, if that requires full dose anticoagulation then would discontinue aspirin and DVT prophylaxis.  Encourage family to have goals of care conversation with palliative care team.  Plan discussed with medical ICU team.
Advanced care planning was discussed with  family.  Advanced care planning forms were reviewed and discussed as appropriate.  Differential diagnosis and plan of care discussed with family after the evaluation.   Pain assessed and judicious use of narcotics when appropriate was discussed.  Importance of Fall prevention discussed.  Counseling on Smoking and Alcohol cessation was offered when appropriate.  Counseling on Diet, exercise, and medication compliance was done.
Mental status grossly unchanged but with increase work of breathing and concern for PNA  Repeat CTH done today which showed hemorrhagic transformation of L MCA infarct  Started on Levaquin for PNA  Coags were elevated, would repeat  No AC/Antiplatelets for now, would repeat scan in AM to consider resuming DVT ppx   Continue GOC with family, possible PEG next week pending clinical stability

## 2024-02-28 NOTE — PHARMACOTHERAPY INTERVENTION NOTE - COMMENTS
89-year-old with CHF, HTN, PPM, TAVR, who presented as a code stroke for AMS, R facial droop and R hemiparesis found to have L MCA infarct with hemorrhagic transformation. PEG tube placement 2/27. Transferred to MICU after patient intubated following oxygen saturation decreasing down to 30s-40s few hours after PEG tube placement.     Allergy:  PCN (unknown)    Current Therapy:  Vancomycin 500mg IV q12hr for empiric therapy    MRSA swab: Negative    A/P:  Recommended discontinuing vancomycin since MRSA swab negative.    Additionally, spoke to patient's daughter (Florencia) via phone, who stated that pt's PCN allergy was a rash that developed when pt was a child.    Zahida Armas, PharmD  PGY1 Pharmacy Resident  Available on Teams

## 2024-02-28 NOTE — PROGRESS NOTE ADULT - SUBJECTIVE AND OBJECTIVE BOX
Subjective: Patient seen and examined. No new events except as noted.   events noted , now in ICU   intubated Post PEG   aid at bedside       REVIEW OF SYSTEMS:    Unable to ob  MEDICATIONS:  MEDICATIONS  (STANDING):  atorvastatin 20 milliGRAM(s) Oral at bedtime  chlorhexidine 0.12% Liquid 15 milliLiter(s) Oral Mucosa every 12 hours  chlorhexidine 2% Cloths 1 Application(s) Topical <User Schedule>  enoxaparin Injectable 30 milliGRAM(s) SubCutaneous every 24 hours  meropenem  IVPB 500 milliGRAM(s) IV Intermittent every 12 hours  vancomycin  IVPB 500 milliGRAM(s) IV Intermittent every 12 hours      PHYSICAL EXAM:  T(C): 36.8 (02-28-24 @ 08:00), Max: 36.9 (02-27-24 @ 10:27)  HR: 81 (02-28-24 @ 08:00) (74 - 109)  BP: 114/59 (02-28-24 @ 08:00) (91/39 - 167/72)  RR: 17 (02-28-24 @ 08:00) (11 - 26)  SpO2: 100% (02-28-24 @ 08:00) (98% - 100%)  Wt(kg): --  I&O's Summary    27 Feb 2024 07:01  -  28 Feb 2024 07:00  --------------------------------------------------------  IN: 365 mL / OUT: 550 mL / NET: -185 mL          Appearance: Intubated awake   HEENT:   Normal oral mucosa, PERRL, EOMI	  Lymphatic: No lymphadenopathy , no edema  Cardiovascular: Normal S1 S2, No JVD, No murmurs , Peripheral pulses palpable 2+ bilaterally  Respiratory: ventilated   Gastrointestinal:  Soft, Non-tender, + BS	  Skin: No rashes, No ecchymoses, No cyanosis, warm to touch  Musculoskeletal: Normal range of motion, normal strength  Psychiatry:  awake   Ext: No edema  Mental status - eyes closed, opens to repeated verbal stimuli. Follows intermittent simple commands to squeeze L hand/give thumbs up in L hand. Does not respond to orientation questions.   Cranial nerves - R pupil appears ?sluggishly reactive. No BTT in R eye. Unable to open left eye. ?R facial droop but may be 2/2 positioning of patient's head to R.  Motor - Normal bulk. Increased tone in RUE. Does not maintain antigravity when asked throughout all extremities initially, however does squeeze hand on LUE, withdraws slightly to noxious stimuli in R hand.   Upon re-evaluation able to raise both legs antigravity.  Sensation - Withdraws to noxious stimuli throughout.  DTR's - deferred  Coordination -deferred  Gait and station - deferred      LABS:    CARDIAC MARKERS:    Blood Gas Profile - Arterial (02.28.24 @ 00:13)   pH, Arterial: 7.43  pCO2, Arterial: 30 mmHg  pO2, Arterial: 159 mmHg  HCO3, Arterial: 20 mmol/L  Base Excess, Arterial: -3.7 mmol/L  Oxygen Saturation, Arterial: 99.7 %  Total CO2, Arterial: 21 mmol/L  FIO2, Arterial: 40.0                                9.5    10.23 )-----------( 212      ( 28 Feb 2024 06:32 )             30.6     02-28    145  |  113<H>  |  26<H>  ----------------------------<  125<H>  4.6   |  17<L>  |  1.07    Ca    8.4      28 Feb 2024 06:32  Phos  3.6     02-27  Mg     2.1     02-27    TPro  6.0  /  Alb  3.1<L>  /  TBili  0.6  /  DBili  x   /  AST  25  /  ALT  14  /  AlkPhos  88  02-28        TELEMETRY: 	  SR  ECG:  	  RADIOLOGY: < from: Xray Chest 1 View- PORTABLE-Routine (Xray Chest 1 View- PORTABLE-Routine .) (02.25.24 @ 18:45) >    ACC: [id number] EXAM:  XR CHEST PORTABLE ROUTINE 1V   ORDERED BY: RAFAELA HARRISON     PROCEDURE DATE:  02/25/2024          INTERPRETATION:  EXAMINATION: XR CHEST    CLINICAL INDICATION: Cough    TECHNIQUE: Single frontal, portable view of the chestwas obtained.    COMPARISON: Chest x-ray 2/22/2024    FINDINGS:  Left chest wall pacemaker with lead intact. Enteric tube terminates in   the distal stomach or proximal duodenum.  The heart size is borderline in size. Status post TAVR.  No focal consolidation. Limited evaluation of bilateral apices.  There is no pleural effusion. There is no pneumothorax.  No acute bony abnormality.    IMPRESSION:  No focal consolidation.    --- End of Report ---      < end of copied text >    DIAGNOSTIC TESTING:  [ ] Echocardiogram:  [ ]  Catheterization:  [ ] Stress Test:    OTHER:

## 2024-02-28 NOTE — PROGRESS NOTE ADULT - ASSESSMENT
89-year-old with CHF, HTN, PPM, TAVR, who presented as a code stroke for AMS, R facial droop and R hemiparesis found to have L MCA infarct with hemorrhagic transformation. PEG tube placement 2/27. Transferred to MICU after patient intubated following oxygen saturation decreasing down to 30s-40s few hours after PEG tube placement.       PLAN  =====Neurologic=====  #CVA  #Hemorrhagic transformation  Patient presented with increased AMS (baseline patient with neurocognitive difficulties with ability to recognize familiar faces and often trouble getting words out), possible R sided facial droop and R hemiparesis. Code stroke called, but not candidate for tenecteplase given time course and higher MRS.   Continues to have global aphasia and R hemiparesis  CT scans from 2/14-2/17 with evolving L MCA distribution infarct with hemorrhagic transformation and 8-9mm rightward shift.   - repeat CT head 2/27 with resolving hemorrhagic infarct and decrease in midline shift  - Per neuro:  - SBP goal 110-140.   - atorvastatin 40mg with LDL goal < 70, on hold for now    =====Pulmonary=====  #AHRF  #Aspiration  #Mechanical Ventilation  vent settings: 16/350/5/40, will reassess after repeat ABG  Patient O2 sat at 30s-40s after PEG placement. Possible 2/2 aspiration. Intubated as per GOC. CT chest on 2/26 with pulmonary edema and bilateral pleural effusions.   - repeat ABG  - treatment of possible aspiration PNA/pneumonitis as below  - aggressive suctioning  - diuresis as below    =====Cardiovascular=====  #HFpEF  Patient with recent TTE with moderate (grade 2) diastolic dysfunction.  -Additionally, CT chest 2/26 with pulmonary edema and bilateral pleural effusions, was given lasix 20mg IV x1.   - continue diuresis with bumex 1mg     #Valvular dysfunction  Severe mitral stenosis seen on TTE 2/14.  s/p TAVR, no aortic regurgitation    #HTN  Patient currently normotensive.     #PPM  Patient with pacemaker, interrogated by cardiology per notes  - VVI mode    =====GI=====  #PEG placement  Patient with PEG placed on 2/27 after GOC discussions with family.   - per GI cannot use for 24 hours after placement  - for now give low rate D5 1/2 NS    =====Renal/=====  #Electrolytes  - Maintain K>4, Phos>3, Mag>2, iCal>1    =====Endocrine=====  #NPO  D5 1/2 NS for now given cannot use PEG tube for 24 hours  - fingersticks q6h    =====Infectious Disease=====  #?Aspiration pneumonitis vs pneumonia  Patient's desaturations likely 2/2 aspiration. Now with leukocytosis to 11.   - Bcx2  - sputum culture  - MRSA swab  - empiric darian given possible zosyn allergy as listed penicillin allergy    =====Heme/Onc=====  #Anemia  Hgb 6.7, repeat 6.8 this AM. s/p 1 u pRBC 2/27 AM , with repeat Hgb 9.8 (likely concentrated).  No overt source of bleeding. MCV normocytic.   Baseline Hgb in 8s.   - CTM Hgb  - transfuse if Hgb <7    #DVT Ppx  - Lovenox 30mg SQ qd given resolving hemorrhagic transformation on head CT    =====Ethics=====  FULL CODE, palliative consulted previously, saw patient with discussion involving that patient to be full code. 89-year-old with CHF, HTN, PPM, TAVR, who presented as a code stroke for AMS, R facial droop and R hemiparesis found to have L MCA infarct with hemorrhagic transformation. PEG tube placement 2/27. Transferred to MICU after patient intubated following oxygen saturation decreasing down to 30s-40s few hours after PEG tube placement.       PLAN  =====Neurologic=====  #CVA  #Hemorrhagic transformation  Patient presented with increased AMS (baseline patient with neurocognitive difficulties with ability to recognize familiar faces and often trouble getting words out), possible R sided facial droop and R hemiparesis. Code stroke called, but not candidate for tenecteplase given time course and higher MRS.   Continues to have global aphasia and R hemiparesis  CT scans from 2/14-2/17 with evolving L MCA distribution infarct with hemorrhagic transformation and 8-9mm rightward shift.   - repeat CT head 2/27 with resolving hemorrhagic infarct and decrease in midline shift  - Per neuro:  - SBP goal 110-140.   - atorvastatin 40mg with LDL goal < 70, on hold for now    =====Pulmonary=====  #AHRF  #Aspiration  #Mechanical Ventilation  vent settings: 16/350/5/40, will reassess after repeat ABG  Patient O2 sat at 30s-40s after PEG placement. Possible 2/2 aspiration. Intubated as per GOC. CT chest on 2/26 with pulmonary edema and bilateral pleural effusions.   - repeat ABG  - treatment of possible aspiration PNA/pneumonitis as below  - diuresis as below    =====Cardiovascular=====  #HFpEF  Patient with recent TTE with moderate (grade 2) diastolic dysfunction.  -Additionally, CT chest 2/26 with pulmonary edema and bilateral pleural effusions, was given lasix 20mg IV x1.   - continue diuresis with bumex 1mg     #Valvular dysfunction  Severe mitral stenosis seen on TTE 2/14.  s/p TAVR, no aortic regurgitation    #HTN  Patient currently normotensive.     #PPM  Patient with pacemaker, interrogated by cardiology per notes  - VVI mode    =====GI=====  #PEG placement  Patient with PEG placed on 2/27 after GOC discussions with family.   - f/u IR regarding PEG tube usage 2/28    =====Renal/=====  #Electrolytes  - Maintain K>4, Phos>3, Mag>2, iCal>1    =====Endocrine=====  #NPO  - fingersticks q6h while NPO    =====Infectious Disease=====  #?Aspiration pneumonitis vs pneumonia  Patient's desaturations likely 2/2 aspiration. Now with leukocytosis to 11.   - Bcx2  - sputum culture  - empiric darian 3d course through 2/29 given possible zosyn allergy as listed penicillin allergy- per daughter, unknown reaction from childhood so will discuss with pharmacy having it removed as true allergy      =====Heme/Onc=====  #Anemia  Hgb 6.7, repeat 6.8 this AM. s/p 1 u pRBC 2/27 AM , with repeat Hgb 9.8 (likely concentrated).  No overt source of bleeding. MCV normocytic.   Baseline Hgb in 8s.   - CTM Hgb  - transfuse if Hgb <7    #DVT Ppx  - Lovenox 30mg SQ qd given resolving hemorrhagic transformation on head CT    =====Ethics=====  FULL CODE, palliative consulted previously, saw patient with discussion involving that patient to be full code.

## 2024-02-28 NOTE — ADVANCED PRACTICE NURSE CONSULT - RECOMMEDATIONS
Impression  fecal incontinence  yojana rectal incontinence dermatitis    bilateral sacrum/buttock deep tissue injury.    Recommendations   1. Bilateral  , sacral / buttocks  deep tissue injury     Yojana rectal incontinence associated dermatitis     Topical therapy- sacral/bilateral buttocks- cleanse w/incontinent cleanser, pat dry & apply laz cream  twice daily & prn soiling . Monitor for changes .  2. Right and left heel    Elevate heels; apply Complete Cair air fluidized boots; ensure that the soles of the feet are not resting on the foot board of the bed.  3  Incontinent management - incontinent cleanser, pads,  yojana care  BID  4. Maintain on an alternating air with low air loss surface   5. Turn & reposition every 2 hr; Use positioning pillow to turn and reposition, soft pillow between bony prominences; continue measures to decrease friction/shear/pressure.  6. Nutrition optimization.  7. Place  waffle cushion when out of bed to chair .   plan of care reviewed with covering staff Soila Mcdonald (SUSAN)

## 2024-02-28 NOTE — ADVANCED PRACTICE NURSE CONSULT - ASSESSMENT
arrived on unit, patient was found lying in a low air loss pressure redistribution support surface style bed.  patient  is unable to turn independently and staff assistance x 1was provided. Once turned,   incontinence of urine and stool   was apparent and pericare was provided. primafit to divert urine present. gastromy tube present.   Yojana rectal area with erythema and indurated consistent with incontinence dermatitis.  bilateral sacrum/ buttock there is  non- blanchable deep red and purple discoloration and hyperpigmentation  noted to measure approximately 10 cm x 16 cm x 0cm.  This  is consistent with deep tissue injury.     patient daughter was educated  on the importance  for her mother  turning  and positioning  every 2 hours. The use of waffle cushion when out of bed  to chair  and to shift her  Weight every 2 hours while in chair. The importance a keeping skin clean and dry and  to offload feet/heels , and optimal nutrition.

## 2024-02-28 NOTE — ADVANCED PRACTICE NURSE CONSULT - REASON FOR CONSULT
Consult to assess patient skin initiated by RN for sacrum deep tissue injury.      History of Present Illness:  History of Present Illness:   89-year-old left-handed woman with past medical history of CHF, HTN, pacemaker placement, valve replacement presenting as a code stroke for AMS, ?R-facial droop, R sided weakness.  Was found by home aide at 830 2/14 less responsive, not moving extremities as usually does. No prior known hx strokes per family.  At baseline patient alert, able to recognize/comprehend familiar faces, per daughters at bedside patient sometimes has difficulty with getting words out.    SH: No smoking ETOH use recreational drug use.  LKW: 2130 2/13/24  NIHSS 18  MRS 4 (aide at bedside reports patient requires assistance with all ADLs, including getting out of bed, showering, changing clothes, walking, and since breaking L arm last year, requires also assistance with feeding)  BP per /70, glucose per .     Information obtained from aide at bedside, then after CT scanner family also at bedside.

## 2024-02-28 NOTE — PROGRESS NOTE ADULT - PROBLEM SELECTOR PLAN 6
CT chest 2/16 with mucous plugging LLL, congestion   -Procalcitonin not significantly elevated   -No leukocytosis, afebrile  -Suggest monitor off ABX unless increase in WBC or fevers   -Keep sats >90%   -Duoneb q6h  -Suction PRN.

## 2024-02-28 NOTE — PROGRESS NOTE ADULT - SUBJECTIVE AND OBJECTIVE BOX
DATE OF SERVICE: 02-28-24 @ 07:27  --------------------------------------------------------------  Nathaniel Ochoa MD  PGY-2, Internal Medicine  [employer] Teams preferred  Pager #: [phone], [facility] [phone]  After 7 PM: Night Float Pager  --------------------------------------------------------------      Patient is a 89y old  Female who presents with a chief complaint of CVA (23 Feb 2024 17:30)      SUBJECTIVE / OVERNIGHT EVENTS:  No acute events overnight, patient reports feeling well and all questions answered at this time.     Additional Review of Systems:  Denies any other acute sx including f/c, HA, cough, sore throat, eye/ear changes, rhinorrhea, CP, palpitations, SOB, n/v, abdominal pain, back pain, bowel/bladder changes, fatigue, numbness or tingling.    MEDICATIONS  (STANDING):  atorvastatin 20 milliGRAM(s) Oral at bedtime  chlorhexidine 0.12% Liquid 15 milliLiter(s) Oral Mucosa every 12 hours  chlorhexidine 2% Cloths 1 Application(s) Topical <User Schedule>  enoxaparin Injectable 30 milliGRAM(s) SubCutaneous every 24 hours  meropenem  IVPB 500 milliGRAM(s) IV Intermittent every 12 hours  vancomycin  IVPB 500 milliGRAM(s) IV Intermittent every 12 hours    MEDICATIONS  (PRN):  albuterol/ipratropium for Nebulization 3 milliLiter(s) Nebulizer every 6 hours PRN Wheezing  fentaNYL    Injectable 25 MICROGram(s) IV Push every 4 hours PRN Moderate Pain (4 - 6)      Vital Signs Last 24 Hrs  T(C): 36.6 (28 Feb 2024 04:00), Max: 37.2 (27 Feb 2024 08:00)  T(F): 97.9 (28 Feb 2024 04:00), Max: 98.9 (27 Feb 2024 08:00)  HR: 80 (28 Feb 2024 06:00) (74 - 109)  BP: 137/56 (28 Feb 2024 06:00) (91/39 - 167/72)  BP(mean): 80 (28 Feb 2024 06:00) (54 - 110)  RR: 16 (28 Feb 2024 06:00) (11 - 26)  SpO2: 100% (28 Feb 2024 06:00) (96% - 100%)    Parameters below as of 27 Feb 2024 20:00  Patient On (Oxygen Delivery Method): ventilator      CAPILLARY BLOOD GLUCOSE      POCT Blood Glucose.: 112 mg/dL (28 Feb 2024 04:51)  POCT Blood Glucose.: 131 mg/dL (27 Feb 2024 22:32)  POCT Blood Glucose.: 162 mg/dL (27 Feb 2024 17:23)    I&O's Summary    27 Feb 2024 07:01  -  28 Feb 2024 07:00  --------------------------------------------------------  IN: 365 mL / OUT: 550 mL / NET: -185 mL        PHYSICAL EXAM:  GENERAL: Clinically well-appearing and comfortable  HEAD:  Atraumatic, Normocephalic  EYES: EOMI, PERRL, conjunctiva and sclera clear  NECK: Supple, No JVD  CHEST/LUNG: Clear to auscultation bilaterally; No wheeze  HEART: Regular rate and rhythm; No murmurs, rubs, or gallops  ABDOMEN: Soft, Nontender, Nondistended; Bowel sounds present  EXTREMITIES:  2+ Peripheral Pulses, No clubbing, cyanosis, or edema  PSYCH: Normal mood, Normal affect  NEUROLOGY: non-focal, moving all four extremities  SKIN: No rashes or lesions    LABS:                        9.5    10.23 )-----------( 212      ( 28 Feb 2024 06:32 )             30.6     02-28    145  |  113<H>  |  26<H>  ----------------------------<  125<H>  4.6   |  17<L>  |  1.07    Ca    8.4      28 Feb 2024 06:32  Phos  3.6     02-27  Mg     2.1     02-27    TPro  6.0  /  Alb  3.1<L>  /  TBili  0.6  /  DBili  x   /  AST  25  /  ALT  14  /  AlkPhos  88  02-28    PT/INR - ( 28 Feb 2024 02:16 )   PT: 12.1 sec;   INR: 1.10 ratio         PTT - ( 28 Feb 2024 02:16 )  PTT:27.5 sec      Urinalysis Basic - ( 28 Feb 2024 06:32 )    Color: x / Appearance: x / SG: x / pH: x  Gluc: 125 mg/dL / Ketone: x  / Bili: x / Urobili: x   Blood: x / Protein: x / Nitrite: x   Leuk Esterase: x / RBC: x / WBC x   Sq Epi: x / Non Sq Epi: x / Bacteria: x        RADIOLOGY & ADDITIONAL TESTS:    Imaging Personally Reviewed:    Consultant(s) Notes Reviewed:      Care Discussed with Consultants/Other Providers:   DATE OF SERVICE: 02-28-24 @ 07:27  --------------------------------------------------------------  Nathaniel Ochoa MD  PGY-2, Internal Medicine  [employer] Teams preferred  Pager #: [phone], [facility] [phone]  After 7 PM: Night Float Pager  --------------------------------------------------------------      Patient is a 89y old  Female who presents with a chief complaint of CVA (23 Feb 2024 17:30)      SUBJECTIVE / OVERNIGHT EVENTS:  No acute events overnight, limited subjective 2/2 AMS     Additional Review of Systems:  Unable to assess: AMS    MEDICATIONS  (STANDING):  atorvastatin 20 milliGRAM(s) Oral at bedtime  chlorhexidine 0.12% Liquid 15 milliLiter(s) Oral Mucosa every 12 hours  chlorhexidine 2% Cloths 1 Application(s) Topical <User Schedule>  enoxaparin Injectable 30 milliGRAM(s) SubCutaneous every 24 hours  meropenem  IVPB 500 milliGRAM(s) IV Intermittent every 12 hours  vancomycin  IVPB 500 milliGRAM(s) IV Intermittent every 12 hours    MEDICATIONS  (PRN):  albuterol/ipratropium for Nebulization 3 milliLiter(s) Nebulizer every 6 hours PRN Wheezing  fentaNYL    Injectable 25 MICROGram(s) IV Push every 4 hours PRN Moderate Pain (4 - 6)      Vital Signs Last 24 Hrs  T(C): 36.6 (28 Feb 2024 04:00), Max: 37.2 (27 Feb 2024 08:00)  T(F): 97.9 (28 Feb 2024 04:00), Max: 98.9 (27 Feb 2024 08:00)  HR: 80 (28 Feb 2024 06:00) (74 - 109)  BP: 137/56 (28 Feb 2024 06:00) (91/39 - 167/72)  BP(mean): 80 (28 Feb 2024 06:00) (54 - 110)  RR: 16 (28 Feb 2024 06:00) (11 - 26)  SpO2: 100% (28 Feb 2024 06:00) (96% - 100%)    Parameters below as of 27 Feb 2024 20:00  Patient On (Oxygen Delivery Method): ventilator      CAPILLARY BLOOD GLUCOSE      POCT Blood Glucose.: 112 mg/dL (28 Feb 2024 04:51)  POCT Blood Glucose.: 131 mg/dL (27 Feb 2024 22:32)  POCT Blood Glucose.: 162 mg/dL (27 Feb 2024 17:23)    I&O's Summary    27 Feb 2024 07:01  -  28 Feb 2024 07:00  --------------------------------------------------------  IN: 365 mL / OUT: 550 mL / NET: -185 mL        PHYSICAL EXAM:  GENERAL: Frail elderly female, comfortable  HEAD:  Atraumatic, Normocephalic, ETT in place  EYES: EOMI, PERRL, conjunctiva and sclera clear  NECK: Supple, No JVD  CHEST/LUNG: Clear to auscultation bilaterally; No wheeze  HEART: Regular rate and rhythm; No murmurs, rubs, or gallops  ABDOMEN: Soft, Nontender, Nondistended; Bowel sounds present, PEG site clean and dry  EXTREMITIES:  2+ Peripheral Pulses, No clubbing, cyanosis, or edema  NEUROLOGY: opens eyes to voice, tracking, minimally following commands  SKIN: ecchymoses over BUE and abdomen    LABS:                        9.5    10.23 )-----------( 212      ( 28 Feb 2024 06:32 )             30.6     02-28    145  |  113<H>  |  26<H>  ----------------------------<  125<H>  4.6   |  17<L>  |  1.07    Ca    8.4      28 Feb 2024 06:32  Phos  3.6     02-27  Mg     2.1     02-27    TPro  6.0  /  Alb  3.1<L>  /  TBili  0.6  /  DBili  x   /  AST  25  /  ALT  14  /  AlkPhos  88  02-28    PT/INR - ( 28 Feb 2024 02:16 )   PT: 12.1 sec;   INR: 1.10 ratio         PTT - ( 28 Feb 2024 02:16 )  PTT:27.5 sec      Urinalysis Basic - ( 28 Feb 2024 06:32 )    Color: x / Appearance: x / SG: x / pH: x  Gluc: 125 mg/dL / Ketone: x  / Bili: x / Urobili: x   Blood: x / Protein: x / Nitrite: x   Leuk Esterase: x / RBC: x / WBC x   Sq Epi: x / Non Sq Epi: x / Bacteria: x        RADIOLOGY & ADDITIONAL TESTS:    Imaging Personally Reviewed:    Consultant(s) Notes Reviewed:      Care Discussed with Consultants/Other Providers:

## 2024-02-28 NOTE — PROGRESS NOTE ADULT - SUBJECTIVE AND OBJECTIVE BOX
Follow-up Pulm Progress Note    events noted - s/p RRT yesterday afternoon for hypoxia, s/p intubation   now in MICU  pt lethargic  on PS 5/5, MV ~6.3      Medications:  MEDICATIONS  (STANDING):  atorvastatin 20 milliGRAM(s) Oral at bedtime  chlorhexidine 0.12% Liquid 15 milliLiter(s) Oral Mucosa every 12 hours  chlorhexidine 2% Cloths 1 Application(s) Topical <User Schedule>  enoxaparin Injectable 30 milliGRAM(s) SubCutaneous every 24 hours  meropenem  IVPB 500 milliGRAM(s) IV Intermittent every 12 hours  vancomycin  IVPB 500 milliGRAM(s) IV Intermittent every 12 hours    MEDICATIONS  (PRN):  albuterol/ipratropium for Nebulization 3 milliLiter(s) Nebulizer every 6 hours PRN Wheezing  fentaNYL    Injectable 25 MICROGram(s) IV Push every 4 hours PRN Moderate Pain (4 - 6)      Mode: CPAP with PS  FiO2: 30  PEEP: 5  PS: 5  MAP: 7  PIP: 11      Vital Signs Last 24 Hrs  T(C): 36.8 (28 Feb 2024 08:00), Max: 36.8 (27 Feb 2024 14:24)  T(F): 98.2 (28 Feb 2024 08:00), Max: 98.2 (28 Feb 2024 08:00)  HR: 90 (28 Feb 2024 11:55) (74 - 109)  BP: 121/55 (28 Feb 2024 11:00) (91/39 - 167/72)  BP(mean): 79 (28 Feb 2024 11:00) (54 - 110)  RR: 21 (28 Feb 2024 11:00) (11 - 26)  SpO2: 100% (28 Feb 2024 11:55) (98% - 100%)    Parameters below as of 28 Feb 2024 08:00  Patient On (Oxygen Delivery Method): ventilator    O2 Concentration (%): 40    ABG - ( 28 Feb 2024 00:13 )  pH, Arterial: 7.43  pH, Blood: x     /  pCO2: 30    /  pO2: 159   / HCO3: 20    / Base Excess: -3.7  /  SaO2: 99.7                  02-27 @ 07:01  -  02-28 @ 07:00  --------------------------------------------------------  IN: 365 mL / OUT: 550 mL / NET: -185 mL          LABS:                        9.5    10.23 )-----------( 212      ( 28 Feb 2024 06:32 )             30.6     02-28    145  |  113<H>  |  26<H>  ----------------------------<  125<H>  4.6   |  17<L>  |  1.07    Ca    8.4      28 Feb 2024 06:32  Phos  3.6     02-27  Mg     2.1     02-27    TPro  6.0  /  Alb  3.1<L>  /  TBili  0.6  /  DBili  x   /  AST  25  /  ALT  14  /  AlkPhos  88  02-28          CAPILLARY BLOOD GLUCOSE      POCT Blood Glucose.: 124 mg/dL (28 Feb 2024 08:52)    PT/INR - ( 28 Feb 2024 02:16 )   PT: 12.1 sec;   INR: 1.10 ratio         PTT - ( 28 Feb 2024 02:16 )  PTT:27.5 sec  Urinalysis Basic - ( 28 Feb 2024 06:32 )    Color: x / Appearance: x / SG: x / pH: x  Gluc: 125 mg/dL / Ketone: x  / Bili: x / Urobili: x   Blood: x / Protein: x / Nitrite: x   Leuk Esterase: x / RBC: x / WBC x   Sq Epi: x / Non Sq Epi: x / Bacteria: x            Physical Examination:  PULM: decreased BS at bases   CVS: S1, S2 heard    RADIOLOGY REVIEWED  CXR: RUL obscured by pts head  grossly clear     CT chest:    < from: CT Chest No Cont (02.26.24 @ 08:49) >  FINDINGS:    LUNGS, PLEURA, AND AIRWAYS: No endobronchial lesion. Small bilateral   pleural effusions with partial compressive atelectasis of the lower   lobes, similar to 2/16/2024. There has been some interval improvement of   bilateral upper lobe patchy opacities. Interlobular septal thickening.  MEDIASTINUM AND BERRY: No lymphadenopathy.  VESSELS: Calcification of the aorta and its branches.  HEART: Heart size is normal. Small pericardial effusion, similar to   2/16/2024. Status post TAVR. Mitral annular and coronary artery   calcifications. Left chest wall cardiac device.  CHEST WALL AND LOWER NECK: Enlarged multinodular thyroid gland with a   nodule measuring 2.5 cm on the left.  VISUALIZED UPPER ABDOMEN: Cholecystectomy. Hepatic cyst. Enteric tube   terminates in the distal stomach or proximal duodenum.  BONES: Degenerative changes.    IMPRESSION:    Likely pulmonary edema with small bilateral pleural effusions.    Patchy opacities in the bilateral upper lobes appear slightly improved   compared to 2/16/2024 CT chest.    --- End of Report ---      < end of copied text >

## 2024-02-28 NOTE — AIRWAY REMOVAL NOTE  ADULT & PEDS - ARTIFICAL AIRWAY REMOVAL COMMENTS
Written order for extubation verified. The patient was identified by full name and birth date compared to the identification band. Present during the procedure was  Soila Mcdonald RN.

## 2024-02-28 NOTE — PROGRESS NOTE ADULT - SUBJECTIVE AND OBJECTIVE BOX
-------------------------------------------------------  Interventional Radiology Follow-Up Note  -------------------------------------------------------    Procedure: Gastrostomy placement     Vital Signs:    T(C): 37.2 (02-28-24 @ 12:00), Max: 37.2 (02-28-24 @ 12:00)  HR: 93 (02-28-24 @ 13:58) (74 - 109)  BP: 133/63 (02-28-24 @ 12:00) (91/39 - 167/72)  RR: 24 (02-28-24 @ 13:58) (11 - 26)  SpO2: 100% (02-28-24 @ 13:58) (98% - 100%)    Labs:           9.5  10.23)-----(212     (02-28-24 @ 06:32)         30.6     145 | 113 | 26  --------------------< 125     (02-28-24 @ 06:32)  4.6 | 17 | 1.07       PT: 12.1 02-28-24 @ 02:16  aPTT: 27.5 02-28-24 @ 02:16   INR: 1.10 02-28-24 @ 02:16      Physical Exam:     General: No acute distress. Intubated.    Cardiovascular: No tachycardia  Respiratory: Breathing comfortably.   Abdomen: Non-distended. No tenderness to palpation. Gastrostomy in place c/d/i, flushing without issue.   Extremities: No edema.     Imaging:  - relevant imaging reviewed     Impression:  89-year-old with CHF, HTN, PPM, TAVR, who presented as a code stroke for AMS, R facial droop and R hemiparesis found to have L MCA infarct with hemorrhagic transformation. S/p gastrostomy placement with IR 2/27. Interval intubation 2/2 labored breathing now in MICU.     - Gastrostomy assessed, flushing without issue, no peritoneal signs  - Okay to use gastrostomy     --  Benny Easley MD, PGY-3  Vascular and Interventional Radiology   Available on [employer] Teams    - Non-emergent consults: Place IR consult order in [place]  - Emergent issues (pager): [facility] [phone]; [facility] [phone]; [phone]  - Scheduling questions: [facility] [phone]; [facility] [phone]  - Clinic/outpatient booking: [facility] [phone]; [facility] [phone]    -------------------------------------------------------  Interventional Radiology Follow-Up Note  -------------------------------------------------------    Procedure: Gastrostomy placement     Vital Signs:    T(C): 37.2 (02-28-24 @ 12:00), Max: 37.2 (02-28-24 @ 12:00)  HR: 93 (02-28-24 @ 13:58) (74 - 109)  BP: 133/63 (02-28-24 @ 12:00) (91/39 - 167/72)  RR: 24 (02-28-24 @ 13:58) (11 - 26)  SpO2: 100% (02-28-24 @ 13:58) (98% - 100%)    Labs:           9.5  10.23)-----(212     (02-28-24 @ 06:32)         30.6     145 | 113 | 26  --------------------< 125     (02-28-24 @ 06:32)  4.6 | 17 | 1.07       PT: 12.1 02-28-24 @ 02:16  aPTT: 27.5 02-28-24 @ 02:16   INR: 1.10 02-28-24 @ 02:16      Physical Exam:     General: No acute distress. Intubated.    Cardiovascular: No tachycardia  Respiratory: Breathing comfortably.   Abdomen: Non-distended. No tenderness to palpation. Gastrostomy in place c/d/i, flushing without issue.   Extremities: No edema.     Imaging:  - relevant imaging reviewed     Impression:  89-year-old with CHF, HTN, PPM, TAVR, who presented as a code stroke for AMS, R facial droop and R hemiparesis found to have L MCA infarct with hemorrhagic transformation. S/p gastrostomy placement with IR 2/27. Interval intubation 2/2 labored breathing now in MICU.     - Gastrostomy assessed, flushing without issue, no peritoneal signs      --  Benny Easley MD, PGY-3  Vascular and Interventional Radiology   Available on [employer] Teams    - Non-emergent consults: Place IR consult order in [place]  - Emergent issues (pager): [facility] [phone]; [facility] [phone]; [phone]  - Scheduling questions: [facility] [phone]; [facility] [phone]  - Clinic/outpatient booking: [facility] [phone]; [facility] [phone]    -------------------------------------------------------  Interventional Radiology Follow-Up Note  -------------------------------------------------------    Procedure: Gastrostomy placement     Vital Signs:    T(C): 37.2 (02-28-24 @ 12:00), Max: 37.2 (02-28-24 @ 12:00)  HR: 93 (02-28-24 @ 13:58) (74 - 109)  BP: 133/63 (02-28-24 @ 12:00) (91/39 - 167/72)  RR: 24 (02-28-24 @ 13:58) (11 - 26)  SpO2: 100% (02-28-24 @ 13:58) (98% - 100%)    Labs:           9.5  10.23)-----(212     (02-28-24 @ 06:32)         30.6     145 | 113 | 26  --------------------< 125     (02-28-24 @ 06:32)  4.6 | 17 | 1.07       PT: 12.1 02-28-24 @ 02:16  aPTT: 27.5 02-28-24 @ 02:16   INR: 1.10 02-28-24 @ 02:16      Physical Exam:     General: No acute distress. Intubated.    Cardiovascular: No tachycardia  Respiratory: Breathing comfortably.   Abdomen: Non-distended. No tenderness to palpation. Gastrostomy in place c/d/i, flushing without issue.   Extremities: No edema.     Imaging:  - relevant imaging reviewed     Impression:  89-year-old with CHF, HTN, PPM, TAVR, who presented as a code stroke for AMS, R facial droop and R hemiparesis found to have L MCA infarct with hemorrhagic transformation. S/p gastrostomy placement with IR 2/27. Interval intubation 2/2 labored breathing now in MICU.     - Gastrostomy assessed, flushing without issue, no peritoneal signs  - Okay to use gastrostomy     --  Benny Easley MD, PGY-3  Vascular and Interventional Radiology   Available on [employer] Teams    - Non-emergent consults: Place IR consult order in [place]  - Emergent issues (pager): [facility] [phone]; [facility] [phone]; [phone]  - Scheduling questions: [facility] [phone]; [facility] [phone]  - Clinic/outpatient booking: [facility] [phone]; [facility] [phone]

## 2024-02-28 NOTE — CHART NOTE - NSCHARTNOTEFT_GEN_A_CORE
: Precious Aponte    INDICATION: critical illness    PROCEDURE:  [x] LIMITED ECHO  [x] LIMITED CHEST  [ ] LIMITED RETROPERITONEAL  [ ] LIMITED ABDOMINAL  [ ] LIMITED DVT  [ ] NEEDLE GUIDANCE VASCULAR  [ ] NEEDLE GUIDANCE THORACENTESIS  [ ] NEEDLE GUIDANCE PARACENTESIS  [ ] NEEDLE GUIDANCE PERICARDIOCENTESIS  [ ] OTHER    FINDINGS:  B line predominance in all long fields with smooth pleura  Small R base consolidation with trace-small simple pleural effusion  Poor cardiac views      INTERPRETATION:  B line predominance with smooth pleura suggestive of pulmonary edema, will diurese       Images uploaded on [employer] : James Schulte    INDICATION: Respiratory failure    PROCEDURE:  [x] LIMITED ECHO  [x] LIMITED CHEST  [ ] LIMITED RETROPERITONEAL  [ ] LIMITED ABDOMINAL  [ ] LIMITED DVT  [ ] NEEDLE GUIDANCE VASCULAR  [ ] NEEDLE GUIDANCE THORACENTESIS  [ ] NEEDLE GUIDANCE PARACENTESIS  [ ] NEEDLE GUIDANCE PERICARDIOCENTESIS  [ ] OTHER    FINDINGS:  B line predominance in all long fields with smooth pleura  Small R base consolidation with trace-small simple pleural effusion  Poor cardiac views      INTERPRETATION:  B line predominance with smooth pleura suggestive of pulmonary edema, will diurese       Images uploaded on [employer]    I have assisted and supervised entire procedure.   James Hermosillo MD

## 2024-02-28 NOTE — PROGRESS NOTE ADULT - SUBJECTIVE AND OBJECTIVE BOX
THE PATIENT WAS SEEN AND EXAMINED BY ME WITH THE HOUSESTAFF AND STROKE TEAM DURING MORNING ROUNDS.   HPI:89-year-old left-handed woman with past medical history of CHF, HTN, pacemaker placement, valve replacement presenting as a code stroke for AMS, ?R-facial droop, R sided weakness.  Was found by home aide at 830 2/14 less responsive, not moving extremities as usually does. No prior known hx strokes per family.  At baseline patient alert, able to recognize/comprehend familiar faces, per daughters at bedside patient sometimes has difficulty with getting words out.  SH: No smoking ETOH use recreational drug use.  LKW: 2130 2/13/24  NIHSS 18  MRS 4 (aide at bedside reports patient requires assistance with all ADLs, including getting out of bed, showering, changing clothes, walking, and since breaking L arm last year, requires also assistance with feeding)  BP per /70, glucose per .     Information obtained from aide at bedside, then after CT scanner family also at bedside. (14 Feb 2024 12:33)    SUBJECTIVE: Pt transferred to MICU 2/27, intubated.    acetylcysteine 20%  Inhalation 4 milliLiter(s) Inhalation two times a day  albuterol/ipratropium for Nebulization 3 milliLiter(s) Nebulizer every 6 hours  atorvastatin 20 milliGRAM(s) Oral at bedtime  clindamycin IVPB 600 milliGRAM(s) IV Intermittent once      PHYSICAL EXAM:   Vital Signs Last 24 Hrs  T(C): 36.9 (27 Feb 2024 10:27), Max: 37.3 (27 Feb 2024 06:36)  T(F): 98.5 (27 Feb 2024 10:27), Max: 99.1 (27 Feb 2024 06:36)  HR: 91 (27 Feb 2024 10:27) (89 - 95)  BP: 97/64 (27 Feb 2024 10:27) (97/64 - 136/75)  RR: 18 (27 Feb 2024 10:27) (18 - 18)  SpO2: 98% (27 Feb 2024 10:27) (96% - 98%)    Parameters below as of 27 Feb 2024 10:27  Patient On (Oxygen Delivery Method): room air      General: No acute distress  Abdomen: Soft, nondistended   Extremities: No edema    NEUROLOGICAL EXAM:  Mental status: Opens open briefly to voice, tracks examiner. Does not respond to questions, groans intermittently. intermittently follows some simple commands.  Cranial Nerves: No facial asymmetry. Decreased blink to threat on Right, though difficult to clearly assess given patient intermittently blinking spontaneously/resisting eye opening.   Motor exam: Normal tone, slight effort spontaneous against gravity throughout all extremities more notable on L, trace movement on R. Arthritic changes throughout extremities.  Sensation: Grimaces to painful stimuli x 4, less so on the right  Coordination/ Gait: Deferred    LABS:             LABS:                        9.5    10.23 )-----------( 212      ( 28 Feb 2024 06:32 )             30.6     02-28    145  |  113<H>  |  26<H>  ----------------------------<  125<H>  4.6   |  17<L>  |  1.07    Ca    8.4      28 Feb 2024 06:32  Phos  3.6     02-27  Mg     2.1     02-27    TPro  6.0  /  Alb  3.1<L>  /  TBili  0.6  /  DBili  x   /  AST  25  /  ALT  14  /  AlkPhos  88  02-28    PT/INR - ( 28 Feb 2024 02:16 )   PT: 12.1 sec;   INR: 1.10 ratio         PTT - ( 28 Feb 2024 02:16 )  PTT:27.5 sec    IMAGING: Reviewed by me.   2/27/24 CT head Redemonstration of recent large left MCA territory infarct. Resolving hemorrhagic transformation. Decreased mass effect upon the left lateral ventricle. Slightly decreased rightward midline shift. Basal cisterns well visualized.    2/17/24 CT Head: Acute left MCA territory infarct, as on the prior, with associated hemorrhagic transformation centered in the ipsilateral basal ganglia, not significantly changed. Associated 8-9 mm rightward midline shift is not   significantly changed. Slightly more pronounced gyriform hyperdensity may reflect spared cortex versus cortical petechial hemorrhage.    2/16/24 CT Head:  Redemonstration of an evolving left-sided MCA distribution acute/subacute infarct with a new small amount of hemorrhagic transformation within the infarct bed in comparison with 2/14/2024. Surrounding mass effect and shift of the midline structures from left to right appears unchanged when compared to the most recent prior CT exam.      CT brain 2/14/24: Acute left frontoparietalinfarct. No CT evidence for selene hemorrhagic transformation.    CT brain perfusion 2/14/24: Significantly limited by motion. Cerebral blood flow less than 30% = 0 mL. No core infarct predicted. Tmax greater than 6 seconds = 46 mL and involves the bilateral mesial cerebellar hemispheres, the right temporal lobe, bilateral frontal lobes, and left posterior temporal. This represents brain parenchyma predicted to be at continued ischemic risk in the presence of neurologic symptoms. This finding is likely spurious in nature.  Mismatch volume 46 mL, Mismatch ratio infinite    CTA brain 2/14/24: Intraluminal filling defect involving the left ICA terminus and extending into the proximal right A1 segment. Normal flow-related enhancement of the remaining left LEBRON. Normal flow-related enhancement of the left MCA.  Right MCA enhances to a lesser degree than the left MCA. No vascular aneurysm or AVM.    CTA neck: Approximately 50% short segment stenosis of the origin and proximal segment of the left ICA due to calcified plaque. Normal flow-related enhancement of the more distal vessel. Approximately 75-80% short segment stenosis of the proximal segment of the right internal carotid artery due to partially calcified plaque. Normal flow-related enhancement of the more distal vessel.

## 2024-02-28 NOTE — CHART NOTE - NSCHARTNOTEFT_GEN_A_CORE
MICU Transfer Note    Transfer from: MICU    Transfer to: ( X  ) Medicine    (  ) Telemetry     (   ) RCU        (    ) Palliative         (   ) Stroke Unit          (   ) __________________    Accepting Physician:  Signout given to:       HPI:   89 yo F with PMH of CHF, HTN, s/p TAVR and PPM presenting 2/14 with AMS, ?R facial droop and R sided weakness, found by home health aide to be less responsive. As per documentation, at baseline pt alert and able to recognize familiar faces, intermittent aphasia, requiring assistance with all ADLs. On imaging pt found with large left MCA infarct with hemorrhagic conversion and shift; pt not a candidate for TPA as per Neuro. Since her CVA pt with limited mental status, open her eyes intermittently and groaning, following some simple commands. Pt underwent PEG tube placement on 2/27. After procedure pt with acute hypoxia and respiratory distressed, intubated and transferred to MICU for further care.     MICU COURSE:  In MICU pt started on empiric antibiotics for likely aspiration pneumonia. Her repeat CT after intubation showing resolving hemorrhagic infarct and decrease in midline shift. Pt was otherwise found with new partially occluded L subclavian DVT, continued on HSQ considering recent hemorrhagic stroke. She improved from a respiratory standpoint and was extubated on 2/28 weaned to 28% aerosol mask. She remains hemodynamically stable without vasopressors.     ASSESSMENT & PLAN:   89-year-old with CHF, HTN, PPM, TAVR, who presented as a code stroke for AMS, R facial droop and R hemiparesis found to have L MCA infarct with hemorrhagic transformation. s/p PEG placement 2/27 with course c/b with acute hypoxic respiratory failure requiring intubation and transfer to MICU for further care.    PLAN  =====Neurologic=====  #CVA  #Hemorrhagic transformation  Patient presented with increased AMS (baseline patient with neurocognitive difficulties with ability to recognize familiar faces and often trouble getting words out), possible R sided facial droop and R hemiparesis. Code stroke called, but not candidate for tenecteplase given time course and higher MRS.   Continues to have global aphasia and R hemiparesis  CT scans from 2/14-2/17 with evolving L MCA distribution infarct with hemorrhagic transformation and 8-9mm rightward shift.   - repeat CT head 2/27 with resolving hemorrhagic infarct and decrease in midline shift  - Per neuro:  - SBP goal 110-140.   - atorvastatin 40mg with LDL goal < 70, on hold for now    =====Pulmonary=====  #AHRF  #Aspiration  #Mechanical Ventilation  Patient O2 sat at 30s-40s after PEG placement. Possible 2/2 aspiration. Intubated as per GOC. CT chest on 2/26 with pulmonary edema and bilateral pleural effusions.   - treatment of possible aspiration PNA/pneumonitis as below  - diuresis as below  - successfully extubated on 2/28 weaned to aerosol mask FiO2 28%    =====Cardiovascular=====  #HFpEF  Patient with recent TTE with moderate (grade 2) diastolic dysfunction.  -Additionally, CT chest 2/26 with pulmonary edema and bilateral pleural effusions, was given lasix 20mg IV x1.   - continue diuresis with bumex 1mg     #Valvular dysfunction  Severe mitral stenosis seen on TTE 2/14.  s/p TAVR, no aortic regurgitation    #HTN  Patient currently normotensive.     #PPM  Patient with pacemaker, interrogated by cardiology per notes  - VVI mode    =====GI=====  #PEG placement  Patient with PEG placed on 2/27 after GOC discussions with family.   - started on PO meds and tube feeds 2/28     =====Renal/=====  #Electrolytes  - Maintain K>4, Phos>3, Mag>2, iCal>1    =====Endocrine=====  - blood glucose q6 hrs while on enteral nutrition     =====Infectious Disease=====  #?Aspiration pneumonitis vs pneumonia  Patient's desaturations likely 2/2 aspiration. Now with leukocytosis to 11.   - Bcx2  - sputum culture  - empiric darina 3d course through 2/29 given possible zosyn allergy as listed penicillin allergy- per daughter, unknown reaction from childhood so will discuss with pharmacy having it removed as true allergy  - Vancomycin d/c'd as MRSA swab negative       =====Heme/Onc=====  #Anemia  Hgb 6.7, repeat 6.8 this AM. s/p 1 u PRBC 2/27 AM , with repeat Hgb 9.8 (likely concentrated).  No overt source of bleeding. MCV normocytic.   Baseline Hgb in 8s.   - CTM Hgb  - transfuse if Hgb <7    #DVT Ppx  - HSQ given resolving hemorrhagic transformation on head CT, holding on further therapeutic anticoagulation currently   - Upper Extremity Duplex 2/28 with findings of a short segment, focal, nonobstructive mural thrombus in the   right subclavian vein. Superficial thrombophlebitis affects the right cephalic vein.    =====Ethics=====  FULL CODE, palliative consulted previously, saw patient with discussion involving that patient to be full code.            FOR FOLLOW UP: MICU Transfer Note    Transfer from: MICU    Transfer to: ( X  ) Medicine    (  ) Telemetry     (   ) RCU        (    ) Palliative         (   ) Stroke Unit          (   ) __________________    Accepting Physician:   Signout given to:       HPI:   89 yo F with PMH of CHF, HTN, s/p TAVR and PPM presenting 2/14 with AMS, ?R facial droop and R sided weakness, found by home health aide to be less responsive. As per documentation, at baseline pt alert and able to recognize familiar faces, intermittent aphasia, requiring assistance with all ADLs. On imaging pt found with large left MCA infarct with hemorrhagic conversion and shift; pt not a candidate for TPA as per Neuro. Since her CVA pt with limited mental status, open her eyes intermittently and groaning, following some simple commands. Pt underwent PEG tube placement on 2/27. After procedure pt with acute hypoxia and respiratory distressed, intubated and transferred to MICU for further care.     MICU COURSE:  In MICU pt started on empiric antibiotics for likely aspiration pneumonia. Her repeat CT after intubation showing resolving hemorrhagic infarct and decrease in midline shift. Pt was otherwise found with new partially occluded L subclavian DVT, continued on HSQ considering recent hemorrhagic stroke. She improved from a respiratory standpoint and was extubated on 2/28 weaned to 28% aerosol mask. She remains hemodynamically stable without vasopressors.     ASSESSMENT & PLAN:   89-year-old with CHF, HTN, PPM, TAVR, who presented as a code stroke for AMS, R facial droop and R hemiparesis found to have L MCA infarct with hemorrhagic transformation. s/p PEG placement 2/27 with course c/b with acute hypoxic respiratory failure requiring intubation and transfer to MICU for further care.    PLAN  =====Neurologic=====  #CVA  #Hemorrhagic transformation  Patient presented with increased AMS (baseline patient with neurocognitive difficulties with ability to recognize familiar faces and often trouble getting words out), possible R sided facial droop and R hemiparesis. Code stroke called, but not candidate for tenecteplase given time course and higher MRS.   Continues to have global aphasia and R hemiparesis  CT scans from 2/14-2/17 with evolving L MCA distribution infarct with hemorrhagic transformation and 8-9mm rightward shift.   - repeat CT head 2/27 with resolving hemorrhagic infarct and decrease in midline shift  - Per neuro:  - SBP goal 110-140.   - atorvastatin 40mg with LDL goal < 70, on hold for now    =====Pulmonary=====  #AHRF  #Aspiration  #Mechanical Ventilation  Patient O2 sat at 30s-40s after PEG placement. Possible 2/2 aspiration. Intubated as per GOC. CT chest on 2/26 with pulmonary edema and bilateral pleural effusions.   - treatment of possible aspiration PNA/pneumonitis as below  - diuresis as below  - successfully extubated on 2/28 weaned to aerosol mask FiO2 28%    =====Cardiovascular=====  #HFpEF  Patient with recent TTE with moderate (grade 2) diastolic dysfunction.  -Additionally, CT chest 2/26 with pulmonary edema and bilateral pleural effusions, was given lasix 20mg IV x1.   - continue diuresis with bumex 1mg     #Valvular dysfunction  Severe mitral stenosis seen on TTE 2/14.  s/p TAVR, no aortic regurgitation    #HTN  Patient currently normotensive.     #PPM  Patient with pacemaker, interrogated by cardiology per notes  - VVI mode    =====GI=====  #PEG placement  Patient with PEG placed on 2/27 after GOC discussions with family.   - started on PO meds and tube feeds 2/28     =====Renal/=====  #Electrolytes  - Maintain K>4, Phos>3, Mag>2, iCal>1    =====Endocrine=====  - blood glucose q6 hrs while on enteral nutrition     =====Infectious Disease=====  #?Aspiration pneumonitis vs pneumonia  Patient's desaturations likely 2/2 aspiration. Now with leukocytosis to 11.   - Bcx2  - sputum culture  - empiric darian 3d course through 2/29 given possible zosyn allergy as listed penicillin allergy- per daughter, unknown reaction from childhood so will discuss with pharmacy having it removed as true allergy  - Vancomycin d/c'd as MRSA swab negative       =====Heme/Onc=====  #Anemia  Hgb 6.7, repeat 6.8 this AM. s/p 1 u PRBC 2/27 AM , with repeat Hgb 9.8 (likely concentrated).  No overt source of bleeding. MCV normocytic.   Baseline Hgb in 8s.   - CTM Hgb  - transfuse if Hgb <7    #DVT Ppx  - HSQ given resolving hemorrhagic transformation on head CT, holding on further therapeutic anticoagulation currently   - Upper Extremity Duplex 2/28 with findings of a short segment, focal, nonobstructive mural thrombus in the   right subclavian vein. Superficial thrombophlebitis affects the right cephalic vein.    =====Ethics=====  FULL CODE, palliative consulted previously, saw patient with discussion involving that patient to be full code.          FOR FOLLOW UP:  [ ] continue empiric antibiotics pending remaining infectious w/u MICU Transfer Note    Transfer from: MICU    Transfer to: ( X  ) Medicine    (  ) Telemetry     (   ) RCU        (    ) Palliative         (   ) Stroke Unit          (   ) __________________    Accepting Physician: Dr. Siddiqi  Signout given to:       HPI:   89 yo F with PMH of CHF, HTN, s/p TAVR and PPM presenting 2/14 with AMS, ?R facial droop and R sided weakness, found by home health aide to be less responsive. As per documentation, at baseline pt alert and able to recognize familiar faces, intermittent aphasia, requiring assistance with all ADLs. On imaging pt found with large left MCA infarct with hemorrhagic conversion and shift; pt not a candidate for TPA as per Neuro. Since her CVA pt with limited mental status, open her eyes intermittently and groaning, following some simple commands. Pt underwent PEG tube placement on 2/27. After procedure pt with acute hypoxia and respiratory distressed, intubated and transferred to MICU for further care.     MICU COURSE:  In MICU pt started on empiric antibiotics for likely aspiration pneumonia. Her repeat CT after intubation showing resolving hemorrhagic infarct and decrease in midline shift. Pt was otherwise found with new partially occluded L subclavian DVT, continued on HSQ considering recent hemorrhagic stroke. She improved from a respiratory standpoint and was extubated on 2/28 weaned to 28% aerosol mask. She remains hemodynamically stable without vasopressors.     ASSESSMENT & PLAN:   89-year-old with CHF, HTN, PPM, TAVR, who presented as a code stroke for AMS, R facial droop and R hemiparesis found to have L MCA infarct with hemorrhagic transformation. s/p PEG placement 2/27 with course c/b with acute hypoxic respiratory failure requiring intubation and transfer to MICU for further care.    PLAN  =====Neurologic=====  #CVA  #Hemorrhagic transformation  Patient presented with increased AMS (baseline patient with neurocognitive difficulties with ability to recognize familiar faces and often trouble getting words out), possible R sided facial droop and R hemiparesis. Code stroke called, but not candidate for tenecteplase given time course and higher MRS.   Continues to have global aphasia and R hemiparesis  CT scans from 2/14-2/17 with evolving L MCA distribution infarct with hemorrhagic transformation and 8-9mm rightward shift.   - repeat CT head 2/27 with resolving hemorrhagic infarct and decrease in midline shift  - Per neuro:  - SBP goal 110-140.   - atorvastatin 40mg with LDL goal < 70, on hold for now    =====Pulmonary=====  #AHRF  #Aspiration  #Mechanical Ventilation  Patient O2 sat at 30s-40s after PEG placement. Possible 2/2 aspiration. Intubated as per GOC. CT chest on 2/26 with pulmonary edema and bilateral pleural effusions.   - treatment of possible aspiration PNA/pneumonitis as below  - diuresis as below  - successfully extubated on 2/28 weaned to aerosol mask FiO2 28%    =====Cardiovascular=====  #HFpEF  Patient with recent TTE with moderate (grade 2) diastolic dysfunction.  -Additionally, CT chest 2/26 with pulmonary edema and bilateral pleural effusions, was given lasix 20mg IV x1.   - continue diuresis with bumex 1mg     #Valvular dysfunction  Severe mitral stenosis seen on TTE 2/14.  s/p TAVR, no aortic regurgitation    #HTN  Patient currently normotensive.     #PPM  Patient with pacemaker, interrogated by cardiology per notes  - VVI mode    =====GI=====  #PEG placement  Patient with PEG placed on 2/27 after GOC discussions with family.   - started on PO meds and tube feeds 2/28     =====Renal/=====  #Electrolytes  - Maintain K>4, Phos>3, Mag>2, iCal>1    =====Endocrine=====  - blood glucose q6 hrs while on enteral nutrition     =====Infectious Disease=====  #?Aspiration pneumonitis vs pneumonia  Patient's desaturations likely 2/2 aspiration. Now with leukocytosis to 11.   - Bcx2  - sputum culture  - empiric darian 3d course through 2/29 given possible zosyn allergy as listed penicillin allergy- per daughter, unknown reaction from childhood so will discuss with pharmacy having it removed as true allergy  - Vancomycin d/c'd as MRSA swab negative       =====Heme/Onc=====  #Anemia  Hgb 6.7, repeat 6.8 this AM. s/p 1 u PRBC 2/27 AM , with repeat Hgb 9.8 (likely concentrated).  No overt source of bleeding. MCV normocytic.   Baseline Hgb in 8s.   - CTM Hgb  - transfuse if Hgb <7    #DVT Ppx  - HSQ given resolving hemorrhagic transformation on head CT, holding on further therapeutic anticoagulation currently   - Upper Extremity Duplex 2/28 with findings of a short segment, focal, nonobstructive mural thrombus in the   right subclavian vein. Superficial thrombophlebitis affects the right cephalic vein.    =====Ethics=====  FULL CODE, palliative consulted previously, saw patient with discussion involving that patient to be full code.          FOR FOLLOW UP:  [ ] continue empiric antibiotics pending remaining infectious w/u MICU Transfer Note    Transfer from: MICU    Transfer to: ( X  ) Medicine    (  ) Telemetry     (   ) RCU        (    ) Palliative         (   ) Stroke Unit          (   ) __________________    Accepting Physician: Dr. Siddiqi  Signout given to: SGloria Leftyelizabeth      HPI:   89 yo F with PMH of CHF, HTN, s/p TAVR and PPM presenting 2/14 with AMS, ?R facial droop and R sided weakness, found by home health aide to be less responsive. As per documentation, at baseline pt alert and able to recognize familiar faces, intermittent aphasia, requiring assistance with all ADLs. On imaging pt found with large left MCA infarct with hemorrhagic conversion and shift; pt not a candidate for TPA as per Neuro. Since her CVA pt with limited mental status, open her eyes intermittently and groaning, following some simple commands. Pt underwent PEG tube placement on 2/27. After procedure pt with acute hypoxia and respiratory distressed, intubated and transferred to MICU for further care.     MICU COURSE:  In MICU pt started on empiric antibiotics for likely aspiration pneumonia. Her repeat CT after intubation showing resolving hemorrhagic infarct and decrease in midline shift. Pt was otherwise found with new partially occluded L subclavian DVT, continued on HSQ considering recent hemorrhagic stroke. She improved from a respiratory standpoint and was extubated on 2/28 weaned to 28% aerosol mask. She remains hemodynamically stable without vasopressors.     ASSESSMENT & PLAN:   89-year-old with CHF, HTN, PPM, TAVR, who presented as a code stroke for AMS, R facial droop and R hemiparesis found to have L MCA infarct with hemorrhagic transformation. s/p PEG placement 2/27 with course c/b with acute hypoxic respiratory failure requiring intubation and transfer to MICU for further care.    PLAN  =====Neurologic=====  #CVA  #Hemorrhagic transformation  Patient presented with increased AMS (baseline patient with neurocognitive difficulties with ability to recognize familiar faces and often trouble getting words out), possible R sided facial droop and R hemiparesis. Code stroke called, but not candidate for tenecteplase given time course and higher MRS.   Continues to have global aphasia and R hemiparesis  CT scans from 2/14-2/17 with evolving L MCA distribution infarct with hemorrhagic transformation and 8-9mm rightward shift.   - repeat CT head 2/27 with resolving hemorrhagic infarct and decrease in midline shift  - Per neuro:  - SBP goal 110-140.   - atorvastatin 40mg with LDL goal < 70, on hold for now    =====Pulmonary=====  #AHRF  #Aspiration  #Mechanical Ventilation  Patient O2 sat at 30s-40s after PEG placement. Possible 2/2 aspiration. Intubated as per GOC. CT chest on 2/26 with pulmonary edema and bilateral pleural effusions.   - treatment of possible aspiration PNA/pneumonitis as below  - diuresis as below  - successfully extubated on 2/28 weaned to aerosol mask FiO2 28%    =====Cardiovascular=====  #HFpEF  Patient with recent TTE with moderate (grade 2) diastolic dysfunction.  -Additionally, CT chest 2/26 with pulmonary edema and bilateral pleural effusions, was given lasix 20mg IV x1.   - continue diuresis with bumex 1mg     #Valvular dysfunction  Severe mitral stenosis seen on TTE 2/14.  s/p TAVR, no aortic regurgitation    #HTN  Patient currently normotensive.     #PPM  Patient with pacemaker, interrogated by cardiology per notes  - VVI mode    =====GI=====  #PEG placement  Patient with PEG placed on 2/27 after GOC discussions with family.   - started on PO meds and tube feeds 2/28     =====Renal/=====  #Electrolytes  - Maintain K>4, Phos>3, Mag>2, iCal>1    =====Endocrine=====  - blood glucose q6 hrs while on enteral nutrition     =====Infectious Disease=====  #?Aspiration pneumonitis vs pneumonia  Patient's desaturations likely 2/2 aspiration. Now with leukocytosis to 11.   - Bcx2  - sputum culture  - empiric darian 3d course through 2/29 given possible zosyn allergy as listed penicillin allergy- per daughter, unknown reaction from childhood so will discuss with pharmacy having it removed as true allergy  - Vancomycin d/c'd as MRSA swab negative       =====Heme/Onc=====  #Anemia  Hgb 6.7, repeat 6.8 this AM. s/p 1 u PRBC 2/27 AM , with repeat Hgb 9.8 (likely concentrated).  No overt source of bleeding. MCV normocytic.   Baseline Hgb in 8s.   - CTM Hgb  - transfuse if Hgb <7    #DVT Ppx  - HSQ given resolving hemorrhagic transformation on head CT, holding on further therapeutic anticoagulation currently   - Upper Extremity Duplex 2/28 with findings of a short segment, focal, nonobstructive mural thrombus in the   right subclavian vein. Superficial thrombophlebitis affects the right cephalic vein.    =====Ethics=====  FULL CODE, palliative consulted previously, saw patient with discussion involving that patient to be full code.          FOR FOLLOW UP:  [ ] continue empiric antibiotics pending remaining infectious w/u (ordered for d/c 2/29 for 3d course) MICU Transfer Note    Transfer from: MICU    Transfer to: ( X  ) Medicine    (  ) Telemetry     (   ) RCU        (    ) Palliative         (   ) Stroke Unit          (   ) __________________    Accepting Physician: Dr. Siddiqi  Signout given to: GREGG Guardado      HPI:   89 yo F with PMH of CHF, HTN, s/p TAVR and PPM presenting 2/14 with AMS, ?R facial droop and R sided weakness, found by home health aide to be less responsive. As per documentation, at baseline pt alert and able to recognize familiar faces, intermittent aphasia, requiring assistance with all ADLs. On imaging pt found with large left MCA infarct with hemorrhagic conversion and shift; pt not a candidate for TPA as per Neuro. Since her CVA pt with limited mental status, open her eyes intermittently and groaning, following some simple commands. Pt underwent PEG tube placement on 2/27. After procedure pt with acute hypoxia and respiratory distressed, intubated and transferred to MICU for further care.     MICU COURSE:  In MICU pt started on empiric antibiotics for likely aspiration pneumonia. Her repeat CT after intubation showing resolving hemorrhagic infarct and decrease in midline shift. Pt was otherwise found with new partially occluded L subclavian DVT, continued on HSQ considering recent hemorrhagic stroke. She improved from a respiratory standpoint and was extubated on 2/28 weaned to 28% aerosol mask. She remains hemodynamically stable without vasopressors.     ASSESSMENT & PLAN:   89-year-old with CHF, HTN, PPM, TAVR, who presented as a code stroke for AMS, R facial droop and R hemiparesis found to have L MCA infarct with hemorrhagic transformation. s/p PEG placement 2/27 with course c/b with acute hypoxic respiratory failure requiring intubation and transfer to MICU for further care.    PLAN  =====Neurologic=====  #CVA  #Hemorrhagic transformation  Patient presented with increased AMS (baseline patient with neurocognitive difficulties with ability to recognize familiar faces and often trouble getting words out), possible R sided facial droop and R hemiparesis. Code stroke called, but not candidate for tenecteplase given time course and higher MRS.   Continues to have global aphasia and R hemiparesis  CT scans from 2/14-2/17 with evolving L MCA distribution infarct with hemorrhagic transformation and 8-9mm rightward shift.   - repeat CT head 2/27 with resolving hemorrhagic infarct and decrease in midline shift  - Per neuro:  - SBP goal 110-140.   - atorvastatin 40mg with LDL goal < 70, on hold for now    =====Pulmonary=====  #AHRF  #Aspiration  #Mechanical Ventilation  Patient O2 sat at 30s-40s after PEG placement. Possible 2/2 aspiration. Intubated as per GOC. CT chest on 2/26 with pulmonary edema and bilateral pleural effusions.   - treatment of possible aspiration PNA/pneumonitis as below  - diuresis as below  - successfully extubated on 2/28 weaned to aerosol mask FiO2 28%    =====Cardiovascular=====  #HFpEF  Patient with recent TTE with moderate (grade 2) diastolic dysfunction.  -Additionally, CT chest 2/26 with pulmonary edema and bilateral pleural effusions, was given lasix 20mg IV x1.   - continue diuresis with bumex 1mg     #Valvular dysfunction  Severe mitral stenosis seen on TTE 2/14.  s/p TAVR, no aortic regurgitation    #HTN  Patient currently normotensive.     #PPM  Patient with pacemaker, interrogated by cardiology per notes  - VVI mode    =====GI=====  #PEG placement  Patient with PEG placed on 2/27 after GOC discussions with family.   - started on PO meds and tube feeds 2/28     =====Renal/=====  #Electrolytes  - Maintain K>4, Phos>3, Mag>2, iCal>1    =====Endocrine=====  - blood glucose q6 hrs while on enteral nutrition     =====Infectious Disease=====  #?Aspiration pneumonitis vs pneumonia  Patient's desaturations likely 2/2 aspiration. Now with leukocytosis to 11.   - Bcx2  - sputum culture  - empiric darian 3d course through 2/29 given possible zosyn allergy as listed penicillin allergy- per daughter, unknown reaction from childhood so will discuss with pharmacy having it removed as true allergy  - Vancomycin d/c'd as MRSA swab negative       =====Heme/Onc=====  #Anemia  Hgb 6.7, repeat 6.8 this AM. s/p 1 u PRBC 2/27 AM , with repeat Hgb 9.8 (likely concentrated).  No overt source of bleeding. MCV normocytic.   Baseline Hgb in 8s.   - CTM Hgb  - transfuse if Hgb <7    #DVT Ppx  - HSQ given resolving hemorrhagic transformation on head CT, holding on further therapeutic anticoagulation currently   - Upper Extremity Duplex 2/28 with findings of a short segment, focal, nonobstructive mural thrombus in the   right subclavian vein. Superficial thrombophlebitis affects the right cephalic vein.    =====Ethics=====  FULL CODE, palliative consulted previously, saw patient with discussion involving that patient to be full code.          FOR FOLLOW UP:  [ ] continue empiric antibiotics pending remaining infectious w/u (ordered for d/c 2/29 for 3d course)

## 2024-02-28 NOTE — PROGRESS NOTE ADULT - ASSESSMENT
89-year-old left-handed woman with past medical history of CHF, HTN, pacemaker placement, valve replacement presenting as a code stroke for AMS, ?R-facial droop, R sided weakness. Was found by home aide at 830 2/14 less responsive, not moving extremities as usually does. No prior known hx strokes per family. At baseline patient alert, able to recognize/comprehend familiar faces, per daughters at bedside patient sometimes has difficulty with getting words out. Not candidate for tenecteplase as area of hypodensity already appreciated on CT head imaging/age, not candidate for thrombectomy given higher MRS.    IMPRESSION: Global aphasia, R hemiparesis due to L MCA infarct with associated hemorrhagic transformation 2/2 L ICA partially occlusive thrombus. Mechanism ESUS vs. ICAD. Intubated 2/27 i/s/o c/f aspiration PNA vs pneumonitis.    Recommendations:  [] Maintain -140  [] PT/OT/speech eval  [] AC on hold given hemorrhagic transformation on CT head, anemia   [] DVT ppx with venodynes, chemical prophylaxis + ASA 81 daily  [] trend Hgb  [] Recommending continuing goals of care conversations    CORE MEASURES:        Admission NIHSS: 18     TPA: NO      LDL/HDL: 76/45     Depression Screen: unable to assess     Statin Therapy: yes,      Dysphagia Screen: FAIL     Smoking NO      Afib NO     Stroke Education YES    Patient seen with stroke consult attending on rounds. Note finalized upon attending attestation. 89-year-old left-handed woman with past medical history of CHF, HTN, pacemaker placement, valve replacement presenting as a code stroke for AMS, ?R-facial droop, R sided weakness. Was found by home aide at 830 2/14 less responsive, not moving extremities as usually does. No prior known hx strokes per family. At baseline patient alert, able to recognize/comprehend familiar faces, per daughters at bedside patient sometimes has difficulty with getting words out. Not candidate for tenecteplase as area of hypodensity already appreciated on CT head imaging/age, not candidate for thrombectomy given higher MRS.    IMPRESSION: Global aphasia, R hemiparesis due to L MCA infarct with associated hemorrhagic transformation 2/2 L ICA partially occlusive thrombus. Mechanism ESUS vs. ICAD. Intubated 2/27 i/s/o c/f aspiration PNA vs pneumonitis.    Recommendations:  [] Maintain -140  [] PT/OT/speech eval  [] AC on hold given hemorrhagic transformation on CT head, anemia   [] DVT prophylaxis with venodynes, ____,  [] trend Hgb  [] Recommending continuing goals of care conversations    CORE MEASURES:        Admission NIHSS: 18     TPA: NO      LDL/HDL: 76/45     Depression Screen: unable to assess     Statin Therapy: yes,      Dysphagia Screen: FAIL     Smoking NO      Afib NO     Stroke Education YES    Patient seen with stroke consult attending on rounds. Note finalized upon attending attestation. 89-year-old left-handed woman with past medical history of CHF, HTN, pacemaker placement, valve replacement presenting as a code stroke for AMS, ?R-facial droop, R sided weakness. Was found by home aide at 830 2/14 less responsive, not moving extremities as usually does. No prior known hx strokes per family. At baseline patient alert, able to recognize/comprehend familiar faces, per daughters at bedside patient sometimes has difficulty with getting words out. Not candidate for tenecteplase as area of hypodensity already appreciated on CT head imaging/age, not candidate for thrombectomy given higher MRS.    IMPRESSION: Global aphasia, R hemiparesis due to L MCA infarct with associated hemorrhagic transformation 2/2 L ICA partially occlusive thrombus. Mechanism ESUS vs. ICAD. Intubated 2/27 i/s/o c/f aspiration PNA vs pneumonitis.    Recommendations:  [] Maintain -140  [] PT/OT/speech eval  [] AC on hold given hemorrhagic transformation on CT head, anemia   [] DVT prophylaxis can do chemical dvt ppx + ASA 81 daily, however just found to have RUE thrombus, ok for therapeutic AC however would d/c chemical DVt ppx + ASA 81 daily and maintain PTT 40-60  [] trend Hgb  [] Recommending continuing goals of care conversations    CORE MEASURES:        Admission NIHSS: 18     TPA: NO      LDL/HDL: 76/45     Depression Screen: unable to assess     Statin Therapy: yes,      Dysphagia Screen: FAIL     Smoking NO      Afib NO     Stroke Education YES    Patient seen with stroke consult attending on rounds. Note finalized upon attending attestation.

## 2024-02-29 LAB
-  BLOOD PCR PANEL: SIGNIFICANT CHANGE UP
ALBUMIN SERPL ELPH-MCNC: 3 G/DL — LOW (ref 3.3–5)
ALP SERPL-CCNC: 88 U/L — SIGNIFICANT CHANGE UP (ref 40–120)
ALT FLD-CCNC: 10 U/L — SIGNIFICANT CHANGE UP (ref 10–45)
ANION GAP SERPL CALC-SCNC: 13 MMOL/L — SIGNIFICANT CHANGE UP (ref 5–17)
APTT BLD: 27.6 SEC — SIGNIFICANT CHANGE UP (ref 24.5–35.6)
AST SERPL-CCNC: 20 U/L — SIGNIFICANT CHANGE UP (ref 10–40)
BILIRUB SERPL-MCNC: 0.7 MG/DL — SIGNIFICANT CHANGE UP (ref 0.2–1.2)
BUN SERPL-MCNC: 23 MG/DL — SIGNIFICANT CHANGE UP (ref 7–23)
CALCIUM SERPL-MCNC: 8.5 MG/DL — SIGNIFICANT CHANGE UP (ref 8.4–10.5)
CHLORIDE SERPL-SCNC: 111 MMOL/L — HIGH (ref 96–108)
CO2 SERPL-SCNC: 20 MMOL/L — LOW (ref 22–31)
CREAT SERPL-MCNC: 1 MG/DL — SIGNIFICANT CHANGE UP (ref 0.5–1.3)
CULTURE RESULTS: SIGNIFICANT CHANGE UP
EGFR: 52 ML/MIN/1.73M2 — LOW
GAS PNL BLDA: SIGNIFICANT CHANGE UP
GLUCOSE BLDC GLUCOMTR-MCNC: 134 MG/DL — HIGH (ref 70–99)
GLUCOSE SERPL-MCNC: 113 MG/DL — HIGH (ref 70–99)
GRAM STN FLD: ABNORMAL
HCT VFR BLD CALC: 27.7 % — LOW (ref 34.5–45)
HGB BLD-MCNC: 8.7 G/DL — LOW (ref 11.5–15.5)
INR BLD: 1.26 RATIO — HIGH (ref 0.85–1.18)
MAGNESIUM SERPL-MCNC: 2 MG/DL — SIGNIFICANT CHANGE UP (ref 1.6–2.6)
MCHC RBC-ENTMCNC: 27 PG — SIGNIFICANT CHANGE UP (ref 27–34)
MCHC RBC-ENTMCNC: 31.4 GM/DL — LOW (ref 32–36)
MCV RBC AUTO: 86 FL — SIGNIFICANT CHANGE UP (ref 80–100)
METHOD TYPE: SIGNIFICANT CHANGE UP
NRBC # BLD: 0 /100 WBCS — SIGNIFICANT CHANGE UP (ref 0–0)
PHOSPHATE SERPL-MCNC: 3.1 MG/DL — SIGNIFICANT CHANGE UP (ref 2.5–4.5)
PLATELET # BLD AUTO: 216 K/UL — SIGNIFICANT CHANGE UP (ref 150–400)
POTASSIUM SERPL-MCNC: 3.6 MMOL/L — SIGNIFICANT CHANGE UP (ref 3.5–5.3)
POTASSIUM SERPL-SCNC: 3.6 MMOL/L — SIGNIFICANT CHANGE UP (ref 3.5–5.3)
PROT SERPL-MCNC: 5.9 G/DL — LOW (ref 6–8.3)
PROTHROM AB SERPL-ACNC: 13.1 SEC — HIGH (ref 9.5–13)
RBC # BLD: 3.22 M/UL — LOW (ref 3.8–5.2)
RBC # FLD: 19.8 % — HIGH (ref 10.3–14.5)
SODIUM SERPL-SCNC: 144 MMOL/L — SIGNIFICANT CHANGE UP (ref 135–145)
SPECIMEN SOURCE: SIGNIFICANT CHANGE UP
WBC # BLD: 7.57 K/UL — SIGNIFICANT CHANGE UP (ref 3.8–10.5)
WBC # FLD AUTO: 7.57 K/UL — SIGNIFICANT CHANGE UP (ref 3.8–10.5)

## 2024-02-29 PROCEDURE — 99232 SBSQ HOSP IP/OBS MODERATE 35: CPT

## 2024-02-29 PROCEDURE — 99233 SBSQ HOSP IP/OBS HIGH 50: CPT | Mod: GC

## 2024-02-29 RX ORDER — SODIUM CHLORIDE 9 MG/ML
10 INJECTION INTRAMUSCULAR; INTRAVENOUS; SUBCUTANEOUS DAILY
Refills: 0 | Status: DISCONTINUED | OUTPATIENT
Start: 2024-02-29 | End: 2024-03-07

## 2024-02-29 RX ORDER — FUROSEMIDE 40 MG
40 TABLET ORAL DAILY
Refills: 0 | Status: DISCONTINUED | OUTPATIENT
Start: 2024-02-29 | End: 2024-03-07

## 2024-02-29 RX ADMIN — Medication 3 MILLILITER(S): at 16:01

## 2024-02-29 RX ADMIN — Medication 3 MILLILITER(S): at 22:48

## 2024-02-29 RX ADMIN — MEROPENEM 100 MILLIGRAM(S): 1 INJECTION INTRAVENOUS at 17:26

## 2024-02-29 RX ADMIN — CHLORHEXIDINE GLUCONATE 1 APPLICATION(S): 213 SOLUTION TOPICAL at 05:16

## 2024-02-29 RX ADMIN — ATORVASTATIN CALCIUM 20 MILLIGRAM(S): 80 TABLET, FILM COATED ORAL at 22:43

## 2024-02-29 RX ADMIN — MEROPENEM 100 MILLIGRAM(S): 1 INJECTION INTRAVENOUS at 05:16

## 2024-02-29 RX ADMIN — Medication 40 MILLIGRAM(S): at 12:24

## 2024-02-29 RX ADMIN — HEPARIN SODIUM 5000 UNIT(S): 5000 INJECTION INTRAVENOUS; SUBCUTANEOUS at 17:26

## 2024-02-29 RX ADMIN — HEPARIN SODIUM 5000 UNIT(S): 5000 INJECTION INTRAVENOUS; SUBCUTANEOUS at 05:16

## 2024-02-29 NOTE — PROGRESS NOTE ADULT - ASSESSMENT
89-year-old with CHF, HTN, PPM, TAVR, who presented as a code stroke for AMS, R facial droop and R hemiparesis found to have L MCA infarct with hemorrhagic transformation. PEG tube placement 2/27. Transferred to MICU after patient intubated following oxygen saturation decreasing down to 30s-40s few hours after PEG tube placement.       PLAN  =====Neurologic=====  #CVA  #Hemorrhagic transformation  Patient presented with increased AMS (baseline patient with neurocognitive difficulties with ability to recognize familiar faces and often trouble getting words out), possible R sided facial droop and R hemiparesis. Code stroke called, but not candidate for tenecteplase given time course and higher MRS.   Continues to have global aphasia and R hemiparesis  CT scans from 2/14-2/17 with evolving L MCA distribution infarct with hemorrhagic transformation and 8-9mm rightward shift.   - repeat CT head 2/27 with resolving hemorrhagic infarct and decrease in midline shift  - Per neuro:  - SBP goal 110-140.   - atorvastatin 40mg with LDL goal < 70, on hold for now    =====Pulmonary=====  #AHRF  #Aspiration  #Mechanical Ventilation  vent settings: 16/350/5/40, will reassess after repeat ABG  Patient O2 sat at 30s-40s after PEG placement. Possible 2/2 aspiration. Intubated as per GOC. CT chest on 2/26 with pulmonary edema and bilateral pleural effusions.   - repeat ABG  - treatment of possible aspiration PNA/pneumonitis as below  - diuresis as below    =====Cardiovascular=====  #HFpEF  Patient with recent TTE with moderate (grade 2) diastolic dysfunction.  -Additionally, CT chest 2/26 with pulmonary edema and bilateral pleural effusions, was given lasix 20mg IV x1.   - continue diuresis with bumex 1mg     #Valvular dysfunction  Severe mitral stenosis seen on TTE 2/14.  s/p TAVR, no aortic regurgitation    #HTN  Patient currently normotensive.     #PPM  Patient with pacemaker, interrogated by cardiology per notes  - VVI mode    =====GI=====  #PEG placement  Patient with PEG placed on 2/27 after GOC discussions with family.   - f/u IR regarding PEG tube usage 2/28    =====Renal/=====  #Electrolytes  - Maintain K>4, Phos>3, Mag>2, iCal>1    =====Endocrine=====  #NPO  - fingersticks q6h while NPO    =====Infectious Disease=====  #?Aspiration pneumonitis vs pneumonia  Patient's desaturations likely 2/2 aspiration. Now with leukocytosis to 11.   - Bcx2  - sputum culture  - empiric darian 3d course through 2/29 given possible zosyn allergy as listed penicillin allergy- per daughter, unknown reaction from childhood so will discuss with pharmacy having it removed as true allergy      =====Heme/Onc=====  #Anemia  Hgb 6.7, repeat 6.8 this AM. s/p 1 u pRBC 2/27 AM , with repeat Hgb 9.8 (likely concentrated).  No overt source of bleeding. MCV normocytic.   Baseline Hgb in 8s.   - CTM Hgb  - transfuse if Hgb <7    #DVT Ppx  - Lovenox 30mg SQ qd given resolving hemorrhagic transformation on head CT    =====Ethics=====  FULL CODE, palliative consulted previously, saw patient with discussion involving that patient to be full code. 89-year-old with CHF, HTN, PPM, TAVR, who presented as a code stroke for AMS, R facial droop and R hemiparesis found to have L MCA infarct with hemorrhagic transformation. PEG tube placement 2/27. Transferred to MICU after patient intubated following oxygen saturation decreasing down to 30s-40s few hours after PEG tube placement.       PLAN  =====Neurologic=====  #CVA  #Hemorrhagic transformation  Patient presented with increased AMS (baseline patient with neurocognitive difficulties with ability to recognize familiar faces and often trouble getting words out), possible R sided facial droop and R hemiparesis. Code stroke called, but not candidate for tenecteplase given time course and higher MRS.   Continues to have global aphasia and R hemiparesis  CT scans from 2/14-2/17 with evolving L MCA distribution infarct with hemorrhagic transformation and 8-9mm rightward shift.   - repeat CT head 2/27 with resolving hemorrhagic infarct and decrease in midline shift  - Per neuro:  - SBP goal 110-140.   - atorvastatin 40mg with LDL goal < 70, consider medication de-escalation given age    =====Pulmonary=====  #AHRF  #Aspiration  #Mechanical Ventilation  vent settings: 16/350/5/40, will reassess after repeat ABG  Patient O2 sat at 30s-40s after PEG placement. Possible 2/2 aspiration. Intubated as per GOC. CT chest on 2/26 with pulmonary edema and bilateral pleural effusions.   - repeat ABG  - treatment of possible aspiration PNA/pneumonitis as below  - diuresis as below    =====Cardiovascular=====  #HFpEF  Patient with recent TTE with moderate (grade 2) diastolic dysfunction.  -Additionally, CT chest 2/26 with pulmonary edema and bilateral pleural effusions, was given lasix 20mg IV x1.   - continue diuresis with bumex 1mg     #Valvular dysfunction  Severe mitral stenosis seen on TTE 2/14.  s/p TAVR, no aortic regurgitation    #HTN  Patient currently normotensive.     #PPM  Patient with pacemaker, interrogated by cardiology per notes  - VVI mode    =====GI=====  #PEG placement  Patient with PEG placed on 2/27 after GOC discussions with family.   - f/u IR regarding PEG tube usage 2/28    =====Renal/=====  #Electrolytes  - Maintain K>4, Phos>3, Mag>2, iCal>1    =====Endocrine=====  #NPO  - fingersticks q6h while NPO    =====Infectious Disease=====  #?Aspiration pneumonitis vs pneumonia  Patient's desaturations likely 2/2 aspiration. Now with leukocytosis to 11.   - Bcx2  - sputum culture  - empiric darian 3d course through 2/29 given possible zosyn allergy as listed penicillin allergy      =====Heme/Onc=====  #Anemia  Hgb 6.7, repeat 6.8 this AM. s/p 1 u pRBC 2/27 AM , with repeat Hgb 9.8 (likely concentrated).  No overt source of bleeding. MCV normocytic.   Baseline Hgb in 8s.   - CTM Hgb  - transfuse if Hgb <7    #DVT Ppx  - Lovenox 30mg SQ qd given resolving hemorrhagic transformation on head CT    =====Ethics=====  FULL CODE, palliative consulted previously, saw patient with discussion involving that patient to be full code.

## 2024-02-29 NOTE — PROGRESS NOTE ADULT - ATTENDING COMMENTS
seen and examined 24 @ 0923    PSH - classical , open hysterectomy, laparoscopic cholecystectomy, laparoscopic hiatal hernia repair    2024 - unsuccessful attempt at PEG placement  no window on CT for percutaneous G-tube by IR    NG feeding tube in right nostril with tube feeds currently held    SpO2 = 99% on room air  soft / NT / ND  low-midline laparotomy scar    plats - 169  INR - 1.3  ptt - 24.6 sec    CT abd/pelvis w/o contrast - no hiatal hernia is seen. transverse colon is anterior to collapsed stomach.      dysphagia  -PEG / possible laparoscopic G-tube / possible open G-tube was offered to her family, but they are not agreeable to surgery  -please reconsult acute care surgery if the patient's family becomes agreeable      I reviewed her labs and radiologic images.
1. Acute hypoxemic respiratory failure s/p PEG placement. ? pt lethargic  due to anesthesia. ? aspiration pneumonia. .  Tolerating PS wean.   Minimal if any secretions from ETT. Trial of extubation.    2.ID. Obtain MRSA nasal swab.         Continue Vancomycin and  meropenem.    3. New PEG placement.  ok to use PEG.    4. New partially occlude  L subclavian vein. Will give SQ heparin considering recent hemorrhagic stroke.    5. Neur. New L stroke with hemorrhagic transformation. Continue statin    6.  Chronic diastolic heart failure. Will give additional diuretics before possible extubation today.    7. DVT prophylaxis : SQ heparin.    8. GOC; Full code.
1. Acute hypoxemic respiratory failure s/p PEG placement. ? pt lethargic  due to anesthesia. ? aspiration pneumonia. .  Tolerating  extubation.    2.ID.  ? aspiration pneumonia. Finish total 3 days of meropenem.  3. New PEG placement.  ok to use PEG.    4. New partially occlude  L subclavian vein. Will give SQ heparin considering recent hemorrhagic stroke.    5. Neur. New L stroke with hemorrhagic transformation. Continue statin    6.  Chronic diastolic heart failure. Will give additional diuretics before possible extubation today.    7. DVT prophylaxis : SQ heparin.    8. GOC; Full code.

## 2024-02-29 NOTE — PROGRESS NOTE ADULT - ASSESSMENT
89-year-old with CHF, HTN, PPM, TAVR, who presented as a code stroke for AMS, R facial droop and R hemiparesis found to have L MCA infarct with hemorrhagic transformation. PEG tube placement 2/27. Transferred to MICU after patient intubated following oxygen saturation decreasing down to 30s-40s few hours after PEG tube placement.       PLAN  =====Neurologic=====  #CVA  #Hemorrhagic transformation  Patient presented with increased AMS (baseline patient with neurocognitive difficulties with ability to recognize familiar faces and often trouble getting words out), possible R sided facial droop and R hemiparesis. Code stroke called, but not candidate for tenecteplase given time course and higher MRS.   Continues to have global aphasia and R hemiparesis  CT scans from 2/14-2/17 with evolving L MCA distribution infarct with hemorrhagic transformation and 8-9mm rightward shift.   - repeat CT head 2/27 with resolving hemorrhagic infarct and decrease in midline shift  - Per neuro:  - SBP goal 110-140.   - atorvastatin 40mg with LDL goal < 70, consider medication de-escalation given age    =====Pulmonary=====  #AHRF  #Aspiration  #Mechanical Ventilation  vent settings: 16/350/5/40, will reassess after repeat ABG  Patient O2 sat at 30s-40s after PEG placement. Possible 2/2 aspiration. Intubated as per GOC. CT chest on 2/26 with pulmonary edema and bilateral pleural effusions.   - repeat ABG  - treatment of possible aspiration PNA/pneumonitis as below  - diuresis as below    =====Cardiovascular=====  #HFpEF  Patient with recent TTE with moderate (grade 2) diastolic dysfunction.  -Additionally, CT chest 2/26 with pulmonary edema and bilateral pleural effusions, was given lasix 20mg IV x1.   - continue diuresis with bumex 1mg     #Valvular dysfunction  Severe mitral stenosis seen on TTE 2/14.  s/p TAVR, no aortic regurgitation    #HTN  Patient currently normotensive.     #PPM  Patient with pacemaker, interrogated by cardiology per notes  - VVI mode    =====GI=====  #PEG placement  Patient with PEG placed on 2/27 after GOC discussions with family.   - f/u IR regarding PEG tube usage 2/28    =====Renal/=====  #Electrolytes  - Maintain K>4, Phos>3, Mag>2, iCal>1    =====Endocrine=====  #NPO  - fingersticks q6h while NPO    =====Infectious Disease=====  #?Aspiration pneumonitis vs pneumonia  Patient's desaturations likely 2/2 aspiration. Now with leukocytosis to 11.   - Bcx2  - sputum culture  - empiric darian 3d course through 2/29 given possible zosyn allergy as listed penicillin allergy      =====Heme/Onc=====  #Anemia  Hgb 6.7, repeat 6.8 this AM. s/p 1 u pRBC 2/27 AM , with repeat Hgb 9.8 (likely concentrated).  No overt source of bleeding. MCV normocytic.   Baseline Hgb in 8s.   - CTM Hgb  - transfuse if Hgb <7    #DVT Ppx  - Lovenox 30mg SQ qd given resolving hemorrhagic transformation on head CT    =====Ethics=====  FULL CODE, palliative consulted previously, saw patient with discussion involving that patient to be full code.

## 2024-02-29 NOTE — PROGRESS NOTE ADULT - SUBJECTIVE AND OBJECTIVE BOX
DATE OF SERVICE: 02-29-24 @ 23:00    Patient is a 89y old  Female who presents with a chief complaint of CVA (29 Feb 2024 07:05)      SUBJECTIVE / OVERNIGHT EVENTS:  nonverbal, does not follow commands    MEDICATIONS  (STANDING):  atorvastatin 20 milliGRAM(s) Oral at bedtime  chlorhexidine 2% Cloths 1 Application(s) Topical <User Schedule>  furosemide    Tablet 40 milliGRAM(s) Oral daily  heparin   Injectable 5000 Unit(s) SubCutaneous every 12 hours    MEDICATIONS  (PRN):  albuterol/ipratropium for Nebulization 3 milliLiter(s) Nebulizer every 6 hours PRN Wheezing  sodium chloride 0.9% lock flush 10 milliLiter(s) IV Push daily PRN For PEG      Vital Signs Last 24 Hrs  T(C): 36.4 (29 Feb 2024 16:00), Max: 37.1 (29 Feb 2024 08:00)  T(F): 97.5 (29 Feb 2024 16:00), Max: 98.8 (29 Feb 2024 08:00)  HR: 72 (29 Feb 2024 16:00) (72 - 90)  BP: 130/77 (29 Feb 2024 16:00) (94/57 - 150/65)  BP(mean): 69 (29 Feb 2024 12:00) (67 - 93)  RR: 18 (29 Feb 2024 16:00) (14 - 28)  SpO2: 97% (29 Feb 2024 16:00) (95% - 100%)    Parameters below as of 29 Feb 2024 16:00  Patient On (Oxygen Delivery Method): room air      CAPILLARY BLOOD GLUCOSE      POCT Blood Glucose.: 134 mg/dL (29 Feb 2024 18:07)    I&O's Summary    28 Feb 2024 07:01  -  29 Feb 2024 07:00  --------------------------------------------------------  IN: 290 mL / OUT: 800 mL / NET: -510 mL    29 Feb 2024 07:01  -  29 Feb 2024 23:00  --------------------------------------------------------  IN: 70 mL / OUT: 0 mL / NET: 70 mL        PHYSICAL EXAM:  GENERAL: NAD, well-developed  HEAD:  Atraumatic, Normocephalic  EYES: EOMI, PERRLA, conjunctiva and sclera clear  NECK: Supple, No JVD  CHEST/LUNG: Clear to auscultation bilaterally; No wheeze  HEART: Regular rate and rhythm; No murmurs, rubs, or gallops  ABDOMEN: Soft, Nontender, Nondistended; Bowel sounds present  EXTREMITIES:  2+ Peripheral Pulses, No clubbing, cyanosis, or edema    LABS:                        8.7    7.57  )-----------( 216      ( 29 Feb 2024 00:40 )             27.7     02-29    144  |  111<H>  |  23  ----------------------------<  113<H>  3.6   |  20<L>  |  1.00    Ca    8.5      29 Feb 2024 00:42  Phos  3.1     02-29  Mg     2.0     02-29    TPro  5.9<L>  /  Alb  3.0<L>  /  TBili  0.7  /  DBili  x   /  AST  20  /  ALT  10  /  AlkPhos  88  02-29    PT/INR - ( 29 Feb 2024 00:40 )   PT: 13.1 sec;   INR: 1.26 ratio         PTT - ( 29 Feb 2024 00:40 )  PTT:27.6 sec      Urinalysis Basic - ( 29 Feb 2024 00:42 )    Color: x / Appearance: x / SG: x / pH: x  Gluc: 113 mg/dL / Ketone: x  / Bili: x / Urobili: x   Blood: x / Protein: x / Nitrite: x   Leuk Esterase: x / RBC: x / WBC x   Sq Epi: x / Non Sq Epi: x / Bacteria: x        RADIOLOGY & ADDITIONAL TESTS:    Imaging Personally Reviewed:    Consultant(s) Notes Reviewed:      Care Discussed with Consultants/Other Providers:

## 2024-02-29 NOTE — PROGRESS NOTE ADULT - SUBJECTIVE AND OBJECTIVE BOX
Follow-up Pulm Progress Note    extubated yesterday afternoon  pt lethargic, opens eyes to voice  ROS unable to be obtained  sats 95% RA         Medications:  MEDICATIONS  (STANDING):  atorvastatin 20 milliGRAM(s) Oral at bedtime  chlorhexidine 2% Cloths 1 Application(s) Topical <User Schedule>  furosemide    Tablet 40 milliGRAM(s) Oral daily  heparin   Injectable 5000 Unit(s) SubCutaneous every 12 hours  meropenem  IVPB 500 milliGRAM(s) IV Intermittent every 12 hours    MEDICATIONS  (PRN):  albuterol/ipratropium for Nebulization 3 milliLiter(s) Nebulizer every 6 hours PRN Wheezing          Vital Signs Last 24 Hrs  T(C): 37.1 (29 Feb 2024 08:00), Max: 37.2 (28 Feb 2024 12:00)  T(F): 98.8 (29 Feb 2024 08:00), Max: 99 (28 Feb 2024 12:00)  HR: 88 (29 Feb 2024 11:00) (75 - 95)  BP: 113/53 (29 Feb 2024 11:00) (94/57 - 158/70)  BP(mean): 76 (29 Feb 2024 11:00) (67 - 101)  RR: 22 (29 Feb 2024 11:00) (12 - 28)  SpO2: 96% (29 Feb 2024 11:00) (95% - 100%)    Parameters below as of 29 Feb 2024 08:00  Patient On (Oxygen Delivery Method): room air        ABG - ( 29 Feb 2024 00:34 )  pH, Arterial: 7.44  pH, Blood: x     /  pCO2: 32    /  pO2: 164   / HCO3: 22    / Base Excess: -1.9  /  SaO2: 99.2                  02-28 @ 07:01  -  02-29 @ 07:00  --------------------------------------------------------  IN: 290 mL / OUT: 800 mL / NET: -510 mL          LABS:                        8.7    7.57  )-----------( 216      ( 29 Feb 2024 00:40 )             27.7     02-29    144  |  111<H>  |  23  ----------------------------<  113<H>  3.6   |  20<L>  |  1.00    Ca    8.5      29 Feb 2024 00:42  Phos  3.1     02-29  Mg     2.0     02-29    TPro  5.9<L>  /  Alb  3.0<L>  /  TBili  0.7  /  DBili  x   /  AST  20  /  ALT  10  /  AlkPhos  88  02-29          CAPILLARY BLOOD GLUCOSE      POCT Blood Glucose.: 125 mg/dL (28 Feb 2024 17:02)    PT/INR - ( 29 Feb 2024 00:40 )   PT: 13.1 sec;   INR: 1.26 ratio         PTT - ( 29 Feb 2024 00:40 )  PTT:27.6 sec  Urinalysis Basic - ( 29 Feb 2024 00:42 )    Color: x / Appearance: x / SG: x / pH: x  Gluc: 113 mg/dL / Ketone: x  / Bili: x / Urobili: x   Blood: x / Protein: x / Nitrite: x   Leuk Esterase: x / RBC: x / WBC x   Sq Epi: x / Non Sq Epi: x / Bacteria: x                  CULTURES:     Culture - Blood (collected 02-27-24 @ 18:45)  Source: .Blood Blood  Preliminary Report (02-28-24 @ 23:02):    No growth at 24 hours    Culture - Blood (collected 02-27-24 @ 18:30)  Source: .Blood Blood  Preliminary Report (02-28-24 @ 23:02):    No growth at 24 hours                      Physical Examination:  PULM: b/l rhonchi   CVS: S1, S2 heard        RADIOLOGY REVIEWED  CXR: RUL obscured by pts head  grossly clear     CT chest:    < from: CT Chest No Cont (02.26.24 @ 08:49) >  FINDINGS:    LUNGS, PLEURA, AND AIRWAYS: No endobronchial lesion. Small bilateral   pleural effusions with partial compressive atelectasis of the lower   lobes, similar to 2/16/2024. There has been some interval improvement of   bilateral upper lobe patchy opacities. Interlobular septal thickening.  MEDIASTINUM AND BERRY: No lymphadenopathy.  VESSELS: Calcification of the aorta and its branches.  HEART: Heart size is normal. Small pericardial effusion, similar to   2/16/2024. Status post TAVR. Mitral annular and coronary artery   calcifications. Left chest wall cardiac device.  CHEST WALL AND LOWER NECK: Enlarged multinodular thyroid gland with a   nodule measuring 2.5 cm on the left.  VISUALIZED UPPER ABDOMEN: Cholecystectomy. Hepatic cyst. Enteric tube   terminates in the distal stomach or proximal duodenum.  BONES: Degenerative changes.    IMPRESSION:    Likely pulmonary edema with small bilateral pleural effusions.    Patchy opacities in the bilateral upper lobes appear slightly improved   compared to 2/16/2024 CT chest.    --- End of Report ---      < end of copied text >

## 2024-02-29 NOTE — PROGRESS NOTE ADULT - SUBJECTIVE AND OBJECTIVE BOX
DATE OF SERVICE: 02-29-24 @ 07:06  --------------------------------------------------------------  Nathaniel Ochoa MD  PGY-2, Internal Medicine  Microsoft Teams preferred  Pager #: [phone], [facility] [phone]  After 7 PM: Night Float Pager  --------------------------------------------------------------      Patient is a 89y old  Female who presents with a chief complaint of L hemorrhagic CVA (28 Feb 2024 07:26)      SUBJECTIVE / OVERNIGHT EVENTS:  No acute events overnight, patient reports feeling well and all questions answered at this time.     Additional Review of Systems:  Denies any other acute sx including f/c, HA, cough, sore throat, eye/ear changes, rhinorrhea, CP, palpitations, SOB, n/v, abdominal pain, back pain, bowel/bladder changes, fatigue, numbness or tingling.    MEDICATIONS  (STANDING):  aspirin enteric coated 81 milliGRAM(s) Oral daily  atorvastatin 20 milliGRAM(s) Oral at bedtime  chlorhexidine 2% Cloths 1 Application(s) Topical <User Schedule>  heparin   Injectable 5000 Unit(s) SubCutaneous every 12 hours  meropenem  IVPB 500 milliGRAM(s) IV Intermittent every 12 hours    MEDICATIONS  (PRN):  albuterol/ipratropium for Nebulization 3 milliLiter(s) Nebulizer every 6 hours PRN Wheezing  fentaNYL    Injectable 25 MICROGram(s) IV Push every 4 hours PRN Moderate Pain (4 - 6)      Vital Signs Last 24 Hrs  T(C): 36.9 (29 Feb 2024 04:00), Max: 37.2 (28 Feb 2024 12:00)  T(F): 98.4 (29 Feb 2024 04:00), Max: 99 (28 Feb 2024 12:00)  HR: 84 (29 Feb 2024 06:00) (75 - 95)  BP: 116/53 (29 Feb 2024 06:00) (94/57 - 158/70)  BP(mean): 76 (29 Feb 2024 06:00) (67 - 101)  RR: 22 (29 Feb 2024 06:00) (12 - 26)  SpO2: 96% (29 Feb 2024 06:00) (96% - 100%)    Parameters below as of 28 Feb 2024 20:00  Patient On (Oxygen Delivery Method): mask, simple face    O2 Concentration (%): 28  CAPILLARY BLOOD GLUCOSE      POCT Blood Glucose.: 125 mg/dL (28 Feb 2024 17:02)  POCT Blood Glucose.: 124 mg/dL (28 Feb 2024 08:52)    I&O's Summary    28 Feb 2024 07:01  -  29 Feb 2024 07:00  --------------------------------------------------------  IN: 290 mL / OUT: 800 mL / NET: -510 mL        PHYSICAL EXAM:  GENERAL: Clinically well-appearing and comfortable  HEAD:  Atraumatic, Normocephalic  EYES: EOMI, PERRL, conjunctiva and sclera clear  NECK: Supple, No JVD  CHEST/LUNG: Clear to auscultation bilaterally; No wheeze  HEART: Regular rate and rhythm; No murmurs, rubs, or gallops  ABDOMEN: Soft, Nontender, Nondistended; Bowel sounds present  EXTREMITIES:  2+ Peripheral Pulses, No clubbing, cyanosis, or edema  PSYCH: Normal mood, Normal affect  NEUROLOGY: non-focal, moving all four extremities  SKIN: No rashes or lesions    LABS:                        8.7    7.57  )-----------( 216      ( 29 Feb 2024 00:40 )             27.7     02-29    144  |  111<H>  |  23  ----------------------------<  113<H>  3.6   |  20<L>  |  1.00    Ca    8.5      29 Feb 2024 00:42  Phos  3.1     02-29  Mg     2.0     02-29    TPro  5.9<L>  /  Alb  3.0<L>  /  TBili  0.7  /  DBili  x   /  AST  20  /  ALT  10  /  AlkPhos  88  02-29    PT/INR - ( 29 Feb 2024 00:40 )   PT: 13.1 sec;   INR: 1.26 ratio         PTT - ( 29 Feb 2024 00:40 )  PTT:27.6 sec      Urinalysis Basic - ( 29 Feb 2024 00:42 )    Color: x / Appearance: x / SG: x / pH: x  Gluc: 113 mg/dL / Ketone: x  / Bili: x / Urobili: x   Blood: x / Protein: x / Nitrite: x   Leuk Esterase: x / RBC: x / WBC x   Sq Epi: x / Non Sq Epi: x / Bacteria: x        RADIOLOGY & ADDITIONAL TESTS:    Imaging Personally Reviewed:    Consultant(s) Notes Reviewed:      Care Discussed with Consultants/Other Providers:   DATE OF SERVICE: 02-29-24 @ 07:06  --------------------------------------------------------------  Nathaniel Ochoa MD  PGY-2, Internal Medicine  Microsoft Teams preferred  Pager #: [phone], [facility] [phone]  After 7 PM: Night Float Pager  --------------------------------------------------------------      Patient is a 89y old  Female who presents with a chief complaint of L hemorrhagic CVA (28 Feb 2024 07:26)      SUBJECTIVE / OVERNIGHT EVENTS:  No acute events overnight, limited subjective 2/2 AMS.     Additional Review of Systems:  Unable to assess: AMS    MEDICATIONS  (STANDING):  aspirin enteric coated 81 milliGRAM(s) Oral daily  atorvastatin 20 milliGRAM(s) Oral at bedtime  chlorhexidine 2% Cloths 1 Application(s) Topical <User Schedule>  heparin   Injectable 5000 Unit(s) SubCutaneous every 12 hours  meropenem  IVPB 500 milliGRAM(s) IV Intermittent every 12 hours    MEDICATIONS  (PRN):  albuterol/ipratropium for Nebulization 3 milliLiter(s) Nebulizer every 6 hours PRN Wheezing  fentaNYL    Injectable 25 MICROGram(s) IV Push every 4 hours PRN Moderate Pain (4 - 6)      Vital Signs Last 24 Hrs  T(C): 36.9 (29 Feb 2024 04:00), Max: 37.2 (28 Feb 2024 12:00)  T(F): 98.4 (29 Feb 2024 04:00), Max: 99 (28 Feb 2024 12:00)  HR: 84 (29 Feb 2024 06:00) (75 - 95)  BP: 116/53 (29 Feb 2024 06:00) (94/57 - 158/70)  BP(mean): 76 (29 Feb 2024 06:00) (67 - 101)  RR: 22 (29 Feb 2024 06:00) (12 - 26)  SpO2: 96% (29 Feb 2024 06:00) (96% - 100%)    Parameters below as of 28 Feb 2024 20:00  Patient On (Oxygen Delivery Method): mask, simple face    O2 Concentration (%): 28  CAPILLARY BLOOD GLUCOSE      POCT Blood Glucose.: 125 mg/dL (28 Feb 2024 17:02)  POCT Blood Glucose.: 124 mg/dL (28 Feb 2024 08:52)    I&O's Summary    28 Feb 2024 07:01  -  29 Feb 2024 07:00  --------------------------------------------------------  IN: 290 mL / OUT: 800 mL / NET: -510 mL        PHYSICAL EXAM:  GENERAL: Frail, supine, comfortable  HEAD:  Atraumatic, Normocephalic  EYES: EOMI, PERRL, conjunctiva and sclera clear  NECK: Supple, No JVD  CHEST/LUNG: Rhonchi over right upper lung, otherwise clear. No wheeze  HEART: Regular rate and rhythm; No murmurs, rubs, or gallops  ABDOMEN: Soft, Nontender, Nondistended; Bowel sounds present  EXTREMITIES:  2+ Peripheral Pulses, No clubbing, cyanosis, or edema  NEUROLOGY: opens eyes to voice, minimally follows commands  SKIN: BUE ecchymoses    LABS:                        8.7    7.57  )-----------( 216      ( 29 Feb 2024 00:40 )             27.7     02-29    144  |  111<H>  |  23  ----------------------------<  113<H>  3.6   |  20<L>  |  1.00    Ca    8.5      29 Feb 2024 00:42  Phos  3.1     02-29  Mg     2.0     02-29    TPro  5.9<L>  /  Alb  3.0<L>  /  TBili  0.7  /  DBili  x   /  AST  20  /  ALT  10  /  AlkPhos  88  02-29    PT/INR - ( 29 Feb 2024 00:40 )   PT: 13.1 sec;   INR: 1.26 ratio         PTT - ( 29 Feb 2024 00:40 )  PTT:27.6 sec      Urinalysis Basic - ( 29 Feb 2024 00:42 )    Color: x / Appearance: x / SG: x / pH: x  Gluc: 113 mg/dL / Ketone: x  / Bili: x / Urobili: x   Blood: x / Protein: x / Nitrite: x   Leuk Esterase: x / RBC: x / WBC x   Sq Epi: x / Non Sq Epi: x / Bacteria: x        RADIOLOGY & ADDITIONAL TESTS:    Imaging Personally Reviewed:    Consultant(s) Notes Reviewed:      Care Discussed with Consultants/Other Providers:

## 2024-02-29 NOTE — PROGRESS NOTE ADULT - ASSESSMENT
Assessment  89-year-old left-handed woman with past medical history of CHF, HTN, pacemaker placement, valve replacement presenting as a code stroke for AMS, ?R-facial droop, R sided weakness. Was found by home aide at 830 2/14 less responsive, not moving extremities as usually does. No prior known hx strokes per family. At baseline patient alert, able to recognize/comprehend familiar faces, per daughters at bedside patient sometimes has difficulty with getting words out. Not candidate for tenecteplase as area of hypodensity already appreciated on CT head imaging/age, not candidate for thrombectomy given higher MRS.    IMPRESSION: Global aphasia, R hemiparesis due to L MCA infarct with associated hemorrhagic transformation 2/2 L ICA partially occlusive thrombus. Mechanism ESUS vs. ICAD. Intubated 2/27 i/s/o c/f aspiration PNA vs pneumonitis, now extubated.    Recommendations:  [] Maintain -140  [] PT/OT/speech eval  [] DVT prophylaxis currently on heparin BID  ASA 81 daily  [] trend Hgb  [] Recommending continuing goals of care conversations    CORE MEASURES:        Admission NIHSS: 18     TPA: NO      LDL/HDL: 76/45     Depression Screen: unable to assess     Statin Therapy: yes,      Dysphagia Screen: FAIL     Smoking NO      Afib NO     Stroke Education YES    Patient to be seen with stroke consult attending on rounds. Note finalized upon attending attestation.

## 2024-02-29 NOTE — CHART NOTE - NSCHARTNOTEFT_GEN_A_CORE
Nutrition Follow Up Note  Patient seen for: Malnutrition follow-up     Chart reviewed, events noted.    Source: [] Patient       [x] Medical Record        [] RN        [x] Family at bedside - pt's 2 daughters      [] Other:    -If unable to interview patient: [] Trach/Vent/BiPAP  [x] Disoriented/confused/inappropriate to interview    Diet Order:   Diet, NPO after Midnight:      NPO Start Date: 2024,   NPO Start Time: 23:59 (24)  Diet, NPO with Tube Feed:   Tube Feeding Modality: Nasogastric  Jevity 1.2 Cristo (JEVITY1.2RTH)  Total Volume for 24 Hours (mL): 840  Continuous  Starting Tube Feed Rate {mL per Hour}: 10  Increase Tube Feed Rate by (mL): 10     Every 3 hours  Until Goal Tube Feed Rate (mL per Hour): 35  Tube Feed Duration (in Hours): 24  Tube Feed Start Time: 11:00    Start Time: 21:00 (24)        EN provision x4 days:  -: EN held secondary to respiratory distress  per chart, continues to be held for potential g-tube placement via IR  : 280 mL formula (33% of goal)  : 140 mL formula (17% of goal)  - Inadequate provision noted, NPO day 2, pt NPO x8 days prior to TF initiation, pt at HIGH RISK for refeeding syndrome* see below    - Nutrition-related concerns:  - Family noting pt with very good appetite typically, follows a low carbohydrate diet, does not eat rice, bread, pasta, etc. Daughters endorsing pt with trouble chewing/swallowing, noting she would cough when drinking water and needed food cut up into small pieces  - Family voicing concern for prolonged lack of nutrition, pt NPO day 2 s/p attempt at PEG placement endoscopically - unsuccessful, now with plan for IR placement of g-tube as family declining surgical placement at this time  - GOC discussions ongoing  - Ordered for IV NaCl 0.9% @ 50mL/hr at this time  - Noted A1C 6.3% (2/15), indicates good glycemic control with respect for age  - Renal: JOLLY resolved per chart, renal electrolytes WDL, continues ordered for lasix, of note pt with moderate edema of the right arm    GI: No N/V or diarrhea/constipation noted in chart, Last BM  per chart.   Bowel Regimen? [] Yes   [x] No    Weights:   Daily Weight in k.4 ()  RD obtained bedscale wt: 71 pounds ()/32.2 kg        Drug Dosing Weight  Weight (kg): 43.091 (2024 16:30)    Nutritionally Pertinent:   MEDICATIONS  (STANDING):  atorvastatin 20 milliGRAM(s) Oral at bedtime  furosemide    Tablet 40 milliGRAM(s) Oral daily  heparin   Injectable 5000 Unit(s) SubCutaneous every 12 hours  meropenem  IVPB 500 milliGRAM(s) IV Intermittent every 12 hours    Pertinent Labs:  @ 00:42: Na 144, BUN 23, Cr 1.00, <H>, K+ 3.6, Phos 3.1, Mg 2.0, Alk Phos 88, ALT/SGPT 10, AST/SGOT 20    A1C with Estimated Average Glucose Result: 6.3 % (02-15-24 @ 05:25)    Finger Sticks:  POCT Blood Glucose.: 125 mg/dL ( @ 17:02)    Triglycerides, Serum: 79 mg/dL (02-15-24 @ 05:25)    Skin per nursing documentation: Per flowsheet, no pressure injuries noted    Edema: (+3) R arm    Estimated Needs:   [x] no change since previous assessment  Needs based on dosing wt 43.1 kg ()  Estimated kcal needs: 28-33 kcal/kg (3050-8098 kcal/day)  Estimated protein needs: 1.1-1.3 g pro/kg (47-56 g pro/day)  Fluid needs deferred to team.     Previous Nutrition Diagnosis: (1) Severe acute on chronic malnutrition, (2) Underweight  Nutrition Diagnosis is: [x] ongoing - Being addressed with EN, micronutrients     New Nutrition Diagnosis: [x] Not applicable    Nutrition Care Plan:  [x] In Progress  [] Achieved  [] Not applicable    Nutrition Interventions:     Education Provided:       [x] Yes: spoke with daughters about EN and NGT vs. PEG, answered all questions       Recommendations:       1) Defer EN re-initiation to team, if warranted, new EN recommendation of Jevity 1.2 initiated at 10mL/hr and titrated up to GOAL of 45mL/hr x 24hr, total of 1080 mL formula/day. Provides 1296 kcal/day, 60g pro/day, and 872mL fluid/day, meeting 30kcal/kg and 1.4g pro/kg based on dosing wt 43.1kg. Defer free water flushes to team.    2) Add multivitamin and thiamine daily for micronutrient coverage pending no medical contraindications as pt at HIGH RISK FOR REFEEDING SYNDROME - monitor phosphorus, magnesium, and potassium and replenish prior to re-initiation or advancement of EN   3) Continue to monitor goals of care as they relate to nutrition management     Monitoring and Evaluation:   Continue to monitor nutritional intake, tolerance to diet prescription, weights, labs, skin integrity    Nutrition Follow Up Note  Patient seen for:    Chart reviewed, events noted.    Source: [] Patient       [x] Medical Record        [] RN        [] Family at bedside       [] Other:    -If unable to interview patient: [] Trach/Vent/BiPAP  [] Disoriented/confused/inappropriate to interview    Nutrition-Related Events:   - Pressors:  [] Yes    [] No   - Propofol:  [] Yes    [] No         - Rate: __mL/hr. If maintained x 24 hours, propofol will provide:     Diet Order:   Diet, NPO with Tube Feed:   Tube Feeding Modality: Gastrostomy  Jevity 1.2 Cristo (JEVITY1.2RTH)  Total Volume for 24 Hours (mL): 630  Intermittent  Until Goal Tube Feed Rate (mL per Hour): 35  Tube Feeding Hours ON: 18  Tube Feeding OFF (Hours): 6  Tube Feed Start Time: 11:00    Start Time: 21:00 (24)      EN Order Provides: total volume    mL,    kcals,   gm protein, and   mL free water. Meets   kcals/kG and  gm protein/kG based on   Current Pump Rate: Currently on hold per MICU 18 hr policy. Was at  mL/hr  5-Day EN Average Provision per RN flowsheet: mL,  kcals, and  gm protein    Is current diet order appropriate/adequate? See recommendations below    PO intake :   [] >75%  Adequate    [] 50-75%  Fair       [] <50%  Poor   [x] N/A    Nutrition-related concerns:    GI:  Last BM ___.   Bowel Regimen? [] Yes   [] No  NGT Output:  IV Fluids:  Ostomy Output:  Fistula Output:     Weights:         Skin per nursing documentation:   Edema per nursing documentation:     Estimated Energy Needs:  Estimated Protein Needs:  Estimated Fluid Needs:   [place] State Equation:    Previous Nutrition Diagnosis:   Nutrition Diagnosis is: [] ongoing  [] resolved [] not applicable     Nutrition Care Plan:  [] In Progress  [] Achieved  [] Not applicable    New Nutrition Diagnosis: [] Not applicable    Nutrition Interventions:     Education Provided:       [] Yes:  [] No:     Recommendations:        Monitoring and Evaluation:   Continue to monitor nutritional intake, tolerance to diet prescription, weights, labs, skin integrity    RD remains available upon request and will follow up per protocol  Joi Farley RD, MS, CDN, CNSC, CDCES TEAMS Nutrition Follow Up Note  Patient seen for: Malnutrition follow-up.  Chart reviewed and events noted.     Source: [] Patient       [x] Medical Record        [x] RN        [] Family at bedside       [x] Other: Interdisciplinary Rounds     -If unable to interview patient: [] Trach/Vent/BiPAP  [x] Disoriented/confused/inappropriate to interview    Diet Order:   Diet, NPO with Tube Feed:   Tube Feeding Modality: Gastrostomy  Jevity 1.2 Cristo (JEVITY1.2RTH)  Total Volume for 24 Hours (mL): 630  Intermittent  Until Goal Tube Feed Rate (mL per Hour): 35  Tube Feeding Hours ON: 18  Tube Feeding OFF (Hours): 6  Tube Feed Start Time: 11:00    Start Time: 21:00 (24)    EN Order Provides: total volume 630 mL, 756 kcals, 35 gm protein, and 508 mL free water. Meets 17.5 kcals/kG and 0.8 gm protein/kG based on dosing wt 43.1 kg.  Current Pump Rate: Currently on hold per MICU 18 hr policy. Was at 35 mL/hr  Feeds resumed ; Hx of prolonged inadequate provision of nutrition     Is current diet order appropriate/adequate? See recommendations below    PO intake :   [] >75%  Adequate    [] 50-75%  Fair       [] <50%  Poor   [x] N/A    Nutrition-related concerns:  - Extubated    - PEG tube placement .   - CT chest on  with pulmonary edema and bilateral pleural effusions.    GI: Last BM 2/28 x4.   Bowel Regimen? [] Yes   [x] No    Weights:   Daily Weight in k.4 ()  RD obtained bedscale wt: 71 lbs/32.2 kg ()  Wt fluctuations noted and likely secondary to fluid shifts as pt diuresed vs inaccurate bed scale  RD will continue to trend as new wts available/able.     Drug Dosing Weight  Height (cm): 152.4 (2024 10:03)  Weight (kg): 43.091 (2024 16:30)  BMI (kg/m2): 18.6 (2024 10:03)    Nutritionally Pertinent:   MEDICATIONS  (STANDING):  atorvastatin 20 milliGRAM(s) Oral at bedtime  furosemide    Tablet 40 milliGRAM(s) Oral daily  heparin   Injectable 5000 Unit(s) SubCutaneous every 12 hours  meropenem  IVPB 500 milliGRAM(s) IV Intermittent every 12 hours    Pertinent Labs:  @ 00:42: Na 144, BUN 23, Cr 1.00, <H>, K+ 3.6, Phos 3.1, Mg 2.0, Alk Phos 88, ALT/SGPT 10, AST/SGOT 20    A1C with Estimated Average Glucose Result: 6.3 % (02-15-24 @ 05:25)    Finger Sticks:  POCT Blood Glucose.: 125 mg/dL ( @ 17:02)    Triglycerides, Serum: 79 mg/dL (02-15-24 @ 05:25)    Skin per wound care documentation : bilateral sacrum/buttock deep tissue injury.  Edema per nursing documentation: None noted thus far today     Based on dosing wt 43.1 kg with consideration for age, pressure injury, and malnutrition   Estimated Energy Needs: (28-33 kcal/kg) 3189-4037 kcals  Estimated Protein Needs: (1.2-1.5 g pro/kg) 52-65 gm protein   Fluid needs deferred to team.     Previous Nutrition Diagnosis: Severe acute on chronic malnutrition + Underweight  Nutrition Diagnosis is: [x] ongoing  [] resolved [] not applicable     New Nutrition Diagnosis: Increased protein-energy needs related to increased physiological demand for nutrients as evidenced by pressure injury     Nutrition Care Plan:  [x] In Progress  [] Achieved  [] Not applicable    Nutrition Interventions:     Education Provided:       [] Yes:  [x] No: N/A    Recommendations:      1) Jevity 1.2 at 30 mL/hr and titrated up to GOAL of 45mL/hr x 24hr to provide total volume 1080 mL, 1296 kcals, 60 gm protein, and 872 mL free water. Meets 30 kcals/kG and 1.4 gm protein/kG based on dosing wt 43.1 kg.   -> Roberto bid to aid in wound healing.   -> Fluid needs deferred to provider.   -> HIGH RISK FOR REFEEDING SYNDROME; monitor phosphorus, magnesium, and potassium  2) As medically feasible, provide multivitamin and thiamine for refeeding risk & Vitamin C for wound healing.    Monitoring and Evaluation:   Continue to monitor nutritional intake, tolerance to diet prescription, weights, labs, skin integrity    RD remains available upon request and will follow up per protocol  Joi Farley, SEBASTIEN, MS, CDN, CNSC, CDCES TEAMS

## 2024-02-29 NOTE — PROGRESS NOTE ADULT - SUBJECTIVE AND OBJECTIVE BOX
Neurology Progress Note    SUBJECTIVE/OBJECTIVE/INTERVAL EVENTS: Patient seen and examined at bedside w/ neuro attending and team.     INTERVAL HISTORY: Patient extubated    VITALS & EXAMINATION:  Vital Signs Last 24 Hrs  T(C): 36.8 (29 Feb 2024 12:00), Max: 37.1 (28 Feb 2024 16:00)  T(F): 98.2 (29 Feb 2024 12:00), Max: 98.8 (28 Feb 2024 16:00)  HR: 79 (29 Feb 2024 12:00) (75 - 95)  BP: 109/48 (29 Feb 2024 12:00) (94/57 - 158/70)  BP(mean): 69 (29 Feb 2024 12:00) (67 - 101)  RR: 19 (29 Feb 2024 12:00) (12 - 28)  SpO2: 95% (29 Feb 2024 12:00) (95% - 100%)    NEUROLOGICAL EXAM:  Mental status: Opens open briefly to tactile stimuli, tracks examiner. Does not respond to questions, groans intermittently. does not follow simple commands  Cranial Nerves: No facial asymmetry. Does not BTT on R or L lateral fields  Motor exam: Normal tone, slight effort spontaneous against gravity throughout all extremities more notable on L, no movement on R. Arthritic changes throughout extremities.  Sensation: Grimaces to painful stimuli throughout  Coordination/ Gait: Deferred      LABORATORY:  CBC                       8.7    7.57  )-----------( 216      ( 29 Feb 2024 00:40 )             27.7     Chem 02-29    144  |  111<H>  |  23  ----------------------------<  113<H>  3.6   |  20<L>  |  1.00    Ca    8.5      29 Feb 2024 00:42  Phos  3.1     02-29  Mg     2.0     02-29    TPro  5.9<L>  /  Alb  3.0<L>  /  TBili  0.7  /  DBili  x   /  AST  20  /  ALT  10  /  AlkPhos  88  02-29    LFTs LIVER FUNCTIONS - ( 29 Feb 2024 00:42 )  Alb: 3.0 g/dL / Pro: 5.9 g/dL / ALK PHOS: 88 U/L / ALT: 10 U/L / AST: 20 U/L / GGT: x           Coagulopathy PT/INR - ( 29 Feb 2024 00:40 )   PT: 13.1 sec;   INR: 1.26 ratio         PTT - ( 29 Feb 2024 00:40 )  PTT:27.6 sec  Lipid Panel 02-15 Chol 136 LDL -- HDL 45<L> Trig 79  A1c   Cardiac enzymes     U/A Urinalysis Basic - ( 29 Feb 2024 00:42 )    Color: x / Appearance: x / SG: x / pH: x  Gluc: 113 mg/dL / Ketone: x  / Bili: x / Urobili: x   Blood: x / Protein: x / Nitrite: x   Leuk Esterase: x / RBC: x / WBC x   Sq Epi: x / Non Sq Epi: x / Bacteria: x    STUDIES & IMAGING: (EEG, CT, MR, U/S, TTE/DAVIN):    Redemonstration of recent large left MCA territory infarct.  Resolving hemorrhagic transformation.  Decreased mass effect upon the left lateral ventricle.  Slightly decreased rightward midline shift.  Basal cisterns well visualized.    RUE duplex  There is a short segment, focal, nonobstructive mural thrombus in the   right subclavian vein.  Superficial thrombophlebitis affects the right cephalic vein.

## 2024-02-29 NOTE — PROGRESS NOTE ADULT - SUBJECTIVE AND OBJECTIVE BOX
Subjective: Patient seen and examined. No new events except as noted.   moved out of ICU     REVIEW OF SYSTEMS:  Unable to obtain    MEDICATIONS:  MEDICATIONS  (STANDING):  atorvastatin 20 milliGRAM(s) Oral at bedtime  chlorhexidine 2% Cloths 1 Application(s) Topical <User Schedule>  furosemide    Tablet 40 milliGRAM(s) Oral daily  heparin   Injectable 5000 Unit(s) SubCutaneous every 12 hours      PHYSICAL EXAM:  T(C): 36.4 (02-29-24 @ 16:00), Max: 37.1 (02-28-24 @ 20:00)  HR: 72 (02-29-24 @ 16:00) (72 - 90)  BP: 130/77 (02-29-24 @ 16:00) (94/57 - 150/65)  RR: 18 (02-29-24 @ 16:00) (12 - 28)  SpO2: 97% (02-29-24 @ 16:00) (95% - 100%)  Wt(kg): --  I&O's Summary    28 Feb 2024 07:01  -  29 Feb 2024 07:00  --------------------------------------------------------  IN: 290 mL / OUT: 800 mL / NET: -510 mL    29 Feb 2024 07:01  -  29 Feb 2024 19:34  --------------------------------------------------------  IN: 70 mL / OUT: 0 mL / NET: 70 mL      Height (cm): 152.4 (02-29 @ 10:03)        Appearance: NAD  HEENT:   Dry oral mucosa, PERRL, EOMI	  Lymphatic: No lymphadenopathy  Cardiovascular: Normal S1 S2, No JVD, No murmurs, No edema  Respiratory: Decreased bs  Psychiatry: A & O x 0 sleepy  Gastrointestinal:  Soft, Non-tender, + Gtube  Skin: No rashes, No ecchymoses, No cyanosis	  Neurologic Exam:  Mental status - eyes closed, opens to repeated verbal stimuli. Follows intermittent simple commands to squeeze L hand/give thumbs up in L hand. Does not respond to orientation questions.   Cranial nerves - R pupil appears ?sluggishly reactive. No BTT in R eye. Unable to open left eye. ?R facial droop but may be 2/2 positioning of patient's head to R.  Motor - Normal bulk. Increased tone in RUE. Does not maintain antigravity when asked throughout all extremities initially, however does squeeze hand on LUE, withdraws slightly to noxious stimuli in R hand.   Upon re-evaluation able to raise both legs antigravity.  Sensation - Withdraws to noxious stimuli throughout.  DTR's - deferred  Coordination -deferred  Gait and station - deferred  Extremities: Normal range of motion, No clubbing, cyanosis or edema  Vascular: Peripheral pulses palpable 2+ bilaterally              LABS:    CARDIAC MARKERS:                                8.7    7.57  )-----------( 216      ( 29 Feb 2024 00:40 )             27.7     02-29    144  |  111<H>  |  23  ----------------------------<  113<H>  3.6   |  20<L>  |  1.00    Ca    8.5      29 Feb 2024 00:42  Phos  3.1     02-29  Mg     2.0     02-29    TPro  5.9<L>  /  Alb  3.0<L>  /  TBili  0.7  /  DBili  x   /  AST  20  /  ALT  10  /  AlkPhos  88  02-29    proBNP:   Lipid Profile:   HgA1c:   TSH:     Present          TELEMETRY: 	    ECG:  	  RADIOLOGY:   DIAGNOSTIC TESTING:  [ ] Echocardiogram:  [ ]  Catheterization:  [ ] Stress Test:    OTHER:

## 2024-03-01 LAB
CULTURE RESULTS: ABNORMAL
GLUCOSE BLDC GLUCOMTR-MCNC: 137 MG/DL — HIGH (ref 70–99)
GLUCOSE BLDC GLUCOMTR-MCNC: 141 MG/DL — HIGH (ref 70–99)
GLUCOSE BLDC GLUCOMTR-MCNC: 153 MG/DL — HIGH (ref 70–99)
GLUCOSE BLDC GLUCOMTR-MCNC: 158 MG/DL — HIGH (ref 70–99)
ORGANISM # SPEC MICROSCOPIC CNT: ABNORMAL
ORGANISM # SPEC MICROSCOPIC CNT: ABNORMAL
SPECIMEN SOURCE: SIGNIFICANT CHANGE UP

## 2024-03-01 PROCEDURE — 71045 X-RAY EXAM CHEST 1 VIEW: CPT | Mod: 26

## 2024-03-01 RX ORDER — IPRATROPIUM/ALBUTEROL SULFATE 18-103MCG
3 AEROSOL WITH ADAPTER (GRAM) INHALATION EVERY 8 HOURS
Refills: 0 | Status: DISCONTINUED | OUTPATIENT
Start: 2024-03-01 | End: 2024-03-07

## 2024-03-01 RX ORDER — ACETYLCYSTEINE 200 MG/ML
4 VIAL (ML) MISCELLANEOUS
Refills: 0 | Status: DISCONTINUED | OUTPATIENT
Start: 2024-03-01 | End: 2024-03-07

## 2024-03-01 RX ADMIN — ATORVASTATIN CALCIUM 20 MILLIGRAM(S): 80 TABLET, FILM COATED ORAL at 21:20

## 2024-03-01 RX ADMIN — HEPARIN SODIUM 5000 UNIT(S): 5000 INJECTION INTRAVENOUS; SUBCUTANEOUS at 17:12

## 2024-03-01 RX ADMIN — HEPARIN SODIUM 5000 UNIT(S): 5000 INJECTION INTRAVENOUS; SUBCUTANEOUS at 05:39

## 2024-03-01 RX ADMIN — Medication 40 MILLIGRAM(S): at 05:39

## 2024-03-01 RX ADMIN — CHLORHEXIDINE GLUCONATE 1 APPLICATION(S): 213 SOLUTION TOPICAL at 05:38

## 2024-03-01 RX ADMIN — Medication 4 MILLILITER(S): at 17:17

## 2024-03-01 RX ADMIN — Medication 4 MILLILITER(S): at 23:44

## 2024-03-01 RX ADMIN — Medication 3 MILLILITER(S): at 12:51

## 2024-03-01 RX ADMIN — Medication 3 MILLILITER(S): at 21:20

## 2024-03-01 NOTE — PROGRESS NOTE ADULT - ASSESSMENT
89-year-old with CHF, HTN, PPM, TAVR, who presented as a code stroke for AMS, R facial droop and R hemiparesis found to have L MCA infarct with hemorrhagic transformation. PEG tube placement 2/27. Transferred to MICU after patient intubated following oxygen saturation decreasing down to 30s-40s few hours after PEG tube placement.       PLAN  =====Neurologic=====  #CVA  #Hemorrhagic transformation  Patient presented with increased AMS (baseline patient with neurocognitive difficulties with ability to recognize familiar faces and often trouble getting words out), possible R sided facial droop and R hemiparesis. Code stroke called, but not candidate for tenecteplase given time course and higher MRS.   Continues to have global aphasia and R hemiparesis  CT scans from 2/14-2/17 with evolving L MCA distribution infarct with hemorrhagic transformation and 8-9mm rightward shift.   - repeat CT head 2/27 with resolving hemorrhagic infarct and decrease in midline shift  - Per neuro:  - SBP goal 110-140.   - atorvastatin 40mg with LDL goal < 70, consider medication de-escalation given age    =====Pulmonary=====  #AHRF  #Aspiration  #Mechanical Ventilation  vent settings: 16/350/5/40, will reassess after repeat ABG  Patient O2 sat at 30s-40s after PEG placement. Possible 2/2 aspiration. Intubated as per GOC. CT chest on 2/26 with pulmonary edema and bilateral pleural effusions.   - mucomyst qid  - treatment of possible aspiration PNA/pneumonitis as below  - diuresis as below    =====Cardiovascular=====  #HFpEF  Patient with recent TTE with moderate (grade 2) diastolic dysfunction.  -Additionally, CT chest 2/26 with pulmonary edema and bilateral pleural effusions, was given lasix 20mg IV x1.   - continue diuresis with bumex 1mg     #Valvular dysfunction  Severe mitral stenosis seen on TTE 2/14.  s/p TAVR, no aortic regurgitation    #HTN  Patient currently normotensive.     #PPM  Patient with pacemaker, interrogated by cardiology per notes  - VVI mode    =====GI=====  #PEG placement  Patient with PEG placed on 2/27 after GOC discussions with family.   - f/u IR regarding PEG tube usage 2/28    =====Renal/=====  #Electrolytes  - Maintain K>4, Phos>3, Mag>2, iCal>1    =====Endocrine=====  #NPO  - fingersticks q6h while NPO    =====Infectious Disease=====  #?Aspiration pneumonitis vs pneumonia  Patient's desaturations likely 2/2 aspiration. Now with leukocytosis to 11.   - Bcx2  - sputum culture  - empiric darian 3d course through 2/29 given possible zosyn allergy as listed penicillin allergy  pt has cloudy urine   - ua and uc to r/o uti    =====Heme/Onc=====  #Anemia  Hgb 6.7, repeat 6.8 this AM. s/p 1 u pRBC 2/27 AM , with repeat Hgb 9.8 (likely concentrated).  No overt source of bleeding. MCV normocytic.   Baseline Hgb in 8s.   - CTM Hgb  - transfuse if Hgb <7    #DVT Ppx  - Lovenox 30mg SQ qd given resolving hemorrhagic transformation on head CT    =====Ethics=====  FULL CODE, palliative consulted previously, saw patient with discussion involving that patient to be full code.

## 2024-03-01 NOTE — PROGRESS NOTE ADULT - ASSESSMENT
89F presented with a stroke    1. Stroke     2. Dysphagia   s/p ir g tube, doing well    3. respiratory failure now extubated        Jovon Ugalde D.O.  Gastroenterology and Hepatology  [address] [facility], [address]  [place], [place]  Office: [phone]

## 2024-03-01 NOTE — PROGRESS NOTE ADULT - SUBJECTIVE AND OBJECTIVE BOX
Chief Complaint:  Patient is a 89y old  Female who presents with a chief complaint of CVA (29 Feb 2024 07:05)      Date of service 03-01-24 @ 12:26      Interval Events: no GI complaints    Hospital Medications:  albuterol/ipratropium for Nebulization 3 milliLiter(s) Nebulizer every 8 hours  atorvastatin 20 milliGRAM(s) Oral at bedtime  chlorhexidine 2% Cloths 1 Application(s) Topical <User Schedule>  furosemide    Tablet 40 milliGRAM(s) Oral daily  heparin   Injectable 5000 Unit(s) SubCutaneous every 12 hours  sodium chloride 0.9% lock flush 10 milliLiter(s) IV Push daily PRN            PHYSICAL EXAM:   Vital Signs:  Vital Signs Last 24 Hrs  T(C): 36.5 (01 Mar 2024 05:34), Max: 37.2 (01 Mar 2024 00:56)  T(F): 97.7 (01 Mar 2024 05:34), Max: 98.9 (01 Mar 2024 00:56)  HR: 75 (01 Mar 2024 05:34) (72 - 89)  BP: 118/60 (01 Mar 2024 05:34) (97/60 - 130/77)  BP(mean): --  RR: 16 (01 Mar 2024 05:34) (16 - 18)  SpO2: 99% (01 Mar 2024 05:34) (95% - 99%)    Parameters below as of 01 Mar 2024 00:56  Patient On (Oxygen Delivery Method): room air      Daily     Daily       PHYSICAL EXAM:     GENERAL:  Appears stated age, well-groomed, well-nourished, no distress  HEENT:  NC/AT,  conjunctivae anicteric, clear and pink,   NECK: supple, trachea midline  CHEST:  Full & symmetric excursion, no increased effort, breath sounds clear  HEART:  Regular rhythm, no JVD  ABDOMEN:  Soft, non-tender, non-distended, normoactive bowel sounds,  no masses , no hepatosplenomegaly  EXTREMITIES:  no cyanosis,clubbing or edema  SKIN:  No rash, erythema, or, ecchymoses, no jaundice  NEURO:  Alert, non-focal, no asterixis  PSYCH: Appropriate affect, oriented to place and time  RECTAL: Deferred      LABS Personally reviewed by me:                        8.7    7.57  )-----------( 216      ( 29 Feb 2024 00:40 )             27.7       02-29    144  |  111<H>  |  23  ----------------------------<  113<H>  3.6   |  20<L>  |  1.00    Ca    8.5      29 Feb 2024 00:42  Phos  3.1     02-29  Mg     2.0     02-29    TPro  5.9<L>  /  Alb  3.0<L>  /  TBili  0.7  /  DBili  x   /  AST  20  /  ALT  10  /  AlkPhos  88  02-29    LIVER FUNCTIONS - ( 29 Feb 2024 00:42 )  Alb: 3.0 g/dL / Pro: 5.9 g/dL / ALK PHOS: 88 U/L / ALT: 10 U/L / AST: 20 U/L / GGT: x           PT/INR - ( 29 Feb 2024 00:40 )   PT: 13.1 sec;   INR: 1.26 ratio         PTT - ( 29 Feb 2024 00:40 )  PTT:27.6 sec  Urinalysis Basic - ( 29 Feb 2024 00:42 )    Color: x / Appearance: x / SG: x / pH: x  Gluc: 113 mg/dL / Ketone: x  / Bili: x / Urobili: x   Blood: x / Protein: x / Nitrite: x   Leuk Esterase: x / RBC: x / WBC x   Sq Epi: x / Non Sq Epi: x / Bacteria: x                              8.7    7.57  )-----------( 216      ( 29 Feb 2024 00:40 )             27.7                         9.5    10.23 )-----------( 212      ( 28 Feb 2024 06:32 )             30.6                         9.8    11.53 )-----------( 213      ( 27 Feb 2024 18:49 )             30.7       Imaging personally reviewed by me:

## 2024-03-01 NOTE — CHART NOTE - NSCHARTNOTEFT_GEN_A_CORE
Met with daughters at bedside upon transfer out of MICU to 8m floor bed.  They were confused as to why we where called.  We confirmed care goals as previously, for all manner of interventions to prolong life.  Medical decision making is guided by Rabbinical counseling.  Full code, ok to intubate, peg placed and feeds to continue.      Patient to be discharged from the [facility] team consult service today.  Please call [phone] if we can be of further assistance.     Ary Love MD MSEavila FACP  Available on Teams during regular business hours  Pager after hours [phone]  Division of Geriatrics and Palliative Medicine

## 2024-03-01 NOTE — PROGRESS NOTE ADULT - SUBJECTIVE AND OBJECTIVE BOX
Follow-up Pulm Progress Note    pt seems more alert today  opening eyes to voice  nonlabored  sats 99% on RA    Medications:  MEDICATIONS  (STANDING):  atorvastatin 20 milliGRAM(s) Oral at bedtime  chlorhexidine 2% Cloths 1 Application(s) Topical <User Schedule>  furosemide    Tablet 40 milliGRAM(s) Oral daily  heparin   Injectable 5000 Unit(s) SubCutaneous every 12 hours    MEDICATIONS  (PRN):  albuterol/ipratropium for Nebulization 3 milliLiter(s) Nebulizer every 6 hours PRN Wheezing  sodium chloride 0.9% lock flush 10 milliLiter(s) IV Push daily PRN For PEG          Vital Signs Last 24 Hrs  T(C): 36.5 (01 Mar 2024 05:34), Max: 37.2 (01 Mar 2024 00:56)  T(F): 97.7 (01 Mar 2024 05:34), Max: 98.9 (01 Mar 2024 00:56)  HR: 75 (01 Mar 2024 05:34) (72 - 90)  BP: 118/60 (01 Mar 2024 05:34) (97/60 - 150/65)  BP(mean): 69 (29 Feb 2024 12:00) (69 - 93)  RR: 16 (01 Mar 2024 05:34) (16 - 24)  SpO2: 99% (01 Mar 2024 05:34) (95% - 99%)    Parameters below as of 01 Mar 2024 00:56  Patient On (Oxygen Delivery Method): room air        ABG - ( 29 Feb 2024 00:34 )  pH, Arterial: 7.44  pH, Blood: x     /  pCO2: 32    /  pO2: 164   / HCO3: 22    / Base Excess: -1.9  /  SaO2: 99.2                  02-29 @ 07:01  -  03-01 @ 07:00  --------------------------------------------------------  IN: 420 mL / OUT: 500 mL / NET: -80 mL          LABS:                        8.7    7.57  )-----------( 216      ( 29 Feb 2024 00:40 )             27.7     02-29    144  |  111<H>  |  23  ----------------------------<  113<H>  3.6   |  20<L>  |  1.00    Ca    8.5      29 Feb 2024 00:42  Phos  3.1     02-29  Mg     2.0     02-29    TPro  5.9<L>  /  Alb  3.0<L>  /  TBili  0.7  /  DBili  x   /  AST  20  /  ALT  10  /  AlkPhos  88  02-29          CAPILLARY BLOOD GLUCOSE      POCT Blood Glucose.: 141 mg/dL (01 Mar 2024 00:57)    PT/INR - ( 29 Feb 2024 00:40 )   PT: 13.1 sec;   INR: 1.26 ratio         PTT - ( 29 Feb 2024 00:40 )  PTT:27.6 sec  Urinalysis Basic - ( 29 Feb 2024 00:42 )    Color: x / Appearance: x / SG: x / pH: x  Gluc: 113 mg/dL / Ketone: x  / Bili: x / Urobili: x   Blood: x / Protein: x / Nitrite: x   Leuk Esterase: x / RBC: x / WBC x   Sq Epi: x / Non Sq Epi: x / Bacteria: x                CULTURES:     Culture - Blood (collected 02-27-24 @ 18:45)  Source: .Blood Blood  Gram Stain (02-29-24 @ 13:29):    Growth in aerobic bottle: Gram Positive Rods  Preliminary Report (02-29-24 @ 13:29):    Growth in aerobic bottle: Gram Positive Rods    Direct identification is available within approximately 3-5    hours either by Blood Panel Multiplexed PCR or Direct    MALDI-TOF. Details: [email or web address]  Organism: Blood Culture PCR (02-29-24 @ 13:56)  Organism: Blood Culture PCR (02-29-24 @ 13:56)      Method Type: PCR      -  Blood PCR Panel: NEG    Culture - Blood (collected 02-27-24 @ 18:30)  Source: .Blood Blood  Preliminary Report (02-29-24 @ 23:02):    No growth at 48 Hours        Culture - Urine (collected 02-28-24 @ 09:13)  Source: Catheterized Catheterized  Final Report (02-29-24 @ 14:10):    <10,000 CFU/mL Normal Urogenital Clau              Physical Examination:  PULM: few scattered rhonchi bilaterally   CVS: S1, S2 heard        RADIOLOGY REVIEWED  CXR: RUL obscured by pts head  grossly clear     CT chest:    < from: CT Chest No Cont (02.26.24 @ 08:49) >  FINDINGS:    LUNGS, PLEURA, AND AIRWAYS: No endobronchial lesion. Small bilateral   pleural effusions with partial compressive atelectasis of the lower   lobes, similar to 2/16/2024. There has been some interval improvement of   bilateral upper lobe patchy opacities. Interlobular septal thickening.  MEDIASTINUM AND BERRY: No lymphadenopathy.  VESSELS: Calcification of the aorta and its branches.  HEART: Heart size is normal. Small pericardial effusion, similar to   2/16/2024. Status post TAVR. Mitral annular and coronary artery   calcifications. Left chest wall cardiac device.  CHEST WALL AND LOWER NECK: Enlarged multinodular thyroid gland with a   nodule measuring 2.5 cm on the left.  VISUALIZED UPPER ABDOMEN: Cholecystectomy. Hepatic cyst. Enteric tube   terminates in the distal stomach or proximal duodenum.  BONES: Degenerative changes.    IMPRESSION:    Likely pulmonary edema with small bilateral pleural effusions.    Patchy opacities in the bilateral upper lobes appear slightly improved   compared to 2/16/2024 CT chest.    --- End of Report ---      < end of copied text >

## 2024-03-01 NOTE — PROGRESS NOTE ADULT - SUBJECTIVE AND OBJECTIVE BOX
DATE OF SERVICE: 03-01-24 @ 15:25    Patient is a 89y old  Female who presents with a chief complaint of CVA (29 Feb 2024 07:05)      SUBJECTIVE / OVERNIGHT EVENTS:  nonverbal. does not follow commands    MEDICATIONS  (STANDING):  acetylcysteine 10%  Inhalation 4 milliLiter(s) Inhalation four times a day  albuterol/ipratropium for Nebulization 3 milliLiter(s) Nebulizer every 8 hours  atorvastatin 20 milliGRAM(s) Oral at bedtime  chlorhexidine 2% Cloths 1 Application(s) Topical <User Schedule>  furosemide    Tablet 40 milliGRAM(s) Oral daily  heparin   Injectable 5000 Unit(s) SubCutaneous every 12 hours    MEDICATIONS  (PRN):  sodium chloride 0.9% lock flush 10 milliLiter(s) IV Push daily PRN For PEG      Vital Signs Last 24 Hrs  T(C): 36.5 (01 Mar 2024 11:20), Max: 37.2 (01 Mar 2024 00:56)  T(F): 97.7 (01 Mar 2024 11:20), Max: 98.9 (01 Mar 2024 00:56)  HR: 73 (01 Mar 2024 11:20) (72 - 85)  BP: 122/62 (01 Mar 2024 11:20) (97/60 - 130/77)  BP(mean): --  RR: 18 (01 Mar 2024 11:20) (16 - 18)  SpO2: 98% (01 Mar 2024 11:20) (97% - 99%)    Parameters below as of 01 Mar 2024 11:20  Patient On (Oxygen Delivery Method): room air      CAPILLARY BLOOD GLUCOSE      POCT Blood Glucose.: 137 mg/dL (01 Mar 2024 12:34)  POCT Blood Glucose.: 141 mg/dL (01 Mar 2024 00:57)  POCT Blood Glucose.: 134 mg/dL (29 Feb 2024 18:07)    I&O's Summary    29 Feb 2024 07:01  -  01 Mar 2024 07:00  --------------------------------------------------------  IN: 420 mL / OUT: 500 mL / NET: -80 mL        PHYSICAL EXAM:  GENERAL: NAD, well-developed  HEAD:  Atraumatic, Normocephalic  EYES: EOMI, PERRLA, conjunctiva and sclera clear  NECK: Supple, No JVD  CHEST/LUNG: Clear to auscultation bilaterally; No wheeze  HEART: Regular rate and rhythm; No murmurs, rubs, or gallops  ABDOMEN: Soft, Nontender, Nondistended; Bowel sounds present  EXTREMITIES:  2+ Peripheral Pulses, No clubbing, cyanosis, or edema    LABS:                        8.7    7.57  )-----------( 216      ( 29 Feb 2024 00:40 )             27.7     02-29    144  |  111<H>  |  23  ----------------------------<  113<H>  3.6   |  20<L>  |  1.00    Ca    8.5      29 Feb 2024 00:42  Phos  3.1     02-29  Mg     2.0     02-29    TPro  5.9<L>  /  Alb  3.0<L>  /  TBili  0.7  /  DBili  x   /  AST  20  /  ALT  10  /  AlkPhos  88  02-29    PT/INR - ( 29 Feb 2024 00:40 )   PT: 13.1 sec;   INR: 1.26 ratio         PTT - ( 29 Feb 2024 00:40 )  PTT:27.6 sec      Urinalysis Basic - ( 29 Feb 2024 00:42 )    Color: x / Appearance: x / SG: x / pH: x  Gluc: 113 mg/dL / Ketone: x  / Bili: x / Urobili: x   Blood: x / Protein: x / Nitrite: x   Leuk Esterase: x / RBC: x / WBC x   Sq Epi: x / Non Sq Epi: x / Bacteria: x        RADIOLOGY & ADDITIONAL TESTS:    Imaging Personally Reviewed:    Consultant(s) Notes Reviewed:      Care Discussed with Consultants/Other Providers:

## 2024-03-01 NOTE — ADVANCED PRACTICE NURSE CONSULT - RECOMMEDATIONS
Will recommend the followin. Sacrum, b/l buttocks: continue to monitor, routine pericare with Fernando  2. continue to encourage mobilty, T&P  3. Off-load heels with Cesar-Lock cushion  4. Seat cushion when OOB to chair  5. nutrition support as pt condition allows  Tx plan discussed with RN/pt/HHA

## 2024-03-01 NOTE — ADVANCED PRACTICE NURSE CONSULT - ASSESSMENT
The pt was encountered on [facility]- Mrs Patel was awake and alert but did not respond to attempts at verbal interaction- her aide Adela was at the bedside.  A O&P Pro P 500 support surface is in use and the pt needs assistance with T&P. There are boots at the bedside to off-load the heels but the A reports that the pt finds these to be uncomfortable- Will recommend the Christiano-Lock cushion to off-load the heels.  The pt has a PEG tube for enteral feeds and is being followed by nutrition.  Upon assessment,  it was noted that the pt has a Prima-fit catheter in place to divert urine from the skin. It was contaminated with stool and d/c'd. Pericare was provided by the primary RN. On the sacrum and b/l buttocks the skin was intact with blanchable erythema - suggest that this may be r/t to IAD and not a PI.  Education was provided to the aide at bedside about the skin and how to prevent PI.  Will recommedn the application of Fernando barrier cream with incontinence episodes

## 2024-03-01 NOTE — PROGRESS NOTE ADULT - SUBJECTIVE AND OBJECTIVE BOX
Subjective: Patient seen and examined. No new events except as noted.     REVIEW OF SYSTEMS:  Unable to obtain     MEDICATIONS:  MEDICATIONS  (STANDING):  albuterol/ipratropium for Nebulization 3 milliLiter(s) Nebulizer every 8 hours  atorvastatin 20 milliGRAM(s) Oral at bedtime  chlorhexidine 2% Cloths 1 Application(s) Topical <User Schedule>  furosemide    Tablet 40 milliGRAM(s) Oral daily  heparin   Injectable 5000 Unit(s) SubCutaneous every 12 hours      PHYSICAL EXAM:  T(C): 36.5 (03-01-24 @ 05:34), Max: 37.2 (03-01-24 @ 00:56)  HR: 75 (03-01-24 @ 05:34) (72 - 90)  BP: 118/60 (03-01-24 @ 05:34) (97/60 - 150/65)  RR: 16 (03-01-24 @ 05:34) (16 - 24)  SpO2: 99% (03-01-24 @ 05:34) (95% - 99%)  Wt(kg): --  I&O's Summary    29 Feb 2024 07:01  -  01 Mar 2024 07:00  --------------------------------------------------------  IN: 420 mL / OUT: 500 mL / NET: -80 mL        Appearance: NAD  HEENT:   Dry oral mucosa, PERRL, EOMI	  Lymphatic: No lymphadenopathy  Cardiovascular: Normal S1 S2, No JVD, No murmurs, No edema  Respiratory: Decreased bs  Psychiatry: A & O x 0 sleepy  Gastrointestinal:  Soft, Non-tender, + Gtube  Skin: No rashes, No ecchymoses, No cyanosis	  Neurologic Exam:  Mental status - eyes closed, opens to repeated verbal stimuli. Follows intermittent simple commands to squeeze L hand/give thumbs up in L hand. Does not respond to orientation questions.   Cranial nerves - R pupil appears ?sluggishly reactive. No BTT in R eye. Unable to open left eye. ?R facial droop but may be 2/2 positioning of patient's head to R.  Motor - Normal bulk. Increased tone in RUE. Does not maintain antigravity when asked throughout all extremities initially, however does squeeze hand on LUE, withdraws slightly to noxious stimuli in R hand.   Upon re-evaluation able to raise both legs antigravity.  Sensation - Withdraws to noxious stimuli throughout.  DTR's - deferred  Coordination -deferred  Gait and station - deferred  Extremities: Normal range of motion, No clubbing, cyanosis or edema  Vascular: Peripheral pulses palpable 2+ bilaterally        LABS:    CARDIAC MARKERS:                                8.7    7.57  )-----------( 216      ( 29 Feb 2024 00:40 )             27.7     02-29    144  |  111<H>  |  23  ----------------------------<  113<H>  3.6   |  20<L>  |  1.00    Ca    8.5      29 Feb 2024 00:42  Phos  3.1     02-29  Mg     2.0     02-29    TPro  5.9<L>  /  Alb  3.0<L>  /  TBili  0.7  /  DBili  x   /  AST  20  /  ALT  10  /  AlkPhos  88  02-29    proBNP:   Lipid Profile:   HgA1c:   TSH:     Present          TELEMETRY: 	    ECG:  	  RADIOLOGY:   DIAGNOSTIC TESTING:  [ ] Echocardiogram:  [ ]  Catheterization:  [ ] Stress Test:    OTHER:

## 2024-03-01 NOTE — ADVANCED PRACTICE NURSE CONSULT - REASON FOR CONSULT
F/u visit by the Wound Care Team to re-evaluate skin status: sacrum and b/l buttocks. PMH is noted:  89-year-old female with CHF, HTN, PPM, TAVR, who presented as a code stroke for AMS, R facial droop and R hemiparesis found to have L MCA infarct with hemorrhagic transformation. PEG tube placement 2/27. Transferred to MICU after patient intubated following oxygen saturation decreasing down to 30s-40s few hours after PEG tube placement.   The pt is now transferred from MICU to [facility].  The pt was last seen by Wound Team on 2/28

## 2024-03-02 LAB
ANION GAP SERPL CALC-SCNC: 12 MMOL/L — SIGNIFICANT CHANGE UP (ref 5–17)
APPEARANCE UR: ABNORMAL
BACTERIA # UR AUTO: NEGATIVE /HPF — SIGNIFICANT CHANGE UP
BILIRUB UR-MCNC: NEGATIVE — SIGNIFICANT CHANGE UP
BUN SERPL-MCNC: 27 MG/DL — HIGH (ref 7–23)
CALCIUM SERPL-MCNC: 8.6 MG/DL — SIGNIFICANT CHANGE UP (ref 8.4–10.5)
CAST: 0 /LPF — SIGNIFICANT CHANGE UP (ref 0–4)
CHLORIDE SERPL-SCNC: 106 MMOL/L — SIGNIFICANT CHANGE UP (ref 96–108)
CO2 SERPL-SCNC: 26 MMOL/L — SIGNIFICANT CHANGE UP (ref 22–31)
COLOR SPEC: YELLOW — SIGNIFICANT CHANGE UP
CREAT SERPL-MCNC: 0.86 MG/DL — SIGNIFICANT CHANGE UP (ref 0.5–1.3)
DIFF PNL FLD: NEGATIVE — SIGNIFICANT CHANGE UP
EGFR: 62 ML/MIN/1.73M2 — SIGNIFICANT CHANGE UP
GLUCOSE BLDC GLUCOMTR-MCNC: 164 MG/DL — HIGH (ref 70–99)
GLUCOSE BLDC GLUCOMTR-MCNC: 189 MG/DL — HIGH (ref 70–99)
GLUCOSE SERPL-MCNC: 164 MG/DL — HIGH (ref 70–99)
GLUCOSE UR QL: NEGATIVE MG/DL — SIGNIFICANT CHANGE UP
HCT VFR BLD CALC: 29.6 % — LOW (ref 34.5–45)
HGB BLD-MCNC: 9 G/DL — LOW (ref 11.5–15.5)
KETONES UR-MCNC: NEGATIVE MG/DL — SIGNIFICANT CHANGE UP
LEUKOCYTE ESTERASE UR-ACNC: NEGATIVE — SIGNIFICANT CHANGE UP
MCHC RBC-ENTMCNC: 27.4 PG — SIGNIFICANT CHANGE UP (ref 27–34)
MCHC RBC-ENTMCNC: 30.4 GM/DL — LOW (ref 32–36)
MCV RBC AUTO: 90 FL — SIGNIFICANT CHANGE UP (ref 80–100)
NITRITE UR-MCNC: NEGATIVE — SIGNIFICANT CHANGE UP
NRBC # BLD: 0 /100 WBCS — SIGNIFICANT CHANGE UP (ref 0–0)
PH UR: 6 — SIGNIFICANT CHANGE UP (ref 5–8)
PLATELET # BLD AUTO: 201 K/UL — SIGNIFICANT CHANGE UP (ref 150–400)
POTASSIUM SERPL-MCNC: 3.8 MMOL/L — SIGNIFICANT CHANGE UP (ref 3.5–5.3)
POTASSIUM SERPL-SCNC: 3.8 MMOL/L — SIGNIFICANT CHANGE UP (ref 3.5–5.3)
PROT UR-MCNC: NEGATIVE MG/DL — SIGNIFICANT CHANGE UP
RBC # BLD: 3.29 M/UL — LOW (ref 3.8–5.2)
RBC # FLD: 19.4 % — HIGH (ref 10.3–14.5)
RBC CASTS # UR COMP ASSIST: 45 /HPF — HIGH (ref 0–4)
REVIEW: SIGNIFICANT CHANGE UP
SODIUM SERPL-SCNC: 144 MMOL/L — SIGNIFICANT CHANGE UP (ref 135–145)
SP GR SPEC: 1.01 — SIGNIFICANT CHANGE UP (ref 1–1.03)
SQUAMOUS # UR AUTO: 0 /HPF — SIGNIFICANT CHANGE UP (ref 0–5)
UROBILINOGEN FLD QL: 1 MG/DL — SIGNIFICANT CHANGE UP (ref 0.2–1)
WBC # BLD: 4.47 K/UL — SIGNIFICANT CHANGE UP (ref 3.8–10.5)
WBC # FLD AUTO: 4.47 K/UL — SIGNIFICANT CHANGE UP (ref 3.8–10.5)
WBC UR QL: 0 /HPF — SIGNIFICANT CHANGE UP (ref 0–5)
YEAST-LIKE CELLS: PRESENT

## 2024-03-02 RX ADMIN — HEPARIN SODIUM 5000 UNIT(S): 5000 INJECTION INTRAVENOUS; SUBCUTANEOUS at 05:09

## 2024-03-02 RX ADMIN — Medication 3 MILLILITER(S): at 14:40

## 2024-03-02 RX ADMIN — ATORVASTATIN CALCIUM 20 MILLIGRAM(S): 80 TABLET, FILM COATED ORAL at 22:39

## 2024-03-02 RX ADMIN — HEPARIN SODIUM 5000 UNIT(S): 5000 INJECTION INTRAVENOUS; SUBCUTANEOUS at 17:23

## 2024-03-02 RX ADMIN — Medication 4 MILLILITER(S): at 12:28

## 2024-03-02 RX ADMIN — CHLORHEXIDINE GLUCONATE 1 APPLICATION(S): 213 SOLUTION TOPICAL at 05:09

## 2024-03-02 RX ADMIN — Medication 4 MILLILITER(S): at 05:09

## 2024-03-02 RX ADMIN — Medication 3 MILLILITER(S): at 22:38

## 2024-03-02 RX ADMIN — Medication 4 MILLILITER(S): at 17:23

## 2024-03-02 RX ADMIN — Medication 3 MILLILITER(S): at 05:10

## 2024-03-02 RX ADMIN — Medication 40 MILLIGRAM(S): at 05:09

## 2024-03-02 NOTE — PROGRESS NOTE ADULT - ASSESSMENT
89F presented with a stroke    1. Stroke     2. Dysphagia   s/p ir g tube, doing well    3. respiratory failure now extubated

## 2024-03-02 NOTE — PROGRESS NOTE ADULT - SUBJECTIVE AND OBJECTIVE BOX
Chief Complaint:  Patient is a 89y old  Female who presents with a chief complaint of CVA (29 Feb 2024 07:05)      Date of service 03-02      Interval Events: no GI complaints          acetylcysteine 10%  Inhalation 4 milliLiter(s) Inhalation four times a day  albuterol/ipratropium for Nebulization 3 milliLiter(s) Nebulizer every 8 hours  atorvastatin 20 milliGRAM(s) Oral at bedtime  chlorhexidine 2% Cloths 1 Application(s) Topical <User Schedule>  furosemide    Tablet 40 milliGRAM(s) Oral daily  heparin   Injectable 5000 Unit(s) SubCutaneous every 12 hours  sodium chloride 0.9% lock flush 10 milliLiter(s) IV Push daily PRN                            9.0    4.47  )-----------( 201      ( 02 Mar 2024 10:03 )             29.6       Hemoglobin: 9.0 g/dL (03-02 @ 10:03)  Hemoglobin: 8.7 g/dL (02-29 @ 00:40)  Hemoglobin: 9.5 g/dL (02-28 @ 06:32)  Hemoglobin: 9.8 g/dL (02-27 @ 18:49)  Hemoglobin: 6.8 g/dL (02-27 @ 07:03)      03-02    144  |  106  |  27<H>  ----------------------------<  164<H>  3.8   |  26  |  0.86    Ca    8.6      02 Mar 2024 10:03      Creatinine Trend: 0.86<--, 1.00<--, 1.07<--, 0.98<--, 1.01<--, 1.07<--    COAGS:           T(C): 36.5 (03-02-24 @ 10:54), Max: 37 (03-01-24 @ 23:57)  HR: 80 (03-02-24 @ 10:54) (80 - 85)  BP: 111/58 (03-02-24 @ 10:54) (104/55 - 115/56)  RR: 18 (03-02-24 @ 10:54) (16 - 18)  SpO2: 99% (03-02-24 @ 10:54) (98% - 100%)  Wt(kg): --    I&O's Summary    01 Mar 2024 07:01  -  02 Mar 2024 07:00  --------------------------------------------------------  IN: 385 mL / OUT: 1000 mL / NET: -615 mL         PHYSICAL EXAM:     GENERAL:  Appears stated age, well-groomed, well-nourished, no distress  HEENT:  NC/AT,  conjunctivae anicteric, clear and pink,   NECK: supple, trachea midline  CHEST:  Full & symmetric excursion, no increased effort, breath sounds clear  HEART:  Regular rhythm, no JVD  ABDOMEN:  Soft, non-tender, non-distended, normoactive bowel sounds,  no masses , no hepatosplenomegaly  EXTREMITIES:  no cyanosis,clubbing or edema  SKIN:  No rash, erythema, or, ecchymoses, no jaundice  NEURO:  Alert, non-focal, no asterixis  PSYCH: Appropriate affect, oriented to place and time  RECTAL: Deferred

## 2024-03-02 NOTE — PROGRESS NOTE ADULT - ASSESSMENT
89-year-old with CHF, HTN, PPM, TAVR, who presented as a code stroke for AMS, R facial droop and R hemiparesis found to have L MCA infarct with hemorrhagic transformation. PEG tube placement 2/27. Transferred to MICU after patient intubated following oxygen saturation decreasing down to 30s-40s few hours after PEG tube placement.       PLAN  =====Neurologic=====  #CVA  #Hemorrhagic transformation  Patient presented with increased AMS (baseline patient with neurocognitive difficulties with ability to recognize familiar faces and often trouble getting words out), possible R sided facial droop and R hemiparesis. Code stroke called, but not candidate for tenecteplase given time course and higher MRS.   Continues to have global aphasia and R hemiparesis  CT scans from 2/14-2/17 with evolving L MCA distribution infarct with hemorrhagic transformation and 8-9mm rightward shift.   - repeat CT head 2/27 with resolving hemorrhagic infarct and decrease in midline shift  - Per neuro:  - SBP goal 110-140.   - atorvastatin 40mg with LDL goal < 70, consider medication de-escalation given age    =====Pulmonary=====  #AHRF  #Aspiration  Patient O2 sat at 30s-40s after PEG placement. Possible 2/2 aspiration. Intubated as per GOC. CT chest on 2/26 with pulmonary edema and bilateral pleural effusions.   - treatment of possible aspiration PNA/pneumonitis as below  - diuresis as below  - pt started on mucomyst  - expectorating secretions    =====Cardiovascular=====  #HFpEF  Patient with recent TTE with moderate (grade 2) diastolic dysfunction.  -Additionally, CT chest 2/26 with pulmonary edema and bilateral pleural effusions, was given lasix 20mg IV x1.   - continue diuresis with bumex 1mg     #Valvular dysfunction  Severe mitral stenosis seen on TTE 2/14.  s/p TAVR, no aortic regurgitation    #HTN  Patient currently normotensive.     #PPM  Patient with pacemaker, interrogated by cardiology per notes  - VVI mode    =====GI=====  #PEG placement  Patient with PEG placed on 2/27 after GO discussions with family.   - f/u IR regarding PEG tube usage 2/28    =====Renal/=====  #Electrolytes  - Maintain K>4, Phos>3, Mag>2, iCal>1    =====Endocrine=====  #NPO  - fingersticks q6h while NPO    =====Infectious Disease=====  #?Aspiration pneumonitis vs pneumonia  Patient's desaturations likely 2/2 aspiration. Now with leukocytosis to 11.   - Bcx2  - sputum culture  - empiric darian 3d course through 2/29 given possible zosyn allergy as listed penicillin allergy      =====Heme/Onc=====  #Anemia  Hgb 6.7, repeat 6.8 this AM. s/p 1 u pRBC 2/27 AM , with repeat Hgb 9.8 (likely concentrated).  No overt source of bleeding. MCV normocytic.   Baseline Hgb in 8s.   - CTM Hgb  - transfuse if Hgb <7    #DVT Ppx  - Lovenox 30mg SQ qd given resolving hemorrhagic transformation on head CT    =====Ethics=====  FULL CODE, palliative consulted previously, saw patient with discussion involving that patient to be full code.  dispo  - family taking pt home  - d/c planning

## 2024-03-02 NOTE — PROGRESS NOTE ADULT - SUBJECTIVE AND OBJECTIVE BOX
DATE OF SERVICE: 24 @ 15:06    Patient is a 89y old  Female who presents with a chief complaint of CVA (2024 07:05)      SUBJECTIVE / OVERNIGHT EVENTS:  nonverbal, does not follow commands    MEDICATIONS  (STANDING):  acetylcysteine 10%  Inhalation 4 milliLiter(s) Inhalation four times a day  albuterol/ipratropium for Nebulization 3 milliLiter(s) Nebulizer every 8 hours  atorvastatin 20 milliGRAM(s) Oral at bedtime  chlorhexidine 2% Cloths 1 Application(s) Topical <User Schedule>  furosemide    Tablet 40 milliGRAM(s) Oral daily  heparin   Injectable 5000 Unit(s) SubCutaneous every 12 hours    MEDICATIONS  (PRN):  sodium chloride 0.9% lock flush 10 milliLiter(s) IV Push daily PRN For PEG      Vital Signs Last 24 Hrs  T(C): 36.5 (02 Mar 2024 10:54), Max: 37 (01 Mar 2024 23:57)  T(F): 97.7 (02 Mar 2024 10:54), Max: 98.6 (01 Mar 2024 23:57)  HR: 80 (02 Mar 2024 10:54) (80 - 85)  BP: 111/58 (02 Mar 2024 10:54) (104/55 - 115/56)  BP(mean): --  RR: 18 (02 Mar 2024 10:54) (16 - 18)  SpO2: 99% (02 Mar 2024 10:54) (98% - 100%)    Parameters below as of 02 Mar 2024 10:54  Patient On (Oxygen Delivery Method): room air      CAPILLARY BLOOD GLUCOSE      POCT Blood Glucose.: 189 mg/dL (02 Mar 2024 06:34)  POCT Blood Glucose.: 153 mg/dL (01 Mar 2024 23:56)  POCT Blood Glucose.: 158 mg/dL (01 Mar 2024 18:24)    I&O's Summary    01 Mar 2024 07:01  -  02 Mar 2024 07:00  --------------------------------------------------------  IN: 385 mL / OUT: 1000 mL / NET: -615 mL        PHYSICAL EXAM:  GENERAL: NAD, well-developed  HEAD:  Atraumatic, Normocephalic  EYES: EOMI, PERRLA, conjunctiva and sclera clear  NECK: Supple, No JVD  CHEST/LUNG: Clear to auscultation bilaterally; No wheeze  HEART: Regular rate and rhythm; No murmurs, rubs, or gallops  ABDOMEN: Soft, Nontender, Nondistended; Bowel sounds present  EXTREMITIES:  2+ Peripheral Pulses, No clubbing, cyanosis, or edema  PSYCH: AAOx3  NEUROLOGY: non-focal  SKIN: No rashes or lesions    LABS:                        9.0    4.47  )-----------( 201      ( 02 Mar 2024 10:03 )             29.6     03-02    144  |  106  |  27<H>  ----------------------------<  164<H>  3.8   |  26  |  0.86    Ca    8.6      02 Mar 2024 10:03            Urinalysis Basic - ( 02 Mar 2024 13:41 )    Color: Yellow / Appearance: Cloudy / S.012 / pH: x  Gluc: x / Ketone: Negative mg/dL  / Bili: Negative / Urobili: 1.0 mg/dL   Blood: x / Protein: Negative mg/dL / Nitrite: Negative   Leuk Esterase: Negative / RBC: 1 /HPF / WBC 0 /HPF   Sq Epi: x / Non Sq Epi: 0 /HPF / Bacteria: Negative /HPF        RADIOLOGY & ADDITIONAL TESTS:    Imaging Personally Reviewed:    Consultant(s) Notes Reviewed:      Care Discussed with Consultants/Other Providers:

## 2024-03-02 NOTE — PROGRESS NOTE ADULT - PROBLEM SELECTOR PLAN 3
Interrogated   VVI mode. Fluconazole Counseling:  Patient counseled regarding adverse effects of fluconazole including but not limited to headache, diarrhea, nausea, upset stomach, liver function test abnormalities, taste disturbance, and stomach pain.  There is a rare possibility of liver failure that can occur when taking fluconazole.  The patient understands that monitoring of LFTs and kidney function test may be required, especially at baseline. The patient verbalized understanding of the proper use and possible adverse effects of fluconazole.  All of the patient's questions and concerns were addressed. Birth Control Pills Counseling: Birth Control Pill Counseling: I discussed with the patient the potential side effects of OCPs including but not limited to increased risk of stroke, heart attack, thrombophlebitis, deep venous thrombosis, hepatic adenomas, breast changes, GI upset, headaches, and depression.  The patient verbalized understanding of the proper use and possible adverse effects of OCPs. All of the patient's questions and concerns were addressed. Quinolones Counseling:  I discussed with the patient the risks of fluoroquinolones including but not limited to GI upset, allergic reaction, drug rash, diarrhea, dizziness, photosensitivity, yeast infections, liver function test abnormalities, tendonitis/tendon rupture. Valtrex Counseling: I discussed with the patient the risks of valacyclovir including but not limited to kidney damage, nausea, vomiting and severe allergy.  The patient understands that if the infection seems to be worsening or is not improving, they are to call. Zyclara Pregnancy And Lactation Text: This medication is Pregnancy Category C. It is unknown if this medication is excreted in breast milk. Hydroquinone Counseling:  Patient advised that medication may result in skin irritation, lightening (hypopigmentation), dryness, and burning.  In the event of skin irritation, the patient was advised to reduce the amount of the drug applied or use it less frequently.  Rarely, spots that are treated with hydroquinone can become darker (pseudoochronosis).  Should this occur, patient instructed to stop medication and call the office. The patient verbalized understanding of the proper use and possible adverse effects of hydroquinone.  All of the patient's questions and concerns were addressed. Albendazole Counseling:  I discussed with the patient the risks of albendazole including but not limited to cytopenia, kidney damage, nausea/vomiting and severe allergy.  The patient understands that this medication is being used in an off-label manner. Dupixent Pregnancy And Lactation Text: This medication likely crosses the placenta but the risk for the fetus is uncertain. This medication is excreted in breast milk. Sski Pregnancy And Lactation Text: This medication is Pregnancy Category D and isn't considered safe during pregnancy. It is excreted in breast milk. Xolair Pregnancy And Lactation Text: This medication is Pregnancy Category B and is considered safe during pregnancy. This medication is excreted in breast milk. Cephalexin Pregnancy And Lactation Text: This medication is Pregnancy Category B and considered safe during pregnancy.  It is also excreted in breast milk but can be used safely for shorter doses. Albendazole Pregnancy And Lactation Text: This medication is Pregnancy Category C and it isn't known if it is safe during pregnancy. It is also excreted in breast milk. Humira Counseling:  I discussed with the patient the risks of adalimumab including but not limited to myelosuppression, immunosuppression, autoimmune hepatitis, demyelinating diseases, lymphoma, and serious infections.  The patient understands that monitoring is required including a PPD at baseline and must alert us or the primary physician if symptoms of infection or other concerning signs are noted. Benzoyl Peroxide Pregnancy And Lactation Text: This medication is Pregnancy Category C. It is unknown if benzoyl peroxide is excreted in breast milk. Erivedge Pregnancy And Lactation Text: This medication is Pregnancy Category X and is absolutely contraindicated during pregnancy. It is unknown if it is excreted in breast milk. Topical Sulfur Applications Counseling: Topical Sulfur Counseling: Patient counseled that this medication may cause skin irritation or allergic reactions.  In the event of skin irritation, the patient was advised to reduce the amount of the drug applied or use it less frequently.   The patient verbalized understanding of the proper use and possible adverse effects of topical sulfur application.  All of the patient's questions and concerns were addressed. Topical Sulfur Applications Pregnancy And Lactation Text: This medication is Pregnancy Category C and has an unknown safety profile during pregnancy. It is unknown if this topical medication is excreted in breast milk. SSKI Counseling:  I discussed with the patient the risks of SSKI including but not limited to thyroid abnormalities, metallic taste, GI upset, fever, headache, acne, arthralgias, paraesthesias, lymphadenopathy, easy bleeding, arrhythmias, and allergic reaction. Include Pregnancy/Lactation Warning?: No Methotrexate Pregnancy And Lactation Text: This medication is Pregnancy Category X and is known to cause fetal harm. This medication is excreted in breast milk. Arava Counseling:  Patient counseled regarding adverse effects of Arava including but not limited to nausea, vomiting, abnormalities in liver function tests. Patients may develop mouth sores, rash, diarrhea, and abnormalities in blood counts. The patient understands that monitoring is required including LFTs and blood counts.  There is a rare possibility of scarring of the liver and lung problems that can occur when taking methotrexate. Persistent nausea, loss of appetite, pale stools, dark urine, cough, and shortness of breath should be reported immediately. Patient advised to discontinue Arava treatment and consult with a physician prior to attempting conception. The patient will have to undergo a treatment to eliminate Arava from the body prior to conception. Isotretinoin Pregnancy And Lactation Text: This medication is Pregnancy Category X and is considered extremely dangerous during pregnancy. It is unknown if it is excreted in breast milk. Dapsone Pregnancy And Lactation Text: This medication is Pregnancy Category C and is not considered safe during pregnancy or breast feeding. Picato Counseling:  I discussed with the patient the risks of Picato including but not limited to erythema, scaling, itching, weeping, crusting, and pain. Bactrim Counseling:  I discussed with the patient the risks of sulfa antibiotics including but not limited to GI upset, allergic reaction, drug rash, diarrhea, dizziness, photosensitivity, and yeast infections.  Rarely, more serious reactions can occur including but not limited to aplastic anemia, agranulocytosis, methemoglobinemia, blood dyscrasias, liver or kidney failure, lung infiltrates or desquamative/blistering drug rashes. Terbinafine Pregnancy And Lactation Text: This medication is Pregnancy Category B and is considered safe during pregnancy. It is also excreted in breast milk and breast feeding isn't recommended. Tetracycline Pregnancy And Lactation Text: This medication is Pregnancy Category D and not consider safe during pregnancy. It is also excreted in breast milk. Carac Pregnancy And Lactation Text: This medication is Pregnancy Category X and contraindicated in pregnancy and in women who may become pregnant. It is unknown if this medication is excreted in breast milk. Hydroxyzine Counseling: Patient advised that the medication is sedating and not to drive a car after taking this medication.  Patient informed of potential adverse effects including but not limited to dry mouth, urinary retention, and blurry vision.  The patient verbalized understanding of the proper use and possible adverse effects of hydroxyzine.  All of the patient's questions and concerns were addressed. Simponi Counseling:  I discussed with the patient the risks of golimumab including but not limited to myelosuppression, immunosuppression, autoimmune hepatitis, demyelinating diseases, lymphoma, and serious infections.  The patient understands that monitoring is required including a PPD at baseline and must alert us or the primary physician if symptoms of infection or other concerning signs are noted. Colchicine Pregnancy And Lactation Text: This medication is Pregnancy Category C and isn't considered safe during pregnancy. It is excreted in breast milk. Spironolactone Counseling: Patient advised regarding risks of diarrhea, abdominal pain, hyperkalemia, birth defects (for female patients), liver toxicity and renal toxicity. The patient may need blood work to monitor liver and kidney function and potassium levels while on therapy. The patient verbalized understanding of the proper use and possible adverse effects of spironolactone.  All of the patient's questions and concerns were addressed. Ketoconazole Pregnancy And Lactation Text: This medication is Pregnancy Category C and it isn't know if it is safe during pregnancy. It is also excreted in breast milk and breast feeding isn't recommended. Tremfya Counseling: I discussed with the patient the risks of guselkumab including but not limited to immunosuppression, serious infections, worsening of inflammatory bowel disease and drug reactions.  The patient understands that monitoring is required including a PPD at baseline and must alert us or the primary physician if symptoms of infection or other concerning signs are noted. Tazorac Pregnancy And Lactation Text: This medication is not safe during pregnancy. It is unknown if this medication is excreted in breast milk. Stelara Pregnancy And Lactation Text: This medication is Pregnancy Category B and is considered safe during pregnancy. It is unknown if this medication is excreted in breast milk. Metronidazole Counseling:  I discussed with the patient the risks of metronidazole including but not limited to seizures, nausea/vomiting, a metallic taste in the mouth, nausea/vomiting and severe allergy. Tremfya Pregnancy And Lactation Text: The risk during pregnancy and breastfeeding is uncertain with this medication. Erivedge Counseling- I discussed with the patient the risks of Erivedge including but not limited to nausea, vomiting, diarrhea, constipation, weight loss, changes in the sense of taste, decreased appetite, muscle spasms, and hair loss.  The patient verbalized understanding of the proper use and possible adverse effects of Erivedge.  All of the patient's questions and concerns were addressed. Ketoconazole Counseling:   Patient counseled regarding improving absorption with orange juice.  Adverse effects include but are not limited to breast enlargement, headache, diarrhea, nausea, upset stomach, liver function test abnormalities, taste disturbance, and stomach pain.  There is a rare possibility of liver failure that can occur when taking ketoconazole. The patient understands that monitoring of LFTs may be required, especially at baseline. The patient verbalized understanding of the proper use and possible adverse effects of ketoconazole.  All of the patient's questions and concerns were addressed. Tazorac Counseling:  Patient advised that medication is irritating and drying.  Patient may need to apply sparingly and wash off after an hour before eventually leaving it on overnight.  The patient verbalized understanding of the proper use and possible adverse effects of tazorac.  All of the patient's questions and concerns were addressed. Acitretin Counseling:  I discussed with the patient the risks of acitretin including but not limited to hair loss, dry lips/skin/eyes, liver damage, hyperlipidemia, depression/suicidal ideation, photosensitivity.  Serious rare side effects can include but are not limited to pancreatitis, pseudotumor cerebri, bony changes, clot formation/stroke/heart attack.  Patient understands that alcohol is contraindicated since it can result in liver toxicity and significantly prolong the elimination of the drug by many years. Solaraze Counseling:  I discussed with the patient the risks of Solaraze including but not limited to erythema, scaling, itching, weeping, crusting, and pain. Protopic Pregnancy And Lactation Text: This medication is Pregnancy Category C. It is unknown if this medication is excreted in breast milk when applied topically. Azithromycin Pregnancy And Lactation Text: This medication is considered safe during pregnancy and is also secreted in breast milk. Odomzo Counseling- I discussed with the patient the risks of Odomzo including but not limited to nausea, vomiting, diarrhea, constipation, weight loss, changes in the sense of taste, decreased appetite, muscle spasms, and hair loss.  The patient verbalized understanding of the proper use and possible adverse effects of Odomzo.  All of the patient's questions and concerns were addressed. Metronidazole Pregnancy And Lactation Text: This medication is Pregnancy Category B and considered safe during pregnancy.  It is also excreted in breast milk. Minoxidil Pregnancy And Lactation Text: This medication has not been assigned a Pregnancy Risk Category but animal studies failed to show danger with the topical medication. It is unknown if the medication is excreted in breast milk. Benzoyl Peroxide Counseling: Patient counseled that medicine may cause skin irritation and bleach clothing.  In the event of skin irritation, the patient was advised to reduce the amount of the drug applied or use it less frequently.   The patient verbalized understanding of the proper use and possible adverse effects of benzoyl peroxide.  All of the patient's questions and concerns were addressed. Cellcept Counseling:  I discussed with the patient the risks of mycophenolate mofetil including but not limited to infection/immunosuppression, GI upset, hypokalemia, hypercholesterolemia, bone marrow suppression, lymphoproliferative disorders, malignancy, GI ulceration/bleed/perforation, colitis, interstitial lung disease, kidney failure, progressive multifocal leukoencephalopathy, and birth defects.  The patient understands that monitoring is required including a baseline creatinine and regular CBC testing. In addition, patient must alert us immediately if symptoms of infection or other concerning signs are noted. Griseofulvin Counseling:  I discussed with the patient the risks of griseofulvin including but not limited to photosensitivity, cytopenia, liver damage, nausea/vomiting and severe allergy.  The patient understands that this medication is best absorbed when taken with a fatty meal (e.g., ice cream or french fries). High Dose Vitamin A Counseling: Side effects reviewed, pt to contact office should one occur. Rituxan Counseling:  I discussed with the patient the risks of Rituxan infusions. Side effects can include infusion reactions, severe drug rashes including mucocutaneous reactions, reactivation of latent hepatitis and other infections and rarely progressive multifocal leukoencephalopathy.  All of the patient's questions and concerns were addressed. Elidel Counseling: Patient may experience a mild burning sensation during topical application. Elidel is not approved in children less than 2 years of age. There have been case reports of hematologic and skin malignancies in patients using topical calcineurin inhibitors although causality is questionable. Eucrisa Counseling: Patient may experience a mild burning sensation during topical application. Eucrisa is not approved in children less than 2 years of age. Isotretinoin Counseling: Patient should get monthly blood tests, not donate blood, not drive at night if vision affected, not share medication, and not undergo elective surgery for 6 months after tx completed. Side effects reviewed, pt to contact office should one occur. Cimetidine Counseling:  I discussed with the patient the risks of Cimetidine including but not limited to gynecomastia, headache, diarrhea, nausea, drowsiness, arrhythmias, pancreatitis, skin rashes, psychosis, bone marrow suppression and kidney toxicity. Bexarotene Pregnancy And Lactation Text: This medication is Pregnancy Category X and should not be given to women who are pregnant or may become pregnant. This medication should not be used if you are breast feeding. Valtrex Pregnancy And Lactation Text: this medication is Pregnancy Category B and is considered safe during pregnancy. This medication is not directly found in breast milk but it's metabolite acyclovir is present. Spironolactone Pregnancy And Lactation Text: This medication can cause feminization of the male fetus and should be avoided during pregnancy. The active metabolite is also found in breast milk. Cosentyx Counseling:  I discussed with the patient the risks of Cosentyx including but not limited to worsening of Crohn's disease, immunosuppression, allergic reactions and infections.  The patient understands that monitoring is required including a PPD at baseline and must alert us or the primary physician if symptoms of infection or other concerning signs are noted. Drysol Counseling:  I discussed with the patient the risks of drysol/aluminum chloride including but not limited to skin rash, itching, irritation, burning. Doxycycline Counseling:  Patient counseled regarding possible photosensitivity and increased risk for sunburn.  Patient instructed to avoid sunlight, if possible.  When exposed to sunlight, patients should wear protective clothing, sunglasses, and sunscreen.  The patient was instructed to call the office immediately if the following severe adverse effects occur:  hearing changes, easy bruising/bleeding, severe headache, or vision changes.  The patient verbalized understanding of the proper use and possible adverse effects of doxycycline.  All of the patient's questions and concerns were addressed. Hydroxychloroquine Counseling:  I discussed with the patient that a baseline ophthalmologic exam is needed at the start of therapy and every year thereafter while on therapy. A CBC may also be warranted for monitoring.  The side effects of this medication were discussed with the patient, including but not limited to agranulocytosis, aplastic anemia, seizures, rashes, retinopathy, and liver toxicity. Patient instructed to call the office should any adverse effect occur.  The patient verbalized understanding of the proper use and possible adverse effects of Plaquenil.  All the patient's questions and concerns were addressed. Doxycycline Pregnancy And Lactation Text: This medication is Pregnancy Category D and not consider safe during pregnancy. It is also excreted in breast milk but is considered safe for shorter treatment courses. Nsaids Counseling: NSAID Counseling: I discussed with the patient that NSAIDs should be taken with food. Prolonged use of NSAIDs can result in the development of stomach ulcers.  Patient advised to stop taking NSAIDs if abdominal pain occurs.  The patient verbalized understanding of the proper use and possible adverse effects of NSAIDs.  All of the patient's questions and concerns were addressed. Bexarotene Counseling:  I discussed with the patient the risks of bexarotene including but not limited to hair loss, dry lips/skin/eyes, liver abnormalities, hyperlipidemia, pancreatitis, depression/suicidal ideation, photosensitivity, drug rash/allergic reactions, hypothyroidism, anemia, leukopenia, infection, cataracts, and teratogenicity.  Patient understands that they will need regular blood tests to check lipid profile, liver function tests, white blood cell count, thyroid function tests and pregnancy test if applicable. Carac Counseling:  I discussed with the patient the risks of Carac including but not limited to erythema, scaling, itching, weeping, crusting, and pain. Cyclosporine Pregnancy And Lactation Text: This medication is Pregnancy Category C and it isn't know if it is safe during pregnancy. This medication is excreted in breast milk. High Dose Vitamin A Pregnancy And Lactation Text: High dose vitamin A therapy is contraindicated during pregnancy and breast feeding. Topical Retinoid counseling:  Patient advised to apply a pea-sized amount only at bedtime and wait 30 minutes after washing their face before applying.  If too drying, patient may add a non-comedogenic moisturizer. The patient verbalized understanding of the proper use and possible adverse effects of retinoids.  All of the patient's questions and concerns were addressed. Topical Clindamycin Pregnancy And Lactation Text: This medication is Pregnancy Category B and is considered safe during pregnancy. It is unknown if it is excreted in breast milk. Fluconazole Pregnancy And Lactation Text: This medication is Pregnancy Category C and it isn't know if it is safe during pregnancy. It is also excreted in breast milk. Protopic Counseling: Patient may experience a mild burning sensation during topical application. Protopic is not approved in children less than 2 years of age. There have been case reports of hematologic and skin malignancies in patients using topical calcineurin inhibitors although causality is questionable. Doxepin Counseling:  Patient advised that the medication is sedating and not to drive a car after taking this medication. Patient informed of potential adverse effects including but not limited to dry mouth, urinary retention, and blurry vision.  The patient verbalized understanding of the proper use and possible adverse effects of doxepin.  All of the patient's questions and concerns were addressed. Enbrel Counseling:  I discussed with the patient the risks of etanercept including but not limited to myelosuppression, immunosuppression, autoimmune hepatitis, demyelinating diseases, lymphoma, and infections.  The patient understands that monitoring is required including a PPD at baseline and must alert us or the primary physician if symptoms of infection or other concerning signs are noted. Rituxan Pregnancy And Lactation Text: This medication is Pregnancy Category C and it isn't know if it is safe during pregnancy. It is unknown if this medication is excreted in breast milk but similar antibodies are known to be excreted. Topical Clindamycin Counseling: Patient counseled that this medication may cause skin irritation or allergic reactions.  In the event of skin irritation, the patient was advised to reduce the amount of the drug applied or use it less frequently.   The patient verbalized understanding of the proper use and possible adverse effects of clindamycin.  All of the patient's questions and concerns were addressed. Detail Level: Detailed Azathioprine Pregnancy And Lactation Text: This medication is Pregnancy Category D and isn't considered safe during pregnancy. It is unknown if this medication is excreted in breast milk. Cephalexin Counseling: I counseled the patient regarding use of cephalexin as an antibiotic for prophylactic and/or therapeutic purposes. Cephalexin (commonly prescribed under brand name Keflex) is a cephalosporin antibiotic which is active against numerous classes of bacteria, including most skin bacteria. Side effects may include nausea, diarrhea, gastrointestinal upset, rash, hives, yeast infections, and in rare cases, hepatitis, kidney disease, seizures, fever, confusion, neurologic symptoms, and others. Patients with severe allergies to penicillin medications are cautioned that there is about a 10% incidence of cross-reactivity with cephalosporins. When possible, patients with penicillin allergies should use alternatives to cephalosporins for antibiotic therapy. Minocycline Counseling: Patient advised regarding possible photosensitivity and discoloration of the teeth, skin, lips, tongue and gums.  Patient instructed to avoid sunlight, if possible.  When exposed to sunlight, patients should wear protective clothing, sunglasses, and sunscreen.  The patient was instructed to call the office immediately if the following severe adverse effects occur:  hearing changes, easy bruising/bleeding, severe headache, or vision changes.  The patient verbalized understanding of the proper use and possible adverse effects of minocycline.  All of the patient's questions and concerns were addressed. Itraconazole Counseling:  I discussed with the patient the risks of itraconazole including but not limited to liver damage, nausea/vomiting, neuropathy, and severe allergy.  The patient understands that this medication is best absorbed when taken with acidic beverages such as non-diet cola or ginger ale.  The patient understands that monitoring is required including baseline LFTs and repeat LFTs at intervals.  The patient understands that they are to contact us or the primary physician if concerning signs are noted. Tetracycline Counseling: Patient counseled regarding possible photosensitivity and increased risk for sunburn.  Patient instructed to avoid sunlight, if possible.  When exposed to sunlight, patients should wear protective clothing, sunglasses, and sunscreen.  The patient was instructed to call the office immediately if the following severe adverse effects occur:  hearing changes, easy bruising/bleeding, severe headache, or vision changes.  The patient verbalized understanding of the proper use and possible adverse effects of tetracycline.  All of the patient's questions and concerns were addressed. Patient understands to avoid pregnancy while on therapy due to potential birth defects. Rifampin Counseling: I discussed with the patient the risks of rifampin including but not limited to liver damage, kidney damage, red-orange body fluids, nausea/vomiting and severe allergy. Xelosielz Pregnancy And Lactation Text: This medication is Pregnancy Category D and is not considered safe during pregnancy.  The risk during breast feeding is also uncertain. Terbinafine Counseling: Patient counseling regarding adverse effects of terbinafine including but not limited to headache, diarrhea, rash, upset stomach, liver function test abnormalities, itching, taste/smell disturbance, nausea, abdominal pain, and flatulence.  There is a rare possibility of liver failure that can occur when taking terbinafine.  The patient understands that a baseline LFT and kidney function test may be required. The patient verbalized understanding of the proper use and possible adverse effects of terbinafine.  All of the patient's questions and concerns were addressed. Prednisone Counseling:  I discussed with the patient the risks of prolonged use of prednisone including but not limited to weight gain, insomnia, osteoporosis, mood changes, diabetes, susceptibility to infection, glaucoma and high blood pressure.  In cases where prednisone use is prolonged, patients should be monitored with blood pressure checks, serum glucose levels and an eye exam.  Additionally, the patient may need to be placed on GI prophylaxis, PCP prophylaxis, and calcium and vitamin D supplementation and/or a bisphosphonate.  The patient verbalized understanding of the proper use and the possible adverse effects of prednisone.  All of the patient's questions and concerns were addressed. Minoxidil Counseling: Minoxidil is a topical medication which can increase blood flow where it is applied. It is uncertain how this medication increases hair growth. Side effects are uncommon and include stinging and allergic reactions. 5-Fu Counseling: 5-Fluorouracil Counseling:  I discussed with the patient the risks of 5-fluorouracil including but not limited to erythema, scaling, itching, weeping, crusting, and pain. Ivermectin Counseling:  Patient instructed to take medication on an empty stomach with a full glass of water.  Patient informed of potential adverse effects including but not limited to nausea, diarrhea, dizziness, itching, and swelling of the extremities or lymph nodes.  The patient verbalized understanding of the proper use and possible adverse effects of ivermectin.  All of the patient's questions and concerns were addressed. Bactrim Pregnancy And Lactation Text: This medication is Pregnancy Category D and is known to cause fetal risk.  It is also excreted in breast milk. Solaraze Pregnancy And Lactation Text: This medication is Pregnancy Category B and is considered safe. There is some data to suggest avoiding during the third trimester. It is unknown if this medication is excreted in breast milk. Stelara Counseling:  I discussed with the patient the risks of ustekinumab including but not limited to immunosuppression, malignancy, posterior leukoencephalopathy syndrome, and serious infections.  The patient understands that monitoring is required including a PPD at baseline and must alert us or the primary physician if symptoms of infection or other concerning signs are noted. Nsaids Pregnancy And Lactation Text: These medications are considered safe up to 30 weeks gestation. It is excreted in breast milk. Imiquimod Counseling:  I discussed with the patient the risks of imiquimod including but not limited to erythema, scaling, itching, weeping, crusting, and pain.  Patient understands that the inflammatory response to imiquimod is variable from person to person and was educated regarded proper titration schedule.  If flu-like symptoms develop, patient knows to discontinue the medication and contact us. Xolair Counseling:  Patient informed of potential adverse effects including but not limited to fever, muscle aches, rash and allergic reactions.  The patient verbalized understanding of the proper use and possible adverse effects of Xolair.  All of the patient's questions and concerns were addressed. Azithromycin Counseling:  I discussed with the patient the risks of azithromycin including but not limited to GI upset, allergic reaction, drug rash, diarrhea, and yeast infections. Colchicine Counseling:  Patient counseled regarding adverse effects including but not limited to stomach upset (nausea, vomiting, stomach pain, or diarrhea).  Patient instructed to limit alcohol consumption while taking this medication.  Colchicine may reduce blood counts especially with prolonged use.  The patient understands that monitoring of kidney function and blood counts may be required, especially at baseline. The patient verbalized understanding of the proper use and possible adverse effects of colchicine.  All of the patient's questions and concerns were addressed. Thalidomide Counseling: I discussed with the patient the risks of thalidomide including but not limited to birth defects, anxiety, weakness, chest pain, dizziness, cough and severe allergy. Doxepin Pregnancy And Lactation Text: This medication is Pregnancy Category C and it isn't known if it is safe during pregnancy. It is also excreted in breast milk and breast feeding isn't recommended. Clofazimine Counseling:  I discussed with the patient the risks of clofazimine including but not limited to skin and eye pigmentation, liver damage, nausea/vomiting, gastrointestinal bleeding and allergy. Birth Control Pills Pregnancy And Lactation Text: This medication should be avoided if pregnant and for the first 30 days post-partum. Drysol Pregnancy And Lactation Text: This medication is considered safe during pregnancy and breast feeding. Xeljanz Counseling: I discussed with the patient the risks of Xeljanz therapy including increased risk of infection, liver issues, headache, diarrhea, or cold symptoms. Live vaccines should be avoided. They were instructed to call if they have any problems. Dapsone Counseling: I discussed with the patient the risks of dapsone including but not limited to hemolytic anemia, agranulocytosis, rashes, methemoglobinemia, kidney failure, peripheral neuropathy, headaches, GI upset, and liver toxicity.  Patients who start dapsone require monitoring including baseline LFTs and weekly CBCs for the first month, then every month thereafter.  The patient verbalized understanding of the proper use and possible adverse effects of dapsone.  All of the patient's questions and concerns were addressed. Methotrexate Counseling:  Patient counseled regarding adverse effects of methotrexate including but not limited to nausea, vomiting, abnormalities in liver function tests. Patients may develop mouth sores, rash, diarrhea, and abnormalities in blood counts. The patient understands that monitoring is required including LFT's and blood counts.  There is a rare possibility of scarring of the liver and lung problems that can occur when taking methotrexate. Persistent nausea, loss of appetite, pale stools, dark urine, cough, and shortness of breath should be reported immediately. Patient advised to discontinue methotrexate treatment at least three months before attempting to become pregnant.  I discussed the need for folate supplements while taking methotrexate.  These supplements can decrease side effects during methotrexate treatment. The patient verbalized understanding of the proper use and possible adverse effects of methotrexate.  All of the patient's questions and concerns were addressed. Erythromycin Counseling:  I discussed with the patient the risks of erythromycin including but not limited to GI upset, allergic reaction, drug rash, diarrhea, increase in liver enzymes, and yeast infections. Gabapentin Counseling: I discussed with the patient the risks of gabapentin including but not limited to dizziness, somnolence, fatigue and ataxia. Dupixent Counseling: I discussed with the patient the risks of dupilumab including but not limited to eye infection and irritation, cold sores, injection site reactions, worsening of asthma, allergic reactions and increased risk of parasitic infection.  Live vaccines should be avoided while taking dupilumab. Dupilumab will also interact with certain medications such as warfarin and cyclosporine. The patient understands that monitoring is required and they must alert us or the primary physician if symptoms of infection or other concerning signs are noted. Griseofulvin Pregnancy And Lactation Text: This medication is Pregnancy Category X and is known to cause serious birth defects. It is unknown if this medication is excreted in breast milk but breast feeding should be avoided. Erythromycin Pregnancy And Lactation Text: This medication is Pregnancy Category B and is considered safe during pregnancy. It is also excreted in breast milk. Cyclosporine Counseling:  I discussed with the patient the risks of cyclosporine including but not limited to hypertension, gingival hyperplasia,myelosuppression, immunosuppression, liver damage, kidney damage, neurotoxicity, lymphoma, and serious infections. The patient understands that monitoring is required including baseline blood pressure, CBC, CMP, lipid panel and uric acid, and then 1-2 times monthly CMP and blood pressure. Rifampin Pregnancy And Lactation Text: This medication is Pregnancy Category C and it isn't know if it is safe during pregnancy. It is also excreted in breast milk and should not be used if you are breast feeding. Hydroxyzine Pregnancy And Lactation Text: This medication is not safe during pregnancy and should not be taken. It is also excreted in breast milk and breast feeding isn't recommended. Zyclara Counseling:  I discussed with the patient the risks of imiquimod including but not limited to erythema, scaling, itching, weeping, crusting, and pain.  Patient understands that the inflammatory response to imiquimod is variable from person to person and was educated regarded proper titration schedule.  If flu-like symptoms develop, patient knows to discontinue the medication and contact us. Infliximab Counseling:  I discussed with the patient the risks of infliximab including but not limited to myelosuppression, immunosuppression, autoimmune hepatitis, demyelinating diseases, lymphoma, and serious infections.  The patient understands that monitoring is required including a PPD at baseline and must alert us or the primary physician if symptoms of infection or other concerning signs are noted. Hydroxychloroquine Pregnancy And Lactation Text: This medication has been shown to cause fetal harm but it isn't assigned a Pregnancy Risk Category. There are small amounts excreted in breast milk. Acitretin Pregnancy And Lactation Text: This medication is Pregnancy Category X and should not be given to women who are pregnant or may become pregnant in the future. This medication is excreted in breast milk. Azathioprine Counseling:  I discussed with the patient the risks of azathioprine including but not limited to myelosuppression, immunosuppression, hepatotoxicity, lymphoma, and infections.  The patient understands that monitoring is required including baseline LFTs, Creatinine, possible TPMP genotyping and weekly CBCs for the first month and then every 2 weeks thereafter.  The patient verbalized understanding of the proper use and possible adverse effects of azathioprine.  All of the patient's questions and concerns were addressed. Taltz Counseling: I discussed with the patient the risks of ixekizumab including but not limited to immunosuppression, serious infections, worsening of inflammatory bowel disease and drug reactions.  The patient understands that monitoring is required including a PPD at baseline and must alert us or the primary physician if symptoms of infection or other concerning signs are noted.

## 2024-03-02 NOTE — PROGRESS NOTE ADULT - SUBJECTIVE AND OBJECTIVE BOX
Subjective: Patient seen and examined. No new events except as noted.     REVIEW OF SYSTEMS:  Unable to obtain     MEDICATIONS:  MEDICATIONS  (STANDING):  acetylcysteine 10%  Inhalation 4 milliLiter(s) Inhalation four times a day  albuterol/ipratropium for Nebulization 3 milliLiter(s) Nebulizer every 8 hours  atorvastatin 20 milliGRAM(s) Oral at bedtime  chlorhexidine 2% Cloths 1 Application(s) Topical <User Schedule>  furosemide    Tablet 40 milliGRAM(s) Oral daily  heparin   Injectable 5000 Unit(s) SubCutaneous every 12 hours      PHYSICAL EXAM:  T(C): 37.3 (03-02-24 @ 16:00), Max: 37.3 (03-02-24 @ 16:00)  HR: 83 (03-02-24 @ 16:00) (80 - 83)  BP: 109/64 (03-02-24 @ 16:00) (109/64 - 115/56)  RR: 18 (03-02-24 @ 10:54) (18 - 18)  SpO2: 98% (03-02-24 @ 16:00) (98% - 100%)  Wt(kg): --  I&O's Summary    01 Mar 2024 07:01  -  02 Mar 2024 07:00  --------------------------------------------------------  IN: 385 mL / OUT: 1000 mL / NET: -615 mL          Appearance: NAD  HEENT:   Dry oral mucosa, PERRL, EOMI	  Lymphatic: No lymphadenopathy  Cardiovascular: Normal S1 S2, No JVD, No murmurs, No edema  Respiratory: Decreased bs  Psychiatry: A & O x 0 sleepy  Gastrointestinal:  Soft, Non-tender, + Gtube  Skin: No rashes, No ecchymoses, No cyanosis	  Neurologic Exam:  Mental status - eyes closed, opens to repeated verbal stimuli. Follows intermittent simple commands to squeeze L hand/give thumbs up in L hand. Does not respond to orientation questions.   Cranial nerves - R pupil appears ?sluggishly reactive. No BTT in R eye. Unable to open left eye. ?R facial droop but may be 2/2 positioning of patient's head to R.  Motor - Normal bulk. Increased tone in RUE. Does not maintain antigravity when asked throughout all extremities initially, however does squeeze hand on LUE, withdraws slightly to noxious stimuli in R hand.   Upon re-evaluation able to raise both legs antigravity.  Sensation - Withdraws to noxious stimuli throughout.  DTR's - deferred  Coordination -deferred  Gait and station - deferred  Extremities: Normal range of motion, No clubbing, cyanosis or edema  Vascular: Peripheral pulses palpable 2+ bilaterally      LABS:    CARDIAC MARKERS:                                9.0    4.47  )-----------( 201      ( 02 Mar 2024 10:03 )             29.6     03-02    144  |  106  |  27<H>  ----------------------------<  164<H>  3.8   |  26  |  0.86    Ca    8.6      02 Mar 2024 10:03      proBNP:   Lipid Profile:   HgA1c:   TSH:     Negative          TELEMETRY: 	    ECG:  	  RADIOLOGY:   DIAGNOSTIC TESTING:  [ ] Echocardiogram:  [ ]  Catheterization:  [ ] Stress Test:    OTHER:

## 2024-03-03 LAB
CULTURE RESULTS: SIGNIFICANT CHANGE UP
CULTURE RESULTS: SIGNIFICANT CHANGE UP
GLUCOSE BLDC GLUCOMTR-MCNC: 123 MG/DL — HIGH (ref 70–99)
GLUCOSE BLDC GLUCOMTR-MCNC: 167 MG/DL — HIGH (ref 70–99)
GLUCOSE BLDC GLUCOMTR-MCNC: 186 MG/DL — HIGH (ref 70–99)
SPECIMEN SOURCE: SIGNIFICANT CHANGE UP
SPECIMEN SOURCE: SIGNIFICANT CHANGE UP

## 2024-03-03 RX ADMIN — Medication 3 MILLILITER(S): at 21:39

## 2024-03-03 RX ADMIN — Medication 40 MILLIGRAM(S): at 05:57

## 2024-03-03 RX ADMIN — Medication 4 MILLILITER(S): at 12:50

## 2024-03-03 RX ADMIN — Medication 4 MILLILITER(S): at 05:57

## 2024-03-03 RX ADMIN — Medication 4 MILLILITER(S): at 23:13

## 2024-03-03 RX ADMIN — Medication 3 MILLILITER(S): at 05:57

## 2024-03-03 RX ADMIN — Medication 4 MILLILITER(S): at 17:41

## 2024-03-03 RX ADMIN — ATORVASTATIN CALCIUM 20 MILLIGRAM(S): 80 TABLET, FILM COATED ORAL at 21:39

## 2024-03-03 RX ADMIN — HEPARIN SODIUM 5000 UNIT(S): 5000 INJECTION INTRAVENOUS; SUBCUTANEOUS at 17:42

## 2024-03-03 RX ADMIN — Medication 4 MILLILITER(S): at 00:36

## 2024-03-03 RX ADMIN — Medication 3 MILLILITER(S): at 12:50

## 2024-03-03 RX ADMIN — HEPARIN SODIUM 5000 UNIT(S): 5000 INJECTION INTRAVENOUS; SUBCUTANEOUS at 05:58

## 2024-03-03 RX ADMIN — CHLORHEXIDINE GLUCONATE 1 APPLICATION(S): 213 SOLUTION TOPICAL at 06:15

## 2024-03-03 NOTE — PROGRESS NOTE ADULT - SUBJECTIVE AND OBJECTIVE BOX
DATE OF SERVICE: 24 @ 23:02    Patient is a 89y old  Female who presents with a chief complaint of CVA (2024 07:05)      SUBJECTIVE / OVERNIGHT EVENTS:  nonverbal, does not follow coomands    MEDICATIONS  (STANDING):  acetylcysteine 10%  Inhalation 4 milliLiter(s) Inhalation four times a day  albuterol/ipratropium for Nebulization 3 milliLiter(s) Nebulizer every 8 hours  atorvastatin 20 milliGRAM(s) Oral at bedtime  chlorhexidine 2% Cloths 1 Application(s) Topical <User Schedule>  furosemide    Tablet 40 milliGRAM(s) Oral daily  heparin   Injectable 5000 Unit(s) SubCutaneous every 12 hours    MEDICATIONS  (PRN):  sodium chloride 0.9% lock flush 10 milliLiter(s) IV Push daily PRN For PEG      Vital Signs Last 24 Hrs  T(C): 36.8 (03 Mar 2024 15:00), Max: 37 (03 Mar 2024 13:09)  T(F): 98.3 (03 Mar 2024 15:00), Max: 98.6 (03 Mar 2024 13:09)  HR: 85 (03 Mar 2024 15:00) (81 - 99)  BP: 114/70 (03 Mar 2024 15:00) (109/58 - 124/74)  BP(mean): --  RR: 18 (03 Mar 2024 15:00) (18 - 18)  SpO2: 97% (03 Mar 2024 15:00) (97% - 100%)    Parameters below as of 03 Mar 2024 13:09  Patient On (Oxygen Delivery Method): room air      CAPILLARY BLOOD GLUCOSE      POCT Blood Glucose.: 186 mg/dL (03 Mar 2024 19:39)  POCT Blood Glucose.: 167 mg/dL (03 Mar 2024 11:53)  POCT Blood Glucose.: 123 mg/dL (03 Mar 2024 00:39)    I&O's Summary      PHYSICAL EXAM:  GENERAL: NAD, well-developed  HEAD:  Atraumatic, Normocephalic  EYES: EOMI, PERRLA, conjunctiva and sclera clear  NECK: Supple, No JVD  CHEST/LUNG: Clear to auscultation bilaterally; No wheeze  HEART: Regular rate and rhythm; No murmurs, rubs, or gallops  ABDOMEN: Soft, Nontender, Nondistended; Bowel sounds present  EXTREMITIES:  2+ Peripheral Pulses, No clubbing, cyanosis, or edema    LABS:                        9.0    4.47  )-----------( 201      ( 02 Mar 2024 10:03 )             29.6         144  |  106  |  27<H>  ----------------------------<  164<H>  3.8   |  26  |  0.86    Ca    8.6      02 Mar 2024 10:03            Urinalysis Basic - ( 02 Mar 2024 13:41 )    Color: Yellow / Appearance: Cloudy / S.012 / pH: x  Gluc: x / Ketone: Negative mg/dL  / Bili: Negative / Urobili: 1.0 mg/dL   Blood: x / Protein: Negative mg/dL / Nitrite: Negative   Leuk Esterase: Negative / RBC: 1 /HPF / WBC 0 /HPF   Sq Epi: x / Non Sq Epi: 0 /HPF / Bacteria: Negative /HPF        RADIOLOGY & ADDITIONAL TESTS:    Imaging Personally Reviewed:    Consultant(s) Notes Reviewed:      Care Discussed with Consultants/Other Providers:

## 2024-03-03 NOTE — PROGRESS NOTE ADULT - SUBJECTIVE AND OBJECTIVE BOX
Subjective: Patient seen and examined. No new events except as noted.     REVIEW OF SYSTEMS:    Unable to obtain     MEDICATIONS:  MEDICATIONS  (STANDING):  acetylcysteine 10%  Inhalation 4 milliLiter(s) Inhalation four times a day  albuterol/ipratropium for Nebulization 3 milliLiter(s) Nebulizer every 8 hours  atorvastatin 20 milliGRAM(s) Oral at bedtime  chlorhexidine 2% Cloths 1 Application(s) Topical <User Schedule>  furosemide    Tablet 40 milliGRAM(s) Oral daily  heparin   Injectable 5000 Unit(s) SubCutaneous every 12 hours      PHYSICAL EXAM:  T(C): 36.6 (03-03-24 @ 05:13), Max: 37.3 (03-02-24 @ 16:00)  HR: 95 (03-03-24 @ 05:13) (80 - 95)  BP: 110/69 (03-03-24 @ 05:13) (109/64 - 123/72)  RR: 18 (03-03-24 @ 05:13) (18 - 18)  SpO2: 100% (03-03-24 @ 05:13) (98% - 100%)  Wt(kg): --  I&O's Summary          Appearance: NAD  HEENT:   Dry oral mucosa, PERRL, EOMI	  Lymphatic: No lymphadenopathy  Cardiovascular: Normal S1 S2, No JVD, No murmurs, No edema  Respiratory: Decreased bs  Psychiatry: A & O x 0 sleepy  Gastrointestinal:  Soft, Non-tender, + Gtube  Skin: No rashes, No ecchymoses, No cyanosis	  Neurologic Exam:  Mental status - eyes closed, opens to repeated verbal stimuli. Follows intermittent simple commands to squeeze L hand/give thumbs up in L hand. Does not respond to orientation questions.   Cranial nerves - R pupil appears ?sluggishly reactive. No BTT in R eye. Unable to open left eye. ?R facial droop but may be 2/2 positioning of patient's head to R.  Motor - Normal bulk. Increased tone in RUE. Does not maintain antigravity when asked throughout all extremities initially, however does squeeze hand on LUE, withdraws slightly to noxious stimuli in R hand.   Upon re-evaluation able to raise both legs antigravity.  Sensation - Withdraws to noxious stimuli throughout.  DTR's - deferred  Coordination -deferred  Gait and station - deferred  Extremities: Normal range of motion, No clubbing, cyanosis or edema  Vascular: Peripheral pulses palpable 2+ bilaterally    LABS:    CARDIAC MARKERS:                                9.0    4.47  )-----------( 201      ( 02 Mar 2024 10:03 )             29.6     03-02    144  |  106  |  27<H>  ----------------------------<  164<H>  3.8   |  26  |  0.86    Ca    8.6      02 Mar 2024 10:03      proBNP:   Lipid Profile:   HgA1c:   TSH:     Negative          TELEMETRY: 	    ECG:  	  RADIOLOGY:   DIAGNOSTIC TESTING:  [ ] Echocardiogram:  [ ]  Catheterization:  [ ] Stress Test:    OTHER:

## 2024-03-04 ENCOUNTER — TRANSCRIPTION ENCOUNTER (OUTPATIENT)
Age: 89
End: 2024-03-04

## 2024-03-04 LAB
ANION GAP SERPL CALC-SCNC: 13 MMOL/L — SIGNIFICANT CHANGE UP (ref 5–17)
BUN SERPL-MCNC: 28 MG/DL — HIGH (ref 7–23)
CALCIUM SERPL-MCNC: 9 MG/DL — SIGNIFICANT CHANGE UP (ref 8.4–10.5)
CHLORIDE SERPL-SCNC: 105 MMOL/L — SIGNIFICANT CHANGE UP (ref 96–108)
CO2 SERPL-SCNC: 27 MMOL/L — SIGNIFICANT CHANGE UP (ref 22–31)
CREAT SERPL-MCNC: 0.87 MG/DL — SIGNIFICANT CHANGE UP (ref 0.5–1.3)
EGFR: 61 ML/MIN/1.73M2 — SIGNIFICANT CHANGE UP
GLUCOSE BLDC GLUCOMTR-MCNC: 108 MG/DL — HIGH (ref 70–99)
GLUCOSE BLDC GLUCOMTR-MCNC: 181 MG/DL — HIGH (ref 70–99)
GLUCOSE SERPL-MCNC: 160 MG/DL — HIGH (ref 70–99)
HCT VFR BLD CALC: 29.3 % — LOW (ref 34.5–45)
HGB BLD-MCNC: 9.5 G/DL — LOW (ref 11.5–15.5)
MCHC RBC-ENTMCNC: 28.2 PG — SIGNIFICANT CHANGE UP (ref 27–34)
MCHC RBC-ENTMCNC: 32.4 GM/DL — SIGNIFICANT CHANGE UP (ref 32–36)
MCV RBC AUTO: 86.9 FL — SIGNIFICANT CHANGE UP (ref 80–100)
NRBC # BLD: 0 /100 WBCS — SIGNIFICANT CHANGE UP (ref 0–0)
PLATELET # BLD AUTO: 221 K/UL — SIGNIFICANT CHANGE UP (ref 150–400)
POTASSIUM SERPL-MCNC: 3.9 MMOL/L — SIGNIFICANT CHANGE UP (ref 3.5–5.3)
POTASSIUM SERPL-SCNC: 3.9 MMOL/L — SIGNIFICANT CHANGE UP (ref 3.5–5.3)
RBC # BLD: 3.37 M/UL — LOW (ref 3.8–5.2)
RBC # FLD: 19.2 % — HIGH (ref 10.3–14.5)
SODIUM SERPL-SCNC: 145 MMOL/L — SIGNIFICANT CHANGE UP (ref 135–145)
WBC # BLD: 6.05 K/UL — SIGNIFICANT CHANGE UP (ref 3.8–10.5)
WBC # FLD AUTO: 6.05 K/UL — SIGNIFICANT CHANGE UP (ref 3.8–10.5)

## 2024-03-04 PROCEDURE — 99222 1ST HOSP IP/OBS MODERATE 55: CPT

## 2024-03-04 RX ADMIN — Medication 3 MILLILITER(S): at 16:23

## 2024-03-04 RX ADMIN — ATORVASTATIN CALCIUM 20 MILLIGRAM(S): 80 TABLET, FILM COATED ORAL at 21:18

## 2024-03-04 RX ADMIN — Medication 3 MILLILITER(S): at 05:24

## 2024-03-04 RX ADMIN — Medication 4 MILLILITER(S): at 23:39

## 2024-03-04 RX ADMIN — Medication 4 MILLILITER(S): at 11:38

## 2024-03-04 RX ADMIN — Medication 3 MILLILITER(S): at 21:18

## 2024-03-04 RX ADMIN — Medication 40 MILLIGRAM(S): at 05:24

## 2024-03-04 RX ADMIN — HEPARIN SODIUM 5000 UNIT(S): 5000 INJECTION INTRAVENOUS; SUBCUTANEOUS at 05:24

## 2024-03-04 RX ADMIN — Medication 4 MILLILITER(S): at 05:23

## 2024-03-04 RX ADMIN — HEPARIN SODIUM 5000 UNIT(S): 5000 INJECTION INTRAVENOUS; SUBCUTANEOUS at 16:23

## 2024-03-04 RX ADMIN — Medication 4 MILLILITER(S): at 16:23

## 2024-03-04 RX ADMIN — CHLORHEXIDINE GLUCONATE 1 APPLICATION(S): 213 SOLUTION TOPICAL at 05:25

## 2024-03-04 NOTE — CONSULT NOTE ADULT - SUBJECTIVE AND OBJECTIVE BOX
Patient is a 89y old  Female who presents with a chief complaint of CVA (29 Feb 2024 07:05)    HPI:  89F with CHF, HTN, pacemaker placement, TAVR admitted 2/14/2024 with new R facial droop and R weakness.  CT with acute L MCA stroke.  presenting as a code stroke for AMS, ?R-facial droop, R sided weakness.  Course complicated by hemorrhogic transformation.  On 2/27, she underwent PEG, complicated by hypoxia.  Was briefly in ICU intubated 2/27 - 2/28.  No fever.  WBC 11.5.  BCs were obtained.  Empiric vanc/meropenem started.  BC with GPR, PCR negative.  Duplex noted R subclavian thrombus and R cephalic vein clot.  Vancomycin stopped after 2 days.  No repeat BC sent.    ID asked to help management.     prior hospital charts reviewed [  ]  primary team notes reviewed [ x ]  other consultant notes reviewed [  ]    PAST MEDICAL & SURGICAL HISTORY:  CHF  HTN  PPM  TAVR    Allergies  penicillins (Other)    ANTIMICROBIALS:  levoFLOXacin IVPB (2/16 x1)  meropenem  IVPB (2/27-2/29)  vancomycin  IVPB (2/27-2/28)    MEDICATIONS  (STANDING):  acetylcysteine 10%  Inhalation 4 four times a day  albuterol/ipratropium for Nebulization 3 every 8 hours  atorvastatin 20 at bedtime  furosemide    Tablet 40 daily  heparin   Injectable 5000 every 12 hours    SOCIAL HISTORY:   no hx etoh or tobacco    FAMILY HISTORY:    REVIEW OF SYSTEMS  [  ] ROS unobtainable because:    [  ] All other systems negative except as noted below:	    Constitutional:  [ ] fever [ ] chills  [ ] weight loss  [ ] weakness  Skin:  [ ] rash [ ] phlebitis	  Eyes: [ ] icterus [ ] pain  [ ] discharge	  ENMT: [ ] sore throat  [ ] thrush [ ] ulcers [ ] exudates  Respiratory: [ ] dyspnea [ ] hemoptysis [ ] cough [ ] sputum	  Cardiovascular:  [ ] chest pain [ ] palpitations [ ] edema	  Gastrointestinal:  [ ] nausea [ ] vomiting [ ] diarrhea [ ] constipation [ ] pain	  Genitourinary:  [ ] dysuria [ ] frequency [ ] hematuria [ ] discharge [ ] flank pain  [ ] incontinence  Musculoskeletal:  [ ] myalgias [ ] arthralgias [ ] arthritis  [ ] back pain  Neurological:  [ ] headache [ ] seizures  [ ] confusion/altered mental status  Psychiatric:  [ ] anxiety [ ] depression	  Hematology/Lymphatics:  [ ] lymphadenopathy  Endocrine:  [ ] adrenal [ ] thyroid  Allergic/Immunologic:	 [ ] transplant [ ] seasonal    Vital Signs Last 24 Hrs  T(F): 98.4 (03-04-24 @ 05:00), Max: 99.1 (02-27-24 @ 06:36)  Vital Signs Last 24 Hrs  HR: 99 (03-04-24 @ 05:00) (85 - 99)  BP: 110/71 (03-04-24 @ 05:00) (109/58 - 114/70)  RR: 18 (03-04-24 @ 05:00)  SpO2: 99% (03-04-24 @ 05:00) (97% - 99%)  Wt(kg): --    PHYSICAL EXAM:                              9.5    6.05  )-----------( 221      ( 04 Mar 2024 07:29 )             29.3     145  |  105  |  28<H>  ----------------------------<  160<H>  3.9   |  27  |  0.87    Ca    9.0      04 Mar 2024 07:30    MICROBIOLOGY:  Culture - Urine (collected 03-02-24 @ 13:37)  Source: Clean Catch Clean Catch (Midstream)  Final Report (03-03-24 @ 22:30):    <10,000 CFU/mL Normal Urogenital Clau    Culture - Urine (collected 02-28-24 @ 09:13)  Source: Catheterized Catheterized  Final Report (02-29-24 @ 14:10):    <10,000 CFU/mL Normal Urogenital Clau    Culture - Blood (collected 02-27-24 @ 18:45)  Source: .Blood Blood  Gram Stain (02-29-24 @ 13:29):    Growth in aerobic bottle: Gram Positive Rods  Final Report (03-01-24 @ 14:01):    Growth in aerobic bottle: Brevibacterium varshanse    "Susceptibilities not performed"    Direct identification is available within approximately 3-5    hours either by Blood Panel Multiplexed PCR or Direct    MALDI-TOF. Details: [email or web address]  Organism: Blood Culture PCR (03-01-24 @ 14:01)  Organism: Blood Culture PCR (03-01-24 @ 14:01)      Method Type: PCR      -  Blood PCR Panel: NEG    Culture - Blood (collected 02-27-24 @ 18:30)  Source: .Blood Blood  Final Report (03-03-24 @ 23:01):    No growth at 5 days    RADIOLOGY:  imaging below personally reviewed and agree with findings    Xray Chest 1 View- PORTABLE-Routine (Xray Chest 1 View- PORTABLE-Routine .) (03.01.24 @ 10:31) >  IMPRESSION:  Status post extubation.  Trace right effusion    VA Duplex Upper Ext Vein Scan, Right (02.28.24 @ 13:24) >  IMPRESSION:  There is a short segment, focal, nonobstructive mural thrombus in the right subclavian vein.  Superficial thrombophlebitis affects the right cephalic vein.    Xray Chest 1 View- PORTABLE-Urgent (Xray Chest 1 View- PORTABLE-Urgent .) (02.27.24 @ 21:05) >  IMPRESSION:  ET tube in the trachea. Evaluation is limited to patient positioning.      CT Head No Cont (02.27.24 @ 18:21) >  IMPRESSION:  Redemonstration of recent large left MCA territory infarct.  Resolving hemorrhagic transformation.  Decreased mass effect upon the left lateral ventricle.  Slightly decreased rightward midline shift.  Basal cisterns well visualized.    IR Procedure (02.27.24 @ 15:13) >  IMPRESSION:  Technically successful 18-Romanian G-tube placement into the stomach.    CT Chest No Cont (02.26.24 @ 08:49) >  IMPRESSION:  Likely pulmonary edema with small bilateral pleural effusions.  Patchy opacities in the bilateral upper lobes appear slightly improved compared to 2/16/2024 CT chest.    CT Abdomen and Pelvis No Cont (02.23.24 @ 11:12) >  IMPRESSION:  Trace pneumoperitoneum. This finding was discussed by Dr. Reyez with Lisa Costa NP on 2/23/2024 11:44 AM with read back confirmation.  Small bilateral pleural effusions and pericardial effusion, unchanged.  Chronic appearing fracture deformity of the left distal humerus.    CT Head No Cont (02.17.24 @ 09:14) >  IMPRESSION:  Acute left MCA territory infarct, as on the prior, with associated hemorrhagic transformation centered in the ipsilateral basal ganglia, not significantly changed. Associated 8-9 mm rightward midline shift is not significantly changed. Slightly more pronounced gyriform hyperdensity may reflect spared cortex versus cortical petechial hemorrhage. Additional findings described in detail above    CT Head No Cont (02.16.24 @ 12:30) >  IMPRESSION: Redemonstration of an evolving left-sided MCA distribution acute/subacute infarct with a new small amount of hemorrhagic transformation within the infarct bed in comparison with 2/14/2024.  Surrounding mass effect and shift of the midline structures from left to right appears unchanged when compared to the most recent prior CT exam.    CT Head No Cont (02.16.24 @ 12:30) >  IMPRESSION: Redemonstration of an evolving left-sided MCA distribution acute/subacute infarct with a new small amount of hemorrhagic transformation within the infarct bed in comparison with 2/14/2024.  Surrounding mass effect and shift of the midline structures from left to right appears unchanged when compared to the most recent prior CT exam.            ECHO  TTE W or WO Ultrasound Enhancing Agent (02.14.24 @ 15:41) >  CONCLUSIONS:   1. Technically difficult image quality.   2. 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 No aortic regurgitation.   3. Severe mitral valve stenosis, secondary to dystrophic mitral annular calcification.   4. No prior echocardiogram is available for comparison.   Patient is a 89y old  Female who presents with a chief complaint of CVA (29 Feb 2024 07:05)    HPI:  89F with CHF, HTN, pacemaker placement, TAVR admitted 2/14/2024 with new R facial droop and R weakness.  CT with acute L MCA stroke.  Course complicated by hemorrhogic transformation.  On 2/27, she underwent PEG, complicated by hypoxia.  Was briefly in ICU intubated 2/27 - 2/28.  No fever.  WBC 11.5.  BCs were obtained.  Empiric vanc/meropenem started.  BC with GPR, PCR negative.  Duplex noted R subclavian thrombus and R cephalic vein clot.  Vancomycin stopped after 2 days.  No repeat BC sent.    ID asked to help management.     prior hospital charts reviewed [  ]  primary team notes reviewed [ x ]  other consultant notes reviewed [  ]    PAST MEDICAL & SURGICAL HISTORY:  CHF  HTN  PPM  TAVR    Allergies  penicillins (Other)    ANTIMICROBIALS:  levoFLOXacin IVPB (2/16 x1)  meropenem  IVPB (2/27-2/29)  vancomycin  IVPB (2/27-2/28)    MEDICATIONS  (STANDING):  acetylcysteine 10%  Inhalation 4 four times a day  albuterol/ipratropium for Nebulization 3 every 8 hours  atorvastatin 20 at bedtime  furosemide    Tablet 40 daily  heparin   Injectable 5000 every 12 hours    SOCIAL HISTORY:   no hx etoh or tobacco    FAMILY HISTORY:  non-contributory    REVIEW OF SYSTEMS  [ x ] ROS unobtainable because:  lethargic  [  ] All other systems negative except as noted below:	    Constitutional:  [ ] fever [ ] chills  [ ] weight loss  [ ] weakness  Skin:  [ ] rash [ ] phlebitis	  Eyes: [ ] icterus [ ] pain  [ ] discharge	  ENMT: [ ] sore throat  [ ] thrush [ ] ulcers [ ] exudates  Respiratory: [ ] dyspnea [ ] hemoptysis [ ] cough [ ] sputum	  Cardiovascular:  [ ] chest pain [ ] palpitations [ ] edema	  Gastrointestinal:  [ ] nausea [ ] vomiting [ ] diarrhea [ ] constipation [ ] pain	  Genitourinary:  [ ] dysuria [ ] frequency [ ] hematuria [ ] discharge [ ] flank pain  [ ] incontinence  Musculoskeletal:  [ ] myalgias [ ] arthralgias [ ] arthritis  [ ] back pain  Neurological:  [ ] headache [ ] seizures  [ ] confusion/altered mental status  Psychiatric:  [ ] anxiety [ ] depression	  Hematology/Lymphatics:  [ ] lymphadenopathy  Endocrine:  [ ] adrenal [ ] thyroid  Allergic/Immunologic:	 [ ] transplant [ ] seasonal    Vital Signs Last 24 Hrs  T(F): 98.4 (03-04-24 @ 05:00), Max: 99.1 (02-27-24 @ 06:36)  Vital Signs Last 24 Hrs  HR: 99 (03-04-24 @ 05:00) (85 - 99)  BP: 110/71 (03-04-24 @ 05:00) (109/58 - 114/70)  RR: 18 (03-04-24 @ 05:00)  SpO2: 99% (03-04-24 @ 05:00) (97% - 99%)  Wt(kg): --    PHYSICAL EXAM:  Constitutional: lethargic  HEAD/EYES: keeps eyes closed  ENT:  supple  Cardiovascular:   normal S1, S2  Respiratory:  clear BS bilaterally  GI:  soft, non-tender, normal bowel sounds, peg site is c/d/i  :  no madrigal  Musculoskeletal:  no synovitis  Neurologic: difficult to assess  Skin:  no rash  Psychiatric:  not able to assess                        9.5    6.05  )-----------( 221      ( 04 Mar 2024 07:29 )             29.3     145  |  105  |  28<H>  ----------------------------<  160<H>  3.9   |  27  |  0.87    Ca    9.0      04 Mar 2024 07:30    MICROBIOLOGY:  Culture - Urine (collected 03-02-24 @ 13:37)  Source: Clean Catch Clean Catch (Midstream)  Final Report (03-03-24 @ 22:30):    <10,000 CFU/mL Normal Urogenital Clau    Culture - Urine (collected 02-28-24 @ 09:13)  Source: Catheterized Catheterized  Final Report (02-29-24 @ 14:10):    <10,000 CFU/mL Normal Urogenital Clau    Culture - Blood (collected 02-27-24 @ 18:45)  Source: .Blood Blood  Gram Stain (02-29-24 @ 13:29):    Growth in aerobic bottle: Gram Positive Rods  Final Report (03-01-24 @ 14:01):    Growth in aerobic bottle: Brevibacterium sosa    "Susceptibilities not performed"    Direct identification is available within approximately 3-5    hours either by Blood Panel Multiplexed PCR or Direct    MALDI-TOF. Details: [email or web address]  Organism: Blood Culture PCR (03-01-24 @ 14:01)  Organism: Blood Culture PCR (03-01-24 @ 14:01)      Method Type: PCR      -  Blood PCR Panel: NEG    Culture - Blood (collected 02-27-24 @ 18:30)  Source: .Blood Blood  Final Report (03-03-24 @ 23:01):    No growth at 5 days    RADIOLOGY:  imaging below personally reviewed and agree with findings    Xray Chest 1 View- PORTABLE-Routine (Xray Chest 1 View- PORTABLE-Routine .) (03.01.24 @ 10:31) >  IMPRESSION:  Status post extubation.  Trace right effusion    VA Duplex Upper Ext Vein Scan, Right (02.28.24 @ 13:24) >  IMPRESSION:  There is a short segment, focal, nonobstructive mural thrombus in the right subclavian vein.  Superficial thrombophlebitis affects the right cephalic vein.    Xray Chest 1 View- PORTABLE-Urgent (Xray Chest 1 View- PORTABLE-Urgent .) (02.27.24 @ 21:05) >  IMPRESSION:  ET tube in the trachea. Evaluation is limited to patient positioning.      CT Head No Cont (02.27.24 @ 18:21) >  IMPRESSION:  Redemonstration of recent large left MCA territory infarct.  Resolving hemorrhagic transformation.  Decreased mass effect upon the left lateral ventricle.  Slightly decreased rightward midline shift.  Basal cisterns well visualized.    IR Procedure (02.27.24 @ 15:13) >  IMPRESSION:  Technically successful 18-Ecuadorean G-tube placement into the stomach.    CT Chest No Cont (02.26.24 @ 08:49) >  IMPRESSION:  Likely pulmonary edema with small bilateral pleural effusions.  Patchy opacities in the bilateral upper lobes appear slightly improved compared to 2/16/2024 CT chest.    CT Abdomen and Pelvis No Cont (02.23.24 @ 11:12) >  IMPRESSION:  Trace pneumoperitoneum. This finding was discussed by Dr. Reyez with Lisa Costa NP on 2/23/2024 11:44 AM with read back confirmation.  Small bilateral pleural effusions and pericardial effusion, unchanged.  Chronic appearing fracture deformity of the left distal humerus.    CT Head No Cont (02.17.24 @ 09:14) >  IMPRESSION:  Acute left MCA territory infarct, as on the prior, with associated hemorrhagic transformation centered in the ipsilateral basal ganglia, not significantly changed. Associated 8-9 mm rightward midline shift is not significantly changed. Slightly more pronounced gyriform hyperdensity may reflect spared cortex versus cortical petechial hemorrhage. Additional findings described in detail above    CT Head No Cont (02.16.24 @ 12:30) >  IMPRESSION: Redemonstration of an evolving left-sided MCA distribution acute/subacute infarct with a new small amount of hemorrhagic transformation within the infarct bed in comparison with 2/14/2024.  Surrounding mass effect and shift of the midline structures from left to right appears unchanged when compared to the most recent prior CT exam.    CT Head No Cont (02.16.24 @ 12:30) >  IMPRESSION: Redemonstration of an evolving left-sided MCA distribution acute/subacute infarct with a new small amount of hemorrhagic transformation within the infarct bed in comparison with 2/14/2024.  Surrounding mass effect and shift of the midline structures from left to right appears unchanged when compared to the most recent prior CT exam.            ECHO  TTE W or WO Ultrasound Enhancing Agent (02.14.24 @ 15:41) >  CONCLUSIONS:   1. Technically difficult image quality.   2. 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 No aortic regurgitation.   3. Severe mitral valve stenosis, secondary to dystrophic mitral annular calcification.   4. No prior echocardiogram is available for comparison.

## 2024-03-04 NOTE — CHART NOTE - NSCHARTNOTEFT_GEN_A_CORE
Interim events:  Patient transferred to floor.    Impression:  Global aphasia, R hemiparesis due to L MCA infarct with associated hemorrhagic transformation 2/2 L ICA partially occlusive thrombus. Mechanism ESUS vs. ICAD. Intubated 2/27 i/s/o c/f aspiration PNA vs pneumonitis, extubated and presently on floors.    Additional recommendations:  [] maintain normotension consider BP <140 and >110, avoid hypotension  [] per prior neurovascular recommendations (2/29) would resume ASA 81 daily if no medical contraindications, however antiplatelets regimen can be per cardiology team  [] can be seen in outpatient neurology setting by Dr. No Pedroza, please call on d/c to schedule an appointment  [address], [address], [place] [address]  [phone]    Neurology team to sign off at this time. For any questions can call spectra [phone].  Thank you.

## 2024-03-04 NOTE — CONSULT NOTE ADULT - ASSESSMENT
89F with CHF, HTN, pacemaker placement, TAVR admitted 2/14/2024 with new R facial droop and R weakness.  CT with acute L MCA stroke.  Course complicated by hemorrhogic transformation.  On 2/27, she underwent PEG, complicated by hypoxia.  Was briefly in ICU intubated 2/27 - 2/28.  No fever.  WBC 11.5.  BCs were obtained.  Empiric vanc/meropenem started.  BC with GPR, PCR negative.  Duplex noted R subclavian thrombus and R cephalic vein clot.  Vancomycin stopped after 2 days.  No repeat BC sent.    ID asked to help management.     Tc 98.4  WBC 6.05  Cr 0.87  2/27 BC one of 4 Brevibacterium ravenspurgense  vancomycin 2/27-2/28  meropenem 2/27-2/29    Isolated bacteremia most likely contaminant, however, in light of TAVR and PPM would like to repeat BC  - continue off antibiotics  - repeat BC x2  - if again same from 2/27, would restart vancomycin  - if febrile, restart vancomycin    I have discussed plan of care as detailed above with NP

## 2024-03-04 NOTE — CHART NOTE - NSCHARTNOTEFT_GEN_A_CORE
89-year-old with CHF, HTN, PPM, TAVR, who presented as a code stroke for AMS, R facial droop and R hemiparesis found to have L MCA infarct with hemorrhagic transformation. PEG tube placement 2/27. Patient has limited mobility- i.e patient cannot independently make  changes in body position significant enough to alleviate pressure due to the above mentioned diagnoses. Patient requires gel over lay pad in bed to prevent pressure sores.  Cait Handy NP  medicine ACP

## 2024-03-04 NOTE — PROGRESS NOTE ADULT - SUBJECTIVE AND OBJECTIVE BOX
DATE OF SERVICE: 03-04-24 @ 15:51    Patient is a 89y old  Female who presents with a chief complaint of Acute Stroke (04 Mar 2024 14:37)      SUBJECTIVE / OVERNIGHT EVENTS:  nonverbal, does  not follow commands. eyes closed.      MEDICATIONS  (STANDING):  acetylcysteine 10%  Inhalation 4 milliLiter(s) Inhalation four times a day  albuterol/ipratropium for Nebulization 3 milliLiter(s) Nebulizer every 8 hours  atorvastatin 20 milliGRAM(s) Oral at bedtime  chlorhexidine 2% Cloths 1 Application(s) Topical <User Schedule>  furosemide    Tablet 40 milliGRAM(s) Oral daily  heparin   Injectable 5000 Unit(s) SubCutaneous every 12 hours    MEDICATIONS  (PRN):  sodium chloride 0.9% lock flush 10 milliLiter(s) IV Push daily PRN For PEG      Vital Signs Last 24 Hrs  T(C): 36.9 (04 Mar 2024 05:00), Max: 36.9 (04 Mar 2024 05:00)  T(F): 98.4 (04 Mar 2024 05:00), Max: 98.4 (04 Mar 2024 05:00)  HR: 99 (04 Mar 2024 05:00) (85 - 99)  BP: 110/71 (04 Mar 2024 05:00) (109/58 - 110/71)  BP(mean): --  RR: 18 (04 Mar 2024 05:00) (18 - 18)  SpO2: 99% (04 Mar 2024 05:00) (97% - 99%)    Parameters below as of 04 Mar 2024 05:00  Patient On (Oxygen Delivery Method): room air      CAPILLARY BLOOD GLUCOSE      POCT Blood Glucose.: 181 mg/dL (04 Mar 2024 11:53)  POCT Blood Glucose.: 108 mg/dL (04 Mar 2024 00:12)  POCT Blood Glucose.: 186 mg/dL (03 Mar 2024 19:39)    I&O's Summary      PHYSICAL EXAM:  GENERAL: NAD, well-developed  HEAD:  Atraumatic, Normocephalic  EYES: EOMI, PERRLA, conjunctiva and sclera clear  NECK: Supple, No JVD  CHEST/LUNG: Clear to auscultation bilaterally; No wheeze  HEART: Regular rate and rhythm; No murmurs, rubs, or gallops  ABDOMEN: Soft, Nontender, Nondistended; Bowel sounds present  EXTREMITIES:  2+ Peripheral Pulses, No clubbing, cyanosis, or edema  PSYCH: AAOx3  NEUROLOGY: non-focal  SKIN: No rashes or lesions    LABS:                        9.5    6.05  )-----------( 221      ( 04 Mar 2024 07:29 )             29.3     03-04    145  |  105  |  28<H>  ----------------------------<  160<H>  3.9   |  27  |  0.87    Ca    9.0      04 Mar 2024 07:30            Urinalysis Basic - ( 04 Mar 2024 07:30 )    Color: x / Appearance: x / SG: x / pH: x  Gluc: 160 mg/dL / Ketone: x  / Bili: x / Urobili: x   Blood: x / Protein: x / Nitrite: x   Leuk Esterase: x / RBC: x / WBC x   Sq Epi: x / Non Sq Epi: x / Bacteria: x        RADIOLOGY & ADDITIONAL TESTS:    Imaging Personally Reviewed:    Consultant(s) Notes Reviewed:      Care Discussed with Consultants/Other Providers:

## 2024-03-04 NOTE — PROGRESS NOTE ADULT - ASSESSMENT
89-year-old with CHF, HTN, PPM, TAVR, who presented as a code stroke for AMS, R facial droop and R hemiparesis found to have L MCA infarct with hemorrhagic transformation. PEG tube placement 2/27. Transferred to MICU after patient intubated following oxygen saturation decreasing down to 30s-40s few hours after PEG tube placement.       PLAN  =====Neurologic=====  #CVA  #Hemorrhagic transformation  Patient presented with increased AMS (baseline patient with neurocognitive difficulties with ability to recognize familiar faces and often trouble getting words out), possible R sided facial droop and R hemiparesis. Code stroke called, but not candidate for tenecteplase given time course and higher MRS.   Continues to have global aphasia and R hemiparesis  CT scans from 2/14-2/17 with evolving L MCA distribution infarct with hemorrhagic transformation and 8-9mm rightward shift.   - repeat CT head 2/27 with resolving hemorrhagic infarct and decrease in midline shift  - Per neuro:  - SBP goal 110-140.   - atorvastatin 40mg with LDL goal < 70, consider medication de-escalation given age    =====Pulmonary=====  #AHRF  #Aspiration  Patient O2 sat at 30s-40s after PEG placement. Possible 2/2 aspiration. Intubated as per GOC. CT chest on 2/26 with pulmonary edema and bilateral pleural effusions.   - treatment of possible aspiration PNA/pneumonitis as below  - diuresis as below  - pt started on mucomyst  - expectorating secretions    =====Cardiovascular=====  #HFpEF  Patient with recent TTE with moderate (grade 2) diastolic dysfunction.  -Additionally, CT chest 2/26 with pulmonary edema and bilateral pleural effusions, was given lasix 20mg IV x1.   - continue diuresis with bumex 1mg     #Valvular dysfunction  Severe mitral stenosis seen on TTE 2/14.  s/p TAVR, no aortic regurgitation    #HTN  Patient currently normotensive.     #PPM  Patient with pacemaker, interrogated by cardiology per notes  - VVI mode    =====GI=====  #PEG placement  Patient with PEG placed on 2/27 after GOC discussions with family.   - cont tube feeds as tolerated  - family and nurses aid to be trained with peg feeds    =====Renal/=====  #Electrolytes  - Maintain K>4, Phos>3, Mag>2, iCal>1    =====Endocrine=====  #NPO  - fingersticks q6h while NPO    =====Infectious Disease=====  #?Aspiration pneumonitis vs pneumonia  - positive blood culture with coag neg staph likely contaminant  - seen by ID   - follow up repeat cultures    =====Heme/Onc=====  #Anemia  Hgb 6.7, repeat 6.8 this AM. s/p 1 u pRBC 2/27 AM , with repeat Hgb 9.8 (likely concentrated).  No overt source of bleeding. MCV normocytic.   Baseline Hgb in 8s.   - CTM Hgb  - transfuse if Hgb <7    #DVT Ppx  - Lovenox 30mg SQ qd given resolving hemorrhagic transformation on head CT    =====Ethics=====  FULL CODE, palliative consulted previously, saw patient with discussion involving that patient to be full code.  dispo  - family taking pt home  - d/c planning

## 2024-03-04 NOTE — DISCHARGE NOTE NURSING/CASE MANAGEMENT/SOCIAL WORK - PATIENT PORTAL LINK FT
You can access the FollowMyHealth Patient Portal offered by [facility] by registering at the following website: [email or web address]. By joining [employer]’s FollowMyHealth portal, you will also be able to view your health information using other applications (apps) compatible with our system.

## 2024-03-04 NOTE — DISCHARGE NOTE NURSING/CASE MANAGEMENT/SOCIAL WORK - NSSCCARECORD_GEN_ALL_CORE
Home Care Agency/Community Resource Home Care Agency/Durable Medical Equipment Agency/Community [facility]

## 2024-03-04 NOTE — PROGRESS NOTE ADULT - SUBJECTIVE AND OBJECTIVE BOX
Subjective: Patient seen and examined. No new events except as noted.     REVIEW OF SYSTEMS:    Unable to obtain      MEDICATIONS:  MEDICATIONS  (STANDING):  acetylcysteine 10%  Inhalation 4 milliLiter(s) Inhalation four times a day  albuterol/ipratropium for Nebulization 3 milliLiter(s) Nebulizer every 8 hours  atorvastatin 20 milliGRAM(s) Oral at bedtime  chlorhexidine 2% Cloths 1 Application(s) Topical <User Schedule>  furosemide    Tablet 40 milliGRAM(s) Oral daily  heparin   Injectable 5000 Unit(s) SubCutaneous every 12 hours      PHYSICAL EXAM:  T(C): 36.9 (03-04-24 @ 05:00), Max: 37 (03-03-24 @ 13:09)  HR: 99 (03-04-24 @ 05:00) (85 - 99)  BP: 110/71 (03-04-24 @ 05:00) (109/58 - 124/74)  RR: 18 (03-04-24 @ 05:00) (18 - 18)  SpO2: 99% (03-04-24 @ 05:00) (97% - 100%)  Wt(kg): --  I&O's Summary        Appearance: NAD  HEENT:   Dry oral mucosa, PERRL, EOMI	  Lymphatic: No lymphadenopathy  Cardiovascular: Normal S1 S2, No JVD, No murmurs, No edema  Respiratory: Decreased bs  Psychiatry: A & O x 0 sleepy  Gastrointestinal:  Soft, Non-tender, + Gtube  Skin: No rashes, No ecchymoses, No cyanosis	  Neurologic Exam:  Mental status - eyes closed, opens to repeated verbal stimuli. Follows intermittent simple commands to squeeze L hand/give thumbs up in L hand. Does not respond to orientation questions.   Cranial nerves - R pupil appears ?sluggishly reactive. No BTT in R eye. Unable to open left eye. ?R facial droop but may be 2/2 positioning of patient's head to R.  Motor - Normal bulk. Increased tone in RUE. Does not maintain antigravity when asked throughout all extremities initially, however does squeeze hand on LUE, withdraws slightly to noxious stimuli in R hand.   Upon re-evaluation able to raise both legs antigravity.  Sensation - Withdraws to noxious stimuli throughout.  DTR's - deferred  Coordination -deferred  Gait and station - deferred  Extremities: Normal range of motion, No clubbing, cyanosis or edema  Vascular: Peripheral pulses palpable 2+ bilaterally          LABS:    CARDIAC MARKERS:                                9.5    6.05  )-----------( 221      ( 04 Mar 2024 07:29 )             29.3     03-04    145  |  105  |  28<H>  ----------------------------<  160<H>  3.9   |  27  |  0.87    Ca    9.0      04 Mar 2024 07:30        TELEMETRY: 	    ECG:  	  RADIOLOGY:   DIAGNOSTIC TESTING:  [ ] Echocardiogram:  [ ]  Catheterization:  [ ] Stress Test:    OTHER:

## 2024-03-04 NOTE — PROGRESS NOTE ADULT - SUBJECTIVE AND OBJECTIVE BOX
Follow-up Pulm Progress Note    No new respiratory events overnight  nonlabored  on RA, sats mid 90s   ROS unable to be obtained        Medications:  MEDICATIONS  (STANDING):  acetylcysteine 10%  Inhalation 4 milliLiter(s) Inhalation four times a day  albuterol/ipratropium for Nebulization 3 milliLiter(s) Nebulizer every 8 hours  atorvastatin 20 milliGRAM(s) Oral at bedtime  chlorhexidine 2% Cloths 1 Application(s) Topical <User Schedule>  furosemide    Tablet 40 milliGRAM(s) Oral daily  heparin   Injectable 5000 Unit(s) SubCutaneous every 12 hours    MEDICATIONS  (PRN):  sodium chloride 0.9% lock flush 10 milliLiter(s) IV Push daily PRN For PEG          Vital Signs Last 24 Hrs  T(C): 36.9 (04 Mar 2024 05:00), Max: 37 (03 Mar 2024 13:09)  T(F): 98.4 (04 Mar 2024 05:00), Max: 98.6 (03 Mar 2024 13:09)  HR: 99 (04 Mar 2024 05:00) (85 - 99)  BP: 110/71 (04 Mar 2024 05:00) (109/58 - 124/74)  BP(mean): --  RR: 18 (04 Mar 2024 05:00) (18 - 18)  SpO2: 99% (04 Mar 2024 05:00) (97% - 100%)    Parameters below as of 04 Mar 2024 05:00  Patient On (Oxygen Delivery Method): room air                  LABS:                        9.5    6.05  )-----------( 221      ( 04 Mar 2024 07:29 )             29.3     03-04    145  |  105  |  28<H>  ----------------------------<  160<H>  3.9   |  27  |  0.87    Ca    9.0      04 Mar 2024 07:30            CAPILLARY BLOOD GLUCOSE      POCT Blood Glucose.: 108 mg/dL (04 Mar 2024 00:12)      Urinalysis Basic - ( 04 Mar 2024 07:30 )    Color: x / Appearance: x / SG: x / pH: x  Gluc: 160 mg/dL / Ketone: x  / Bili: x / Urobili: x   Blood: x / Protein: x / Nitrite: x   Leuk Esterase: x / RBC: x / WBC x   Sq Epi: x / Non Sq Epi: x / Bacteria: x                  CULTURES:     Culture - Blood (collected 02-27-24 @ 18:45)  Source: .Blood Blood  Gram Stain (02-29-24 @ 13:29):    Growth in aerobic bottle: Gram Positive Rods  Final Report (03-01-24 @ 14:01):    Growth in aerobic bottle: Brevibacterium chenpurgense    "Susceptibilities not performed"    Direct identification is available within approximately 3-5    hours either by Blood Panel Multiplexed PCR or Direct    MALDI-TOF. Details: [email or web address]  Organism: Blood Culture PCR (03-01-24 @ 14:01)  Organism: Blood Culture PCR (03-01-24 @ 14:01)      Method Type: PCR      -  Blood PCR Panel: NEG    Culture - Blood (collected 02-27-24 @ 18:30)  Source: .Blood Blood  Final Report (03-03-24 @ 23:01):    No growth at 5 days        Culture - Urine (collected 03-02-24 @ 13:37)  Source: Clean Catch Clean Catch (Midstream)  Final Report (03-03-24 @ 22:30):    <10,000 CFU/mL Normal Urogenital Clau    Culture - Urine (collected 02-28-24 @ 09:13)  Source: Catheterized Catheterized  Final Report (02-29-24 @ 14:10):    <10,000 CFU/mL Normal Urogenital Clau            RADIOLOGY REVIEWED  CXR: trace R pl effusion    CT chest:    < from: CT Chest No Cont (02.26.24 @ 08:49) >  FINDINGS:    LUNGS, PLEURA, AND AIRWAYS: No endobronchial lesion. Small bilateral   pleural effusions with partial compressive atelectasis of the lower   lobes, similar to 2/16/2024. There has been some interval improvement of   bilateral upper lobe patchy opacities. Interlobular septal thickening.  MEDIASTINUM AND BERRY: No lymphadenopathy.  VESSELS: Calcification of the aorta and its branches.  HEART: Heart size is normal. Small pericardial effusion, similar to   2/16/2024. Status post TAVR. Mitral annular and coronary artery   calcifications. Left chest wall cardiac device.  CHEST WALL AND LOWER NECK: Enlarged multinodular thyroid gland with a   nodule measuring 2.5 cm on the left.  VISUALIZED UPPER ABDOMEN: Cholecystectomy. Hepatic cyst. Enteric tube   terminates in the distal stomach or proximal duodenum.  BONES: Degenerative changes.    IMPRESSION:    Likely pulmonary edema with small bilateral pleural effusions.    Patchy opacities in the bilateral upper lobes appear slightly improved   compared to 2/16/2024 CT chest.    --- End of Report ---      < end of copied text >

## 2024-03-04 NOTE — DISCHARGE NOTE NURSING/CASE MANAGEMENT/SOCIAL WORK - NSDCCRNAME_GEN_ALL_CORE_FT
[facility] (Formerly [facility] [place])

## 2024-03-05 ENCOUNTER — TRANSCRIPTION ENCOUNTER (OUTPATIENT)
Age: 89
End: 2024-03-05

## 2024-03-05 LAB
ANION GAP SERPL CALC-SCNC: 13 MMOL/L — SIGNIFICANT CHANGE UP (ref 5–17)
BUN SERPL-MCNC: 26 MG/DL — HIGH (ref 7–23)
CALCIUM SERPL-MCNC: 9.2 MG/DL — SIGNIFICANT CHANGE UP (ref 8.4–10.5)
CHLORIDE SERPL-SCNC: 99 MMOL/L — SIGNIFICANT CHANGE UP (ref 96–108)
CO2 SERPL-SCNC: 27 MMOL/L — SIGNIFICANT CHANGE UP (ref 22–31)
CREAT SERPL-MCNC: 0.74 MG/DL — SIGNIFICANT CHANGE UP (ref 0.5–1.3)
EGFR: 74 ML/MIN/1.73M2 — SIGNIFICANT CHANGE UP
GLUCOSE BLDC GLUCOMTR-MCNC: 132 MG/DL — HIGH (ref 70–99)
GLUCOSE BLDC GLUCOMTR-MCNC: 142 MG/DL — HIGH (ref 70–99)
GLUCOSE BLDC GLUCOMTR-MCNC: 163 MG/DL — HIGH (ref 70–99)
GLUCOSE SERPL-MCNC: 146 MG/DL — HIGH (ref 70–99)
HCT VFR BLD CALC: 31.6 % — LOW (ref 34.5–45)
HGB BLD-MCNC: 9.5 G/DL — LOW (ref 11.5–15.5)
MCHC RBC-ENTMCNC: 27.5 PG — SIGNIFICANT CHANGE UP (ref 27–34)
MCHC RBC-ENTMCNC: 30.1 GM/DL — LOW (ref 32–36)
MCV RBC AUTO: 91.6 FL — SIGNIFICANT CHANGE UP (ref 80–100)
NRBC # BLD: 0 /100 WBCS — SIGNIFICANT CHANGE UP (ref 0–0)
PLATELET # BLD AUTO: 209 K/UL — SIGNIFICANT CHANGE UP (ref 150–400)
POTASSIUM SERPL-MCNC: 4.5 MMOL/L — SIGNIFICANT CHANGE UP (ref 3.5–5.3)
POTASSIUM SERPL-SCNC: 4.5 MMOL/L — SIGNIFICANT CHANGE UP (ref 3.5–5.3)
RBC # BLD: 3.45 M/UL — LOW (ref 3.8–5.2)
RBC # FLD: 19 % — HIGH (ref 10.3–14.5)
SODIUM SERPL-SCNC: 139 MMOL/L — SIGNIFICANT CHANGE UP (ref 135–145)
WBC # BLD: 6.88 K/UL — SIGNIFICANT CHANGE UP (ref 3.8–10.5)
WBC # FLD AUTO: 6.88 K/UL — SIGNIFICANT CHANGE UP (ref 3.8–10.5)

## 2024-03-05 PROCEDURE — 99232 SBSQ HOSP IP/OBS MODERATE 35: CPT

## 2024-03-05 PROCEDURE — 99222 1ST HOSP IP/OBS MODERATE 55: CPT

## 2024-03-05 RX ADMIN — Medication 40 MILLIGRAM(S): at 05:07

## 2024-03-05 RX ADMIN — Medication 3 MILLILITER(S): at 05:08

## 2024-03-05 RX ADMIN — Medication 3 MILLILITER(S): at 21:41

## 2024-03-05 RX ADMIN — Medication 4 MILLILITER(S): at 05:08

## 2024-03-05 RX ADMIN — Medication 4 MILLILITER(S): at 16:19

## 2024-03-05 RX ADMIN — CHLORHEXIDINE GLUCONATE 1 APPLICATION(S): 213 SOLUTION TOPICAL at 05:09

## 2024-03-05 RX ADMIN — Medication 3 MILLILITER(S): at 11:42

## 2024-03-05 RX ADMIN — Medication 4 MILLILITER(S): at 11:42

## 2024-03-05 RX ADMIN — HEPARIN SODIUM 5000 UNIT(S): 5000 INJECTION INTRAVENOUS; SUBCUTANEOUS at 16:18

## 2024-03-05 RX ADMIN — ATORVASTATIN CALCIUM 20 MILLIGRAM(S): 80 TABLET, FILM COATED ORAL at 21:42

## 2024-03-05 RX ADMIN — HEPARIN SODIUM 5000 UNIT(S): 5000 INJECTION INTRAVENOUS; SUBCUTANEOUS at 05:08

## 2024-03-05 NOTE — SWALLOW BEDSIDE ASSESSMENT ADULT - ASR SWALLOW RECOMMEND DIAG
Not a candidate at this time given nonfunctional swallow
Not yet appropriate at this time
not clinically appropriate at this time

## 2024-03-05 NOTE — SWALLOW BEDSIDE ASSESSMENT ADULT - H & P REVIEW
yes  89-year-old left-handed woman with past medical history of CHF, HTN, pacemaker placement, valve replacement presenting as a code stroke for AMS, ?R-facial droop, R sided weakness. Was found by home aide at 830 2/14 less responsive, not moving extremities as usually does. No prior known hx strokes per family.At baseline patient alert, able to recognize/comprehend familiar faces, per daughters at bedside patient sometimes has difficulty with getting words out. Not candidate for tenecteplase as area of hypodensity already appreciated on CT head imaging/age, not candidate for thrombectomy given higher MRS. LKW: 2130 2/13/24. NIHSS 18. MRS 4 (aide at bedside reports patient requires assistance with all ADLs, including getting out of bed, showering, changing clothes, walking, and since breaking L arm last year, requires also assistance with feeding). BP per /70, glucose per .  IMPRESSION  R-sided weakness, encephalopathy c/f L brain dysfunction found to have acute L frontoparietal infarct, mechanism likely embolic vs ICAD. dysphagia screen failed on 2/14 due to "unable to position upright".
89-year-old left-handed woman with past medical history of CHF, HTN, pacemaker placement, valve replacement presenting as a code stroke for AMS, ?R-facial droop, R sided weakness. Was found by home aide at 830 2/14 less responsive, not moving extremities as usually does. No prior known hx strokes per family.At baseline patient alert, able to recognize/comprehend familiar faces, per daughters at bedside patient sometimes has difficulty with getting words out. Not candidate for tenecteplase as area of hypodensity already appreciated on CT head imaging/age, not candidate for thrombectomy given higher MRS. LKW: 2130 2/13/24. NIHSS 18. MRS 4 (aide at bedside reports patient requires assistance with all ADLs, including getting out of bed, showering, changing clothes, walking, and since breaking L arm last year, requires also assistance with feeding). BP per /70, glucose per .  IMPRESSION  R-sided weakness, encephalopathy c/f L brain dysfunction found to have acute L frontoparietal infarct, mechanism likely embolic vs ICAD. dysphagia screen failed on 2/14 due to "unable to position upright"./yes
89-year-old left-handed woman with past medical history of CHF, HTN, pacemaker placement, valve replacement presenting as a code stroke for AMS, ?R-facial droop, R sided weakness. Was found by home aide at 830 2/14 less responsive, not moving extremities as usually does. No prior known hx strokes per family.At baseline patient alert, able to recognize/comprehend familiar faces, per daughters at bedside patient sometimes has difficulty with getting words out. Not candidate for tenecteplase as area of hypodensity already appreciated on CT head imaging/age, not candidate for thrombectomy given higher MRS. LKW: 2130 2/13/24. NIHSS 18. MRS 4 (aide at bedside reports patient requires assistance with all ADLs, including getting out of bed, showering, changing clothes, walking, and since breaking L arm last year, requires also assistance with feeding). BP per /70, glucose per .  IMPRESSION  R-sided weakness, encephalopathy c/f L brain dysfunction found to have acute L frontoparietal infarct, mechanism likely embolic vs ICAD. dysphagia screen failed on 2/14 due to "unable to position upright"./yes

## 2024-03-05 NOTE — DISCHARGE NOTE PROVIDER - NSDCFUADDAPPT_GEN_ALL_CORE_FT
APPTS ARE READY TO BE MADE: [ ] YES    Best Family or Patient Contact (if needed):    Additional Information about above appointments (if needed):    1:   2:   3:     Other comments or requests:    APPTS ARE READY TO BE MADE: [ ] YES    Best Family or Patient Contact (if needed):    Additional Information about above appointments (if needed):    1: PCP-> Susie Terry  [phone]  2:   3:     Other comments or requests:

## 2024-03-05 NOTE — SWALLOW BEDSIDE ASSESSMENT ADULT - SWALLOW EVAL: ORAL MUSCULATURE
difficult to ascertain as pt unable to open oral cavity for assessment
ANGY given reduced directive following
unable to assess due to poor participation/comprehension

## 2024-03-05 NOTE — SWALLOW BEDSIDE ASSESSMENT ADULT - SLP GENERAL OBSERVATIONS
Pt received upright at bedside, on room air, w/ PEG. x2 daughters and x1 grandson present. Extensive discussion held with family who was asking about swallow prognosis, exercises, home therapy vs subacute rehab therapy, recommendations, oral diet candidacy, objective testing candidacy; all questions answered to family satisfaction and they expressed verbal understanding and gratitude.

## 2024-03-05 NOTE — DISCHARGE NOTE PROVIDER - HOSPITAL COURSE
HPI:  89-year-old left-handed woman with past medical history of CHF, HTN, pacemaker placement, valve replacement presenting as a code stroke for AMS, ?R-facial droop, R sided weakness.  Was found by home aide at 830 2/14 less responsive, not moving extremities as usually does. No prior known hx strokes per family.  At baseline patient alert, able to recognize/comprehend familiar faces, per daughters at bedside patient sometimes has difficulty with getting words out.    SH: No smoking ETOH use recreational drug use.  LKW: 2130 2/13/24  NIHSS 18  MRS 4 (aide at bedside reports patient requires assistance with all ADLs, including getting out of bed, showering, changing clothes, walking, and since breaking L arm last year, requires also assistance with feeding)  BP per /70, glucose per .     Information obtained from aide at bedside, then after CT scanner family also at bedside. (14 Feb 2024 12:33)    Hospital Course:      Important Medication Changes and Reason:    Active or Pending Issues Requiring Follow-up:    Advanced Directives:   [ ] Full code  [ ] DNR  [ ] Hospice    Discharge Diagnoses:         HPI:  89-year-old left-handed woman with past medical history of CHF, HTN, pacemaker placement, valve replacement presenting as a code stroke for AMS, ?R-facial droop, R sided weakness.  Was found by home aide at 830 2/14 less responsive, not moving extremities as usually does. No prior known hx strokes per family.  At baseline patient alert, able to recognize/comprehend familiar faces, per daughters at bedside patient sometimes has difficulty with getting words out.    SH: No smoking ETOH use recreational drug use.  LKW: 2130 2/13/24  NIHSS 18  MRS 4 (aide at bedside reports patient requires assistance with all ADLs, including getting out of bed, showering, changing clothes, walking, and since breaking L arm last year, requires also assistance with feeding)  BP per /70, glucose per .     Information obtained from aide at bedside, then after CT scanner family also at bedside. (14 Feb 2024 12:33)    Hospital Course:  89 y/o F with PMH of CHF, HTN, PPM, valve replacement. Presents as a code stroke for AMS, ?R-facial droop, R sided weakness. At baseline patient reportedly alert, able to recognize/comprehend familiar faces, per daughters at bedside patient sometimes has difficulty with getting words out. Found to have acute left frontoparietal infarct. Called to consult for concern of aspiration PNA - CT chest with pl effusions, congestion.        Problem/Plan - 1:  ·  Problem: Acute respiratory failure with hypoxia.   ·  Plan: s/p intubation 2/27 likely 2nd to aspiration event - pt reportedly with increased WOB and increased secretions  -S/p extubation 2/28   -No clear infiltrate on CXR   -S/p 3 days of empiric Meropenem   -Keep sats >90% with o2 PRN (currently RA)  -Duoneb q8h   -Suction PRN   -Pt remains at risk for further aspiration events due to poor mental status and inability to handle secretions.     Problem/Plan - 2:  ·  Problem: Pleural effusion.   ·  Plan: -CT chest 2/16 with small b/l pl effusions and congestion  -CT chest 2/26 with increase in effusions, congestion  -Keep O>I as tolerated.  -Keep sats >90% with o2 PRN.     Problem/Plan - 3:  ·  Problem: CVA (cerebrovascular accident).   ·  Plan: -Per neuro.     Problem/Plan - 4:  ·  Problem: Dysphagia.   ·  Plan: -NPO with TF   -Aspiration precautions   -S/p G tube placement in IR 2/27  -GI f/u.     Problem/Plan - 5:  ·  Problem: Hypernatremia.   ·  Plan: -Resolved.      Important Medication Changes and Reason:    Active or Pending Issues Requiring Follow-up:    Advanced Directives:   [ ] Full code  [ ] DNR  [ ] Hospice    Discharge Diagnoses:         HPI:  89-year-old left-handed woman with past medical history of CHF, HTN, pacemaker placement, valve replacement presenting as a code stroke for AMS, ?R-facial droop, R sided weakness.  Was found by home aide at 830 2/14 less responsive, not moving extremities as usually does. No prior known hx strokes per family.  At baseline patient alert, able to recognize/comprehend familiar faces, per daughters at bedside patient sometimes has difficulty with getting words out.    SH: No smoking ETOH use recreational drug use.  LKW: 2130 2/13/24  NIHSS 18  MRS 4 (aide at bedside reports patient requires assistance with all ADLs, including getting out of bed, showering, changing clothes, walking, and since breaking L arm last year, requires also assistance with feeding)  BP per /70, glucose per .     Information obtained from aide at bedside, then after CT scanner family also at bedside. (14 Feb 2024 12:33)    Hospital Course:  89 y/o F with PMH of CHF, HTN, PPM, valve replacement. Presents as a code stroke for AMS, ?R-facial droop, R sided weakness. At baseline patient reportedly alert, able to recognize/comprehend familiar faces, per daughters at bedside patient sometimes has difficulty with getting words out. code stroke was called. Found to have Global aphasia, R hemiparesis due to L MCA infarct with associated hemorrhagic transformation 2/2 L ICA partially occlusive thrombus. Mechanism ESUS vs. ICAD. Intubated 2/27 i/s/o c/f aspiration PNA vs pneumonitis, extubated and transferred to  floors. pulmonary was Called for concern of aspiration PNA - CT chest with pl effusions, congestion.   Acute respiratory failure with hypoxia. s/p intubation 2/27 likely 2nd to aspiration event - pt reportedly with increased WOB and increased secretions  -S/p extubation 2/28 ,No clear infiltrate on CXR ,S/p 3 days of empiric Meropenem . Currently on RA. On duoneb. Pt remains at risk for further aspiration events due to poor mental status and inability to handle secretions. S/P peg tube placement in IR on 2/27. Reached the goal of tube feed and tolerating well.   ID was consulted for positive blood Cx with coag negative staph, as recommended by EP team as the patient has PPM. blood Cx possibly contaminated. Repeat blood CX  -----------.   outpatient neurology setting by Dr. No Pedroza, please call on d/c to schedule an appointment  [address] [facility], [address], [place] [address] [phone]        Important Medication Changes and Reason:    Active or Pending Issues Requiring Follow-up:    Advanced Directives:   [ ] Full code  [ ] DNR  [ ] Hospice    Discharge Diagnoses:         HPI:  89-year-old left-handed woman with past medical history of CHF, HTN, pacemaker placement, valve replacement presenting as a code stroke for AMS, ?R-facial droop, R sided weakness.  Was found by home aide at 830 2/14 less responsive, not moving extremities as usually does. No prior known hx strokes per family.  At baseline patient alert, able to recognize/comprehend familiar faces, per daughters at bedside patient sometimes has difficulty with getting words out.    SH: No smoking ETOH use recreational drug use.  LKW: 2130 2/13/24  NIHSS 18  MRS 4 (aide at bedside reports patient requires assistance with all ADLs, including getting out of bed, showering, changing clothes, walking, and since breaking L arm last year, requires also assistance with feeding)  BP per /70, glucose per .       Information obtained from aide at bedside, then after CT scanner family also at bedside. (14 Feb 2024 12:33)    Hospital Course:  89 y/o F with PMH of CHF, HTN, PPM, valve replacement. Presents as a code stroke for AMS, ?R-facial droop, R sided weakness. At baseline patient reportedly alert, able to recognize/comprehend familiar faces, per daughters at bedside patient sometimes has difficulty with getting words out. code stroke was called. Found to have Global aphasia, R hemiparesis due to L MCA infarct with associated hemorrhagic transformation 2/2 L ICA partially occlusive thrombus. Mechanism ESUS vs. ICAD. Intubated 2/27 i/s/o c/f aspiration PNA vs pneumonitis, extubated and transferred to  floors. pulmonary was Called for concern of aspiration PNA - CT chest with pl effusions, congestion.   Acute respiratory failure with hypoxia. s/p intubation 2/27 likely 2nd to aspiration event - pt reportedly with increased WOB and increased secretions  -S/p extubation 2/28 ,No clear infiltrate on CXR ,S/p 3 days of empiric Meropenem . Currently on RA. On duoneb. Pt remains at risk for further aspiration events due to poor mental status and inability to handle secretions. S/P peg tube placement in IR on 2/27. Reached the goal of tube feed and tolerating well.   ID was consulted for positive blood Cx with coag negative staph, as recommended by EP team as the patient has PPM. blood Cx possibly contaminated. Repeat blood CX  NGTD   outpatient neurology setting by Dr. No Pedroza, please call on d/c to schedule an appointment  [address] [facility], [address], [place], [place] [id number] [phone]        Important Medication Changes and Reason:    Active or Pending Issues Requiring Follow-up:    Advanced Directives:   [ ] Full code  [ ] DNR  [ ] Hospice    Discharge Diagnoses:         HPI:  89-year-old left-handed woman with past medical history of CHF, HTN, pacemaker placement, valve replacement presenting as a code stroke for AMS, ?R-facial droop, R sided weakness.  Was found by home aide at 830 2/14 less responsive, not moving extremities as usually does. No prior known hx strokes per family.  At baseline patient alert, able to recognize/comprehend familiar faces, per daughters at bedside patient sometimes has difficulty with getting words out.    SH: No smoking ETOH use recreational drug use.  LKW: 2130 2/13/24  NIHSS 18  MRS 4 (aide at bedside reports patient requires assistance with all ADLs, including getting out of bed, showering, changing clothes, walking, and since breaking L arm last year, requires also assistance with feeding)  BP per /70, glucose per .       Information obtained from aide at bedside, then after CT scanner family also at bedside. (14 Feb 2024 12:33)    Hospital Course:  89 y/o F with PMH of CHF, HTN, PPM, valve replacement. Presents as a code stroke for AMS, ?R-facial droop, R sided weakness. At baseline patient reportedly alert, able to recognize/comprehend familiar faces, per daughters at bedside patient sometimes has difficulty with getting words out. code stroke was called. Found to have Global aphasia, R hemiparesis due to L MCA infarct with associated hemorrhagic transformation 2/2 L ICA partially occlusive thrombus. Mechanism ESUS vs. ICAD. Intubated 2/27 i/s/o c/f aspiration PNA vs pneumonitis, extubated and transferred to  floors. Pulmonary was Called for concern of aspiration PNA - CT chest with pl effusions, congestion.   Acute respiratory failure with hypoxia. s/p intubation 2/27 likely 2nd to aspiration event - pt reportedly with increased WOB and increased secretions  -S/p extubation 2/28 ,No clear infiltrate on CXR ,S/p 3 days of empiric Meropenem . Currently on RA. On duoneb. Pt remains at risk for further aspiration events due to poor mental status and inability to handle secretions. S/P peg tube placement in IR on 2/27. Reached the goal of tube feed and tolerating well.   ID was consulted for positive blood Cx with coag negative staph, as recommended by EP team as the patient has PPM. blood Cx possibly contaminated. Repeat blood CX  NGTD, suspect contaminant. Per ID continue off antibiotics      outpatient neurology setting by Dr. No Pedroza, please call on d/c to schedule an appointment  [address] [facility], [address], [place], [place] [id number] [phone]  Pt is medically stable for discharge to [facility]      Important Medication Changes and Reason: discontinued blood pressure medications per Dr. Siddiqi   Continue with lasix     Active or Pending Issues Requiring Follow-up: Follow up with Neurology and PCP     Advanced Directives:   [ X] Full code  [ ] DNR  [ ] Hospice    Discharge Diagnoses:  CVA  HTN  pacemaker placement  valve replacement

## 2024-03-05 NOTE — SWALLOW BEDSIDE ASSESSMENT ADULT - ORAL PHASE
Decreased anterior-posterior movement of the bolus/Delayed oral transit time
Decreased anterior-posterior movement of the bolus/Lingual stasis

## 2024-03-05 NOTE — PROGRESS NOTE ADULT - SUBJECTIVE AND OBJECTIVE BOX
Follow-up Pulm Progress Note    No new respiratory events overnight  Breathing nonlabored  O2 sats 99% RA     Medications:  MEDICATIONS  (STANDING):  acetylcysteine 10%  Inhalation 4 milliLiter(s) Inhalation four times a day  albuterol/ipratropium for Nebulization 3 milliLiter(s) Nebulizer every 8 hours  atorvastatin 20 milliGRAM(s) Oral at bedtime  chlorhexidine 2% Cloths 1 Application(s) Topical <User Schedule>  furosemide    Tablet 40 milliGRAM(s) Oral daily  heparin   Injectable 5000 Unit(s) SubCutaneous every 12 hours    MEDICATIONS  (PRN):  sodium chloride 0.9% lock flush 10 milliLiter(s) IV Push daily PRN For PEG      Vital Signs Last 24 Hrs  T(C): 36.4 (05 Mar 2024 08:57), Max: 36.4 (05 Mar 2024 01:00)  T(F): 97.6 (05 Mar 2024 08:57), Max: 97.6 (05 Mar 2024 01:00)  HR: 80 (05 Mar 2024 08:57) (75 - 88)  BP: 104/58 (05 Mar 2024 08:57) (104/58 - 117/58)  BP(mean): --  RR: 18 (05 Mar 2024 08:57) (18 - 18)  SpO2: 99% (05 Mar 2024 08:57) (94% - 100%)    Parameters below as of 05 Mar 2024 08:57  Patient On (Oxygen Delivery Method): room air                  LABS:                        9.5    6.88  )-----------( 209      ( 05 Mar 2024 09:16 )             31.6     03-05    139  |  99  |  26<H>  ----------------------------<  146<H>  4.5   |  27  |  0.74    Ca    9.2      05 Mar 2024 09:16            CAPILLARY BLOOD GLUCOSE      POCT Blood Glucose.: 142 mg/dL (05 Mar 2024 11:57)      Urinalysis Basic - ( 05 Mar 2024 09:16 )    Color: x / Appearance: x / SG: x / pH: x  Gluc: 146 mg/dL / Ketone: x  / Bili: x / Urobili: x   Blood: x / Protein: x / Nitrite: x   Leuk Esterase: x / RBC: x / WBC x   Sq Epi: x / Non Sq Epi: x / Bacteria: x                      CULTURES: (if applicable)    Culture - Blood (collected 02-27-24 @ 18:45)  Source: .Blood Blood  Gram Stain (02-29-24 @ 13:29):    Growth in aerobic bottle: Gram Positive Rods  Final Report (03-01-24 @ 14:01):    Growth in aerobic bottle: Brevibacterium chenpurgense    "Susceptibilities not performed"    Direct identification is available within approximately 3-5    hours either by Blood Panel Multiplexed PCR or Direct    MALDI-TOF. Details: [email or web address]  Organism: Blood Culture PCR (03-01-24 @ 14:01)  Organism: Blood Culture PCR (03-01-24 @ 14:01)      Method Type: PCR      -  Blood PCR Panel: NEG    Culture - Blood (collected 02-27-24 @ 18:30)  Source: .Blood Blood  Final Report (03-03-24 @ 23:01):    No growth at 5 days        Culture - Urine (collected 03-02-24 @ 13:37)  Source: Clean Catch Clean Catch (Midstream)  Final Report (03-03-24 @ 22:30):    <10,000 CFU/mL Normal Urogenital Clau    Culture - Urine (collected 02-28-24 @ 09:13)  Source: Catheterized Catheterized  Final Report (02-29-24 @ 14:10):    <10,000 CFU/mL Normal Urogenital Clau    Physical Examination:  PULM: few scattered rhonchi   CVS: S1, S2 heard    RADIOLOGY REVIEWED  CXR: trace R pl effusion    CT chest:    < from: CT Chest No Cont (02.26.24 @ 08:49) >  FINDINGS:    LUNGS, PLEURA, AND AIRWAYS: No endobronchial lesion. Small bilateral   pleural effusions with partial compressive atelectasis of the lower   lobes, similar to 2/16/2024. There has been some interval improvement of   bilateral upper lobe patchy opacities. Interlobular septal thickening.  MEDIASTINUM AND BERRY: No lymphadenopathy.  VESSELS: Calcification of the aorta and its branches.  HEART: Heart size is normal. Small pericardial effusion, similar to   2/16/2024. Status post TAVR. Mitral annular and coronary artery   calcifications. Left chest wall cardiac device.  CHEST WALL AND LOWER NECK: Enlarged multinodular thyroid gland with a   nodule measuring 2.5 cm on the left.  VISUALIZED UPPER ABDOMEN: Cholecystectomy. Hepatic cyst. Enteric tube   terminates in the distal stomach or proximal duodenum.  BONES: Degenerative changes.    IMPRESSION:    Likely pulmonary edema with small bilateral pleural effusions.    Patchy opacities in the bilateral upper lobes appear slightly improved   compared to 2/16/2024 CT chest.    --- End of Report ---    < end of copied text >

## 2024-03-05 NOTE — DISCHARGE NOTE PROVIDER - NSDCDCMDCOMP_GEN_ALL_CORE
weak gross grasp B'ly. Pt was able to maintain grasp of rattle toy with L hand while held upright against MOC. Benefitted from [place] A to initiate upward reaching, able to lift shoulder ~75d while reaching towards target. B hands to mouth- did not bring hands to midline together
This document is complete and the patient is ready for discharge.
Detailed exam

## 2024-03-05 NOTE — CONSULT NOTE ADULT - PROVIDER SPECIALTY LIST ADULT
Palliative Care
Pulmonology
Intervent Radiology
Surgery
NSICU
Gastroenterology
Infectious Disease
Internal Medicine
Rehab Medicine
Cardiology
Intervent Radiology

## 2024-03-05 NOTE — SWALLOW BEDSIDE ASSESSMENT ADULT - ADDITIONAL RECOMMENDATIONS
[Medical Office: ([facility])___] : at the medical office located in 
swallow goals: long term goal: pt will tolerate recommended diet with no s/s of aspiration. pt will improve oral motor strength/ROM for improved oral management.
[Home] : at home, [unfilled] , at the time of the visit.
[Other:____] : [unfilled]
[Mother] : mother
[FreeTextEntry3] : adopted mother
[FreeTextEntry1] : [id number]\par Patient was followed for mild persistent asthma\par The symptoms are  well controlled :\par Patient is by report          compliant with controller RX\par for the past 4  weeks , the following symptoms were /observed\par \par increased cough at night\par increased eczema\par increased sneezing and drip\par SOB on exertion a little\par exercising in the back yard\par \par season started constantly sneezing\par loratadine\par night coughing\par being wean off keppra\par last EEG no activity\par \par COVID; contact, symptoms, social hx\par no history of travelling to high risk area; \par no contact with known covid pt  \par contact with people who travelled to high risk area in the past 14 days\par no loss of taste or smell\par no diarrhoea, no fever chills aches or sob\par no skin rash, no bruises\par household members: total \par moc\par foc\par people affected by covid (family/friends)\par \par \par 3jan2020\par doing well, s/b Dr Toscano ([place] )\par eczema acting up\par a little cough\par being weaned off one antiepileptic RX to repeat EEG in [facility] in Feb2020\par \par for the past 4  weeks , the following symptoms were /were not observed\par since the last visit, she was doing well In the interval there is no stridor, distress, loss of energy, hemoptysis, fever, night sweat, weight loss\par Asthma symptoms well controlled by Rules of Twos (day symptoms < 2 x/week; night symptoms < 2x /month, no /minimal limitations of activities, less than 2 courses of systemic steroid per 12 month, no ED visits/ hospitalization )\par \par \par OTHER HX: she will be off all seizure meds this month per mother\par \par SEE LAST TIME ( SICK VISIT) VISIT NOTES: jan2020\par URGENT  CARE 1 weeks ago, still coughing, in the paast 12m this is\par 1st X needed prednisolone for coughing and wheezing ,triggered by allergy (eyes red in school)\par Her asthma was diagnosed 6 years ago there were 2 previous hospitalizations for asthma 2 emergency room visits lifelong and she missed 8-10 days of school in the past year. Her symptoms are triggered by exposure to heat, cold, sick to her at smoke, and dusting. Environmentally there are cats dogs in the house\par Officially adopted (Donna = adapted mother Oct 31 2017)\par She is on Keppra and Tripleptal for seizure \par FU for asthma, allergic rhinitis\par Flovent, Claritin help[s, albuterol , \par \par called by Teacher OOB 2x in the past month, she was playing in the gym and then lunch\par Need Rx\par \par \par \par \par Before thiese episodes, In the interval patient symptoms has been stable \par there is improvement in coughing, wheezing, shortness of breath\par there is no stridor, distress, loss of energy, hemoptysis, fever, night sweat, weight loss\par Asthma symptoms well controlled by Rules of Twos (day symptoms < 2 x/week; night symptoms < 2x /month, no /minimal limitations of activities, less than 2 courses of systemic steroid per 12 month, no ED visits/ hospitalization )\par \par \par PMH SEIZURE\par \par In the interval patient symptoms has been stable \par there is improvement in coughing, wheezing, shortness of breath\par there is no stridor, distress, loss of energy, hemoptysis, fever, night sweat, weight loss\par Asthma symptoms well controlled by Rules of Twos (day symptoms < 2 x/week; night symptoms < 2x /month, no /minimal limitations of activities, less than 2 courses of systemic steroid per 12 month, no ED visits/ hospitalization )\par 
Swallow: 1. Pt will complete dysphagia exercises to improve swallow function.  2. Pt will obtain safe and adequate means of nutrition/hydration/medication via least restrictive means
Swallow: 1. Pt will obtain safe and adequate means of nutrition/hydration/medication via least restrictive means

## 2024-03-05 NOTE — PROGRESS NOTE ADULT - SUBJECTIVE AND OBJECTIVE BOX
Patient is a 89y old  Female who presents with a chief complaint of CVA (29 Feb 2024 07:05)    f/u bacteremia    Interval History/ROS:  no fever.  was awaker earlier per aide at bedside.  ROS unable.    PAST MEDICAL & SURGICAL HISTORY:  CHF  HTN  PPM  TAVR    Allergies  penicillins (Other)    ANTIMICROBIALS:  levoFLOXacin IVPB (2/16 x1)  meropenem  IVPB (2/27-2/29)  vancomycin  IVPB (2/27-2/28)    MEDICATIONS  (STANDING):  acetylcysteine 10%  Inhalation 4 four times a day  albuterol/ipratropium for Nebulization 3 every 8 hours  atorvastatin 20 at bedtime  furosemide    Tablet 40 daily  heparin   Injectable 5000 every 12 hours    Vital Signs Last 24 Hrs  T(F): 97.6 (03-05-24 @ 08:57), Max: 97.6 (03-05-24 @ 01:00)  HR: 80 (03-05-24 @ 08:57)  BP: 104/58 (03-05-24 @ 08:57)  RR: 18 (03-05-24 @ 08:57)  SpO2: 99% (03-05-24 @ 08:57) (94% - 100%)    PHYSICAL EXAM:  Constitutional: lethargic  HEAD/EYES: keeps eyes closed  ENT:  supple  Cardiovascular:   normal S1, S2  Respiratory:  clear BS bilaterally  GI:  soft, non-tender, normal bowel sounds, peg site is c/d/i  :  no madrigal  Musculoskeletal:  no synovitis  Neurologic: difficult to assess  Skin:  no rash  Psychiatric:  not able to assess                              9.5    6.05  )-----------( 221      ( 04 Mar 2024 07:29 )             29.3 03-04    145  |  105  |  28  ----------------------------<  160  3.9   |  27  |  0.87  Ca    9.0      04 Mar 2024 07:30    MICROBIOLOGY:  Culture - Urine (collected 03-02-24 @ 13:37)  Source: Clean Catch Clean Catch (Midstream)  Final Report (03-03-24 @ 22:30):    <10,000 CFU/mL Normal Urogenital Clau    Culture - Urine (collected 02-28-24 @ 09:13)  Source: Catheterized Catheterized  Final Report (02-29-24 @ 14:10):    <10,000 CFU/mL Normal Urogenital Clau    Culture - Blood (collected 02-27-24 @ 18:45)  Source: .Blood Blood  Gram Stain (02-29-24 @ 13:29):    Growth in aerobic bottle: Gram Positive Rods  Final Report (03-01-24 @ 14:01):    Growth in aerobic bottle: Brevibacterium ravenspurgense    "Susceptibilities not performed"    Direct identification is available within approximately 3-5    hours either by Blood Panel Multiplexed PCR or Direct    MALDI-TOF. Details: [email or web address]  Organism: Blood Culture PCR (03-01-24 @ 14:01)  Organism: Blood Culture PCR (03-01-24 @ 14:01)      Method Type: PCR      -  Blood PCR Panel: NEG    Culture - Blood (collected 02-27-24 @ 18:30)  Source: .Blood Blood  Final Report (03-03-24 @ 23:01):    No growth at 5 days    RADIOLOGY:  imaging below personally reviewed and agree with findings    Xray Chest 1 View- PORTABLE-Routine (Xray Chest 1 View- PORTABLE-Routine .) (03.01.24 @ 10:31) >  IMPRESSION:  Status post extubation.  Trace right effusion    VA Duplex Upper Ext Vein Scan, Right (02.28.24 @ 13:24) >  IMPRESSION:  There is a short segment, focal, nonobstructive mural thrombus in the right subclavian vein.  Superficial thrombophlebitis affects the right cephalic vein.    Xray Chest 1 View- PORTABLE-Urgent (Xray Chest 1 View- PORTABLE-Urgent .) (02.27.24 @ 21:05) >  IMPRESSION:  ET tube in the trachea. Evaluation is limited to patient positioning.      CT Head No Cont (02.27.24 @ 18:21) >  IMPRESSION:  Redemonstration of recent large left MCA territory infarct.  Resolving hemorrhagic transformation.  Decreased mass effect upon the left lateral ventricle.  Slightly decreased rightward midline shift.  Basal cisterns well visualized.    IR Procedure (02.27.24 @ 15:13) >  IMPRESSION:  Technically successful 18-Tajik G-tube placement into the stomach.    CT Chest No Cont (02.26.24 @ 08:49) >  IMPRESSION:  Likely pulmonary edema with small bilateral pleural effusions.  Patchy opacities in the bilateral upper lobes appear slightly improved compared to 2/16/2024 CT chest.    CT Abdomen and Pelvis No Cont (02.23.24 @ 11:12) >  IMPRESSION:  Trace pneumoperitoneum. This finding was discussed by Dr. Reyez with Lisa Costa NP on 2/23/2024 11:44 AM with read back confirmation.  Small bilateral pleural effusions and pericardial effusion, unchanged.  Chronic appearing fracture deformity of the left distal humerus.    CT Head No Cont (02.17.24 @ 09:14) >  IMPRESSION:  Acute left MCA territory infarct, as on the prior, with associated hemorrhagic transformation centered in the ipsilateral basal ganglia, not significantly changed. Associated 8-9 mm rightward midline shift is not significantly changed. Slightly more pronounced gyriform hyperdensity may reflect spared cortex versus cortical petechial hemorrhage. Additional findings described in detail above    CT Head No Cont (02.16.24 @ 12:30) >  IMPRESSION: Redemonstration of an evolving left-sided MCA distribution acute/subacute infarct with a new small amount of hemorrhagic transformation within the infarct bed in comparison with 2/14/2024.  Surrounding mass effect and shift of the midline structures from left to right appears unchanged when compared to the most recent prior CT exam.    CT Head No Cont (02.16.24 @ 12:30) >  IMPRESSION: Redemonstration of an evolving left-sided MCA distribution acute/subacute infarct with a new small amount of hemorrhagic transformation within the infarct bed in comparison with 2/14/2024.  Surrounding mass effect and shift of the midline structures from left to right appears unchanged when compared to the most recent prior CT exam.            ECHO  TTE W or WO Ultrasound Enhancing Agent (02.14.24 @ 15:41) >  CONCLUSIONS:   1. Technically difficult image quality.   2. 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 No aortic regurgitation.   3. Severe mitral valve stenosis, secondary to dystrophic mitral annular calcification.   4. No prior echocardiogram is available for comparison.

## 2024-03-05 NOTE — SWALLOW BEDSIDE ASSESSMENT ADULT - SWALLOW EVAL: SECRETION MANAGEMENT
baseline cough on secretions
pooling
baseline slightly wet breathing sounds; x2 intermittent cough on secretion during this exam Pt  encouraged to clear throat and swallow secretions.

## 2024-03-05 NOTE — CONSULT NOTE ADULT - CONSULT REQUESTED DATE/TIME
23-Feb-2024 17:30
16-Feb-2024 14:44
20-Feb-2024 11:32
24-Feb-2024 15:08
05-Mar-2024 12:55
04-Mar-2024 14:37
16-Feb-2024 09:00
22-Feb-2024 13:52
15-Feb-2024 19:26
16-Feb-2024 13:00

## 2024-03-05 NOTE — PROGRESS NOTE ADULT - SUBJECTIVE AND OBJECTIVE BOX
DATE OF SERVICE: 03-05-24 @ 13:35    Patient is a 89y old  Female who presents with a chief complaint of Acute Stroke (05 Mar 2024 09:44)      SUBJECTIVE / OVERNIGHT EVENTS:  nonverbal , does not follow commands, daughter requesting rehab and swallow eval    MEDICATIONS  (STANDING):  acetylcysteine 10%  Inhalation 4 milliLiter(s) Inhalation four times a day  albuterol/ipratropium for Nebulization 3 milliLiter(s) Nebulizer every 8 hours  atorvastatin 20 milliGRAM(s) Oral at bedtime  chlorhexidine 2% Cloths 1 Application(s) Topical <User Schedule>  furosemide    Tablet 40 milliGRAM(s) Oral daily  heparin   Injectable 5000 Unit(s) SubCutaneous every 12 hours    MEDICATIONS  (PRN):  sodium chloride 0.9% lock flush 10 milliLiter(s) IV Push daily PRN For PEG      Vital Signs Last 24 Hrs  T(C): 36.4 (05 Mar 2024 08:57), Max: 36.4 (05 Mar 2024 01:00)  T(F): 97.6 (05 Mar 2024 08:57), Max: 97.6 (05 Mar 2024 01:00)  HR: 80 (05 Mar 2024 08:57) (75 - 88)  BP: 104/58 (05 Mar 2024 08:57) (104/58 - 117/58)  BP(mean): --  RR: 18 (05 Mar 2024 08:57) (18 - 18)  SpO2: 99% (05 Mar 2024 08:57) (94% - 100%)    Parameters below as of 05 Mar 2024 08:57  Patient On (Oxygen Delivery Method): room air      CAPILLARY BLOOD GLUCOSE      POCT Blood Glucose.: 142 mg/dL (05 Mar 2024 11:57)  POCT Blood Glucose.: 163 mg/dL (05 Mar 2024 06:53)  POCT Blood Glucose.: 132 mg/dL (05 Mar 2024 00:48)    I&O's Summary      PHYSICAL EXAM:  GENERAL: NAD, well-developed  HEAD:  Atraumatic, Normocephalic  EYES: EOMI, PERRLA, conjunctiva and sclera clear  NECK: Supple, No JVD  CHEST/LUNG: Clear to auscultation bilaterally; No wheeze  HEART: Regular rate and rhythm; No murmurs, rubs, or gallops  ABDOMEN: Soft, Nontender, Nondistended; Bowel sounds present  EXTREMITIES:  2+ Peripheral Pulses, No clubbing, cyanosis, or edema  PSYCH: AAOx0  SKIN: No rashes or lesions    LABS:                        9.5    6.88  )-----------( 209      ( 05 Mar 2024 09:16 )             31.6     03-05    139  |  99  |  26<H>  ----------------------------<  146<H>  4.5   |  27  |  0.74    Ca    9.2      05 Mar 2024 09:16            Urinalysis Basic - ( 05 Mar 2024 09:16 )    Color: x / Appearance: x / SG: x / pH: x  Gluc: 146 mg/dL / Ketone: x  / Bili: x / Urobili: x   Blood: x / Protein: x / Nitrite: x   Leuk Esterase: x / RBC: x / WBC x   Sq Epi: x / Non Sq Epi: x / Bacteria: x        RADIOLOGY & ADDITIONAL TESTS:    Imaging Personally Reviewed:    Consultant(s) Notes Reviewed:      Care Discussed with Consultants/Other Providers:

## 2024-03-05 NOTE — DISCHARGE NOTE PROVIDER - NSDCCPCAREPLAN_GEN_ALL_CORE_FT
PRINCIPAL DISCHARGE DIAGNOSIS  Diagnosis: CVA (cerebrovascular accident)  Assessment and Plan of Treatment: Found to have Global aphasia, R hemiparesis due to L MCA infarct with associated hemorrhagic transformation secondary to  L ICA partially occlusive thrombus. Mechanism ESUS vs. ICAD.   outpatient neurology setting by Dr. No Pedroza, please call on d/c to schedule an appointment  [address] [facility], [address], [place] [id number] [phone]        SECONDARY DISCHARGE DIAGNOSES  Diagnosis: Acute respiratory failure with hypoxia  Assessment and Plan of Treatment: Likely due to PNA. S/P intubation/extubation. s/p 3 days of IV antibiotic.   Now on RA    Diagnosis: Dysphagia  Assessment and Plan of Treatment: S/P peg placement 2/27. CW current feed.     PRINCIPAL DISCHARGE DIAGNOSIS  Diagnosis: CVA (cerebrovascular accident)  Assessment and Plan of Treatment: Found to have Global aphasia, R hemiparesis due to L MCA infarct with associated hemorrhagic transformation secondary to  L ICA partially occlusive thrombus. Mechanism ESUS vs. ICAD.   outpatient neurology setting by Dr. No Pedroza, please call on discharge  to schedule an appointment  [address] [facility], [address], [place] [address]  [phone]        SECONDARY DISCHARGE DIAGNOSES  Diagnosis: Acute respiratory failure with hypoxia  Assessment and Plan of Treatment: Likely due to PNA. You required MICU since you were intubated, nowe xtubated and received 3  days of IV antibiotic.   Now on RA    Diagnosis: Dysphagia  Assessment and Plan of Treatment: S/P peg placement 2/27. Continue  with  current tube  feeds.     PRINCIPAL DISCHARGE DIAGNOSIS  Diagnosis: CVA (cerebrovascular accident)  Assessment and Plan of Treatment: Found to have Global aphasia, R hemiparesis due to L MCA infarct with associated hemorrhagic transformation secondary to  L ICA partially occlusive thrombus. Mechanism ESUS vs. ICAD.   - maintain normotension consider BP <140 and >110, avoid hypotension  outpatient neurology setting by Dr. No Pedroza, please call on discharge  to schedule an appointment  [address] [facility] [facility], [address], [place] [address]  [phone]        SECONDARY DISCHARGE DIAGNOSES  Diagnosis: Acute respiratory failure with hypoxia  Assessment and Plan of Treatment: Likely due to PNA. You required MICU since you were intubated, nowe xtubated and received 3  days of IV antibiotic.   Now on RA    Diagnosis: Dysphagia  Assessment and Plan of Treatment: S/P peg placement 2/27. Continue  with  current tube  feeds.

## 2024-03-05 NOTE — CONSULT NOTE ADULT - CONSULT REASON
Rehabilitation consult
bacteremia
dysphagia
Cardiac Management
G tube placement
medical management
AMS
G tube placement
G Tube placement
r/o PNA

## 2024-03-05 NOTE — DISCHARGE NOTE PROVIDER - NSDCQMCOGNITION_NEU_ALL_CORE
MEDICATION REFILL    Pharmacy Name:    [facility]  Medication requested                             Lisinopril  20 mg  Dosage   1x daily  Quantity     90 days   Allergies    none given  Date of last visit    06/28/2023  Date of Next Appointment   05/06/2024    
Difficulty concentrating/Difficulty making decisions/Difficulty remembering

## 2024-03-05 NOTE — DISCHARGE NOTE PROVIDER - INSTRUCTIONS
Tube Feeding via Gastrostomy: Jevity 1.2 KATE   Total volume for 24 hours (ml): 1080   Intermittent Starting Tube Feed Rate {45 ml}   Tube feeds On: 24 hours

## 2024-03-05 NOTE — PROGRESS NOTE ADULT - ASSESSMENT
89 y/o F with PMH of CHF, HTN, PPM, valve replacement. Presents as a code stroke for AMS, ?R-facial droop, R sided weakness. At baseline patient reportedly alert, able to recognize/comprehend familiar faces, per daughters at bedside patient sometimes has difficulty with getting words out. Found to have acute left frontoparietal infarct. Called to consult for concern of aspiration PNA - CT chest with pl effusions, congestion.

## 2024-03-05 NOTE — DISCHARGE NOTE PROVIDER - CARE PROVIDER_API CALL
No Pedroza  Neurology  [address] [facility] [address], [address]  [place] [address]  Phone: [phone]  Fax: [phone]  Follow Up Time:

## 2024-03-05 NOTE — SWALLOW BEDSIDE ASSESSMENT ADULT - SPECIFY REASON(S)
to clinically assess swallow safety/function; r/o dysphagia.
to subjectively reassess the swallow mechanism r/o dysphagia and determine candidacy for initiating an oral diet
to clinically assess swallow safety/function; r/o dysphagia.

## 2024-03-05 NOTE — PROGRESS NOTE ADULT - ASSESSMENT
89F with CHF, HTN, pacemaker placement, TAVR admitted 2/14/2024 with new R facial droop and R weakness.  CT with acute L MCA stroke.  Course complicated by hemorrhogic transformation.  On 2/27, she underwent PEG, complicated by hypoxia.  Was briefly in ICU intubated 2/27 - 2/28.  No fever.  WBC 11.5.  BCs were obtained.  Empiric vanc/meropenem started.  BC with GPR, PCR negative.  Duplex noted R subclavian thrombus and R cephalic vein clot.  Vancomycin stopped after 2 days.  No repeat BC sent.    ID asked to help management.     Tc 97.6  WBC 6.05  Cr 0.87  2/27 BC one of 4 Brevibacterium ravenspurgense  Repeat BC, unable after multiple attempts  vancomycin 2/27-2/28  meropenem 2/27-2/29    Isolated bacteremia most likely contaminant, however, in light of TAVR and PPM would like to repeat BC  - suspect contaminant  - continue off antibiotics  - if possible, repeat BC x2  - if febrile, restart vancomycin (after blood cultures drawn)

## 2024-03-05 NOTE — SWALLOW BEDSIDE ASSESSMENT ADULT - SWALLOW EVAL: RECOMMENDED DIET
NPO and to continue primary means of nutrition/hydration via PEG
NPO, with non-oral nutrition/hydration/medications. Pending patient/family wishes, palliative care consult in regards ot provision of nutrition/hydration in the is patient of advanced age.
NPO, with non-oral nutrition/hydration/medications.

## 2024-03-05 NOTE — SWALLOW BEDSIDE ASSESSMENT ADULT - CONSISTENCIES ADMINISTERED
Only tsp dipped in ice water
spoon dipped in mdoerately thick liquid, iced spoon, spoon dipped in thin puree

## 2024-03-05 NOTE — PROGRESS NOTE ADULT - SUBJECTIVE AND OBJECTIVE BOX
Subjective: Patient seen and examined. No new events except as noted.     REVIEW OF SYSTEMS:    Unable to obtain        MEDICATIONS:  MEDICATIONS  (STANDING):  acetylcysteine 10%  Inhalation 4 milliLiter(s) Inhalation four times a day  albuterol/ipratropium for Nebulization 3 milliLiter(s) Nebulizer every 8 hours  atorvastatin 20 milliGRAM(s) Oral at bedtime  chlorhexidine 2% Cloths 1 Application(s) Topical <User Schedule>  furosemide    Tablet 40 milliGRAM(s) Oral daily  heparin   Injectable 5000 Unit(s) SubCutaneous every 12 hours      PHYSICAL EXAM:  T(C): 36.4 (03-05-24 @ 08:57), Max: 36.4 (03-05-24 @ 01:00)  HR: 80 (03-05-24 @ 08:57) (75 - 88)  BP: 104/58 (03-05-24 @ 08:57) (104/58 - 117/58)  RR: 18 (03-05-24 @ 08:57) (18 - 18)  SpO2: 99% (03-05-24 @ 08:57) (94% - 100%)  Wt(kg): --  I&O's Summary      Appearance: NAD  HEENT:   Dry oral mucosa, PERRL, EOMI	  Lymphatic: No lymphadenopathy  Cardiovascular: Normal S1 S2, No JVD, No murmurs, No edema  Respiratory: Decreased bs  Psychiatry: A & O x 0 sleepy  Gastrointestinal:  Soft, Non-tender, + Gtube  Skin: No rashes, No ecchymoses, No cyanosis	  Neurologic Exam:  Mental status - eyes closed, opens to repeated verbal stimuli. Follows intermittent simple commands to squeeze L hand/give thumbs up in L hand. Does not respond to orientation questions.   Cranial nerves - R pupil appears ?sluggishly reactive. No BTT in R eye. Unable to open left eye. ?R facial droop but may be 2/2 positioning of patient's head to R.  Motor - Normal bulk. Increased tone in RUE. Does not maintain antigravity when asked throughout all extremities initially, however does squeeze hand on LUE, withdraws slightly to noxious stimuli in R hand.   Upon re-evaluation able to raise both legs antigravity.  Sensation - Withdraws to noxious stimuli throughout.  DTR's - deferred  Coordination -deferred  Gait and station - deferred  Extremities: Normal range of motion, No clubbing, cyanosis or edema  Vascular: Peripheral pulses palpable 2+ bilaterally      LABS:    CARDIAC MARKERS:                                9.5    6.05  )-----------( 221      ( 04 Mar 2024 07:29 )             29.3     03-04    145  |  105  |  28<H>  ----------------------------<  160<H>  3.9   |  27  |  0.87    Ca    9.0      04 Mar 2024 07:30        TELEMETRY: 	    ECG:  	  RADIOLOGY:   DIAGNOSTIC TESTING:  [ ] Echocardiogram:  [ ]  Catheterization:  [ ] Stress Test:    OTHER:

## 2024-03-05 NOTE — PROGRESS NOTE ADULT - ASSESSMENT
89F presented with a stroke    1. Stroke   per neuro     2. Dysphagia   s/p ir g tube, doing well  aspiration precautions    3. respiratory failure, now extubated

## 2024-03-05 NOTE — PROGRESS NOTE ADULT - SUBJECTIVE AND OBJECTIVE BOX
INTERVAL HPI/OVERNIGHT EVENTS:    daughter bedside   tolerating continuous feeds well  no new gi events    MEDICATIONS  (STANDING):  acetylcysteine 10%  Inhalation 4 milliLiter(s) Inhalation four times a day  albuterol/ipratropium for Nebulization 3 milliLiter(s) Nebulizer every 8 hours  atorvastatin 20 milliGRAM(s) Oral at bedtime  chlorhexidine 2% Cloths 1 Application(s) Topical <User Schedule>  furosemide    Tablet 40 milliGRAM(s) Oral daily  heparin   Injectable 5000 Unit(s) SubCutaneous every 12 hours    MEDICATIONS  (PRN):  sodium chloride 0.9% lock flush 10 milliLiter(s) IV Push daily PRN For PEG      Allergies    penicillins (Other)    Intolerances        Review of Systems:   *unobtainable          Vital Signs Last 24 Hrs  T(C): 36.4 (05 Mar 2024 08:57), Max: 36.4 (05 Mar 2024 01:00)  T(F): 97.6 (05 Mar 2024 08:57), Max: 97.6 (05 Mar 2024 01:00)  HR: 80 (05 Mar 2024 08:57) (75 - 88)  BP: 104/58 (05 Mar 2024 08:57) (104/58 - 117/58)  BP(mean): --  RR: 18 (05 Mar 2024 08:57) (18 - 18)  SpO2: 99% (05 Mar 2024 08:57) (94% - 100%)    Parameters below as of 05 Mar 2024 08:57  Patient On (Oxygen Delivery Method): room air        PHYSICAL EXAM:    Constitutional: NAD  HEENT: EOMI, throat clear  Neck: No LAD, supple  Respiratory: CTA and P  Cardiovascular: S1 and S2, RRR, no M  Gastrointestinal: BS+, soft, NT/ND, neg HSM, +peg c/d/i  Extremities: No peripheral edema, neg clubbing, cyanosis  Vascular: 2+ peripheral pulses  Neurological: A/O   Psychiatric: Normal mood, normal affect  Skin: No rashes      LABS:                        9.5    6.88  )-----------( 209      ( 05 Mar 2024 09:16 )             31.6     03-05    139  |  99  |  26<H>  ----------------------------<  146<H>  4.5   |  27  |  0.74    Ca    9.2      05 Mar 2024 09:16        Urinalysis Basic - ( 05 Mar 2024 09:16 )    Color: x / Appearance: x / SG: x / pH: x  Gluc: 146 mg/dL / Ketone: x  / Bili: x / Urobili: x   Blood: x / Protein: x / Nitrite: x   Leuk Esterase: x / RBC: x / WBC x   Sq Epi: x / Non Sq Epi: x / Bacteria: x        RADIOLOGY & ADDITIONAL TESTS:

## 2024-03-05 NOTE — SWALLOW BEDSIDE ASSESSMENT ADULT - PHARYNGEAL PHASE
Patient is a 27y old  Female who presents with a chief complaint of Left ankle surgery (20 Jan 2022 13:59)    HPI:  Patient is 28 y/o female who presents with c/o left ankle pain following an accident last year. She is well known to [facility]. She was discharged from Hospital in Nov 2021. Since then she denies new medical problems. She has been using a walker and in ex fix.  She d/c IV abx in December and d/c PO abx last week.     Patient elects to undergo removal of ex fix; removal abx spacer, open tx left tib/fib malunion, ORIF left syndesmosis, left deltoid ligament repair, left achilles lengthening with Dr. Wallis/ Dr. Dickens  (13 Jan 2022 10:26)    PAST MEDICAL & SURGICAL HISTORY:  Left tibial neuropathy    PAD (peripheral artery disease)    Status post ORIF of fracture of ankle    Social History:  Live with roommate. Mom in town from [facility] (13 Jan 2022 10:26)    FAMILY HISTORY:  non-contributory     Review of Systems: 12 point review of systems otherwise negative    MEDICATIONS  (STANDING):  acetaminophen     Tablet .. 975 milliGRAM(s) Oral every 8 hours  AQUAPHOR (petrolatum Ointment) 1 Application(s) Topical daily  aspirin enteric coated 325 milliGRAM(s) Oral two times a day  caspofungin IVPB 50 milliGRAM(s) IV Intermittent every 24 hours  cefTRIAXone   IVPB 2000 milliGRAM(s) IV Intermittent every 24 hours  DAPTOmycin IVPB 500 milliGRAM(s) IV Intermittent every 24 hours  lactated ringers. 1000 milliLiter(s) (150 mL/Hr) IV Continuous <Continuous>  oxyCODONE  ER Tablet 10 milliGRAM(s) Oral every 12 hours  pantoprazole    Tablet 40 milliGRAM(s) Oral before breakfast  polyethylene glycol 3350 17 Gram(s) Oral at bedtime  potassium chloride    Tablet ER 40 milliEquivalent(s) Oral every 4 hours  pregabalin 25 milliGRAM(s) Oral three times a day  senna 2 Tablet(s) Oral at bedtime    MEDICATIONS  (PRN):  aluminum hydroxide/magnesium hydroxide/simethicone Suspension 30 milliLiter(s) Oral four times a day PRN Indigestion  bisacodyl Suppository 10 milliGRAM(s) Rectal once PRN Constipation  cyclobenzaprine 5 milliGRAM(s) Oral three times a day PRN Muscle Spasm  HYDROmorphone   Tablet 2 milliGRAM(s) Oral every 4 hours PRN Moderate Pain (4 - 6)  HYDROmorphone   Tablet 4 milliGRAM(s) Oral every 4 hours PRN Severe Pain (7 - 10)  HYDROmorphone  Injectable 0.5 milliGRAM(s) IV Push every 4 hours PRN Breakthrough Pain  magnesium hydroxide Suspension 30 milliLiter(s) Oral daily PRN Constipation  ondansetron Injectable 4 milliGRAM(s) IV Push every 6 hours PRN Nausea and/or Vomiting      Allergies    No Known Allergies    Intolerances          Vital Signs Last 24 Hrs  T(C): 36.7 (20 Jan 2022 16:51), Max: 37.1 (20 Jan 2022 04:56)  T(F): 98 (20 Jan 2022 16:51), Max: 98.8 (20 Jan 2022 04:56)  HR: 79 (20 Jan 2022 16:51) (60 - 105)  BP: 99/66 (20 Jan 2022 16:51) (96/65 - 110/72)  BP(mean): --  RR: 16 (20 Jan 2022 16:51) (16 - 17)  SpO2: 96% (20 Jan 2022 16:51) (94% - 96%)  CAPILLARY BLOOD GLUCOSE          01-19 @ 07:01 - 01-20 @ 07:00  --------------------------------------------------------  IN: 2915 mL / OUT: 4465 mL / NET: -1550 mL    01-20 @ 07:01  -  01-20 @ 16:58  --------------------------------------------------------  IN: 220 mL / OUT: 1500 mL / NET: -1280 mL        Physical Exam:  (earlier today)  Daily     Daily   General:  comfortable-appearing in NAD  HEENT:  Nonicteric, PERRLA  CV:  RRR, no murmur, no JVD  Lungs:  CTA B/L, no wheezes, rales, rhonchi  Abdomen:  soft NT ND  Extremities:  LLE ACE wrap, Prevena,  drain  Skin:  WWP  :  +Macario  Neuro:  AAOx3    LABS:                        8.5    5.18  )-----------( 223      ( 20 Jan 2022 06:45 )             25.3     01-20    140  |  105  |  6<L>  ----------------------------<  95  3.2<L>   |  26  |  0.70    Ca    8.0<L>      20 Jan 2022 06:45    
slp provided oral care post each attempt at feeding/Absent trigger of swallow
significantly delayed swallow of ~30s to 1minute; pt also required max modeling and encouragement from x3 people./Delayed pharyngeal swallow

## 2024-03-05 NOTE — SWALLOW BEDSIDE ASSESSMENT ADULT - DIET PRIOR TO ADMI
"soft food cut into small pieces" and thin liquids [er family
soft food and thin liquids
Very soft foods and thin liquids per A

## 2024-03-05 NOTE — PROGRESS NOTE ADULT - ASSESSMENT
89-year-old with CHF, HTN, PPM, TAVR, who presented as a code stroke for AMS, R facial droop and R hemiparesis found to have L MCA infarct with hemorrhagic transformation. PEG tube placement 2/27. Transferred to MICU after patient intubated following oxygen saturation decreasing down to 30s-40s few hours after PEG tube placement.       PLAN  =====Neurologic=====  #CVA  #Hemorrhagic transformation  Patient presented with increased AMS (baseline patient with neurocognitive difficulties with ability to recognize familiar faces and often trouble getting words out), possible R sided facial droop and R hemiparesis. Code stroke called, but not candidate for tenecteplase given time course and higher MRS.   Continues to have global aphasia and R hemiparesis  CT scans from 2/14-2/17 with evolving L MCA distribution infarct with hemorrhagic transformation and 8-9mm rightward shift.   - repeat CT head 2/27 with resolving hemorrhagic infarct and decrease in midline shift  - Per neuro:  - SBP goal 110-140.   - atorvastatin 40mg with LDL goal < 70, consider medication de-escalation given age    =====Pulmonary=====  #AHRF  #Aspiration  Patient O2 sat at 30s-40s after PEG placement. Possible 2/2 aspiration. Intubated as per GOC. CT chest on 2/26 with pulmonary edema and bilateral pleural effusions.   - treatment of possible aspiration PNA/pneumonitis as below  - diuresis as below  - pt started on mucomyst  - expectorating secretions    =====Cardiovascular=====  #HFpEF  Patient with recent TTE with moderate (grade 2) diastolic dysfunction.  -Additionally, CT chest 2/26 with pulmonary edema and bilateral pleural effusions, was given lasix 20mg IV x1.   - continue diuresis with bumex 1mg     #Valvular dysfunction  Severe mitral stenosis seen on TTE 2/14.  s/p TAVR, no aortic regurgitation    #HTN  Patient currently normotensive.     #PPM  Patient with pacemaker, interrogated by cardiology per notes  - VVI mode    =====GI=====  #PEG placement  Patient with PEG placed on 2/27 after GOC discussions with family.   - cont tube feeds as tolerated  - family and nurses aid to be trained with peg feeds    =====Renal/=====  #Electrolytes  - Maintain K>4, Phos>3, Mag>2, iCal>1    =====Endocrine=====  #NPO  - fingersticks q6h while NPO    =====Infectious Disease=====  #?Aspiration pneumonitis vs pneumonia  - positive blood culture with coag neg staph likely contaminant  - seen by ID   - follow up repeat cultures NGTD    =====Heme/Onc=====  #Anemia  Hgb 6.7, repeat 6.8 this AM. s/p 1 u pRBC 2/27 AM , with repeat Hgb 9.8 (likely concentrated).  No overt source of bleeding. MCV normocytic.   Baseline Hgb in 8s.   - CTM Hgb  - transfuse if Hgb <7    #DVT Ppx  - Lovenox 30mg SQ qd given resolving hemorrhagic transformation on head CT    =====Ethics=====  FULL CODE, palliative consulted previously, saw patient with discussion involving that patient to be full code.  dispo  - family requesting rehab  - not a candidate for AR  - d/c planning

## 2024-03-05 NOTE — SWALLOW BEDSIDE ASSESSMENT ADULT - SWALLOW EVAL: DIAGNOSIS
Pt is a 89yoF s/p L MCA stroke and PEG. Pt continues to p/w a non-functional oropharyngeal swallow and reduced secretion mgmt w/ intermittent overt s/sx of aspiration/penetration on baseline secretions. Oral diet not recommended. Pt is a 89yoF s/p L MCA stroke and PEG. Pt continues to p/w a non-functional oropharyngeal swallow and reduced secretion mgmt w/ intermittent overt s/sx of aspiration/penetration on baseline secretions (wet cough/throat clear). Swallow characterized by reduced oral grading, significantly prolonged A-P transit/oral movement, significantly delayed swallow trigger (30s to 1minute). Oral diet not recommended and pt remains not a candidate for objective swallow testing at this time.

## 2024-03-05 NOTE — SWALLOW BEDSIDE ASSESSMENT ADULT - COMMENTS
hx con't  2/14 [facility] 17; pt lethargic  2/15 NIH went from 18 to 22    CXR 2/14 IMPRESSION: Pulmonary vascular congestive changes  brain imaging 2/14 IMPRESSION:  CT brain:Acute left frontoparietal infarct. No CT evidence for selene hemorrhagic transformation. Rightward midline shift of 4 mm  CT brain perfusion:Significantly limited by motion. Cerebral blood flow less than 30% = 0 mL. No core infarct predicted. Tmax greater than 6 seconds = 46 mL and involves the bilateral mesial cerebellar hemispheres, the right temporal lobe, bilateral frontal lobes, and left posterior temporal. This represents brain parenchyma predicted to be at continued ischemic risk in the presence of neurologic symptoms. This finding is likely spurious in nature. Mismatch volume 46 mL Mismatch ratio infinite    CTA brain: Intraluminal filling defect involving the left ICA terminus and extending into the proximal right A1 segment. Normal flow-related enhancement of the remaining left LEBRON. Normal flow-related enhancement of the left MCA. Right MCA enhances to a lesser degree than the left MCA. No vascular aneurysm or AVM.    CTA neck: Approximately 50% short segment stenosis of the origin and proximal segment of the left ICA due to calcified plaque. Normal flow-related enhancement of the more distal vessel. Approximately 75-80% short segment stenosis of the proximal segment of the right internal carotid artery due to partially calcified plaque.  Normal flow-related enhancement of the more distal vessel. No evidence for arterial dissection.    No prior SLP exams in [facility]. 1 MBS in PACS from 10/15/2015 from [facility], which revealed no penetration or aspiration with regular solids, thin liquids, puree, or barium pill. Noted degenerative changes of cervical spine, mild dysmotility of esophagus, and small hiatal hernia.
Continued hospital course:   2/14 NIH 17; pt lethargic  2/15 NIH went from 18 to 22  GI Following for possible PEG. As per SLP d/w JAN Forbes, PEG cannot be placed due to electrolyte imbalance. Per PA, palliative care has been consulted. Pulm consult called re concern for aspiration pna-> monitor off abx.     Seen for bedside swallow evaluation 2/16-> mentation not supportive of PO feeding at that time. NPO, with non-oral nutrition/hydration/medications recommended.   This service is now consulted for reassessment of swallow as mentation reportedly improved
Continued hospital course:   2/14 NIH 17; pt lethargic  2/15 NIH went from 18 to 22  GI Following for possible PEG. As per SLP d/w JAN Forbes, PEG cannot be placed due to electrolyte imbalance. Per PA, palliative care has been consulted. Pulm consult called re concern for aspiration pna-> monitor off abx.   2/27 s/p PEG, pt with hypoxia after procedure. Intubated.  2/28 extubated. eventually on room air.  2/29 xfer to floors    PM&R recs for subacute rehab, family  unsure if would like [facility] or home    Seen for bedside swallow evaluation 2/16-> mentation not supportive of PO feeding at that time. NPO, with non-oral nutrition/hydration/medications recommended. 2/19 mentation improved, but recs for NPO, with non-oral nutrition/hydration/medications; pt noted w/ absent swallow reflex. 2/21 repeat swallow exam recs for NPO; pt required 3 minutes to trigger 1x swallow, not appropriate for objective swallow exam.

## 2024-03-05 NOTE — DISCHARGE NOTE PROVIDER - NSDCMRMEDTOKEN_GEN_ALL_CORE_FT
acetaminophen 500 mg oral tablet: 2 tab(s) orally every 6 hours as needed for  moderate pain  atorvastatin 10 mg oral tablet: 1 tab(s) orally once a day (at bedtime)  D3 25 mcg (1000 intl units) oral tablet: 1 tab(s) orally once a day  esomeprazole 40 mg oral delayed release capsule: 1 cap(s) orally once a day before breakfast  folic acid 1 mg oral tablet: 1 tab(s) orally once a day  furosemide 40 mg oral tablet: 1 tab(s) orally once a day  Gel over lay: daily  losartan 25 mg oral tablet: 1 tab(s) orally once a day  metoprolol succinate 50 mg oral capsule, extended release: 1 cap(s) orally once a day  montelukast 10 mg oral tablet: 1 tab(s) orally once a day   acetaminophen 500 mg oral tablet: 2 tab(s) orally every 6 hours as needed for  moderate pain  acetylcysteine 10% inhalation solution: 4 milliliter(s) inhaled 4 times a day  atorvastatin 20 mg oral tablet: 1 tab(s) orally once a day (at bedtime)  furosemide 40 mg oral tablet: 1 tab(s) orally once a day  ipratropium-albuterol 0.5 mg-2.5 mg/3 mL inhalation solution: 3 milliliter(s) inhaled every 8 hours  polyethylene glycol 3350 oral powder for reconstitution: 17 gram(s) orally once a day  senna (sennosides) 8.8 mg/5 mL oral syrup: 10 milliliter(s) orally once a day (at bedtime)

## 2024-03-05 NOTE — CONSULT NOTE ADULT - SUBJECTIVE AND OBJECTIVE BOX
HPI:  89-year-old left-handed woman with past medical history of CHF, HTN, pacemaker placement, valve replacement presenting as a code stroke for AMS, ?R-facial droop, R sided weakness.  Was found by home aide at 830 2/14 less responsive, not moving extremities as usually does. No prior known hx strokes per family.  At baseline patient alert, able to recognize/comprehend familiar faces, per daughters at bedside patient sometimes has difficulty with getting words out.    SH: No smoking ETOH use recreational drug use.  LKW: 2130 2/13/24  NIHSS 18  MRS 4 (aide at bedside reports patient requires assistance with all ADLs, including getting out of bed, showering, changing clothes, walking, and since breaking L arm last year, requires also assistance with feeding)  BP per /70, glucose per .     Information obtained from aide at bedside, then after CT scanner family also at bedside. (14 Feb 2024 12:33)    Patient was admitted on 2/14, seen today, daughters, home aide at bedside. Patient nonverbal.     REVIEW OF SYSTEMS  unable to obtain     VITALS  T(C): 36.4 (03-05-24 @ 08:57), Max: 36.4 (03-05-24 @ 01:00)  HR: 80 (03-05-24 @ 08:57) (75 - 88)  BP: 104/58 (03-05-24 @ 08:57) (104/58 - 117/58)  RR: 18 (03-05-24 @ 08:57) (18 - 18)  SpO2: 99% (03-05-24 @ 08:57) (94% - 100%)  Wt(kg): --    PAST MEDICAL & SURGICAL HISTORY  see HPI     FUNCTIONAL HISTORY  Lives with family has HHA x 24 hours  needs assist with all mobility, unable to use arms, was walking short distances     CURRENT FUNCTIONAL STATUS  SLP 2/28  dysphagia  aphasia    SLP 2/15  severe receptive and expressive aphasia  impaired alertness   NPO     PT  3/4  bed mobility max assist x 1  patient minimally participated     OT 2/29  bed mobility max assist x 2  sat EOB x 8-10 minutes with poor sitting balance   nonverbal follows 50% commands      RECENT LABS/IMAGING  CBC Full  -  ( 05 Mar 2024 09:16 )  WBC Count : 6.88 K/uL  RBC Count : 3.45 M/uL  Hemoglobin : 9.5 g/dL  Hematocrit : 31.6 %  Platelet Count - Automated : 209 K/uL  Mean Cell Volume : 91.6 fl  Mean Cell Hemoglobin : 27.5 pg  Mean Cell Hemoglobin Concentration : 30.1 gm/dL  Auto Neutrophil # : x  Auto Lymphocyte # : x  Auto Monocyte # : x  Auto Eosinophil # : x  Auto Basophil # : x  Auto Neutrophil % : x  Auto Lymphocyte % : x  Auto Monocyte % : x  Auto Eosinophil % : x  Auto Basophil % : x    03-05    139  |  99  |  26<H>  ----------------------------<  146<H>  4.5   |  27  |  0.74    Ca    9.2      05 Mar 2024 09:16      Urinalysis Basic - ( 05 Mar 2024 09:16 )    Color: x / Appearance: x / SG: x / pH: x  Gluc: 146 mg/dL / Ketone: x  / Bili: x / Urobili: x   Blood: x / Protein: x / Nitrite: x   Leuk Esterase: x / RBC: x / WBC x   Sq Epi: x / Non Sq Epi: x / Bacteria: x    < from: CT Head No Cont (02.27.24 @ 18:21) >    IMPRESSION:    Redemonstration of recent large left MCA territory infarct.  Resolving hemorrhagic transformation.  Decreased mass effect upon the left lateral ventricle.  Slightly decreased rightward midline shift.  Basal cisterns well visualized.      < end of copied text >    < from: CT Chest No Cont (02.26.24 @ 08:49) >    IMPRESSION:    Likely pulmonary edema with small bilateral pleural effusions.    Patchy opacities in the bilateral upper lobes appear slightly improved   compared to 2/16/2024 CT chest.    < end of copied text >      ALLERGIES  penicillins (Other)      MEDICATIONS   acetylcysteine 10%  Inhalation 4 milliLiter(s) Inhalation four times a day  albuterol/ipratropium for Nebulization 3 milliLiter(s) Nebulizer every 8 hours  atorvastatin 20 milliGRAM(s) Oral at bedtime  chlorhexidine 2% Cloths 1 Application(s) Topical <User Schedule>  furosemide    Tablet 40 milliGRAM(s) Oral daily  heparin   Injectable 5000 Unit(s) SubCutaneous every 12 hours  sodium chloride 0.9% lock flush 10 milliLiter(s) IV Push daily PRN      ----------------------------------------------------------------------------------------  PHYSICAL EXAM  Constitutional - NAD, Comfortable, in bed   Chest - Breathing comfortably +cough  Cardiovascular - S1S2   Abdomen - Soft PEG   Extremities - UE ecchymosis, severe arthritic changes in hands, limited ROM b/l UE   Neurologic Exam -    does not follow commands,      Cognitive - opens eyes to name, stimulation, decreased attention to right side      Communication - no verbal output         Motor -does not move ext      ----------------------------------------------------------------------------------------  ASSESSMENT/PLAN  89yFemale h/o CHF, HTN, PPM with functional deficits after CVA with aphasia, dysphagia, weakness  L MCA infarct, hemorrhagic transformation   treated for PNA, now extubated, CT with pulmonary edema, b/l effusions   dysphagia, s/p PEG, NPO   anemia, continue to monitor   Pain - Tylenol  DVT PPX - SCDs heparin   Rehab - Will continue to follow for ongoing rehab needs and recommendations.    daughters report patient does not use right arm, recent left UE fracture   recommend continued inpatient rehabilitation, NOHEMI for PT/OT/SLP   patient will require prolonged period of rehabilitation, has made limited to no progress with bedside therapy since admitted on 2/14, she is not a candidate for Acute rehab   she will not be able to tolerate three hours of therapy daily    Requires max assist of 1-2 people for mobility if she goes home   d/w

## 2024-03-05 NOTE — CONSULT NOTE ADULT - CONSULT REQUESTED BY NAME
Dr. No Pedroza
Dr. Pedroza
Dr. Pedroza
Dr Pedroza
Dr. Pedroza
chrissie
Ave Siddiqi
Dr. Pedroza
Stroke Unit / Family / Dr. Reese Siegel
primary team

## 2024-03-05 NOTE — SWALLOW BEDSIDE ASSESSMENT ADULT - ASR SWALLOW LINGUAL MOBILITY
intact ROM; poor strength
ANGY given reduced directive following
minimal lingual protrusion, fair attempts at lateralization

## 2024-03-06 LAB
ANION GAP SERPL CALC-SCNC: 10 MMOL/L — SIGNIFICANT CHANGE UP (ref 5–17)
BUN SERPL-MCNC: 28 MG/DL — HIGH (ref 7–23)
CALCIUM SERPL-MCNC: 9.3 MG/DL — SIGNIFICANT CHANGE UP (ref 8.4–10.5)
CHLORIDE SERPL-SCNC: 102 MMOL/L — SIGNIFICANT CHANGE UP (ref 96–108)
CO2 SERPL-SCNC: 30 MMOL/L — SIGNIFICANT CHANGE UP (ref 22–31)
CREAT SERPL-MCNC: 0.84 MG/DL — SIGNIFICANT CHANGE UP (ref 0.5–1.3)
EGFR: 64 ML/MIN/1.73M2 — SIGNIFICANT CHANGE UP
GLUCOSE BLDC GLUCOMTR-MCNC: 171 MG/DL — HIGH (ref 70–99)
GLUCOSE SERPL-MCNC: 131 MG/DL — HIGH (ref 70–99)
HCT VFR BLD CALC: 30.8 % — LOW (ref 34.5–45)
HGB BLD-MCNC: 9.6 G/DL — LOW (ref 11.5–15.5)
MCHC RBC-ENTMCNC: 27.7 PG — SIGNIFICANT CHANGE UP (ref 27–34)
MCHC RBC-ENTMCNC: 31.2 GM/DL — LOW (ref 32–36)
MCV RBC AUTO: 89 FL — SIGNIFICANT CHANGE UP (ref 80–100)
NRBC # BLD: 0 /100 WBCS — SIGNIFICANT CHANGE UP (ref 0–0)
PLATELET # BLD AUTO: 227 K/UL — SIGNIFICANT CHANGE UP (ref 150–400)
POTASSIUM SERPL-MCNC: 4.1 MMOL/L — SIGNIFICANT CHANGE UP (ref 3.5–5.3)
POTASSIUM SERPL-SCNC: 4.1 MMOL/L — SIGNIFICANT CHANGE UP (ref 3.5–5.3)
RBC # BLD: 3.46 M/UL — LOW (ref 3.8–5.2)
RBC # FLD: 19 % — HIGH (ref 10.3–14.5)
SODIUM SERPL-SCNC: 142 MMOL/L — SIGNIFICANT CHANGE UP (ref 135–145)
WBC # BLD: 5.66 K/UL — SIGNIFICANT CHANGE UP (ref 3.8–10.5)
WBC # FLD AUTO: 5.66 K/UL — SIGNIFICANT CHANGE UP (ref 3.8–10.5)

## 2024-03-06 PROCEDURE — 99232 SBSQ HOSP IP/OBS MODERATE 35: CPT

## 2024-03-06 RX ADMIN — Medication 4 MILLILITER(S): at 17:14

## 2024-03-06 RX ADMIN — Medication 3 MILLILITER(S): at 13:22

## 2024-03-06 RX ADMIN — HEPARIN SODIUM 5000 UNIT(S): 5000 INJECTION INTRAVENOUS; SUBCUTANEOUS at 06:17

## 2024-03-06 RX ADMIN — Medication 4 MILLILITER(S): at 11:04

## 2024-03-06 RX ADMIN — ATORVASTATIN CALCIUM 20 MILLIGRAM(S): 80 TABLET, FILM COATED ORAL at 21:17

## 2024-03-06 RX ADMIN — HEPARIN SODIUM 5000 UNIT(S): 5000 INJECTION INTRAVENOUS; SUBCUTANEOUS at 17:14

## 2024-03-06 RX ADMIN — Medication 4 MILLILITER(S): at 23:32

## 2024-03-06 RX ADMIN — Medication 40 MILLIGRAM(S): at 06:17

## 2024-03-06 RX ADMIN — Medication 4 MILLILITER(S): at 00:41

## 2024-03-06 RX ADMIN — Medication 3 MILLILITER(S): at 21:16

## 2024-03-06 RX ADMIN — CHLORHEXIDINE GLUCONATE 1 APPLICATION(S): 213 SOLUTION TOPICAL at 06:18

## 2024-03-06 RX ADMIN — Medication 4 MILLILITER(S): at 06:16

## 2024-03-06 RX ADMIN — Medication 3 MILLILITER(S): at 06:16

## 2024-03-06 NOTE — CHART NOTE - NSCHARTNOTEFT_GEN_A_CORE
Nutrition Follow Up Note  Patient seen for: Tube Feeding Follow Up    Chart reviewed, events noted. "Patient is a 89y old  Female who presents with a chief complaint of Acute Stroke"    Source: [X] Patient       [x] Current Medical Record      [] RN        [X] Family at bedside       [] Other:    -If unable to interview patient: [] Trach/Vent/BiPAP  [] Disoriented/confused/inappropriate to interview    Diet Order:   Diet, NPO with Tube Feed:   Tube Feeding Modality: Gastrostomy  Jevity 1.2 Cristo (JEVITY1.2RTH)  Total Volume for 24 Hours (mL): 630  Intermittent  Until Goal Tube Feed Rate (mL per Hour): 35  Tube Feeding Hours ON: 18  Tube Feeding OFF (Hours): 6  Tube Feed Start Time: 11:00    Start Time: 21:00 (02-28-24)    EN Order Provides: total volume 630 mL, 756 kcals, 35 gm protein, and 508 mL free water. Meets 17.5 kcals/kG and 0.8 gm protein/kG based on dosing wt 43.1 kg.    - Nutrition-related concerns:      - EN: observing running at goal rate of 35ml/hr; concern with low calories/protein provided, not meeting estimated needs      - Was re-assessed by SLP on 3/5/24, with recommendations for NPO and to continue primary means of nutrition/hydration via PEG      -GI:  Last BM 3/1/24, not ordered for any bowel regimen     Weights: Drug Dosing Weight  Weight (kg): 43.091 (14 Feb 2024 16:30)  BMI (kg/m2): 18.6 (29 Feb 2024 10:03)    MEDICATIONS  (STANDING):  acetylcysteine 10%  Inhalation 4 milliLiter(s) Inhalation four times a day  atorvastatin 20 milliGRAM(s) Oral at bedtime  furosemide    Tablet 40 milliGRAM(s) Oral daily  heparin   Injectable 5000 Unit(s) SubCutaneous every 12 hours    MEDICATIONS  (PRN):  sodium chloride 0.9% lock flush 10 milliLiter(s) IV Push daily PRN For PEG    Pertinent Labs: 03-06 @ 09:54: Na 142, BUN 28<H>, Cr 0.84, <H>, K+ 4.1, Phos --, Mg --, Alk Phos --, ALT/SGPT --, AST/SGOT --, HbA1c --    A1C with Estimated Average Glucose Result: 6.3 % (02-15-24 @ 05:25)    Finger Sticks:  POCT Blood Glucose.: 171 mg/dL (03-06 @ 12:32)      Skin per nursing documentation: sacrum- Suspected deep tissue pressure injury per flow sheets  Edema: -- +2 left & right arms per flow sheets    Estimated Needs:   Based on dosing wt 43.1 kg with consideration for age, pressure injury, and malnutrition   Estimated Energy Needs: (28-33 kcal/kg) 9584-3396 kcals  Estimated Protein Needs: (1.2-1.5 g pro/kg) 52-65 gm protein   Fluid needs deferred to team.       Previous Nutrition Diagnosis:   1. Severe acute on chronic malnutrition   2. Underweight  3. Increased protein-energy needs  Nutrition Diagnosis is: [X] ongoing  [] resolved [] not applicable     New Nutrition Diagnosis: [X] Not applicable    Nutrition Care Plan:  [X] In Progress  [] Achieved  [] Not applicable    Nutrition Interventions:     Education Provided:       [] Yes:  [X] No:        Recommendations:      1. Patient at HIGH RISK FOR REFEEDING SYNDROME, as tolerated, recommend  Jevity 1.2 @ 35 mL/hr and advance by 5mL q12H until goal rate of 45 mL/hr x24 hrs is reached. Regimen at goal provides 1080 mL total volume, mL free water, 1296 kcal/d and 60g pro/day (30 kcal/kg and 1.4 g/kg) based on current dosing weight 43.1 kg  2. Trend phosphorus, magnesium, and potassium levels closely and replete as indicated   3. As medically feasible, provide multivitamin and thiamine for refeeding risk & Vitamin C for wound healing.  4. Defer diet/texture advancement to medical team/SLP as indicated   5. RD remains available to adjust EN formulary, volume/rate    Monitoring and Evaluation:   Continue to monitor GI tolerance, skin integrity, labs, weight, and bowel movement regularity.     RD remains available upon request and will follow up per protocol  Rina Cherry, MS,RDN,CDN AVAILABLE ON TEAMS

## 2024-03-06 NOTE — PROGRESS NOTE ADULT - SUBJECTIVE AND OBJECTIVE BOX
Follow-up Pulm Progress Note    opens eyes to voice  ROS unable to be obtained  non labored  sats 98% on RA     Medications:  MEDICATIONS  (STANDING):  acetylcysteine 10%  Inhalation 4 milliLiter(s) Inhalation four times a day  albuterol/ipratropium for Nebulization 3 milliLiter(s) Nebulizer every 8 hours  atorvastatin 20 milliGRAM(s) Oral at bedtime  chlorhexidine 2% Cloths 1 Application(s) Topical <User Schedule>  furosemide    Tablet 40 milliGRAM(s) Oral daily  heparin   Injectable 5000 Unit(s) SubCutaneous every 12 hours    MEDICATIONS  (PRN):  sodium chloride 0.9% lock flush 10 milliLiter(s) IV Push daily PRN For PEG          Vital Signs Last 24 Hrs  T(C): 36.4 (06 Mar 2024 08:00), Max: 36.8 (06 Mar 2024 05:24)  T(F): 97.6 (06 Mar 2024 08:00), Max: 98.2 (06 Mar 2024 05:24)  HR: 69 (06 Mar 2024 08:00) (69 - 84)  BP: 99/63 (06 Mar 2024 08:00) (99/63 - 109/65)  BP(mean): --  RR: 18 (06 Mar 2024 08:00) (18 - 18)  SpO2: 97% (06 Mar 2024 08:00) (97% - 100%)    Parameters below as of 06 Mar 2024 08:00  Patient On (Oxygen Delivery Method): room air              03-05 @ 07:01  -  03-06 @ 07:00  --------------------------------------------------------  IN: 0 mL / OUT: 500 mL / NET: -500 mL          LABS:                        9.5    6.88  )-----------( 209      ( 05 Mar 2024 09:16 )             31.6     03-05    139  |  99  |  26<H>  ----------------------------<  146<H>  4.5   |  27  |  0.74    Ca    9.2      05 Mar 2024 09:16            CAPILLARY BLOOD GLUCOSE      POCT Blood Glucose.: 142 mg/dL (05 Mar 2024 11:57)      Urinalysis Basic - ( 05 Mar 2024 09:16 )    Color: x / Appearance: x / SG: x / pH: x  Gluc: 146 mg/dL / Ketone: x  / Bili: x / Urobili: x   Blood: x / Protein: x / Nitrite: x   Leuk Esterase: x / RBC: x / WBC x   Sq Epi: x / Non Sq Epi: x / Bacteria: x                      CULTURES:     Culture - Blood (collected 02-27-24 @ 18:45)  Source: .Blood Blood  Gram Stain (02-29-24 @ 13:29):    Growth in aerobic bottle: Gram Positive Rods  Final Report (03-01-24 @ 14:01):    Growth in aerobic bottle: Brevibacterium paolae    "Susceptibilities not performed"    Direct identification is available within approximately 3-5    hours either by Blood Panel Multiplexed PCR or Direct    MALDI-TOF. Details: [email or web address]  Organism: Blood Culture PCR (03-01-24 @ 14:01)  Organism: Blood Culture PCR (03-01-24 @ 14:01)      Method Type: PCR      -  Blood PCR Panel: NEG    Culture - Blood (collected 02-27-24 @ 18:30)  Source: .Blood Blood  Final Report (03-03-24 @ 23:01):    No growth at 5 days        Culture - Urine (collected 03-02-24 @ 13:37)  Source: Clean Catch Clean Catch (Midstream)  Final Report (03-03-24 @ 22:30):    <10,000 CFU/mL Normal Urogenital Clau    Culture - Urine (collected 02-28-24 @ 09:13)  Source: Catheterized Catheterized  Final Report (02-29-24 @ 14:10):    <10,000 CFU/mL Normal Urogenital Clau          Physical Examination:  PULM: few scattered rhonchi   CVS: S1, S2 heard    RADIOLOGY REVIEWED  CXR: trace R pl effusion    CT chest:    < from: CT Chest No Cont (02.26.24 @ 08:49) >  FINDINGS:    LUNGS, PLEURA, AND AIRWAYS: No endobronchial lesion. Small bilateral   pleural effusions with partial compressive atelectasis of the lower   lobes, similar to 2/16/2024. There has been some interval improvement of   bilateral upper lobe patchy opacities. Interlobular septal thickening.  MEDIASTINUM AND BERRY: No lymphadenopathy.  VESSELS: Calcification of the aorta and its branches.  HEART: Heart size is normal. Small pericardial effusion, similar to   2/16/2024. Status post TAVR. Mitral annular and coronary artery   calcifications. Left chest wall cardiac device.  CHEST WALL AND LOWER NECK: Enlarged multinodular thyroid gland with a   nodule measuring 2.5 cm on the left.  VISUALIZED UPPER ABDOMEN: Cholecystectomy. Hepatic cyst. Enteric tube   terminates in the distal stomach or proximal duodenum.  BONES: Degenerative changes.    IMPRESSION:    Likely pulmonary edema with small bilateral pleural effusions.    Patchy opacities in the bilateral upper lobes appear slightly improved   compared to 2/16/2024 CT chest.    --- End of Report ---    < end of copied text >

## 2024-03-06 NOTE — PROGRESS NOTE ADULT - ASSESSMENT
89F with CHF, HTN, pacemaker placement, TAVR admitted 2/14/2024 with new R facial droop and R weakness.  CT with acute L MCA stroke.  Course complicated by hemorrhogic transformation.  On 2/27, she underwent PEG, complicated by hypoxia.  Was briefly in ICU intubated 2/27 - 2/28.  No fever.  WBC 11.5.  BCs were obtained.  Empiric vanc/meropenem started.  BC with GPR, PCR negative.  Duplex noted R subclavian thrombus and R cephalic vein clot.  Vancomycin stopped after 2 days.  No repeat BC sent.    ID asked to help management.     Tc 98.2  WBC 5.66  Cr 0.74  2/27 BC one of 4 Brevibacterium ravenspurgense  3/5 BC repeated, negative so farattempts  vancomycin 2/27-2/28  meropenem 2/27-2/29    Isolated bacteremia most likely contaminant, however, in light of TAVR and PPM, requested repeat BC  - suspect contaminant  - continue off antibiotics  - if repeat BC remains negative, no need for further antibiotics     89F with CHF, HTN, pacemaker placement, TAVR admitted 2/14/2024 with new R facial droop and R weakness.  CT with acute L MCA stroke.  Course complicated by hemorrhogic transformation.  On 2/27, she underwent PEG, complicated by hypoxia.  Was briefly in ICU intubated 2/27 - 2/28.  No fever.  WBC 11.5.  BCs were obtained.  Empiric vanc/meropenem started.  BC with GPR, PCR negative.  Duplex noted R subclavian thrombus and R cephalic vein clot.  Vancomycin stopped after 2 days.  No repeat BC sent.    ID asked to help management.     Tc 98.2  WBC 5.66  Cr 0.74  2/27 BC one of 4 Brevibacterium ravenspurgense  3/5 BC repeated, negative so farattempts  vancomycin 2/27-2/28  meropenem 2/27-2/29    Isolated bacteremia most likely contaminant, however, in light of TAVR and PPM, requested repeat BC  - suspect contaminant  - continue off antibiotics  - if repeat BC remains negative, no need for further antibiotics    I will be away, returning 3/8/2024.  My associates to cover.  Please call ID as needed [phone].

## 2024-03-06 NOTE — PROGRESS NOTE ADULT - SUBJECTIVE AND OBJECTIVE BOX
Patient is a 89y old  Female who presents with a chief complaint of CVA (29 Feb 2024 07:05)    f/u bacteremia    Interval History/ROS:      PAST MEDICAL & SURGICAL HISTORY:  CHF  HTN  PPM  TAVR    Allergies  penicillins (Other)    ANTIMICROBIALS:  levoFLOXacin IVPB (2/16 x1)  meropenem  IVPB (2/27-2/29)  vancomycin  IVPB (2/27-2/28)    MEDICATIONS  (STANDING):  acetylcysteine 10%  Inhalation 4 four times a day  albuterol/ipratropium for Nebulization 3 every 8 hours  atorvastatin 20 at bedtime  furosemide    Tablet 40 daily  heparin   Injectable 5000 every 12 hours    Vital Signs Last 24 Hrs  T(F): 97.6 (03-06-24 @ 08:00), Max: 98.2 (03-06-24 @ 05:24)  HR: 69 (03-06-24 @ 08:00)  BP: 99/63 (03-06-24 @ 08:00)  RR: 18 (03-06-24 @ 08:00)  SpO2: 97% (03-06-24 @ 08:00) (97% - 100%)  Wt(kg): --    PHYSICAL EXAM:                                                    9.6    5.66  )-----------( 227      ( 06 Mar 2024 09:56 )             30.8 03-05    139  |  99  |  26  ----------------------------<  146  4.5   |  27  |  0.74  Ca    9.2      05 Mar 2024 09:16    MICROBIOLOGY:  3/5 BC pending    Culture - Urine (collected 03-02-24 @ 13:37)  Source: Clean Catch Clean Catch (Midstream)  Final Report (03-03-24 @ 22:30):    <10,000 CFU/mL Normal Urogenital Clau    Culture - Urine (collected 02-28-24 @ 09:13)  Source: Catheterized Catheterized  Final Report (02-29-24 @ 14:10):    <10,000 CFU/mL Normal Urogenital Clau    Culture - Blood (collected 02-27-24 @ 18:45)  Source: .Blood Blood  Gram Stain (02-29-24 @ 13:29):    Growth in aerobic bottle: Gram Positive Rods  Final Report (03-01-24 @ 14:01):    Growth in aerobic bottle: Brevibacterium chenptylernse    "Susceptibilities not performed"    Direct identification is available within approximately 3-5    hours either by Blood Panel Multiplexed PCR or Direct    MALDI-TOF. Details: [email or web address]  Organism: Blood Culture PCR (03-01-24 @ 14:01)  Organism: Blood Culture PCR (03-01-24 @ 14:01)      Method Type: PCR      -  Blood PCR Panel: NEG    Culture - Blood (collected 02-27-24 @ 18:30)  Source: .Blood Blood  Final Report (03-03-24 @ 23:01):    No growth at 5 days    RADIOLOGY:  imaging below personally reviewed and agree with findings    Xray Chest 1 View- PORTABLE-Routine (Xray Chest 1 View- PORTABLE-Routine .) (03.01.24 @ 10:31) >  IMPRESSION:  Status post extubation.  Trace right effusion    VA Duplex Upper Ext Vein Scan, Right (02.28.24 @ 13:24) >  IMPRESSION:  There is a short segment, focal, nonobstructive mural thrombus in the right subclavian vein.  Superficial thrombophlebitis affects the right cephalic vein.    Xray Chest 1 View- PORTABLE-Urgent (Xray Chest 1 View- PORTABLE-Urgent .) (02.27.24 @ 21:05) >  IMPRESSION:  ET tube in the trachea. Evaluation is limited to patient positioning.      CT Head No Cont (02.27.24 @ 18:21) >  IMPRESSION:  Redemonstration of recent large left MCA territory infarct.  Resolving hemorrhagic transformation.  Decreased mass effect upon the left lateral ventricle.  Slightly decreased rightward midline shift.  Basal cisterns well visualized.    IR Procedure (02.27.24 @ 15:13) >  IMPRESSION:  Technically successful 18-Turkish G-tube placement into the stomach.    CT Chest No Cont (02.26.24 @ 08:49) >  IMPRESSION:  Likely pulmonary edema with small bilateral pleural effusions.  Patchy opacities in the bilateral upper lobes appear slightly improved compared to 2/16/2024 CT chest.    CT Abdomen and Pelvis No Cont (02.23.24 @ 11:12) >  IMPRESSION:  Trace pneumoperitoneum. This finding was discussed by Dr. Reyez with Lisa Costa NP on 2/23/2024 11:44 AM with read back confirmation.  Small bilateral pleural effusions and pericardial effusion, unchanged.  Chronic appearing fracture deformity of the left distal humerus.    CT Head No Cont (02.17.24 @ 09:14) >  IMPRESSION:  Acute left MCA territory infarct, as on the prior, with associated hemorrhagic transformation centered in the ipsilateral basal ganglia, not significantly changed. Associated 8-9 mm rightward midline shift is not significantly changed. Slightly more pronounced gyriform hyperdensity may reflect spared cortex versus cortical petechial hemorrhage. Additional findings described in detail above    CT Head No Cont (02.16.24 @ 12:30) >  IMPRESSION: Redemonstration of an evolving left-sided MCA distribution acute/subacute infarct with a new small amount of hemorrhagic transformation within the infarct bed in comparison with 2/14/2024.  Surrounding mass effect and shift of the midline structures from left to right appears unchanged when compared to the most recent prior CT exam.    CT Head No Cont (02.16.24 @ 12:30) >  IMPRESSION: Redemonstration of an evolving left-sided MCA distribution acute/subacute infarct with a new small amount of hemorrhagic transformation within the infarct bed in comparison with 2/14/2024.  Surrounding mass effect and shift of the midline structures from left to right appears unchanged when compared to the most recent prior CT exam.            ECHO  TTE W or WO Ultrasound Enhancing Agent (02.14.24 @ 15:41) >  CONCLUSIONS:   1. Technically difficult image quality.   2. 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 No aortic regurgitation.   3. Severe mitral valve stenosis, secondary to dystrophic mitral annular calcification.   4. No prior echocardiogram is available for comparison.   Patient is a 89y old  Female who presents with a chief complaint of CVA (29 Feb 2024 07:05)    f/u bacteremia    Interval History/ROS:  no fever.  BC drawn negative.  ROS otherwise negative.    PAST MEDICAL & SURGICAL HISTORY:  CHF  HTN  PPM  TAVR    Allergies  penicillins (Other)    ANTIMICROBIALS:  levoFLOXacin IVPB (2/16 x1)  meropenem  IVPB (2/27-2/29)  vancomycin  IVPB (2/27-2/28)    MEDICATIONS  (STANDING):  acetylcysteine 10%  Inhalation 4 four times a day  albuterol/ipratropium for Nebulization 3 every 8 hours  atorvastatin 20 at bedtime  furosemide    Tablet 40 daily  heparin   Injectable 5000 every 12 hours    Vital Signs Last 24 Hrs  T(F): 97.6 (03-06-24 @ 08:00), Max: 98.2 (03-06-24 @ 05:24)  HR: 69 (03-06-24 @ 08:00)  BP: 99/63 (03-06-24 @ 08:00)  RR: 18 (03-06-24 @ 08:00)  SpO2: 97% (03-06-24 @ 08:00) (97% - 100%)  Wt(kg): --    PHYSICAL EXAM:  Constitutional: lethargic  HEAD/EYES: keeps eyes closed  ENT:  supple  Cardiovascular:   normal S1, S2  Respiratory:  clear BS bilaterally  GI:  soft, non-tender, normal bowel sounds, peg site is c/d/i  :  no madrigal  Musculoskeletal:  no synovitis  Neurologic: difficult to assess  Skin:  no rash  Psychiatric:  not able to assess                                 9.6    5.66  )-----------( 227      ( 06 Mar 2024 09:56 )             30.8 03-05    139  |  99  |  26  ----------------------------<  146  4.5   |  27  |  0.74  Ca    9.2      05 Mar 2024 09:16    MICROBIOLOGY:  3/5 BC pending    Culture - Urine (collected 03-02-24 @ 13:37)  Source: Clean Catch Clean Catch (Midstream)  Final Report (03-03-24 @ 22:30):    <10,000 CFU/mL Normal Urogenital Clau    Culture - Urine (collected 02-28-24 @ 09:13)  Source: Catheterized Catheterized  Final Report (02-29-24 @ 14:10):    <10,000 CFU/mL Normal Urogenital Clau    Culture - Blood (collected 02-27-24 @ 18:45)  Source: .Blood Blood  Gram Stain (02-29-24 @ 13:29):    Growth in aerobic bottle: Gram Positive Rods  Final Report (03-01-24 @ 14:01):    Growth in aerobic bottle: Brevibacterium chenpurgense    "Susceptibilities not performed"    Direct identification is available within approximately 3-5    hours either by Blood Panel Multiplexed PCR or Direct    MALDI-TOF. Details: [email or web address]  Organism: Blood Culture PCR (03-01-24 @ 14:01)  Organism: Blood Culture PCR (03-01-24 @ 14:01)      Method Type: PCR      -  Blood PCR Panel: NEG    Culture - Blood (collected 02-27-24 @ 18:30)  Source: .Blood Blood  Final Report (03-03-24 @ 23:01):    No growth at 5 days    RADIOLOGY:  imaging below personally reviewed and agree with findings    Xray Chest 1 View- PORTABLE-Routine (Xray Chest 1 View- PORTABLE-Routine .) (03.01.24 @ 10:31) >  IMPRESSION:  Status post extubation.  Trace right effusion    VA Duplex Upper Ext Vein Scan, Right (02.28.24 @ 13:24) >  IMPRESSION:  There is a short segment, focal, nonobstructive mural thrombus in the right subclavian vein.  Superficial thrombophlebitis affects the right cephalic vein.    Xray Chest 1 View- PORTABLE-Urgent (Xray Chest 1 View- PORTABLE-Urgent .) (02.27.24 @ 21:05) >  IMPRESSION:  ET tube in the trachea. Evaluation is limited to patient positioning.      CT Head No Cont (02.27.24 @ 18:21) >  IMPRESSION:  Redemonstration of recent large left MCA territory infarct.  Resolving hemorrhagic transformation.  Decreased mass effect upon the left lateral ventricle.  Slightly decreased rightward midline shift.  Basal cisterns well visualized.    IR Procedure (02.27.24 @ 15:13) >  IMPRESSION:  Technically successful 18-Welsh G-tube placement into the stomach.    CT Chest No Cont (02.26.24 @ 08:49) >  IMPRESSION:  Likely pulmonary edema with small bilateral pleural effusions.  Patchy opacities in the bilateral upper lobes appear slightly improved compared to 2/16/2024 CT chest.    CT Abdomen and Pelvis No Cont (02.23.24 @ 11:12) >  IMPRESSION:  Trace pneumoperitoneum. This finding was discussed by Dr. Reyez with Lisa Costa NP on 2/23/2024 11:44 AM with read back confirmation.  Small bilateral pleural effusions and pericardial effusion, unchanged.  Chronic appearing fracture deformity of the left distal humerus.    CT Head No Cont (02.17.24 @ 09:14) >  IMPRESSION:  Acute left MCA territory infarct, as on the prior, with associated hemorrhagic transformation centered in the ipsilateral basal ganglia, not significantly changed. Associated 8-9 mm rightward midline shift is not significantly changed. Slightly more pronounced gyriform hyperdensity may reflect spared cortex versus cortical petechial hemorrhage. Additional findings described in detail above    CT Head No Cont (02.16.24 @ 12:30) >  IMPRESSION: Redemonstration of an evolving left-sided MCA distribution acute/subacute infarct with a new small amount of hemorrhagic transformation within the infarct bed in comparison with 2/14/2024.  Surrounding mass effect and shift of the midline structures from left to right appears unchanged when compared to the most recent prior CT exam.    CT Head No Cont (02.16.24 @ 12:30) >  IMPRESSION: Redemonstration of an evolving left-sided MCA distribution acute/subacute infarct with a new small amount of hemorrhagic transformation within the infarct bed in comparison with 2/14/2024.  Surrounding mass effect and shift of the midline structures from left to right appears unchanged when compared to the most recent prior CT exam.            ECHO  TTE W or WO Ultrasound Enhancing Agent (02.14.24 @ 15:41) >  CONCLUSIONS:   1. Technically difficult image quality.   2. 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 No aortic regurgitation.   3. Severe mitral valve stenosis, secondary to dystrophic mitral annular calcification.   4. No prior echocardiogram is available for comparison.

## 2024-03-06 NOTE — PROGRESS NOTE ADULT - SUBJECTIVE AND OBJECTIVE BOX
Subjective: Patient seen and examined. No new events except as noted.     REVIEW OF SYSTEMS:    Unable to obtain         MEDICATIONS:  MEDICATIONS  (STANDING):  acetylcysteine 10%  Inhalation 4 milliLiter(s) Inhalation four times a day  albuterol/ipratropium for Nebulization 3 milliLiter(s) Nebulizer every 8 hours  atorvastatin 20 milliGRAM(s) Oral at bedtime  chlorhexidine 2% Cloths 1 Application(s) Topical <User Schedule>  furosemide    Tablet 40 milliGRAM(s) Oral daily  heparin   Injectable 5000 Unit(s) SubCutaneous every 12 hours      PHYSICAL EXAM:  T(C): 36.4 (03-06-24 @ 08:00), Max: 36.8 (03-06-24 @ 05:24)  HR: 69 (03-06-24 @ 08:00) (69 - 84)  BP: 99/63 (03-06-24 @ 08:00) (99/63 - 109/65)  RR: 18 (03-06-24 @ 08:00) (18 - 18)  SpO2: 97% (03-06-24 @ 08:00) (97% - 100%)  Wt(kg): --  I&O's Summary    05 Mar 2024 07:01  -  06 Mar 2024 07:00  --------------------------------------------------------  IN: 0 mL / OUT: 500 mL / NET: -500 mL            Appearance: NAD  HEENT:   Dry oral mucosa, PERRL, EOMI	  Lymphatic: No lymphadenopathy  Cardiovascular: Normal S1 S2, No JVD, No murmurs, No edema  Respiratory: Decreased bs  Psychiatry: A & O x 0 sleepy  Gastrointestinal:  Soft, Non-tender, + Gtube  Skin: No rashes, No ecchymoses, No cyanosis	  Neurologic Exam:  Mental status - eyes closed, opens to repeated verbal stimuli. Follows intermittent simple commands to squeeze L hand/give thumbs up in L hand. Does not respond to orientation questions.   Cranial nerves - R pupil appears ?sluggishly reactive. No BTT in R eye. Unable to open left eye. ?R facial droop but may be 2/2 positioning of patient's head to R.  Motor - Normal bulk. Increased tone in RUE. Does not maintain antigravity when asked throughout all extremities initially, however does squeeze hand on LUE, withdraws slightly to noxious stimuli in R hand.   Upon re-evaluation able to raise both legs antigravity.  Sensation - Withdraws to noxious stimuli throughout.  DTR's - deferred  Coordination -deferred  Gait and station - deferred  Extremities: Normal range of motion, No clubbing, cyanosis or edema  Vascular: Peripheral pulses palpable 2+ bilaterally          LABS:    CARDIAC MARKERS:                                9.6    5.66  )-----------( 227      ( 06 Mar 2024 09:56 )             30.8     03-06    142  |  102  |  28<H>  ----------------------------<  131<H>  4.1   |  30  |  0.84    Ca    9.3      06 Mar 2024 09:54      proBNP:   Lipid Profile:   HgA1c:   TSH:             TELEMETRY: 	    ECG:  	  RADIOLOGY:   DIAGNOSTIC TESTING:  [ ] Echocardiogram:  [ ]  Catheterization:  [ ] Stress Test:    OTHER:

## 2024-03-06 NOTE — PROGRESS NOTE ADULT - SUBJECTIVE AND OBJECTIVE BOX
DATE OF SERVICE: 03-06-24 @ 14:44    Patient is a 89y old  Female who presents with a chief complaint of Acute Stroke (06 Mar 2024 10:23)      SUBJECTIVE / OVERNIGHT EVENTS:  NAd. nonverbal , does not follow commands    MEDICATIONS  (STANDING):  acetylcysteine 10%  Inhalation 4 milliLiter(s) Inhalation four times a day  albuterol/ipratropium for Nebulization 3 milliLiter(s) Nebulizer every 8 hours  atorvastatin 20 milliGRAM(s) Oral at bedtime  chlorhexidine 2% Cloths 1 Application(s) Topical <User Schedule>  furosemide    Tablet 40 milliGRAM(s) Oral daily  heparin   Injectable 5000 Unit(s) SubCutaneous every 12 hours    MEDICATIONS  (PRN):  sodium chloride 0.9% lock flush 10 milliLiter(s) IV Push daily PRN For PEG      Vital Signs Last 24 Hrs  T(C): 36.4 (06 Mar 2024 08:00), Max: 36.8 (06 Mar 2024 05:24)  T(F): 97.6 (06 Mar 2024 08:00), Max: 98.2 (06 Mar 2024 05:24)  HR: 69 (06 Mar 2024 08:00) (69 - 84)  BP: 99/63 (06 Mar 2024 08:00) (99/63 - 109/65)  BP(mean): --  RR: 18 (06 Mar 2024 08:00) (18 - 18)  SpO2: 97% (06 Mar 2024 08:00) (97% - 100%)    Parameters below as of 06 Mar 2024 08:00  Patient On (Oxygen Delivery Method): room air      CAPILLARY BLOOD GLUCOSE      POCT Blood Glucose.: 171 mg/dL (06 Mar 2024 12:32)    I&O's Summary    05 Mar 2024 07:01  -  06 Mar 2024 07:00  --------------------------------------------------------  IN: 0 mL / OUT: 500 mL / NET: -500 mL        PHYSICAL EXAM:  GENERAL: NAD, well-developed  HEAD:  Atraumatic, Normocephalic  EYES: EOMI, PERRLA, conjunctiva and sclera clear  NECK: Supple, No JVD  CHEST/LUNG: Clear to auscultation bilaterally; No wheeze  HEART: Regular rate and rhythm; No murmurs, rubs, or gallops  ABDOMEN: Soft, Nontender, Nondistended; Bowel sounds present  EXTREMITIES:  2+ Peripheral Pulses, No clubbing, cyanosis, or edema      LABS:                        9.6    5.66  )-----------( 227      ( 06 Mar 2024 09:56 )             30.8     03-06    142  |  102  |  28<H>  ----------------------------<  131<H>  4.1   |  30  |  0.84    Ca    9.3      06 Mar 2024 09:54            Urinalysis Basic - ( 06 Mar 2024 09:54 )    Color: x / Appearance: x / SG: x / pH: x  Gluc: 131 mg/dL / Ketone: x  / Bili: x / Urobili: x   Blood: x / Protein: x / Nitrite: x   Leuk Esterase: x / RBC: x / WBC x   Sq Epi: x / Non Sq Epi: x / Bacteria: x        RADIOLOGY & ADDITIONAL TESTS:    Imaging Personally Reviewed:    Consultant(s) Notes Reviewed:      Care Discussed with Consultants/Other Providers:

## 2024-03-06 NOTE — CHART NOTE - NSCHARTNOTESELECT_GEN_ALL_CORE
Event Note
Event Note
Gel overlay/Event Note
MICU Accept note/Event Note
Nutrition Services
Palliative Care/Off Service Note
Speech and Swallow
Event Note
Event Note
Interventional Radiology
Neurology/Event Note
Nutrition Services
POCUS/Event Note
Palliative Care
Transfer Note

## 2024-03-06 NOTE — PROGRESS NOTE ADULT - SUBJECTIVE AND OBJECTIVE BOX
daughter and aide at bedside  patient with aphasia  lethargic     REVIEW OF SYSTEMS  unable to obtain     FUNCTIONAL PROGRESS  SLP 3/5  non functional oropharyngeal swallow  reduced secretion management   NPO    PT 3/6   bed mobility max assist     VITALS  T(C): 36.4 (03-06-24 @ 08:00), Max: 36.8 (03-06-24 @ 05:24)  HR: 69 (03-06-24 @ 08:00) (69 - 84)  BP: 99/63 (03-06-24 @ 08:00) (99/63 - 109/65)  RR: 18 (03-06-24 @ 08:00) (18 - 18)  SpO2: 97% (03-06-24 @ 08:00) (97% - 98%)  Wt(kg): --    MEDICATIONS   acetylcysteine 10%  Inhalation 4 milliLiter(s) four times a day  albuterol/ipratropium for Nebulization 3 milliLiter(s) every 8 hours  atorvastatin 20 milliGRAM(s) at bedtime  chlorhexidine 2% Cloths 1 Application(s) <User Schedule>  furosemide    Tablet 40 milliGRAM(s) daily  heparin   Injectable 5000 Unit(s) every 12 hours  sodium chloride 0.9% lock flush 10 milliLiter(s) daily PRN      RECENT LABS - Reviewed                        9.6    5.66  )-----------( 227      ( 06 Mar 2024 09:56 )             30.8     03-06    142  |  102  |  28<H>  ----------------------------<  131<H>  4.1   |  30  |  0.84    Ca    9.3      06 Mar 2024 09:54        Urinalysis Basic - ( 06 Mar 2024 09:54 )    Color: x / Appearance: x / SG: x / pH: x  Gluc: 131 mg/dL / Ketone: x  / Bili: x / Urobili: x   Blood: x / Protein: x / Nitrite: x   Leuk Esterase: x / RBC: x / WBC x   Sq Epi: x / Non Sq Epi: x / Bacteria: x      < from: CT Head No Cont (02.27.24 @ 18:21) >    IMPRESSION:    Redemonstration of recent large left MCA territory infarct.  Resolving hemorrhagic transformation.  Decreased mass effect upon the left lateral ventricle.  Slightly decreased rightward midline shift.  Basal cisterns well visualized.      < end of copied text >      ----------------------------------------------------------------------------------------  PHYSICAL EXAM  Constitutional - NAD, Comfortable, in bed   Chest - Breathing comfortably   Cardiovascular - S1S2   Abdomen - Soft PEG   Extremities - UE ecchymosis, severe arthritic changes in hands, limited ROM b/l UE   Neurologic Exam -    does not follow commands,      Cognitive - eyes closed      Communication - no verbal output         Motor -does not move ext      ----------------------------------------------------------------------------------------  ASSESSMENT/PLAN  89yFemale h/o CHF, HTN, PPM with functional deficits after CVA with aphasia, dysphagia, weakness  L MCA infarct, hemorrhagic transformation   treated for PNA, now extubated, CT with pulmonary edema, b/l effusions   dysphagia, s/p PEG, NPO, non functional swallow on repeat testing with speech therapy   anemia, continue to monitor   DVT PPX - SCDs heparin   Rehab - Will continue to follow for ongoing rehab needs and recommendations.    daughters report patient does not use right arm, recent left UE fracture   recommend continued inpatient rehabilitation, NOHEMI for PT/OT/SLP   if home, PT recommending mechanical lift for transfers if family decides on discharge home    patient will require prolonged period of rehabilitation, has made limited to no progress with bedside therapy since admitted on 2/14, she is not a candidate for Acute rehab   she will not be able to tolerate three hours of therapy daily

## 2024-03-07 VITALS
RESPIRATION RATE: 18 BRPM | HEART RATE: 96 BPM | SYSTOLIC BLOOD PRESSURE: 99 MMHG | OXYGEN SATURATION: 100 % | TEMPERATURE: 98 F | DIASTOLIC BLOOD PRESSURE: 58 MMHG

## 2024-03-07 LAB
GLUCOSE BLDC GLUCOMTR-MCNC: 149 MG/DL — HIGH (ref 70–99)
GLUCOSE BLDC GLUCOMTR-MCNC: 174 MG/DL — HIGH (ref 70–99)
GLUCOSE BLDC GLUCOMTR-MCNC: 187 MG/DL — HIGH (ref 70–99)
SARS-COV-2 RNA SPEC QL NAA+PROBE: SIGNIFICANT CHANGE UP

## 2024-03-07 PROCEDURE — 83036 HEMOGLOBIN GLYCOSYLATED A1C: CPT

## 2024-03-07 PROCEDURE — 94640 AIRWAY INHALATION TREATMENT: CPT

## 2024-03-07 PROCEDURE — 80061 LIPID PANEL: CPT

## 2024-03-07 PROCEDURE — 92526 ORAL FUNCTION THERAPY: CPT

## 2024-03-07 PROCEDURE — 81003 URINALYSIS AUTO W/O SCOPE: CPT

## 2024-03-07 PROCEDURE — 85014 HEMATOCRIT: CPT

## 2024-03-07 PROCEDURE — 84145 PROCALCITONIN (PCT): CPT

## 2024-03-07 PROCEDURE — 94002 VENT MGMT INPAT INIT DAY: CPT

## 2024-03-07 PROCEDURE — 82435 ASSAY OF BLOOD CHLORIDE: CPT

## 2024-03-07 PROCEDURE — 87641 MR-STAPH DNA AMP PROBE: CPT

## 2024-03-07 PROCEDURE — 85025 COMPLETE CBC W/AUTO DIFF WBC: CPT

## 2024-03-07 PROCEDURE — 74176 CT ABD & PELVIS W/O CONTRAST: CPT

## 2024-03-07 PROCEDURE — 83880 ASSAY OF NATRIURETIC PEPTIDE: CPT

## 2024-03-07 PROCEDURE — 84100 ASSAY OF PHOSPHORUS: CPT

## 2024-03-07 PROCEDURE — 93971 EXTREMITY STUDY: CPT

## 2024-03-07 PROCEDURE — 82330 ASSAY OF CALCIUM: CPT

## 2024-03-07 PROCEDURE — 70496 CT ANGIOGRAPHY HEAD: CPT | Mod: MA

## 2024-03-07 PROCEDURE — L8699: CPT

## 2024-03-07 PROCEDURE — 97535 SELF CARE MNGMENT TRAINING: CPT

## 2024-03-07 PROCEDURE — C1769: CPT

## 2024-03-07 PROCEDURE — 97161 PT EVAL LOW COMPLEX 20 MIN: CPT

## 2024-03-07 PROCEDURE — 87040 BLOOD CULTURE FOR BACTERIA: CPT

## 2024-03-07 PROCEDURE — 82962 GLUCOSE BLOOD TEST: CPT

## 2024-03-07 PROCEDURE — 94003 VENT MGMT INPAT SUBQ DAY: CPT

## 2024-03-07 PROCEDURE — 80048 BASIC METABOLIC PNL TOTAL CA: CPT

## 2024-03-07 PROCEDURE — 86901 BLOOD TYPING SEROLOGIC RH(D): CPT

## 2024-03-07 PROCEDURE — 84484 ASSAY OF TROPONIN QUANT: CPT

## 2024-03-07 PROCEDURE — 86923 COMPATIBILITY TEST ELECTRIC: CPT

## 2024-03-07 PROCEDURE — 81001 URINALYSIS AUTO W/SCOPE: CPT

## 2024-03-07 PROCEDURE — P9016: CPT

## 2024-03-07 PROCEDURE — 82803 BLOOD GASES ANY COMBINATION: CPT

## 2024-03-07 PROCEDURE — 97165 OT EVAL LOW COMPLEX 30 MIN: CPT

## 2024-03-07 PROCEDURE — 97530 THERAPEUTIC ACTIVITIES: CPT

## 2024-03-07 PROCEDURE — 97110 THERAPEUTIC EXERCISES: CPT

## 2024-03-07 PROCEDURE — 83735 ASSAY OF MAGNESIUM: CPT

## 2024-03-07 PROCEDURE — 87640 STAPH A DNA AMP PROBE: CPT

## 2024-03-07 PROCEDURE — 92610 EVALUATE SWALLOWING FUNCTION: CPT

## 2024-03-07 PROCEDURE — 85730 THROMBOPLASTIN TIME PARTIAL: CPT

## 2024-03-07 PROCEDURE — 71045 X-RAY EXAM CHEST 1 VIEW: CPT

## 2024-03-07 PROCEDURE — 36415 COLL VENOUS BLD VENIPUNCTURE: CPT

## 2024-03-07 PROCEDURE — 87086 URINE CULTURE/COLONY COUNT: CPT

## 2024-03-07 PROCEDURE — 49440 PLACE GASTROSTOMY TUBE PERC: CPT

## 2024-03-07 PROCEDURE — 85610 PROTHROMBIN TIME: CPT

## 2024-03-07 PROCEDURE — 82947 ASSAY GLUCOSE BLOOD QUANT: CPT

## 2024-03-07 PROCEDURE — 83605 ASSAY OF LACTIC ACID: CPT

## 2024-03-07 PROCEDURE — 71250 CT THORAX DX C-: CPT

## 2024-03-07 PROCEDURE — 0042T: CPT | Mod: MA

## 2024-03-07 PROCEDURE — 70450 CT HEAD/BRAIN W/O DYE: CPT

## 2024-03-07 PROCEDURE — 85027 COMPLETE CBC AUTOMATED: CPT

## 2024-03-07 PROCEDURE — 93970 EXTREMITY STUDY: CPT

## 2024-03-07 PROCEDURE — 86850 RBC ANTIBODY SCREEN: CPT

## 2024-03-07 PROCEDURE — 87150 DNA/RNA AMPLIFIED PROBE: CPT

## 2024-03-07 PROCEDURE — 84295 ASSAY OF SERUM SODIUM: CPT

## 2024-03-07 PROCEDURE — 92523 SPEECH SOUND LANG COMPREHEN: CPT

## 2024-03-07 PROCEDURE — 70498 CT ANGIOGRAPHY NECK: CPT | Mod: MA

## 2024-03-07 PROCEDURE — 99285 EMERGENCY DEPT VISIT HI MDM: CPT

## 2024-03-07 PROCEDURE — 84132 ASSAY OF SERUM POTASSIUM: CPT

## 2024-03-07 PROCEDURE — 87077 CULTURE AEROBIC IDENTIFY: CPT

## 2024-03-07 PROCEDURE — 85018 HEMOGLOBIN: CPT

## 2024-03-07 PROCEDURE — 80053 COMPREHEN METABOLIC PANEL: CPT

## 2024-03-07 PROCEDURE — C1729: CPT

## 2024-03-07 PROCEDURE — 93306 TTE W/DOPPLER COMPLETE: CPT

## 2024-03-07 PROCEDURE — 87635 SARS-COV-2 COVID-19 AMP PRB: CPT

## 2024-03-07 PROCEDURE — 36430 TRANSFUSION BLD/BLD COMPNT: CPT

## 2024-03-07 PROCEDURE — 86900 BLOOD TYPING SEROLOGIC ABO: CPT

## 2024-03-07 RX ORDER — ESOMEPRAZOLE MAGNESIUM 40 MG/1
1 CAPSULE, DELAYED RELEASE ORAL
Refills: 0 | DISCHARGE

## 2024-03-07 RX ORDER — IPRATROPIUM/ALBUTEROL SULFATE 18-103MCG
3 AEROSOL WITH ADAPTER (GRAM) INHALATION
Qty: 0 | Refills: 0 | DISCHARGE
Start: 2024-03-07

## 2024-03-07 RX ORDER — ATORVASTATIN CALCIUM 80 MG/1
1 TABLET, FILM COATED ORAL
Qty: 0 | Refills: 0 | DISCHARGE
Start: 2024-03-07

## 2024-03-07 RX ORDER — ATORVASTATIN CALCIUM 80 MG/1
1 TABLET, FILM COATED ORAL
Refills: 0 | DISCHARGE

## 2024-03-07 RX ORDER — SENNA PLUS 8.6 MG/1
10 TABLET ORAL AT BEDTIME
Refills: 0 | Status: DISCONTINUED | OUTPATIENT
Start: 2024-03-07 | End: 2024-03-07

## 2024-03-07 RX ORDER — METOPROLOL TARTRATE 50 MG
1 TABLET ORAL
Refills: 0 | DISCHARGE

## 2024-03-07 RX ORDER — CHOLECALCIFEROL (VITAMIN D3) 125 MCG
1 CAPSULE ORAL
Refills: 0 | DISCHARGE

## 2024-03-07 RX ORDER — FOLIC ACID 0.8 MG
1 TABLET ORAL
Refills: 0 | DISCHARGE

## 2024-03-07 RX ORDER — POLYETHYLENE GLYCOL 3350 17 G/17G
17 POWDER, FOR SOLUTION ORAL
Qty: 0 | Refills: 0 | DISCHARGE
Start: 2024-03-07

## 2024-03-07 RX ORDER — SENNA PLUS 8.6 MG/1
10 TABLET ORAL
Qty: 0 | Refills: 0 | DISCHARGE
Start: 2024-03-07

## 2024-03-07 RX ORDER — ACETYLCYSTEINE 200 MG/ML
4 VIAL (ML) MISCELLANEOUS
Qty: 0 | Refills: 0 | DISCHARGE
Start: 2024-03-07

## 2024-03-07 RX ORDER — POLYETHYLENE GLYCOL 3350 17 G/17G
17 POWDER, FOR SOLUTION ORAL DAILY
Refills: 0 | Status: DISCONTINUED | OUTPATIENT
Start: 2024-03-07 | End: 2024-03-07

## 2024-03-07 RX ORDER — MONTELUKAST 4 MG/1
1 TABLET, CHEWABLE ORAL
Refills: 0 | DISCHARGE

## 2024-03-07 RX ORDER — LOSARTAN POTASSIUM 100 MG/1
1 TABLET, FILM COATED ORAL
Refills: 0 | DISCHARGE

## 2024-03-07 RX ADMIN — Medication 3 MILLILITER(S): at 13:59

## 2024-03-07 RX ADMIN — Medication 4 MILLILITER(S): at 05:07

## 2024-03-07 RX ADMIN — CHLORHEXIDINE GLUCONATE 1 APPLICATION(S): 213 SOLUTION TOPICAL at 05:07

## 2024-03-07 RX ADMIN — Medication 3 MILLILITER(S): at 05:06

## 2024-03-07 RX ADMIN — POLYETHYLENE GLYCOL 3350 17 GRAM(S): 17 POWDER, FOR SOLUTION ORAL at 11:34

## 2024-03-07 RX ADMIN — HEPARIN SODIUM 5000 UNIT(S): 5000 INJECTION INTRAVENOUS; SUBCUTANEOUS at 05:07

## 2024-03-07 RX ADMIN — Medication 40 MILLIGRAM(S): at 05:07

## 2024-03-07 RX ADMIN — Medication 4 MILLILITER(S): at 11:33

## 2024-03-07 NOTE — PROGRESS NOTE ADULT - PROBLEM SELECTOR PLAN 4
-IVF per primary team  -Improving.
-IVF per primary team  -Improving.
-NPO with TF   -Aspiration precautions   -S/p G tube placement in IR 2/27  -GI f/u
-IVF per primary team  -Improving.
CT chest with mucous plugging LLL, congestion   -Procalcitonin not significantly elevated   -No leukocytosis, afebrile  -Suggest monitor off ABX.   -Keep sats >90%   -Duoneb q6h  -Suction PRN.
-NPO with TF   -Aspiration precautions   -S/p G tube placement in IR 2/27  -GI f/u
-IVF per primary team  -Improving.
CT chest with mucous plugging LLL, congestion   -Procalcitonin not significantly elevated   -No leukocytosis, afebrile  -Suggest monitor off ABX.   -Keep sats >90%   -Duoneb q6h  -Suction PRN.
-NPO with TF   -Aspiration precautions   -S/p G tube placement in IR 2/27  -GI f/u

## 2024-03-07 NOTE — PROGRESS NOTE ADULT - PROBLEM SELECTOR PROBLEM 4
R/O Pneumonia, aspiration
Hypernatremia
Dysphagia
Hypernatremia
R/O Pneumonia, aspiration
Dysphagia
Hypernatremia
Hypernatremia
Dysphagia

## 2024-03-07 NOTE — PROGRESS NOTE ADULT - SUBJECTIVE AND OBJECTIVE BOX
Subjective: Patient seen and examined. No new events except as noted.     REVIEW OF SYSTEMS:  Unable to obtain      MEDICATIONS:  MEDICATIONS  (STANDING):  acetylcysteine 10%  Inhalation 4 milliLiter(s) Inhalation four times a day  albuterol/ipratropium for Nebulization 3 milliLiter(s) Nebulizer every 8 hours  atorvastatin 20 milliGRAM(s) Oral at bedtime  chlorhexidine 2% Cloths 1 Application(s) Topical <User Schedule>  furosemide    Tablet 40 milliGRAM(s) Oral daily  heparin   Injectable 5000 Unit(s) SubCutaneous every 12 hours  polyethylene glycol 3350 17 Gram(s) Oral daily  senna Syrup 10 milliLiter(s) Oral at bedtime      PHYSICAL EXAM:  T(C): 36.7 (03-07-24 @ 15:32), Max: 36.8 (03-06-24 @ 23:56)  HR: 96 (03-07-24 @ 15:32) (84 - 96)  BP: 99/58 (03-07-24 @ 15:32) (94/57 - 113/71)  RR: 18 (03-07-24 @ 15:32) (18 - 18)  SpO2: 100% (03-07-24 @ 15:32) (98% - 100%)  Wt(kg): --  I&O's Summary    06 Mar 2024 07:01  -  07 Mar 2024 07:00  --------------------------------------------------------  IN: 420 mL / OUT: 120 mL / NET: 300 mL    07 Mar 2024 07:01  -  07 Mar 2024 18:44  --------------------------------------------------------  IN: 405 mL / OUT: 650 mL / NET: -245 mL        Appearance: NAD  HEENT:   Dry oral mucosa, PERRL, EOMI	  Lymphatic: No lymphadenopathy  Cardiovascular: Normal S1 S2, No JVD, No murmurs, No edema  Respiratory: Decreased bs  Psychiatry: A & O x 0 sleepy  Gastrointestinal:  Soft, Non-tender, + Gtube  Skin: No rashes, No ecchymoses, No cyanosis	  Neurologic Exam:  Mental status - eyes closed, opens to repeated verbal stimuli. Follows intermittent simple commands to squeeze L hand/give thumbs up in L hand. Does not respond to orientation questions.   Cranial nerves - R pupil appears ?sluggishly reactive. No BTT in R eye. Unable to open left eye. ?R facial droop but may be 2/2 positioning of patient's head to R.  Motor - Normal bulk. Increased tone in RUE. Does not maintain antigravity when asked throughout all extremities initially, however does squeeze hand on LUE, withdraws slightly to noxious stimuli in R hand.   Upon re-evaluation able to raise both legs antigravity.  Sensation - Withdraws to noxious stimuli throughout.  DTR's - deferred  Coordination -deferred  Gait and station - deferred  Extremities: Normal range of motion, No clubbing, cyanosis or edema  Vascular: Peripheral pulses palpable 2+ bilaterally      LABS:    CARDIAC MARKERS:                                9.6    5.66  )-----------( 227      ( 06 Mar 2024 09:56 )             30.8     03-06    142  |  102  |  28<H>  ----------------------------<  131<H>  4.1   |  30  |  0.84    Ca    9.3      06 Mar 2024 09:54            TELEMETRY: 	    ECG:  	  RADIOLOGY:   DIAGNOSTIC TESTING:  [ ] Echocardiogram:  [ ]  Catheterization:  [ ] Stress Test:    OTHER:

## 2024-03-07 NOTE — PROGRESS NOTE ADULT - PROBLEM SELECTOR PROBLEM 1
Acute respiratory failure with hypoxia
CVA (cerebrovascular accident)
Pleural effusion
Acute respiratory failure with hypoxia
CVA (cerebrovascular accident)
CVA (cerebrovascular accident)
Pleural effusion
Acute respiratory failure with hypoxia
CVA (cerebrovascular accident)
Acute respiratory failure with hypoxia
CVA (cerebrovascular accident)
CVA (cerebrovascular accident)
Pleural effusion
Acute respiratory failure with hypoxia
Acute respiratory failure with hypoxia
Pleural effusion
CVA (cerebrovascular accident)
Pleural effusion
CVA (cerebrovascular accident)
Pleural effusion
CVA (cerebrovascular accident)
Acute respiratory failure with hypoxia
Pleural effusion

## 2024-03-07 NOTE — PROGRESS NOTE ADULT - SUBJECTIVE AND OBJECTIVE BOX
DATE OF SERVICE: 03-07-24 @ 12:41    Patient is a 89y old  Female who presents with a chief complaint of Acute Stroke (06 Mar 2024 10:23)      SUBJECTIVE / OVERNIGHT EVENTS:  opens eyes, nonverbal ,     MEDICATIONS  (STANDING):  acetylcysteine 10%  Inhalation 4 milliLiter(s) Inhalation four times a day  albuterol/ipratropium for Nebulization 3 milliLiter(s) Nebulizer every 8 hours  atorvastatin 20 milliGRAM(s) Oral at bedtime  chlorhexidine 2% Cloths 1 Application(s) Topical <User Schedule>  furosemide    Tablet 40 milliGRAM(s) Oral daily  heparin   Injectable 5000 Unit(s) SubCutaneous every 12 hours  polyethylene glycol 3350 17 Gram(s) Oral daily  senna Syrup 10 milliLiter(s) Oral at bedtime    MEDICATIONS  (PRN):  sodium chloride 0.9% lock flush 10 milliLiter(s) IV Push daily PRN For PEG      Vital Signs Last 24 Hrs  T(C): 36.8 (07 Mar 2024 09:00), Max: 36.8 (06 Mar 2024 23:56)  T(F): 98.3 (07 Mar 2024 09:00), Max: 98.3 (06 Mar 2024 23:56)  HR: 88 (07 Mar 2024 09:00) (84 - 88)  BP: 94/57 (07 Mar 2024 09:00) (94/57 - 128/63)  BP(mean): --  RR: 18 (07 Mar 2024 09:00) (18 - 18)  SpO2: 98% (07 Mar 2024 09:00) (97% - 100%)    Parameters below as of 07 Mar 2024 09:00  Patient On (Oxygen Delivery Method): room air      CAPILLARY BLOOD GLUCOSE      POCT Blood Glucose.: 174 mg/dL (07 Mar 2024 12:00)  POCT Blood Glucose.: 187 mg/dL (07 Mar 2024 05:57)  POCT Blood Glucose.: 149 mg/dL (07 Mar 2024 00:37)    I&O's Summary    06 Mar 2024 07:01  -  07 Mar 2024 07:00  --------------------------------------------------------  IN: 420 mL / OUT: 120 mL / NET: 300 mL        PHYSICAL EXAM:  GENERAL: NAD, well-developed  HEAD:  Atraumatic, Normocephalic  EYES: EOMI, PERRLA, conjunctiva and sclera clear  NECK: Supple, No JVD  CHEST/LUNG: Clear to auscultation bilaterally; No wheeze  HEART: Regular rate and rhythm; No murmurs, rubs, or gallops  ABDOMEN: Soft, Nontender, Nondistended; Bowel sounds present  EXTREMITIES:  2+ Peripheral Pulses, No clubbing, cyanosis, or edema  LABS:                        9.6    5.66  )-----------( 227      ( 06 Mar 2024 09:56 )             30.8     03-06    142  |  102  |  28<H>  ----------------------------<  131<H>  4.1   |  30  |  0.84    Ca    9.3      06 Mar 2024 09:54            Urinalysis Basic - ( 06 Mar 2024 09:54 )    Color: x / Appearance: x / SG: x / pH: x  Gluc: 131 mg/dL / Ketone: x  / Bili: x / Urobili: x   Blood: x / Protein: x / Nitrite: x   Leuk Esterase: x / RBC: x / WBC x   Sq Epi: x / Non Sq Epi: x / Bacteria: x        RADIOLOGY & ADDITIONAL TESTS:    Imaging Personally Reviewed:    Consultant(s) Notes Reviewed:      Care Discussed with Consultants/Other Providers:

## 2024-03-07 NOTE — PROGRESS NOTE ADULT - PROBLEM SELECTOR PLAN 2
-CT chest 2/16 with small b/l pl effusions and congestion  -CT chest 2/26 with increase in effusions, congestion  -Keep O>I as tolerated.  -Keep sats >90% with o2 PRN
-Per neuro.
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 lasix 20 mg IV x 1
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
-CT chest 2/16 with small b/l pl effusions and congestion  -CT chest 2/26 with increase in effusions, congestion  -Keep O>I as tolerated.  -Keep sats >90% with o2 PRN (currently RA)
-Per neuro.
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 lasix 20 mg IV x 1
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
-Per neuro.
-Per neuro.
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
-CT chest 2/16 with small b/l pl effusions and congestion  -CT chest 2/26 with increase in effusions, congestion  -Keep O>I as tolerated.  -Keep sats >90% with o2 PRN
-CT chest 2/16 with small b/l pl effusions and congestion  -CT chest 2/26 with increase in effusions, congestion  -Keep O>I as tolerated.  -Keep sats >90% with o2 PRN
-Per neuro.
-Per neuro.
-CT chest 2/16 with small b/l pl effusions and congestion  -CT chest 2/26 with increase in effusions, congestion  -Keep O>I as tolerated.  -Keep sats >90% with o2 PRN (currently RA)
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
-CT chest 2/16 with small b/l pl effusions and congestion  -CT chest 2/26 with increase in effusions, congestion  -Keep O>I as tolerated.  -Keep sats >90% with o2 PRN
-CT chest 2/16 with small b/l pl effusions and congestion  -CT chest 2/26 with increase in effusions, congestion  -Keep O>I as tolerated.  -Keep sats >90% with o2 PRN
-Per neuro.
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 lasix 20 mg IV x 1

## 2024-03-07 NOTE — PROGRESS NOTE ADULT - PROBLEM SELECTOR PLAN 5
CT chest 2/16 with mucous plugging LLL, congestion   -Procalcitonin not significantly elevated   -No leukocytosis, afebrile  -Suggest monitor off ABX unless increase in WBC or fevers   -Keep sats >90%   -Duoneb q6h  -Suction PRN.
-Resolved
-IVF per primary team  -Improving.
CT chest with mucous plugging LLL, congestion   -Procalcitonin not significantly elevated   -No leukocytosis, afebrile  -Suggest monitor off ABX.   -Keep sats >90%   -Duoneb q6h  -Suction PRN.
CT chest with mucous plugging LLL, congestion   -Procalcitonin not significantly elevated   -No leukocytosis, afebrile  -Suggest monitor off ABX.   -Keep sats >90%   -Duoneb q6h  -Suction PRN.
-Resolved
-IVF per primary team  -Improving.
-Resolved
CT chest with mucous plugging LLL, congestion   -Procalcitonin not significantly elevated   -No leukocytosis, afebrile  -Suggest monitor off ABX.   -Keep sats >90%   -Duoneb q6h  -Suction PRN.

## 2024-03-07 NOTE — PROGRESS NOTE ADULT - SUBJECTIVE AND OBJECTIVE BOX
INTERVAL HPI/OVERNIGHT EVENTS:    tolerating feeds      MEDICATIONS  (STANDING):  acetylcysteine 10%  Inhalation 4 milliLiter(s) Inhalation four times a day  albuterol/ipratropium for Nebulization 3 milliLiter(s) Nebulizer every 8 hours  atorvastatin 20 milliGRAM(s) Oral at bedtime  chlorhexidine 2% Cloths 1 Application(s) Topical <User Schedule>  furosemide    Tablet 40 milliGRAM(s) Oral daily  heparin   Injectable 5000 Unit(s) SubCutaneous every 12 hours  polyethylene glycol 3350 17 Gram(s) Oral daily  senna Syrup 10 milliLiter(s) Oral at bedtime    MEDICATIONS  (PRN):  sodium chloride 0.9% lock flush 10 milliLiter(s) IV Push daily PRN For PEG      Allergies    penicillins (Other)    Intolerances        Review of Systems: *unobtainable      Vital Signs Last 24 Hrs  T(C): 36.8 (07 Mar 2024 09:00), Max: 36.8 (06 Mar 2024 23:56)  T(F): 98.3 (07 Mar 2024 09:00), Max: 98.3 (06 Mar 2024 23:56)  HR: 88 (07 Mar 2024 09:00) (84 - 88)  BP: 94/57 (07 Mar 2024 09:00) (94/57 - 128/63)  BP(mean): --  RR: 18 (07 Mar 2024 09:00) (18 - 18)  SpO2: 98% (07 Mar 2024 09:00) (97% - 100%)    Parameters below as of 07 Mar 2024 09:00  Patient On (Oxygen Delivery Method): room air        PHYSICAL EXAM:    Constitutional: NAD  HEENT: EOMI, throat clear  Neck: No LAD, supple  Respiratory: CTA and P  Cardiovascular: S1 and S2, RRR, no M  Gastrointestinal: BS+, soft, NT/ND, neg HSM, +peg  Extremities: No peripheral edema, neg clubbing, cyanosis  Vascular: 2+ peripheral pulses  Neurological: A/O 1 no focal deficits  Psychiatric: Normal mood, normal affect  Skin: No rashes      LABS:                        9.6    5.66  )-----------( 227      ( 06 Mar 2024 09:56 )             30.8     03-06    142  |  102  |  28<H>  ----------------------------<  131<H>  4.1   |  30  |  0.84    Ca    9.3      06 Mar 2024 09:54        Urinalysis Basic - ( 06 Mar 2024 09:54 )    Color: x / Appearance: x / SG: x / pH: x  Gluc: 131 mg/dL / Ketone: x  / Bili: x / Urobili: x   Blood: x / Protein: x / Nitrite: x   Leuk Esterase: x / RBC: x / WBC x   Sq Epi: x / Non Sq Epi: x / Bacteria: x        RADIOLOGY & ADDITIONAL TESTS:

## 2024-03-07 NOTE — PROGRESS NOTE ADULT - PROBLEM SELECTOR PROBLEM 3
Cardiac pacemaker
Cardiac pacemaker
CVA (cerebrovascular accident)
Cardiac pacemaker
Dysphagia
Cardiac pacemaker
CVA (cerebrovascular accident)
CVA (cerebrovascular accident)
Cardiac pacemaker
Cardiac pacemaker
R/O Pneumonia, aspiration
Cardiac pacemaker
CVA (cerebrovascular accident)
Cardiac pacemaker
Dysphagia
Dysphagia
Cardiac pacemaker
Dysphagia
Cardiac pacemaker
Dysphagia
CVA (cerebrovascular accident)
Dysphagia
CVA (cerebrovascular accident)
CVA (cerebrovascular accident)

## 2024-03-07 NOTE — PROGRESS NOTE ADULT - SUBJECTIVE AND OBJECTIVE BOX
Follow-up Pulm Progress Note    Unable to participate in ROS, breathing nonlabored  o2 sats mid 90s RA      Medications:  MEDICATIONS  (STANDING):  acetylcysteine 10%  Inhalation 4 milliLiter(s) Inhalation four times a day  albuterol/ipratropium for Nebulization 3 milliLiter(s) Nebulizer every 8 hours  atorvastatin 20 milliGRAM(s) Oral at bedtime  chlorhexidine 2% Cloths 1 Application(s) Topical <User Schedule>  furosemide    Tablet 40 milliGRAM(s) Oral daily  heparin   Injectable 5000 Unit(s) SubCutaneous every 12 hours  polyethylene glycol 3350 17 Gram(s) Oral daily  senna Syrup 10 milliLiter(s) Oral at bedtime    MEDICATIONS  (PRN):  sodium chloride 0.9% lock flush 10 milliLiter(s) IV Push daily PRN For PEG          Vital Signs Last 24 Hrs  T(C): 36.8 (07 Mar 2024 09:00), Max: 36.8 (06 Mar 2024 23:56)  T(F): 98.3 (07 Mar 2024 09:00), Max: 98.3 (06 Mar 2024 23:56)  HR: 88 (07 Mar 2024 09:00) (84 - 88)  BP: 94/57 (07 Mar 2024 09:00) (94/57 - 128/63)  BP(mean): --  RR: 18 (07 Mar 2024 09:00) (18 - 18)  SpO2: 98% (07 Mar 2024 09:00) (97% - 100%)    Parameters below as of 07 Mar 2024 09:00  Patient On (Oxygen Delivery Method): room air              03-06 @ 07:01  -  03-07 @ 07:00  --------------------------------------------------------  IN: 420 mL / OUT: 120 mL / NET: 300 mL          LABS:                        9.6    5.66  )-----------( 227      ( 06 Mar 2024 09:56 )             30.8     03-06    142  |  102  |  28<H>  ----------------------------<  131<H>  4.1   |  30  |  0.84    Ca    9.3      06 Mar 2024 09:54            CAPILLARY BLOOD GLUCOSE      POCT Blood Glucose.: 174 mg/dL (07 Mar 2024 12:00)      Urinalysis Basic - ( 06 Mar 2024 09:54 )    Color: x / Appearance: x / SG: x / pH: x  Gluc: 131 mg/dL / Ketone: x  / Bili: x / Urobili: x   Blood: x / Protein: x / Nitrite: x   Leuk Esterase: x / RBC: x / WBC x   Sq Epi: x / Non Sq Epi: x / Bacteria: x                      CULTURES: (if applicable)    Culture - Blood (collected 03-05-24 @ 11:33)  Source: .Blood Blood-Peripheral  Preliminary Report (03-06-24 @ 15:11):    No growth at 24 hours    Culture - Blood (collected 02-27-24 @ 18:45)  Source: .Blood Blood  Gram Stain (02-29-24 @ 13:29):    Growth in aerobic bottle: Gram Positive Rods  Final Report (03-01-24 @ 14:01):    Growth in aerobic bottle: Brevibacterium chenpurgense    "Susceptibilities not performed"    Direct identification is available within approximately 3-5    hours either by Blood Panel Multiplexed PCR or Direct    MALDI-TOF. Details: [email or web address]  Organism: Blood Culture PCR (03-01-24 @ 14:01)  Organism: Blood Culture PCR (03-01-24 @ 14:01)      Method Type: PCR      -  Blood PCR Panel: NEG    Culture - Blood (collected 02-27-24 @ 18:30)  Source: .Blood Blood  Final Report (03-03-24 @ 23:01):    No growth at 5 days        Culture - Urine (collected 03-02-24 @ 13:37)  Source: Clean Catch Clean Catch (Midstream)  Final Report (03-03-24 @ 22:30):    <10,000 CFU/mL Normal Urogenital Clau    Culture - Urine (collected 02-28-24 @ 09:13)  Source: Catheterized Catheterized  Final Report (02-29-24 @ 14:10):    <10,000 CFU/mL Normal Urogenital Clau    Physical Examination:  PULM: few b/l rhonchi  CVS: S1, S2 heard    RADIOLOGY REVIEWED  CXR: trace R pl effusion    CT chest:    < from: CT Chest No Cont (02.26.24 @ 08:49) >  FINDINGS:    LUNGS, PLEURA, AND AIRWAYS: No endobronchial lesion. Small bilateral   pleural effusions with partial compressive atelectasis of the lower   lobes, similar to 2/16/2024. There has been some interval improvement of   bilateral upper lobe patchy opacities. Interlobular septal thickening.  MEDIASTINUM AND BERRY: No lymphadenopathy.  VESSELS: Calcification of the aorta and its branches.  HEART: Heart size is normal. Small pericardial effusion, similar to   2/16/2024. Status post TAVR. Mitral annular and coronary artery   calcifications. Left chest wall cardiac device.  CHEST WALL AND LOWER NECK: Enlarged multinodular thyroid gland with a   nodule measuring 2.5 cm on the left.  VISUALIZED UPPER ABDOMEN: Cholecystectomy. Hepatic cyst. Enteric tube   terminates in the distal stomach or proximal duodenum.  BONES: Degenerative changes.    IMPRESSION:    Likely pulmonary edema with small bilateral pleural effusions.    Patchy opacities in the bilateral upper lobes appear slightly improved   compared to 2/16/2024 CT chest.    --- End of Report ---    < end of copied text >

## 2024-03-07 NOTE — PROGRESS NOTE ADULT - NUTRITIONAL ASSESSMENT
This patient has been assessed with a concern for Malnutrition and has been determined to have a diagnosis/diagnoses of Severe protein-calorie malnutrition and Underweight (BMI < 19).    This patient is being managed with:   Diet NPO with Tube Feed-  Tube Feeding Modality: Nasogastric  Jevity 1.2 Cristo (JEVITY1.2RTH)  Total Volume for 24 Hours (mL): 840  Continuous  Starting Tube Feed Rate {mL per Hour}: 10  Increase Tube Feed Rate by (mL): 10     Every 6 hours  Until Goal Tube Feed Rate (mL per Hour): 35  Tube Feed Duration (in Hours): 24  Tube Feed Start Time: 17:00  Entered: Feb 22 2024  5:44PM    Diet NPO with Tube Feed-  Tube Feeding Modality: Nasogastric  Jevity 1.2 Cristo (JEVITY1.2RTH)  Total Volume for 24 Hours (mL): 840  Continuous  Starting Tube Feed Rate {mL per Hour}: 10  Increase Tube Feed Rate by (mL): 10     Every 6 hours  Until Goal Tube Feed Rate (mL per Hour): 35  Tube Feed Duration (in Hours): 24  Tube Feed Start Time: 14:30    Start Time: 21:00  Entered: Feb 22 2024  4:14PM    The following pending diet order is being considered for treatment of Severe protein-calorie malnutrition and Underweight (BMI < 19):null
This patient has been assessed with a concern for Malnutrition and has been determined to have a diagnosis/diagnoses of Severe protein-calorie malnutrition and Underweight (BMI < 19).    This patient is being managed with:   Diet NPO with Tube Feed-  Tube Feeding Modality: Nasogastric  Jevity 1.2 Cristo (JEVITY1.2RTH)  Total Volume for 24 Hours (mL): 840  Continuous  Starting Tube Feed Rate {mL per Hour}: 10  Increase Tube Feed Rate by (mL): 10     Every 6 hours  Until Goal Tube Feed Rate (mL per Hour): 35  Tube Feed Duration (in Hours): 24  Tube Feed Start Time: 17:00  Entered: Feb 22 2024  5:44PM    Diet NPO with Tube Feed-  Tube Feeding Modality: Nasogastric  Jevity 1.2 Cristo (JEVITY1.2RTH)  Total Volume for 24 Hours (mL): 840  Continuous  Starting Tube Feed Rate {mL per Hour}: 10  Increase Tube Feed Rate by (mL): 10     Every 6 hours  Until Goal Tube Feed Rate (mL per Hour): 35  Tube Feed Duration (in Hours): 24  Tube Feed Start Time: 14:30    Start Time: 21:00  Entered: Feb 22 2024  4:14PM    The following pending diet order is being considered for treatment of Severe protein-calorie malnutrition and Underweight (BMI < 19):null
This patient has been assessed with a concern for Malnutrition and has been determined to have a diagnosis/diagnoses of Severe protein-calorie malnutrition and Underweight (BMI < 19).    This patient is being managed with:   Diet NPO-  Except Medications     Special Instructions for Nursing:  Except Medications  Entered: Feb 27 2024  6:51PM    The following pending diet order is being considered for treatment of Severe protein-calorie malnutrition and Underweight (BMI < 19):  Diet NPO with Tube Feed-  Tube Feeding Modality: Nasogastric  Jevity 1.2 Cristo (JEVITY1.2RTH)  Total Volume for 24 Hours (mL): 840  Continuous  Starting Tube Feed Rate {mL per Hour}: 10  Increase Tube Feed Rate by (mL): 10     Every 6 hours  Until Goal Tube Feed Rate (mL per Hour): 35  Tube Feed Duration (in Hours): 24  Tube Feed Start Time: 14:30    Start Time: 21:00  Entered: Feb 22 2024  4:14PM  
This patient has been assessed with a concern for Malnutrition and has been determined to have a diagnosis/diagnoses of Severe protein-calorie malnutrition and Underweight (BMI < 19).    This patient is being managed with:   Diet NPO with Tube Feed-  Tube Feeding Modality: Gastrostomy  Jevity 1.2 Cristo (JEVITY1.2RTH)  Total Volume for 24 Hours (mL): 630  Intermittent  Until Goal Tube Feed Rate (mL per Hour): 35  Tube Feeding Hours ON: 18  Tube Feeding OFF (Hours): 6  Tube Feed Start Time: 11:00    Start Time: 21:00  Entered: Feb 28 2024  2:03PM  
This patient has been assessed with a concern for Malnutrition and has been determined to have a diagnosis/diagnoses of Severe protein-calorie malnutrition and Underweight (BMI < 19).    This patient is being managed with:   Diet NPO with Tube Feed-  Tube Feeding Modality: Nasogastric  Jevity 1.2 Cristo (JEVITY1.2RTH)  Total Volume for 24 Hours (mL): 840  Continuous  Starting Tube Feed Rate {mL per Hour}: 10  Increase Tube Feed Rate by (mL): 10     Every 6 hours  Until Goal Tube Feed Rate (mL per Hour): 35  Tube Feed Duration (in Hours): 24  Tube Feed Start Time: 17:00  Entered: Feb 22 2024  5:44PM    Diet NPO with Tube Feed-  Tube Feeding Modality: Nasogastric  Jevity 1.2 Cristo (JEVITY1.2RTH)  Total Volume for 24 Hours (mL): 840  Continuous  Starting Tube Feed Rate {mL per Hour}: 10  Increase Tube Feed Rate by (mL): 10     Every 6 hours  Until Goal Tube Feed Rate (mL per Hour): 35  Tube Feed Duration (in Hours): 24  Tube Feed Start Time: 14:30    Start Time: 21:00  Entered: Feb 22 2024  4:14PM    The following pending diet order is being considered for treatment of Severe protein-calorie malnutrition and Underweight (BMI < 19):null
This patient has been assessed with a concern for Malnutrition and has been determined to have a diagnosis/diagnoses of Severe protein-calorie malnutrition and Underweight (BMI < 19).    This patient is being managed with:   Diet NPO with Tube Feed-  Tube Feeding Modality: Gastrostomy  Jevity 1.2 Cristo (JEVITY1.2RTH)  Total Volume for 24 Hours (mL): 630  Intermittent  Until Goal Tube Feed Rate (mL per Hour): 35  Tube Feeding Hours ON: 18  Tube Feeding OFF (Hours): 6  Tube Feed Start Time: 11:00    Start Time: 21:00  Entered: Feb 28 2024  2:03PM  
This patient has been assessed with a concern for Malnutrition and has been determined to have a diagnosis/diagnoses of Severe protein-calorie malnutrition and Underweight (BMI < 19).    This patient is being managed with:   Diet NPO with Tube Feed-  Tube Feeding Modality: Nasogastric  Jevity 1.2 Cristo (JEVITY1.2RTH)  Total Volume for 24 Hours (mL): 840  Continuous  Starting Tube Feed Rate {mL per Hour}: 10  Increase Tube Feed Rate by (mL): 10     Every 6 hours  Until Goal Tube Feed Rate (mL per Hour): 35  Tube Feed Duration (in Hours): 24  Tube Feed Start Time: 17:00  Entered: Feb 22 2024  5:44PM    Diet NPO with Tube Feed-  Tube Feeding Modality: Nasogastric  Jevity 1.2 Cristo (JEVITY1.2RTH)  Total Volume for 24 Hours (mL): 840  Continuous  Starting Tube Feed Rate {mL per Hour}: 10  Increase Tube Feed Rate by (mL): 10     Every 6 hours  Until Goal Tube Feed Rate (mL per Hour): 35  Tube Feed Duration (in Hours): 24  Tube Feed Start Time: 14:30    Start Time: 21:00  Entered: Feb 22 2024  4:14PM    The following pending diet order is being considered for treatment of Severe protein-calorie malnutrition and Underweight (BMI < 19):null
This patient has been assessed with a concern for Malnutrition and has been determined to have a diagnosis/diagnoses of Severe protein-calorie malnutrition and Underweight (BMI < 19).    This patient is being managed with:   Diet NPO after Midnight-     NPO Start Date: 26-Feb-2024   NPO Start Time: 23:59  Entered: Feb 26 2024 10:47AM    Diet NPO with Tube Feed-  Tube Feeding Modality: Nasogastric  Jevity 1.2 Cristo (JEVITY1.2RTH)  Total Volume for 24 Hours (mL): 840  Continuous  Starting Tube Feed Rate {mL per Hour}: 10  Increase Tube Feed Rate by (mL): 10     Every 3 hours  Until Goal Tube Feed Rate (mL per Hour): 35  Tube Feed Duration (in Hours): 24  Tube Feed Start Time: 11:00    Start Time: 21:00  Entered: Feb 26 2024 10:47AM    Diet NPO with Tube Feed-  Tube Feeding Modality: Nasogastric  Jevity 1.2 Cristo (JEVITY1.2RTH)  Total Volume for 24 Hours (mL): 840  Continuous  Starting Tube Feed Rate {mL per Hour}: 10  Increase Tube Feed Rate by (mL): 10     Every 6 hours  Until Goal Tube Feed Rate (mL per Hour): 35  Tube Feed Duration (in Hours): 24  Tube Feed Start Time: 14:30    Start Time: 21:00  Entered: Feb 22 2024  4:14PM    The following pending diet order is being considered for treatment of Severe protein-calorie malnutrition and Underweight (BMI < 19):null
This patient has been assessed with a concern for Malnutrition and has been determined to have a diagnosis/diagnoses of Severe protein-calorie malnutrition and Underweight (BMI < 19).    This patient is being managed with:   Diet NPO with Tube Feed-  Tube Feeding Modality: Gastrostomy  Jevity 1.2 Cristo (JEVITY1.2RTH)  Total Volume for 24 Hours (mL): 1080  Intermittent  Starting Tube Feed Rate {mL per Hour}: 35  Increase Tube Feed Rate by (mL): 5    Every 12 hours  Until Goal Tube Feed Rate (mL per Hour): 45  Tube Feeding Hours ON: 24  Tube Feeding OFF (Hours): 0  Entered: Mar  6 2024  3:28PM  
This patient has been assessed with a concern for Malnutrition and has been determined to have a diagnosis/diagnoses of Severe protein-calorie malnutrition and Underweight (BMI < 19).    This patient is being managed with:   Diet NPO with Tube Feed-  Tube Feeding Modality: Gastrostomy  Jevity 1.2 Cristo (JEVITY1.2RTH)  Total Volume for 24 Hours (mL): 630  Intermittent  Until Goal Tube Feed Rate (mL per Hour): 35  Tube Feeding Hours ON: 18  Tube Feeding OFF (Hours): 6  Tube Feed Start Time: 11:00    Start Time: 21:00  Entered: Feb 28 2024  2:03PM  
This patient has been assessed with a concern for Malnutrition and has been determined to have a diagnosis/diagnoses of Severe protein-calorie malnutrition and Underweight (BMI < 19).    This patient is being managed with:   Diet NPO with Tube Feed-  Tube Feeding Modality: Gastrostomy  Jevity 1.2 Cristo (JEVITY1.2RTH)  Total Volume for 24 Hours (mL): 630  Intermittent  Until Goal Tube Feed Rate (mL per Hour): 35  Tube Feeding Hours ON: 18  Tube Feeding OFF (Hours): 6  Tube Feed Start Time: 11:00    Start Time: 21:00  Entered: Feb 28 2024  2:03PM  
This patient has been assessed with a concern for Malnutrition and has been determined to have a diagnosis/diagnoses of Severe protein-calorie malnutrition and Underweight (BMI < 19).    This patient is being managed with:   Diet NPO with Tube Feed-  Tube Feeding Modality: Nasogastric  Jevity 1.2 Cristo (JEVITY1.2RTH)  Total Volume for 24 Hours (mL): 840  Continuous  Starting Tube Feed Rate {mL per Hour}: 10  Increase Tube Feed Rate by (mL): 10     Every 6 hours  Until Goal Tube Feed Rate (mL per Hour): 35  Tube Feed Duration (in Hours): 24  Tube Feed Start Time: 17:00  Entered: Feb 22 2024  5:44PM    Diet NPO with Tube Feed-  Tube Feeding Modality: Nasogastric  Jevity 1.2 Cristo (JEVITY1.2RTH)  Total Volume for 24 Hours (mL): 840  Continuous  Starting Tube Feed Rate {mL per Hour}: 10  Increase Tube Feed Rate by (mL): 10     Every 6 hours  Until Goal Tube Feed Rate (mL per Hour): 35  Tube Feed Duration (in Hours): 24  Tube Feed Start Time: 14:30    Start Time: 21:00  Entered: Feb 22 2024  4:14PM    The following pending diet order is being considered for treatment of Severe protein-calorie malnutrition and Underweight (BMI < 19):null
This patient has been assessed with a concern for Malnutrition and has been determined to have a diagnosis/diagnoses of Severe protein-calorie malnutrition and Underweight (BMI < 19).    This patient is being managed with:   Diet NPO-  Entered: Feb 14 2024 12:22PM  
This patient has been assessed with a concern for Malnutrition and has been determined to have a diagnosis/diagnoses of Severe protein-calorie malnutrition and Underweight (BMI < 19).    This patient is being managed with:   Diet NPO with Tube Feed-  Tube Feeding Modality: Gastrostomy  Jevity 1.2 Cristo (JEVITY1.2RTH)  Total Volume for 24 Hours (mL): 630  Intermittent  Until Goal Tube Feed Rate (mL per Hour): 35  Tube Feeding Hours ON: 18  Tube Feeding OFF (Hours): 6  Tube Feed Start Time: 11:00    Start Time: 21:00  Entered: Feb 28 2024  2:03PM

## 2024-03-07 NOTE — PROGRESS NOTE ADULT - PROBLEM SELECTOR PROBLEM 5
Hypernatremia
Hypernatremia
R/O Pneumonia, aspiration
R/O Pneumonia, aspiration
Hypernatremia
R/O Pneumonia, aspiration
R/O Pneumonia, aspiration

## 2024-03-07 NOTE — PROGRESS NOTE ADULT - PROBLEM SELECTOR PROBLEM 2
Mitral valve stenosis
Mitral valve stenosis
Pleural effusion
Mitral valve stenosis
Mitral valve stenosis
Pleural effusion
CVA (cerebrovascular accident)
Mitral valve stenosis
Pleural effusion
CVA (cerebrovascular accident)
Mitral valve stenosis
CVA (cerebrovascular accident)
Mitral valve stenosis
CVA (cerebrovascular accident)
CVA (cerebrovascular accident)
Mitral valve stenosis
Pleural effusion
Mitral valve stenosis
Mitral valve stenosis
CVA (cerebrovascular accident)
CVA (cerebrovascular accident)
Pleural effusion
Mitral valve stenosis
Pleural effusion
Pleural effusion

## 2024-03-07 NOTE — PROGRESS NOTE ADULT - PROVIDER SPECIALTY LIST ADULT
Anesthesia
Cardiology
Cardiology
Gastroenterology
Infectious Disease
Internal Medicine
Internal Medicine
Neurology
Pulmonology
Pulmonology
Rehab Medicine
Surgery
Cardiology
Cardiology
Gastroenterology
Infectious Disease
Internal Medicine
Intervent Radiology
Intervent Radiology
MICU
MICU
Neurology
Cardiology
Gastroenterology
Internal Medicine
Internal Medicine
Neurology
Pulmonology
Cardiology
Internal Medicine
Cardiology
Pulmonology
Cardiology
Pulmonology
Cardiology
Pulmonology
Cardiology
Pulmonology
Cardiology
Pulmonology

## 2024-03-07 NOTE — PROGRESS NOTE ADULT - ASSESSMENT
89F presented with a stroke c/b respiratory failure     1. Stroke   per neuro     2. Dysphagia   s/p ir g tube, doing well  aspiration precautions    3. respiratory failure, now extubated    4. Constipation, Slow Transit   senna syrup via peg qhs  miralax via peg, flush after administering

## 2024-03-07 NOTE — PROGRESS NOTE ADULT - TIME BILLING
as above
chart review of PT/OT/SLP notes, exam, counseling and education of patient's family on inpatient rehabilitation, coordination of care with rehab team, PT, and 
as above
I was present with the Resident/ACP during the key portions of the history and exam. I discussed the case with the Resident/ACP and agree with the findings and plan as documented in the Resident's/ACP's note, unless noted below.   ROS otherwise negative
as above
chart and data review, clinical assessment, and coordination of care. This excludes any time spent on separate procedures or teaching.
as above
- - -

## 2024-03-07 NOTE — PROGRESS NOTE ADULT - PROBLEM SELECTOR PLAN 1
CT chest with small b/l pl effusions and congestion  -Suggest diuresis as able, keep O>I as tolerated.
Not candidate for tenecteplase as area of hypodensity already appreciated on CT head imaging/age, not candidate for thrombectomy given higher MRS.  LKW: 2130 2/13/24  NIHSS 18  MRS 4 (aide at bedside reports patient requires assistance with all ADLs, including getting out of bed, showering, changing clothes, walking, and since breaking L arm last year, requires also assistance with feeding)  Awaiting MRI   Monitor on Tele rule out PAF , but I suspect this to be the etiology given advanced age and severe MS  Discussed with family and Dr. Siegel    Aspiration precautions.
s/p intubation 2/27 likely 2nd to aspiration event - pt reportedly with increased WOB and increased secretions  -S/p extubation 2/28   -Continue empiric abx  -Keep sats >90% with o2 PRN  -Duoneb PRN  -Suction PRN   -Pt remains at risk for further aspiration events due to poor mental status
Not candidate for tenecteplase as area of hypodensity already appreciated on CT head imaging/age, not candidate for thrombectomy given higher MRS.  LKW: 2130 2/13/24  NIHSS 18  MRS 4 (aide at bedside reports patient requires assistance with all ADLs, including getting out of bed, showering, changing clothes, walking, and since breaking L arm last year, requires also assistance with feeding)  Monitor on Tele rule out PAF , but I suspect this to be the etiology given advanced age and severe MS  Discussed with family and Dr. Siegel    High cardiac risk for lap-assisted G tube placement    Aspiration precautions.
Not candidate for tenecteplase as area of hypodensity already appreciated on CT head imaging/age, not candidate for thrombectomy given higher MRS.  LKW: 2130 2/13/24  NIHSS 18  MRS 4 (aide at bedside reports patient requires assistance with all ADLs, including getting out of bed, showering, changing clothes, walking, and since breaking L arm last year, requires also assistance with feeding)  Monitor on Tele rule out PAF , but I suspect this to be the etiology given advanced age and severe MS  Discussed with family and Dr. Siegel    High cardiac risk for lap-assisted G tube placement    Aspiration precautions.
Not candidate for tenecteplase as area of hypodensity already appreciated on CT head imaging/age, not candidate for thrombectomy given higher MRS.  LKW: 2130 2/13/24  NIHSS 18  MRS 4 (aide at bedside reports patient requires assistance with all ADLs, including getting out of bed, showering, changing clothes, walking, and since breaking L arm last year, requires also assistance with feeding)  Monitor on Tele rule out PAF , but I suspect this to be the etiology given advanced age and severe MS  Discussed with family and Dr. Siegel    s/p IR G tube placement  s/p respiratory failure now extubated  Orders per ICU   Aspiration precautions.
s/p intubation 2/27 likely 2nd to aspiration event - pt reportedly with increased WOB and increased secretions  -S/p extubation 2/28   -No clear infiltrate on CXR   -S/p 3 days of empiric Meropenem   -Keep sats >90% with o2 PRN (currently RA)  -Duoneb q8h started   -Suction PRN   -F/u CXR ordered   -Pt remains at risk for further aspiration events due to poor mental status
CT chest with small b/l pl effusions and congestion  -Suggest keep O>I as tolerated.  -Wean o2 as tolerated, keep sats >90% (currently RA).
Not candidate for tenecteplase as area of hypodensity already appreciated on CT head imaging/age, not candidate for thrombectomy given higher MRS.  LKW: 2130 2/13/24  NIHSS 18  MRS 4 (aide at bedside reports patient requires assistance with all ADLs, including getting out of bed, showering, changing clothes, walking, and since breaking L arm last year, requires also assistance with feeding)  Monitor on Tele rule out PAF , but I suspect this to be the etiology given advanced age and severe MS  Discussed with family and Dr. Siegel    s/p IR G tube placement  s/p respiratory failure now extubated  Orders per ICU   Aspiration precautions.
s/p intubation 2/27 likely 2nd to aspiration event - pt reportedly with increased WOB and increased secretions  -S/p extubation 2/28   -No clear infiltrate on CXR   -S/p 3 days of empiric Meropenem   -Keep sats >90% with o2 PRN (currently RA)  -Duoneb q8h   -Suction PRN   -Pt remains at risk for further aspiration events due to poor mental status and inability to handle secretions
-CT chest 2/16 with small b/l pl effusions and congestion  -CT chest 2/26 with increase in effusions, congestion  -Suggest Lasix 20mg IVP x1. Keep O>I as tolerated.  -Keep sats >90% with o2 PRN
CT chest with small b/l pl effusions and congestion  -Suggest keep O>I as tolerated.  -Wean o2 as tolerated, keep sats >90% (currently RA).
Not candidate for tenecteplase as area of hypodensity already appreciated on CT head imaging/age, not candidate for thrombectomy given higher MRS.  LKW: 2130 2/13/24  NIHSS 18  MRS 4 (aide at bedside reports patient requires assistance with all ADLs, including getting out of bed, showering, changing clothes, walking, and since breaking L arm last year, requires also assistance with feeding)  Awaiting MRI   Monitor on Tele rule out PAF , but I suspect this to be the etiology given advanced age and severe MS  Discussed with family and Dr. Siegel    S/S eval   Aspiration precautions.
Not candidate for tenecteplase as area of hypodensity already appreciated on CT head imaging/age, not candidate for thrombectomy given higher MRS.  LKW: 2130 2/13/24  NIHSS 18  MRS 4 (aide at bedside reports patient requires assistance with all ADLs, including getting out of bed, showering, changing clothes, walking, and since breaking L arm last year, requires also assistance with feeding)  Monitor on Tele rule out PAF , but I suspect this to be the etiology given advanced age and severe MS  Discussed with family and Dr. Siegel    s/p IR G tube placement  s/p respiratory failure now extubated  Orders per ICU   Aspiration precautions.
s/p intubation 2/27 likely 2nd to aspiration event - pt reportedly with increased WOB and increased secretions  -S/p extubation 2/28   -No clear infiltrate on CXR   -S/p 3 days of empiric Meropenem   -Keep sats >90% with o2 PRN (currently RA)  -Duoneb q8h. Change to q8h PRN on discharge.   -Suction PRN   -Pt remains at risk for further aspiration events due to poor mental status and inability to handle secretions
CT chest with small b/l pl effusions and congestion  -Suggest keep O>I as tolerated.  -Wean o2 as tolerated, keep sats >90% (currently 1LNC)
Not candidate for tenecteplase as area of hypodensity already appreciated on CT head imaging/age, not candidate for thrombectomy given higher MRS.  LKW: 2130 2/13/24  NIHSS 18  MRS 4 (aide at bedside reports patient requires assistance with all ADLs, including getting out of bed, showering, changing clothes, walking, and since breaking L arm last year, requires also assistance with feeding)  Monitor on Tele rule out PAF , but I suspect this to be the etiology given advanced age and severe MS  Discussed with family and Dr. Siegel    Possible PEG once hypernatremia resolves   Aspiration precautions.
Not candidate for tenecteplase as area of hypodensity already appreciated on CT head imaging/age, not candidate for thrombectomy given higher MRS.  LKW: 2130 2/13/24  NIHSS 18  MRS 4 (aide at bedside reports patient requires assistance with all ADLs, including getting out of bed, showering, changing clothes, walking, and since breaking L arm last year, requires also assistance with feeding)  Monitor on Tele rule out PAF , but I suspect this to be the etiology given advanced age and severe MS  Discussed with family and Dr. Siegel    Possible PEG once hypernatremia resolves . Discussed with daughter and son in law the risks associated with PEG   Aspiration precautions.
Not candidate for tenecteplase as area of hypodensity already appreciated on CT head imaging/age, not candidate for thrombectomy given higher MRS.  LKW: 2130 2/13/24  NIHSS 18  MRS 4 (aide at bedside reports patient requires assistance with all ADLs, including getting out of bed, showering, changing clothes, walking, and since breaking L arm last year, requires also assistance with feeding)  Awaiting MRI   Monitor on Tele rule out PAF , but I suspect this to be the etiology given advanced age and severe MS  Discussed with family and Dr. Siegel    Aspiration precautions.
Not candidate for tenecteplase as area of hypodensity already appreciated on CT head imaging/age, not candidate for thrombectomy given higher MRS.  LKW: 2130 2/13/24  NIHSS 18  MRS 4 (aide at bedside reports patient requires assistance with all ADLs, including getting out of bed, showering, changing clothes, walking, and since breaking L arm last year, requires also assistance with feeding)  Monitor on Tele rule out PAF , but I suspect this to be the etiology given advanced age and severe MS  Discussed with family and Dr. Siegel    High cardiac risk for lap-assisted G tube placement    Aspiration precautions.
Not candidate for tenecteplase as area of hypodensity already appreciated on CT head imaging/age, not candidate for thrombectomy given higher MRS.  LKW: 2130 2/13/24  NIHSS 18  MRS 4 (aide at bedside reports patient requires assistance with all ADLs, including getting out of bed, showering, changing clothes, walking, and since breaking L arm last year, requires also assistance with feeding)  Monitor on Tele rule out PAF , but I suspect this to be the etiology given advanced age and severe MS  Discussed with family and Dr. Siegel    s/p IR G tube placement  s/p respiratory failure now intubated   Orders per ICU     Aspiration precautions.
Not candidate for tenecteplase as area of hypodensity already appreciated on CT head imaging/age, not candidate for thrombectomy given higher MRS.  LKW: 2130 2/13/24  NIHSS 18  MRS 4 (aide at bedside reports patient requires assistance with all ADLs, including getting out of bed, showering, changing clothes, walking, and since breaking L arm last year, requires also assistance with feeding)  Monitor on Tele rule out PAF , but I suspect this to be the etiology given advanced age and severe MS  Discussed with family and Dr. Siegel    s/p IR G tube placement  s/p respiratory failure now extubated  Orders per ICU   Aspiration precautions.
Not candidate for tenecteplase as area of hypodensity already appreciated on CT head imaging/age, not candidate for thrombectomy given higher MRS.  LKW: 2130 2/13/24  NIHSS 18  MRS 4 (aide at bedside reports patient requires assistance with all ADLs, including getting out of bed, showering, changing clothes, walking, and since breaking L arm last year, requires also assistance with feeding)  Awaiting MRI   Monitor on Tele rule out PAF , but I suspect this to be the etiology given advanced age and severe MS  Discussed with family and Dr. Siegel    Aspiration precautions.
Not candidate for tenecteplase as area of hypodensity already appreciated on CT head imaging/age, not candidate for thrombectomy given higher MRS.  LKW: 2130 2/13/24  NIHSS 18  MRS 4 (aide at bedside reports patient requires assistance with all ADLs, including getting out of bed, showering, changing clothes, walking, and since breaking L arm last year, requires also assistance with feeding)  Monitor on Tele rule out PAF , but I suspect this to be the etiology given advanced age and severe MS  Discussed with family and Dr. Siegel    Planning for IR G-tube placement pending CT    Aspiration precautions.
Not candidate for tenecteplase as area of hypodensity already appreciated on CT head imaging/age, not candidate for thrombectomy given higher MRS.  LKW: 2130 2/13/24  NIHSS 18  MRS 4 (aide at bedside reports patient requires assistance with all ADLs, including getting out of bed, showering, changing clothes, walking, and since breaking L arm last year, requires also assistance with feeding)  Monitor on Tele rule out PAF , but I suspect this to be the etiology given advanced age and severe MS  Discussed with family and Dr. Siegel    s/p IR G tube placement  s/p respiratory failure now extubated  Orders per ICU   Aspiration precautions.
s/p intubation 2/27 likely 2nd to aspiration event - pt reportedly with increased WOB and increased secretions  -S/p extubation 2/28   -No clear infiltrate on CXR   -S/p 3 days of empiric Meropenem   -Keep sats >90% with o2 PRN (currently RA)  -Duoneb q8h   -Suction PRN   -Pt remains at risk for further aspiration events due to poor mental status and inability to handle secretions
Not candidate for tenecteplase as area of hypodensity already appreciated on CT head imaging/age, not candidate for thrombectomy given higher MRS.  LKW: 2130 2/13/24  NIHSS 18  MRS 4 (aide at bedside reports patient requires assistance with all ADLs, including getting out of bed, showering, changing clothes, walking, and since breaking L arm last year, requires also assistance with feeding)  Monitor on Tele rule out PAF , but I suspect this to be the etiology given advanced age and severe MS  Discussed with family and Dr. Siegel    High cardiac risk for lap-assisted/IR G tube placement    Aspiration precautions.
Not candidate for tenecteplase as area of hypodensity already appreciated on CT head imaging/age, not candidate for thrombectomy given higher MRS.  LKW: 2130 2/13/24  NIHSS 18  MRS 4 (aide at bedside reports patient requires assistance with all ADLs, including getting out of bed, showering, changing clothes, walking, and since breaking L arm last year, requires also assistance with feeding)  Monitor on Tele rule out PAF , but I suspect this to be the etiology given advanced age and severe MS  Discussed with family and Dr. Siegel    Planning for PEG. Intermediate cardiac risk to proceed. Discussed with daughter and son in law the risks associated with PEG   Aspiration precautions.
Not candidate for tenecteplase as area of hypodensity already appreciated on CT head imaging/age, not candidate for thrombectomy given higher MRS.  LKW: 2130 2/13/24  NIHSS 18  MRS 4 (aide at bedside reports patient requires assistance with all ADLs, including getting out of bed, showering, changing clothes, walking, and since breaking L arm last year, requires also assistance with feeding)  Monitor on Tele rule out PAF , but I suspect this to be the etiology given advanced age and severe MS  Discussed with family and Dr. Siegel    s/p IR G tube placement  s/p respiratory failure now extubated  Orders per ICU   Aspiration precautions.
Not candidate for tenecteplase as area of hypodensity already appreciated on CT head imaging/age, not candidate for thrombectomy given higher MRS.  LKW: 2130 2/13/24  NIHSS 18  MRS 4 (aide at bedside reports patient requires assistance with all ADLs, including getting out of bed, showering, changing clothes, walking, and since breaking L arm last year, requires also assistance with feeding)  Monitor on Tele rule out PAF , but I suspect this to be the etiology given advanced age and severe MS  Discussed with family and Dr. Siegel    s/p IR G tube placement  s/p respiratory failure now extubated  Orders per ICU   Aspiration precautions.
s/p intubation 2/27 likely 2nd to aspiration event - pt reportedly with increased WOB and increased secretions  -S/p extubation 2/28   -No clear infiltrate on CXR   -S/p 3 days of empiric Meropenem   -Keep sats >90% with o2 PRN (currently RA)  -Duoneb q8h. Change to q8h PRN on discharge.   -Suction PRN   -Pt remains at risk for further aspiration events due to poor mental status and inability to handle secretions
Not candidate for tenecteplase as area of hypodensity already appreciated on CT head imaging/age, not candidate for thrombectomy given higher MRS.  LKW: 2130 2/13/24  NIHSS 18  MRS 4 (aide at bedside reports patient requires assistance with all ADLs, including getting out of bed, showering, changing clothes, walking, and since breaking L arm last year, requires also assistance with feeding)  Monitor on Tele rule out PAF , but I suspect this to be the etiology given advanced age and severe MS  Discussed with family and Dr. Siegel    s/p IR G tube placement  s/p respiratory failure now extubated  Orders per ICU   Aspiration precautions.
CT chest with small b/l pl effusions and congestion  -Suggest keep O>I as tolerated.  -Wean o2 as tolerated, keep sats >90% (currently RA).
CT chest with small b/l pl effusions and congestion  -Suggest keep O>I as tolerated.  -Keep sats >90% with o2 PRN (currently RA).
s/p intubation 2/27 likely 2nd to aspiration event - pt reportedly with increased WOB and increased secretions   -Continue empiric abx  -Keep pH >7.20, SpO2 >90%   -PS trials as tolerated

## 2024-03-10 LAB
CULTURE RESULTS: SIGNIFICANT CHANGE UP
SPECIMEN SOURCE: SIGNIFICANT CHANGE UP

## (undated) DEVICE — TUBING SUCTION CONN 6FT STERILE

## (undated) DEVICE — TUBING IV SET GRAVITY 3Y 100" MACRO

## (undated) DEVICE — Device

## (undated) DEVICE — PACK IV START WITH CHG

## (undated) DEVICE — DEVICE GRASPING RAPTOR

## (undated) DEVICE — SNARE OVAL LOOP MICOR

## (undated) DEVICE — CATH IV SAFE BC 20G X 1.16" (PINK)

## (undated) DEVICE — CATH IV SAFE BC 22G X 1" (BLUE)

## (undated) DEVICE — BALLOON US ENDO

## (undated) DEVICE — SOL INJ NS 0.9% 500ML 2 PORT

## (undated) DEVICE — SYR ALLIANCE II INFLATION 60ML

## (undated) DEVICE — FOLEY HOLDER STATLOCK 2 WAY ADULT

## (undated) DEVICE — SENSOR O2 FINGER ADULT

## (undated) DEVICE — TUBING SUCTION 20FT

## (undated) DEVICE — SUCTION YANKAUER NO CONTROL VENT

## (undated) DEVICE — BITE BLOCK ADULT 20 X 27MM (GREEN)